# Patient Record
Sex: MALE | Race: WHITE | NOT HISPANIC OR LATINO
[De-identification: names, ages, dates, MRNs, and addresses within clinical notes are randomized per-mention and may not be internally consistent; named-entity substitution may affect disease eponyms.]

---

## 2018-01-09 ENCOUNTER — RECORD ABSTRACTING (OUTPATIENT)
Age: 83
End: 2018-01-09

## 2018-01-09 DIAGNOSIS — Z86.79 PERSONAL HISTORY OF OTHER DISEASES OF THE CIRCULATORY SYSTEM: ICD-10-CM

## 2018-01-09 DIAGNOSIS — E66.9 OBESITY, UNSPECIFIED: ICD-10-CM

## 2018-01-09 RX ORDER — METFORMIN HYDROCHLORIDE 1000 MG/1
1000 TABLET, COATED ORAL
Refills: 0 | Status: ACTIVE | COMMUNITY

## 2018-01-09 RX ORDER — ASPIRIN 81 MG
81 TABLET, DELAYED RELEASE (ENTERIC COATED) ORAL
Refills: 0 | Status: ACTIVE | COMMUNITY

## 2018-01-09 RX ORDER — VIT A/VIT C/VIT E/ZINC/COPPER 2148-113
TABLET ORAL
Refills: 0 | Status: ACTIVE | COMMUNITY

## 2018-01-29 ENCOUNTER — RX RENEWAL (OUTPATIENT)
Age: 83
End: 2018-01-29

## 2018-01-29 ENCOUNTER — MEDICATION RENEWAL (OUTPATIENT)
Age: 83
End: 2018-01-29

## 2018-02-05 ENCOUNTER — APPOINTMENT (OUTPATIENT)
Dept: CARDIOLOGY | Facility: CLINIC | Age: 83
End: 2018-02-05
Payer: MEDICARE

## 2018-02-05 VITALS
SYSTOLIC BLOOD PRESSURE: 130 MMHG | HEIGHT: 69 IN | BODY MASS INDEX: 31.84 KG/M2 | HEART RATE: 84 BPM | WEIGHT: 215 LBS | RESPIRATION RATE: 14 BRPM | DIASTOLIC BLOOD PRESSURE: 62 MMHG

## 2018-02-05 PROCEDURE — 93000 ELECTROCARDIOGRAM COMPLETE: CPT

## 2018-02-05 PROCEDURE — 99214 OFFICE O/P EST MOD 30 MIN: CPT

## 2018-02-05 RX ORDER — METOPROLOL SUCCINATE 50 MG/1
50 TABLET, EXTENDED RELEASE ORAL DAILY
Qty: 90 | Refills: 3 | Status: ACTIVE | COMMUNITY
Start: 1900-01-01 | End: 1900-01-01

## 2018-03-20 ENCOUNTER — MEDICATION RENEWAL (OUTPATIENT)
Age: 83
End: 2018-03-20

## 2018-03-20 RX ORDER — SIMVASTATIN 10 MG/1
10 TABLET, FILM COATED ORAL
Qty: 90 | Refills: 3 | Status: ACTIVE | COMMUNITY
Start: 1900-01-01 | End: 1900-01-01

## 2018-03-24 ENCOUNTER — APPOINTMENT (OUTPATIENT)
Dept: CARDIOLOGY | Facility: CLINIC | Age: 83
End: 2018-03-24
Payer: MEDICARE

## 2018-03-24 VITALS
RESPIRATION RATE: 14 BRPM | HEIGHT: 69 IN | HEART RATE: 78 BPM | BODY MASS INDEX: 31.84 KG/M2 | WEIGHT: 215 LBS | SYSTOLIC BLOOD PRESSURE: 148 MMHG | DIASTOLIC BLOOD PRESSURE: 70 MMHG

## 2018-03-24 PROCEDURE — 93000 ELECTROCARDIOGRAM COMPLETE: CPT

## 2018-03-24 PROCEDURE — 99214 OFFICE O/P EST MOD 30 MIN: CPT

## 2018-04-04 ENCOUNTER — RX RENEWAL (OUTPATIENT)
Age: 83
End: 2018-04-04

## 2018-04-04 RX ORDER — LISINOPRIL 10 MG/1
10 TABLET ORAL
Qty: 90 | Refills: 3 | Status: ACTIVE | COMMUNITY
Start: 2018-04-04 | End: 1900-01-01

## 2018-06-08 ENCOUNTER — APPOINTMENT (OUTPATIENT)
Dept: CARDIOLOGY | Facility: CLINIC | Age: 83
End: 2018-06-08
Payer: MEDICARE

## 2018-06-08 VITALS
DIASTOLIC BLOOD PRESSURE: 60 MMHG | RESPIRATION RATE: 14 BRPM | WEIGHT: 206 LBS | HEIGHT: 69 IN | SYSTOLIC BLOOD PRESSURE: 112 MMHG | HEART RATE: 81 BPM | BODY MASS INDEX: 30.51 KG/M2

## 2018-06-08 PROCEDURE — 99214 OFFICE O/P EST MOD 30 MIN: CPT

## 2018-06-08 PROCEDURE — 93000 ELECTROCARDIOGRAM COMPLETE: CPT

## 2018-06-08 RX ORDER — AZELASTINE HYDROCHLORIDE 137 UG/1
0.1 SPRAY, METERED NASAL
Qty: 30 | Refills: 0 | Status: DISCONTINUED | COMMUNITY
Start: 2018-04-20

## 2018-06-08 RX ORDER — AZITHROMYCIN 250 MG/1
250 TABLET, FILM COATED ORAL
Qty: 6 | Refills: 0 | Status: DISCONTINUED | COMMUNITY
Start: 2018-04-23

## 2018-06-08 RX ORDER — AMOXICILLIN 500 MG/1
500 CAPSULE ORAL
Qty: 4 | Refills: 0 | Status: DISCONTINUED | COMMUNITY
Start: 2018-01-16

## 2018-06-08 RX ORDER — AMOXICILLIN 875 MG/1
875 TABLET, FILM COATED ORAL
Qty: 20 | Refills: 0 | Status: DISCONTINUED | COMMUNITY
Start: 2018-01-19

## 2018-06-08 RX ORDER — LEVOFLOXACIN 500 MG/1
500 TABLET, FILM COATED ORAL
Qty: 5 | Refills: 0 | Status: DISCONTINUED | COMMUNITY
Start: 2018-04-20

## 2018-06-08 RX ORDER — BACITRACIN 500 [USP'U]/G
500 OINTMENT OPHTHALMIC
Qty: 4 | Refills: 0 | Status: DISCONTINUED | COMMUNITY
Start: 2018-04-16

## 2018-06-09 ENCOUNTER — APPOINTMENT (OUTPATIENT)
Dept: CARDIOLOGY | Facility: CLINIC | Age: 83
End: 2018-06-09
Payer: MEDICARE

## 2018-06-09 DIAGNOSIS — R94.31 ABNORMAL ELECTROCARDIOGRAM [ECG] [EKG]: ICD-10-CM

## 2018-06-09 PROCEDURE — 78452 HT MUSCLE IMAGE SPECT MULT: CPT

## 2018-06-09 PROCEDURE — 93015 CV STRESS TEST SUPVJ I&R: CPT

## 2018-06-09 PROCEDURE — A9500: CPT

## 2018-06-11 ENCOUNTER — APPOINTMENT (OUTPATIENT)
Dept: CARDIOLOGY | Facility: CLINIC | Age: 83
End: 2018-06-11
Payer: MEDICARE

## 2018-06-11 PROCEDURE — 93306 TTE W/DOPPLER COMPLETE: CPT

## 2018-06-13 PROBLEM — R94.31 ABNORMAL EKG: Status: ACTIVE | Noted: 2018-06-13

## 2018-06-13 RX ORDER — KIT FOR THE PREPARATION OF TECHNETIUM TC99M SESTAMIBI 1 MG/5ML
INJECTION, POWDER, LYOPHILIZED, FOR SOLUTION PARENTERAL
Refills: 0 | Status: COMPLETED | OUTPATIENT
Start: 2018-06-13

## 2018-06-13 RX ADMIN — KIT FOR THE PREPARATION OF TECHNETIUM TC99M SESTAMIBI 0: 1 INJECTION, POWDER, LYOPHILIZED, FOR SOLUTION PARENTERAL at 00:00

## 2018-06-27 ENCOUNTER — RX RENEWAL (OUTPATIENT)
Age: 83
End: 2018-06-27

## 2018-07-23 ENCOUNTER — APPOINTMENT (OUTPATIENT)
Dept: CARDIOLOGY | Facility: CLINIC | Age: 83
End: 2018-07-23

## 2018-07-26 ENCOUNTER — APPOINTMENT (OUTPATIENT)
Dept: CARDIOLOGY | Facility: CLINIC | Age: 83
End: 2018-07-26
Payer: MEDICARE

## 2018-07-26 VITALS
BODY MASS INDEX: 29.33 KG/M2 | DIASTOLIC BLOOD PRESSURE: 68 MMHG | HEIGHT: 69 IN | HEART RATE: 70 BPM | WEIGHT: 198 LBS | SYSTOLIC BLOOD PRESSURE: 124 MMHG | RESPIRATION RATE: 14 BRPM

## 2018-07-26 DIAGNOSIS — H02.9 UNSPECIFIED DISORDER OF EYELID: ICD-10-CM

## 2018-07-26 DIAGNOSIS — I38 ENDOCARDITIS, VALVE UNSPECIFIED: ICD-10-CM

## 2018-07-26 PROCEDURE — 93000 ELECTROCARDIOGRAM COMPLETE: CPT

## 2018-07-26 PROCEDURE — 99215 OFFICE O/P EST HI 40 MIN: CPT

## 2018-07-26 RX ORDER — CEPHALEXIN 500 MG/1
500 CAPSULE ORAL
Qty: 28 | Refills: 0 | Status: DISCONTINUED | COMMUNITY
Start: 2018-06-07 | End: 2018-07-26

## 2018-07-27 ENCOUNTER — APPOINTMENT (OUTPATIENT)
Dept: UROLOGY | Facility: CLINIC | Age: 83
End: 2018-07-27
Payer: MEDICARE

## 2018-07-27 VITALS
WEIGHT: 198 LBS | BODY MASS INDEX: 29.33 KG/M2 | TEMPERATURE: 97.9 F | SYSTOLIC BLOOD PRESSURE: 124 MMHG | HEART RATE: 67 BPM | DIASTOLIC BLOOD PRESSURE: 72 MMHG | RESPIRATION RATE: 14 BRPM | HEIGHT: 69 IN

## 2018-07-27 DIAGNOSIS — Z78.9 OTHER SPECIFIED HEALTH STATUS: ICD-10-CM

## 2018-07-27 DIAGNOSIS — Z85.46 PERSONAL HISTORY OF MALIGNANT NEOPLASM OF PROSTATE: ICD-10-CM

## 2018-07-27 DIAGNOSIS — E10.641 TYPE 1 DIABETES MELLITUS WITH HYPOGLYCEMIA WITH COMA: ICD-10-CM

## 2018-07-27 DIAGNOSIS — Z86.2 PERSONAL HISTORY OF DISEASES OF THE BLOOD AND BLOOD-FORMING ORGANS AND CERTAIN DISORDERS INVOLVING THE IMMUNE MECHANISM: ICD-10-CM

## 2018-07-27 PROBLEM — H02.9 EYELID LESION: Status: ACTIVE | Noted: 2018-07-27

## 2018-07-27 PROBLEM — I38 VALVULAR HEART DISEASE: Status: ACTIVE | Noted: 2018-01-09

## 2018-07-27 PROCEDURE — 99204 OFFICE O/P NEW MOD 45 MIN: CPT

## 2018-08-16 ENCOUNTER — CLINICAL ADVICE (OUTPATIENT)
Age: 83
End: 2018-08-16

## 2018-08-17 ENCOUNTER — OUTPATIENT (OUTPATIENT)
Dept: OUTPATIENT SERVICES | Facility: HOSPITAL | Age: 83
LOS: 1 days | End: 2018-08-17
Payer: MEDICARE

## 2018-08-17 ENCOUNTER — APPOINTMENT (OUTPATIENT)
Dept: UROLOGY | Facility: CLINIC | Age: 83
End: 2018-08-17
Payer: MEDICARE

## 2018-08-17 ENCOUNTER — INPATIENT (INPATIENT)
Facility: HOSPITAL | Age: 83
LOS: 6 days | Discharge: HOME | DRG: 728 | End: 2018-08-24
Attending: UROLOGY | Admitting: UROLOGY
Payer: MEDICARE

## 2018-08-17 VITALS
HEART RATE: 96 BPM | OXYGEN SATURATION: 98 % | SYSTOLIC BLOOD PRESSURE: 128 MMHG | DIASTOLIC BLOOD PRESSURE: 68 MMHG | TEMPERATURE: 99 F | WEIGHT: 197.98 LBS | RESPIRATION RATE: 18 BRPM

## 2018-08-17 VITALS
DIASTOLIC BLOOD PRESSURE: 73 MMHG | TEMPERATURE: 98.2 F | HEART RATE: 89 BPM | SYSTOLIC BLOOD PRESSURE: 145 MMHG | RESPIRATION RATE: 18 BRPM

## 2018-08-17 DIAGNOSIS — N41.2 ABSCESS OF PROSTATE: ICD-10-CM

## 2018-08-17 LAB
ANION GAP SERPL CALC-SCNC: 14 MMOL/L — SIGNIFICANT CHANGE UP (ref 5–17)
APPEARANCE UR: ABNORMAL
APTT BLD: 31.6 SEC — SIGNIFICANT CHANGE UP (ref 27.5–37.4)
BASOPHILS # BLD AUTO: 0 K/UL — SIGNIFICANT CHANGE UP (ref 0–0.2)
BASOPHILS NFR BLD AUTO: 0.1 % — SIGNIFICANT CHANGE UP (ref 0–2)
BILIRUB UR-MCNC: SIGNIFICANT CHANGE UP
BLD GP AB SCN SERPL QL: NEGATIVE — SIGNIFICANT CHANGE UP
BUN SERPL-MCNC: 24 MG/DL — HIGH (ref 7–23)
CALCIUM SERPL-MCNC: 8.7 MG/DL — SIGNIFICANT CHANGE UP (ref 8.4–10.5)
CHLORIDE SERPL-SCNC: 86 MMOL/L — LOW (ref 96–108)
CO2 SERPL-SCNC: 22 MMOL/L — SIGNIFICANT CHANGE UP (ref 22–31)
COLOR SPEC: SIGNIFICANT CHANGE UP
CREAT SERPL-MCNC: 1.18 MG/DL — SIGNIFICANT CHANGE UP (ref 0.5–1.3)
CRP SERPL-MCNC: 22.46 MG/DL — HIGH (ref 0–0.4)
DIFF PNL FLD: SIGNIFICANT CHANGE UP
EOSINOPHIL # BLD AUTO: 0 K/UL — SIGNIFICANT CHANGE UP (ref 0–0.5)
EOSINOPHIL NFR BLD AUTO: 0.2 % — SIGNIFICANT CHANGE UP (ref 0–6)
ERYTHROCYTE [SEDIMENTATION RATE] IN BLOOD: 96 MM/HR — HIGH (ref 0–20)
GLUCOSE BLDC GLUCOMTR-MCNC: 242 MG/DL — HIGH (ref 70–99)
GLUCOSE SERPL-MCNC: 267 MG/DL — HIGH (ref 70–99)
GLUCOSE UR QL: SIGNIFICANT CHANGE UP
HCT VFR BLD CALC: 32.8 % — LOW (ref 39–50)
HGB BLD-MCNC: 10.8 G/DL — LOW (ref 13–17)
INR BLD: 1.89 RATIO — HIGH (ref 0.88–1.16)
KETONES UR-MCNC: SIGNIFICANT CHANGE UP
LEUKOCYTE ESTERASE UR-ACNC: SIGNIFICANT CHANGE UP
LYMPHOCYTES # BLD AUTO: 0.8 K/UL — LOW (ref 1–3.3)
LYMPHOCYTES # BLD AUTO: 6.2 % — LOW (ref 13–44)
MAGNESIUM SERPL-MCNC: 1.8 MG/DL — SIGNIFICANT CHANGE UP (ref 1.6–2.6)
MCHC RBC-ENTMCNC: 27.1 PG — SIGNIFICANT CHANGE UP (ref 27–34)
MCHC RBC-ENTMCNC: 32.9 GM/DL — SIGNIFICANT CHANGE UP (ref 32–36)
MCV RBC AUTO: 82.4 FL — SIGNIFICANT CHANGE UP (ref 80–100)
MONOCYTES # BLD AUTO: 0.8 K/UL — SIGNIFICANT CHANGE UP (ref 0–0.9)
MONOCYTES NFR BLD AUTO: 5.8 % — SIGNIFICANT CHANGE UP (ref 2–14)
NEUTROPHILS # BLD AUTO: 11.5 K/UL — HIGH (ref 1.8–7.4)
NEUTROPHILS NFR BLD AUTO: 87.7 % — HIGH (ref 43–77)
NITRITE UR-MCNC: SIGNIFICANT CHANGE UP
PH UR: SIGNIFICANT CHANGE UP (ref 5–8)
PLATELET # BLD AUTO: 202 K/UL — SIGNIFICANT CHANGE UP (ref 150–400)
POTASSIUM SERPL-MCNC: 5.3 MMOL/L — SIGNIFICANT CHANGE UP (ref 3.5–5.3)
POTASSIUM SERPL-SCNC: 5.3 MMOL/L — SIGNIFICANT CHANGE UP (ref 3.5–5.3)
PROT UR-MCNC: SIGNIFICANT CHANGE UP
PROTHROM AB SERPL-ACNC: 20.7 SEC — HIGH (ref 9.8–12.7)
PSA FLD-MCNC: 0.66 NG/ML — SIGNIFICANT CHANGE UP (ref 0–4)
RBC # BLD: 3.99 M/UL — LOW (ref 4.2–5.8)
RBC # FLD: 13.6 % — SIGNIFICANT CHANGE UP (ref 10.3–14.5)
RH IG SCN BLD-IMP: POSITIVE — SIGNIFICANT CHANGE UP
SODIUM SERPL-SCNC: 122 MMOL/L — LOW (ref 135–145)
SP GR SPEC: 1.03 — HIGH (ref 1.01–1.02)
UROBILINOGEN FLD QL: SIGNIFICANT CHANGE UP
WBC # BLD: 13.2 K/UL — HIGH (ref 3.8–10.5)
WBC # FLD AUTO: 13.2 K/UL — HIGH (ref 3.8–10.5)

## 2018-08-17 PROCEDURE — 93010 ELECTROCARDIOGRAM REPORT: CPT

## 2018-08-17 PROCEDURE — 51702 INSERT TEMP BLADDER CATH: CPT

## 2018-08-17 PROCEDURE — 71045 X-RAY EXAM CHEST 1 VIEW: CPT | Mod: 26

## 2018-08-17 PROCEDURE — 99285 EMERGENCY DEPT VISIT HI MDM: CPT | Mod: 25

## 2018-08-17 PROCEDURE — 99222 1ST HOSP IP/OBS MODERATE 55: CPT | Mod: AI,25

## 2018-08-17 RX ORDER — DEXTROSE 50 % IN WATER 50 %
12.5 SYRINGE (ML) INTRAVENOUS ONCE
Qty: 0 | Refills: 0 | Status: DISCONTINUED | OUTPATIENT
Start: 2018-08-17 | End: 2018-08-24

## 2018-08-17 RX ORDER — MORPHINE SULFATE 50 MG/1
4 CAPSULE, EXTENDED RELEASE ORAL ONCE
Qty: 0 | Refills: 0 | Status: DISCONTINUED | OUTPATIENT
Start: 2018-08-17 | End: 2018-08-17

## 2018-08-17 RX ORDER — METOPROLOL TARTRATE 50 MG
50 TABLET ORAL DAILY
Qty: 0 | Refills: 0 | Status: DISCONTINUED | OUTPATIENT
Start: 2018-08-17 | End: 2018-08-24

## 2018-08-17 RX ORDER — SIMVASTATIN 20 MG/1
10 TABLET, FILM COATED ORAL AT BEDTIME
Qty: 0 | Refills: 0 | Status: DISCONTINUED | OUTPATIENT
Start: 2018-08-17 | End: 2018-08-24

## 2018-08-17 RX ORDER — DEXTROSE 50 % IN WATER 50 %
25 SYRINGE (ML) INTRAVENOUS ONCE
Qty: 0 | Refills: 0 | Status: DISCONTINUED | OUTPATIENT
Start: 2018-08-17 | End: 2018-08-24

## 2018-08-17 RX ORDER — PIPERACILLIN AND TAZOBACTAM 4; .5 G/20ML; G/20ML
3.38 INJECTION, POWDER, LYOPHILIZED, FOR SOLUTION INTRAVENOUS EVERY 8 HOURS
Qty: 0 | Refills: 0 | Status: DISCONTINUED | OUTPATIENT
Start: 2018-08-17 | End: 2018-08-23

## 2018-08-17 RX ORDER — INSULIN LISPRO 100/ML
VIAL (ML) SUBCUTANEOUS
Qty: 0 | Refills: 0 | Status: DISCONTINUED | OUTPATIENT
Start: 2018-08-17 | End: 2018-08-20

## 2018-08-17 RX ORDER — LISINOPRIL 2.5 MG/1
10 TABLET ORAL DAILY
Qty: 0 | Refills: 0 | Status: DISCONTINUED | OUTPATIENT
Start: 2018-08-17 | End: 2018-08-24

## 2018-08-17 RX ORDER — SODIUM CHLORIDE 9 MG/ML
1000 INJECTION INTRAMUSCULAR; INTRAVENOUS; SUBCUTANEOUS
Qty: 0 | Refills: 0 | Status: DISCONTINUED | OUTPATIENT
Start: 2018-08-17 | End: 2018-08-21

## 2018-08-17 RX ORDER — SODIUM CHLORIDE 9 MG/ML
1000 INJECTION, SOLUTION INTRAVENOUS
Qty: 0 | Refills: 0 | Status: DISCONTINUED | OUTPATIENT
Start: 2018-08-17 | End: 2018-08-24

## 2018-08-17 RX ORDER — GLUCAGON INJECTION, SOLUTION 0.5 MG/.1ML
1 INJECTION, SOLUTION SUBCUTANEOUS ONCE
Qty: 0 | Refills: 0 | Status: DISCONTINUED | OUTPATIENT
Start: 2018-08-17 | End: 2018-08-24

## 2018-08-17 RX ORDER — ACETAMINOPHEN 500 MG
650 TABLET ORAL EVERY 6 HOURS
Qty: 0 | Refills: 0 | Status: DISCONTINUED | OUTPATIENT
Start: 2018-08-17 | End: 2018-08-24

## 2018-08-17 RX ORDER — DEXTROSE 50 % IN WATER 50 %
15 SYRINGE (ML) INTRAVENOUS ONCE
Qty: 0 | Refills: 0 | Status: DISCONTINUED | OUTPATIENT
Start: 2018-08-17 | End: 2018-08-24

## 2018-08-17 RX ORDER — SODIUM CHLORIDE 9 MG/ML
1000 INJECTION INTRAMUSCULAR; INTRAVENOUS; SUBCUTANEOUS
Qty: 0 | Refills: 0 | Status: DISCONTINUED | OUTPATIENT
Start: 2018-08-17 | End: 2018-08-17

## 2018-08-17 RX ORDER — HEPARIN SODIUM 5000 [USP'U]/ML
5000 INJECTION INTRAVENOUS; SUBCUTANEOUS EVERY 8 HOURS
Qty: 0 | Refills: 0 | Status: DISCONTINUED | OUTPATIENT
Start: 2018-08-17 | End: 2018-08-19

## 2018-08-17 RX ORDER — TAMSULOSIN HYDROCHLORIDE 0.4 MG/1
0.4 CAPSULE ORAL AT BEDTIME
Qty: 0 | Refills: 0 | Status: DISCONTINUED | OUTPATIENT
Start: 2018-08-17 | End: 2018-08-24

## 2018-08-17 RX ORDER — VANCOMYCIN HCL 1 G
1000 VIAL (EA) INTRAVENOUS ONCE
Qty: 0 | Refills: 0 | Status: COMPLETED | OUTPATIENT
Start: 2018-08-17 | End: 2018-08-17

## 2018-08-17 RX ORDER — PIPERACILLIN AND TAZOBACTAM 4; .5 G/20ML; G/20ML
3.38 INJECTION, POWDER, LYOPHILIZED, FOR SOLUTION INTRAVENOUS ONCE
Qty: 0 | Refills: 0 | Status: COMPLETED | OUTPATIENT
Start: 2018-08-17 | End: 2018-08-17

## 2018-08-17 RX ORDER — SODIUM CHLORIDE 9 MG/ML
1000 INJECTION, SOLUTION INTRAVENOUS
Qty: 0 | Refills: 0 | Status: DISCONTINUED | OUTPATIENT
Start: 2018-08-17 | End: 2018-08-17

## 2018-08-17 RX ORDER — VANCOMYCIN HCL 1 G
1000 VIAL (EA) INTRAVENOUS EVERY 12 HOURS
Qty: 0 | Refills: 0 | Status: DISCONTINUED | OUTPATIENT
Start: 2018-08-18 | End: 2018-08-23

## 2018-08-17 RX ADMIN — TAMSULOSIN HYDROCHLORIDE 0.4 MILLIGRAM(S): 0.4 CAPSULE ORAL at 21:25

## 2018-08-17 RX ADMIN — MORPHINE SULFATE 4 MILLIGRAM(S): 50 CAPSULE, EXTENDED RELEASE ORAL at 16:05

## 2018-08-17 RX ADMIN — HEPARIN SODIUM 5000 UNIT(S): 5000 INJECTION INTRAVENOUS; SUBCUTANEOUS at 21:24

## 2018-08-17 RX ADMIN — SODIUM CHLORIDE 80 MILLILITER(S): 9 INJECTION, SOLUTION INTRAVENOUS at 16:05

## 2018-08-17 RX ADMIN — Medication 650 MILLIGRAM(S): at 16:30

## 2018-08-17 RX ADMIN — SIMVASTATIN 10 MILLIGRAM(S): 20 TABLET, FILM COATED ORAL at 21:25

## 2018-08-17 RX ADMIN — SODIUM CHLORIDE 75 MILLILITER(S): 9 INJECTION INTRAMUSCULAR; INTRAVENOUS; SUBCUTANEOUS at 19:43

## 2018-08-17 RX ADMIN — PIPERACILLIN AND TAZOBACTAM 25 GRAM(S): 4; .5 INJECTION, POWDER, LYOPHILIZED, FOR SOLUTION INTRAVENOUS at 20:16

## 2018-08-17 RX ADMIN — Medication 2: at 19:41

## 2018-08-17 RX ADMIN — Medication 250 MILLIGRAM(S): at 13:13

## 2018-08-17 RX ADMIN — PIPERACILLIN AND TAZOBACTAM 200 GRAM(S): 4; .5 INJECTION, POWDER, LYOPHILIZED, FOR SOLUTION INTRAVENOUS at 11:43

## 2018-08-17 NOTE — ED PROVIDER NOTE - PROGRESS NOTE DETAILS
Urology at bedside, probable admission. Slava Monaco: Urology PA recommended to add vancomycin, add on PSA, and admit to Dr. Irvin Burroughs [place]. Despite leukocytosis and mildly elevated HR do not think patient is septic given clinical appearance and lack of symptoms and likely chronic rather than acute infection.

## 2018-08-17 NOTE — ED PROVIDER NOTE - OBJECTIVE STATEMENT
82 yo m presents from urologist's office after Dooley cath replacement. Pt reports he had an MRI done and was found to have a mass over prostate. He had been having lower abdominal discomfort but denies being in any discomfort now. He denies fever, n/v/d. 82 yo m presents from urologist's office after Barbour cath replacement. Pt reports he had an MRI done and was found to have a mass over prostate concerning for abscess. He had been having lower abdominal discomfort chronically for months with mild worsening today when his urine wouldn't drain. A barbour was exchanged in the office at the [facility] imprvoing his pain and he denies being in any discomfort now. He denies fever, n/v/d. Report states 6.4cm rim enhancing lesion eroding seminal vessicles c/w intraprosthatic abscess. He denies fevers, chills, nausea, vomiting, diarrhea, night sweats.

## 2018-08-17 NOTE — ED PROVIDER NOTE - CARE PLAN
Principal Discharge DX:	Prostatic abscess  Secondary Diagnosis:	Chronic prostatitis  Secondary Diagnosis:	Urinary retention with incomplete bladder emptying

## 2018-08-17 NOTE — H&P ADULT - ASSESSMENT
84 y/o male s/p TURP 5/2018 complicated by persistent urinary retention and failed TOV with persistent abd, pelvic, penile pain sent in from Dr office with MRI results concerning for pelvic collection/abscess    Plan:  admit gu  upload outside images  iv abx  npo  IR consult for drainage

## 2018-08-17 NOTE — ED PROVIDER NOTE - ATTENDING CONTRIBUTION TO CARE
HPI, ROS, Exam and MDM by attending. ACP care supervised an any further documentation by ACP reviewed.

## 2018-08-17 NOTE — ED ADULT NURSE NOTE - NSIMPLEMENTINTERV_GEN_ALL_ED
Implemented All Fall with Harm Risk Interventions:  [place] to call system. Call bell, personal items and telephone within reach. Instruct patient to call for assistance. Room bathroom lighting operational. Non-slip footwear when patient is off stretcher. Physically safe environment: no spills, clutter or unnecessary equipment. Stretcher in lowest position, wheels locked, appropriate side rails in place. Provide visual cue, wrist band, yellow gown, etc. Monitor gait and stability. Monitor for mental status changes and reorient to person, place, and time. Review medications for side effects contributing to fall risk. Reinforce activity limits and safety measures with patient and family. Provide visual clues: red socks.

## 2018-08-17 NOTE — H&P ADULT - NSHPPHYSICALEXAM_GEN_ALL_CORE
pt in no acute distress  Back: no CVAT  Abd: soft NT ND  : 14F abrbour in place, urine pink         no tenderness to perineal

## 2018-08-17 NOTE — PROCEDURE NOTE - ADDITIONAL PROCEDURE DETAILS
Performed cystoscopy bedside for placement of barbour. High riding bladder neck noted. No dilation required, 20 Fr Salamatof placed without difficulty.

## 2018-08-17 NOTE — ED PROVIDER NOTE - NS ED ROS FT
CONST: no fevers, no chills, no trauma  EYES: no pain, no visual disturbances  ENT: no sore throat, no epistaxis, no rhinorrhea, no hearing changes  CV: no chest pain, no palpitations, no orthopnea, no extremity pain or swelling  RESP: no shortness of breath, no cough, no sputum, no pleurisy, no wheezing  ABD: no nausea, no vomiting, no diarrhea  : no dysuria, no hematuria, no frequency, no urgency  MSK: no back pain, no neck pain, no extremity pain  NEURO: no headache, no sensory disturbances, no focal weakness, no dizziness  HEME: no easy bleeding or bruising  SKIN: no diaphoresis, no rash CONST: no fevers, no chills, no trauma  EYES: no pain, no visual disturbances  ENT: no sore throat, no epistaxis, no rhinorrhea, no hearing changes  CV: no chest pain, no palpitations, no orthopnea, no extremity pain or swelling  RESP: no shortness of breath, no cough, no sputum, no pleurisy, no wheezing  ABD: see HPI, no nausea, no vomiting, no diarrhea  : see HPI  MSK: no back pain, no neck pain, no extremity pain  NEURO: no headache, no sensory disturbances, no focal weakness, no dizziness  HEME: no easy bleeding or bruising  SKIN: no diaphoresis, no rash

## 2018-08-17 NOTE — ED PROVIDER NOTE - PHYSICAL EXAMINATION
VITALS: reviewed  GEN: NAD, A & O x 4  HEAD/EYES: NCAT, PERRL, EOMI, anicteric sclerae, no conjunctival pallor  ENT: mucus membranes moist, oropharynx WNL, trachea midline, no JVD  RESP: lungs CTA with equal breath sounds bilaterally, chest wall nontender and atraumatic  CV: heart with reg rhythm S1, S2, no murmur; distal pulses intact and symmetric bilaterally  ABDOMEN: Suprapubic tenderness. normoactive bowel sounds, soft, nondistended  : Dooley in place, uncircumcised. Some blood at meatus. Bloody, clotty urine in Dooley bag.  MSK: extremities atraumatic and nontender, no edema, no asymmetry. the back is without midline or lateral tenderness, there is no spinal deformity or stepoff and the back is ranged painlessly. the neck has no midline tenderness, deformity, or stepoff, and is ranged painlessly.  SKIN: warm, dry, no rash, no bruising, no cyanosis. color appropriate for ethnicity  NEURO: alert, mentating appropriately, no facial asymmetry. gross sensation, motor, coordination are intact  PSYCH: Affect appropriate VITALS: reviewed and high normal heart rate, no other SIRS criteria.  GEN: NAD, A & O x 4  HEAD/EYES: NCAT, PERRL, EOMI, anicteric sclerae, no conjunctival pallor  ENT: mucus membranes moist, oropharynx WNL, trachea midline, no JVD  RESP: lungs CTA with equal breath sounds bilaterally, chest wall nontender and atraumatic  CV: heart with reg rhythm S1, S2, no murmur; distal pulses intact and symmetric bilaterally  ABDOMEN: Mild suprapubic tenderness. No rebound, no guarding, normoactive bowel sounds, soft, nondistended  : Dooley in place, uncircumcised. Trace blood at meatus. Bloody, cloudy, urine in Dooley bag. No CVA tenderness  MSK: extremities atraumatic and nontender, no edema, no asymmetry. the back is without midline or lateral tenderness, there is no spinal deformity or stepoff and the back is ranged painlessly. the neck has no midline tenderness, deformity, or stepoff, and is ranged painlessly.  SKIN: warm, dry, no rash, no bruising, no cyanosis. color appropriate for ethnicity  NEURO: alert, mentating appropriately, no facial asymmetry. gross sensation, motor, coordination are intact  PSYCH: Affect appropriate

## 2018-08-17 NOTE — ED PROVIDER NOTE - MEDICAL DECISION MAKING DETAILS
Well appearing pt with chronic Dooley presents for MRI read, suspicious for prostatic abscess. No systemic symptoms. Suspected secondary to chronic prostatitis. Pt is not septic. Will give antibiotics, get urology consult, probable admission for drainage and treatment. Pt to remain NPO until cleared by urology.

## 2018-08-17 NOTE — H&P ADULT - NSHPLABSRESULTS_GEN_ALL_CORE
PT/INR - ( 17 Aug 2018 11:38 )   PT: 20.7 sec;   INR: 1.89 ratio         PTT - ( 17 Aug 2018 11:38 )  PTT:31.6 sec

## 2018-08-17 NOTE — ED ADULT NURSE NOTE - OBJECTIVE STATEMENT
84 y/o male a+ox3, pmhx prostate CA, COW, TRP, afib on Elliquis, coming from urologists office for pelvic mass. Pt reports onset of urinary retention despite TRP procedure in June 2018; 2-3 days MRI found "mass" and "possible abscess" as per wife at bedside. Pt went to urologist office today for barbour placement; upon arrival to ED roughly 50cc laine colored urine noted; pt denies pain or discomfort to abdomen, penis or barbour site; states barbour has "really helped the pain." Pt currently denies chest pain or discomfort, headache, feeling lightheaded, dizziness, difficulty breathing, n/v/d, fever or chills. IV established, labs obtained. Pt left in position of comfort, guard rails up, bed low and locked. Will reassess

## 2018-08-17 NOTE — H&P ADULT - HISTORY OF PRESENT ILLNESS
84 y/o male s/p TURP 5/2018 with outside urologist Noé Lynn complicated by persistent urinary retention and failed TOV with persistent abd, pelvic, penile pain.  pt was seen in urology office earlier this week and started on cipro for possible infectious process and MRI ordered.  yesterday pt evaluated by GI to r/o gastro source of abd pain. negative  this am pt seen in Dr Moralez office, barbour changed, MRI reviewed and pt sent in for admission possible IR drainage

## 2018-08-18 ENCOUNTER — MOBILE ON CALL (OUTPATIENT)
Age: 83
End: 2018-08-18

## 2018-08-18 LAB
ANION GAP SERPL CALC-SCNC: 13 MMOL/L — SIGNIFICANT CHANGE UP (ref 5–17)
APPEARANCE UR: ABNORMAL
BACTERIA # UR AUTO: NEGATIVE — SIGNIFICANT CHANGE UP
BASOPHILS # BLD AUTO: 0 K/UL — SIGNIFICANT CHANGE UP (ref 0–0.2)
BASOPHILS NFR BLD AUTO: 0.1 % — SIGNIFICANT CHANGE UP (ref 0–2)
BILIRUB UR-MCNC: NEGATIVE — SIGNIFICANT CHANGE UP
BUN SERPL-MCNC: 25 MG/DL — HIGH (ref 7–23)
CALCIUM SERPL-MCNC: 8.6 MG/DL — SIGNIFICANT CHANGE UP (ref 8.4–10.5)
CHLORIDE SERPL-SCNC: 95 MMOL/L — LOW (ref 96–108)
CO2 SERPL-SCNC: 22 MMOL/L — SIGNIFICANT CHANGE UP (ref 22–31)
COLOR SPEC: YELLOW — SIGNIFICANT CHANGE UP
CREAT SERPL-MCNC: 1.34 MG/DL — HIGH (ref 0.5–1.3)
DIFF PNL FLD: ABNORMAL
EOSINOPHIL # BLD AUTO: 0 K/UL — SIGNIFICANT CHANGE UP (ref 0–0.5)
EOSINOPHIL NFR BLD AUTO: 0.5 % — SIGNIFICANT CHANGE UP (ref 0–6)
EPI CELLS # UR: 0 /HPF — SIGNIFICANT CHANGE UP (ref 0–5)
GLUCOSE BLDC GLUCOMTR-MCNC: 165 MG/DL — HIGH (ref 70–99)
GLUCOSE BLDC GLUCOMTR-MCNC: 204 MG/DL — HIGH (ref 70–99)
GLUCOSE BLDC GLUCOMTR-MCNC: 254 MG/DL — HIGH (ref 70–99)
GLUCOSE BLDC GLUCOMTR-MCNC: 302 MG/DL — HIGH (ref 70–99)
GLUCOSE SERPL-MCNC: 248 MG/DL — HIGH (ref 70–99)
GLUCOSE UR QL: NEGATIVE MG/DL — SIGNIFICANT CHANGE UP
HBA1C BLD-MCNC: 8 % — HIGH (ref 4–5.6)
HCT VFR BLD CALC: 29.8 % — LOW (ref 39–50)
HGB BLD-MCNC: 9.8 G/DL — LOW (ref 13–17)
HYALINE CASTS # UR AUTO: 0 /LPF — SIGNIFICANT CHANGE UP (ref 0–7)
KETONES UR-MCNC: NEGATIVE — SIGNIFICANT CHANGE UP
LEUKOCYTE ESTERASE UR-ACNC: ABNORMAL
LYMPHOCYTES # BLD AUTO: 0.9 K/UL — LOW (ref 1–3.3)
LYMPHOCYTES # BLD AUTO: 9.7 % — LOW (ref 13–44)
MCHC RBC-ENTMCNC: 27 PG — SIGNIFICANT CHANGE UP (ref 27–34)
MCHC RBC-ENTMCNC: 33 GM/DL — SIGNIFICANT CHANGE UP (ref 32–36)
MCV RBC AUTO: 81.9 FL — SIGNIFICANT CHANGE UP (ref 80–100)
MONOCYTES # BLD AUTO: 0.7 K/UL — SIGNIFICANT CHANGE UP (ref 0–0.9)
MONOCYTES NFR BLD AUTO: 7.8 % — SIGNIFICANT CHANGE UP (ref 2–14)
NEUTROPHILS # BLD AUTO: 7.5 K/UL — HIGH (ref 1.8–7.4)
NEUTROPHILS NFR BLD AUTO: 81.8 % — HIGH (ref 43–77)
NITRITE UR-MCNC: NEGATIVE — SIGNIFICANT CHANGE UP
PH UR: 6 — SIGNIFICANT CHANGE UP (ref 5–8)
PLATELET # BLD AUTO: 199 K/UL — SIGNIFICANT CHANGE UP (ref 150–400)
POTASSIUM SERPL-MCNC: 4.5 MMOL/L — SIGNIFICANT CHANGE UP (ref 3.5–5.3)
POTASSIUM SERPL-SCNC: 4.5 MMOL/L — SIGNIFICANT CHANGE UP (ref 3.5–5.3)
PROT UR-MCNC: 30 MG/DL
RBC # BLD: 3.64 M/UL — LOW (ref 4.2–5.8)
RBC # FLD: 12.9 % — SIGNIFICANT CHANGE UP (ref 10.3–14.5)
RBC CASTS # UR COMP ASSIST: 15 /HPF — HIGH (ref 0–4)
SODIUM SERPL-SCNC: 130 MMOL/L — LOW (ref 135–145)
SP GR SPEC: 1.01 — LOW (ref 1.01–1.02)
UROBILINOGEN FLD QL: NEGATIVE MG/DL — SIGNIFICANT CHANGE UP
WBC # BLD: 9.2 K/UL — SIGNIFICANT CHANGE UP (ref 3.8–10.5)
WBC # FLD AUTO: 9.2 K/UL — SIGNIFICANT CHANGE UP (ref 3.8–10.5)
WBC UR QL: 494 /HPF — HIGH (ref 0–5)

## 2018-08-18 RX ORDER — ACETAMINOPHEN 500 MG
650 TABLET ORAL EVERY 6 HOURS
Qty: 0 | Refills: 0 | Status: DISCONTINUED | OUTPATIENT
Start: 2018-08-18 | End: 2018-08-24

## 2018-08-18 RX ORDER — OXYCODONE HYDROCHLORIDE 5 MG/1
5 TABLET ORAL EVERY 6 HOURS
Qty: 0 | Refills: 0 | Status: DISCONTINUED | OUTPATIENT
Start: 2018-08-18 | End: 2018-08-24

## 2018-08-18 RX ORDER — DOCUSATE SODIUM 100 MG
100 CAPSULE ORAL
Qty: 0 | Refills: 0 | Status: DISCONTINUED | OUTPATIENT
Start: 2018-08-18 | End: 2018-08-24

## 2018-08-18 RX ORDER — OXYCODONE HYDROCHLORIDE 5 MG/1
10 TABLET ORAL EVERY 6 HOURS
Qty: 0 | Refills: 0 | Status: DISCONTINUED | OUTPATIENT
Start: 2018-08-18 | End: 2018-08-24

## 2018-08-18 RX ORDER — INSULIN LISPRO 100/ML
VIAL (ML) SUBCUTANEOUS AT BEDTIME
Qty: 0 | Refills: 0 | Status: DISCONTINUED | OUTPATIENT
Start: 2018-08-18 | End: 2018-08-20

## 2018-08-18 RX ORDER — SENNA PLUS 8.6 MG/1
2 TABLET ORAL AT BEDTIME
Qty: 0 | Refills: 0 | Status: DISCONTINUED | OUTPATIENT
Start: 2018-08-18 | End: 2018-08-24

## 2018-08-18 RX ORDER — ACETAMINOPHEN 500 MG
650 TABLET ORAL ONCE
Qty: 0 | Refills: 0 | Status: COMPLETED | OUTPATIENT
Start: 2018-08-18 | End: 2018-08-18

## 2018-08-18 RX ORDER — ACETAMINOPHEN 500 MG
650 TABLET ORAL EVERY 6 HOURS
Qty: 0 | Refills: 0 | Status: DISCONTINUED | OUTPATIENT
Start: 2018-08-18 | End: 2018-08-18

## 2018-08-18 RX ADMIN — Medication 650 MILLIGRAM(S): at 05:53

## 2018-08-18 RX ADMIN — PIPERACILLIN AND TAZOBACTAM 25 GRAM(S): 4; .5 INJECTION, POWDER, LYOPHILIZED, FOR SOLUTION INTRAVENOUS at 04:51

## 2018-08-18 RX ADMIN — Medication 650 MILLIGRAM(S): at 04:53

## 2018-08-18 RX ADMIN — LISINOPRIL 10 MILLIGRAM(S): 2.5 TABLET ORAL at 05:53

## 2018-08-18 RX ADMIN — OXYCODONE HYDROCHLORIDE 10 MILLIGRAM(S): 5 TABLET ORAL at 21:31

## 2018-08-18 RX ADMIN — Medication 3: at 08:44

## 2018-08-18 RX ADMIN — Medication 50 MILLIGRAM(S): at 05:53

## 2018-08-18 RX ADMIN — Medication 100 MILLIGRAM(S): at 11:31

## 2018-08-18 RX ADMIN — Medication 30 MILLILITER(S): at 15:23

## 2018-08-18 RX ADMIN — OXYCODONE HYDROCHLORIDE 10 MILLIGRAM(S): 5 TABLET ORAL at 22:31

## 2018-08-18 RX ADMIN — TAMSULOSIN HYDROCHLORIDE 0.4 MILLIGRAM(S): 0.4 CAPSULE ORAL at 21:28

## 2018-08-18 RX ADMIN — Medication 30 MILLILITER(S): at 19:28

## 2018-08-18 RX ADMIN — HEPARIN SODIUM 5000 UNIT(S): 5000 INJECTION INTRAVENOUS; SUBCUTANEOUS at 15:15

## 2018-08-18 RX ADMIN — Medication 650 MILLIGRAM(S): at 18:45

## 2018-08-18 RX ADMIN — Medication 250 MILLIGRAM(S): at 12:23

## 2018-08-18 RX ADMIN — SENNA PLUS 2 TABLET(S): 8.6 TABLET ORAL at 21:28

## 2018-08-18 RX ADMIN — SODIUM CHLORIDE 75 MILLILITER(S): 9 INJECTION INTRAMUSCULAR; INTRAVENOUS; SUBCUTANEOUS at 05:53

## 2018-08-18 RX ADMIN — OXYCODONE HYDROCHLORIDE 5 MILLIGRAM(S): 5 TABLET ORAL at 13:39

## 2018-08-18 RX ADMIN — HEPARIN SODIUM 5000 UNIT(S): 5000 INJECTION INTRAVENOUS; SUBCUTANEOUS at 21:27

## 2018-08-18 RX ADMIN — OXYCODONE HYDROCHLORIDE 5 MILLIGRAM(S): 5 TABLET ORAL at 14:09

## 2018-08-18 RX ADMIN — Medication 650 MILLIGRAM(S): at 10:26

## 2018-08-18 RX ADMIN — Medication 250 MILLIGRAM(S): at 01:19

## 2018-08-18 RX ADMIN — SIMVASTATIN 10 MILLIGRAM(S): 20 TABLET, FILM COATED ORAL at 21:28

## 2018-08-18 RX ADMIN — Medication 2: at 17:23

## 2018-08-18 RX ADMIN — PIPERACILLIN AND TAZOBACTAM 25 GRAM(S): 4; .5 INJECTION, POWDER, LYOPHILIZED, FOR SOLUTION INTRAVENOUS at 15:24

## 2018-08-18 RX ADMIN — HEPARIN SODIUM 5000 UNIT(S): 5000 INJECTION INTRAVENOUS; SUBCUTANEOUS at 05:53

## 2018-08-18 RX ADMIN — PIPERACILLIN AND TAZOBACTAM 25 GRAM(S): 4; .5 INJECTION, POWDER, LYOPHILIZED, FOR SOLUTION INTRAVENOUS at 22:31

## 2018-08-18 RX ADMIN — Medication 4: at 13:28

## 2018-08-18 RX ADMIN — Medication 650 MILLIGRAM(S): at 18:15

## 2018-08-19 LAB
ANION GAP SERPL CALC-SCNC: 12 MMOL/L — SIGNIFICANT CHANGE UP (ref 5–17)
APTT BLD: 27.4 SEC — LOW (ref 27.5–37.4)
BUN SERPL-MCNC: 20 MG/DL — SIGNIFICANT CHANGE UP (ref 7–23)
CALCIUM SERPL-MCNC: 9 MG/DL — SIGNIFICANT CHANGE UP (ref 8.4–10.5)
CHLORIDE SERPL-SCNC: 95 MMOL/L — LOW (ref 96–108)
CO2 SERPL-SCNC: 22 MMOL/L — SIGNIFICANT CHANGE UP (ref 22–31)
CREAT SERPL-MCNC: 1.36 MG/DL — HIGH (ref 0.5–1.3)
CULTURE RESULTS: NO GROWTH — SIGNIFICANT CHANGE UP
GLUCOSE BLDC GLUCOMTR-MCNC: 221 MG/DL — HIGH (ref 70–99)
GLUCOSE BLDC GLUCOMTR-MCNC: 248 MG/DL — HIGH (ref 70–99)
GLUCOSE BLDC GLUCOMTR-MCNC: 250 MG/DL — HIGH (ref 70–99)
GLUCOSE BLDC GLUCOMTR-MCNC: 355 MG/DL — HIGH (ref 70–99)
GLUCOSE SERPL-MCNC: 222 MG/DL — HIGH (ref 70–99)
HCT VFR BLD CALC: 29.7 % — LOW (ref 39–50)
HGB BLD-MCNC: 9.9 G/DL — LOW (ref 13–17)
INR BLD: 1.27 RATIO — HIGH (ref 0.88–1.16)
MCHC RBC-ENTMCNC: 27.6 PG — SIGNIFICANT CHANGE UP (ref 27–34)
MCHC RBC-ENTMCNC: 33.3 GM/DL — SIGNIFICANT CHANGE UP (ref 32–36)
MCV RBC AUTO: 82.9 FL — SIGNIFICANT CHANGE UP (ref 80–100)
PLATELET # BLD AUTO: 196 K/UL — SIGNIFICANT CHANGE UP (ref 150–400)
POTASSIUM SERPL-MCNC: 4.5 MMOL/L — SIGNIFICANT CHANGE UP (ref 3.5–5.3)
POTASSIUM SERPL-SCNC: 4.5 MMOL/L — SIGNIFICANT CHANGE UP (ref 3.5–5.3)
PROTHROM AB SERPL-ACNC: 13.9 SEC — HIGH (ref 9.8–12.7)
RBC # BLD: 3.58 M/UL — LOW (ref 4.2–5.8)
RBC # FLD: 13.4 % — SIGNIFICANT CHANGE UP (ref 10.3–14.5)
SODIUM SERPL-SCNC: 129 MMOL/L — LOW (ref 135–145)
SPECIMEN SOURCE: SIGNIFICANT CHANGE UP
VANCOMYCIN TROUGH SERPL-MCNC: 10.9 UG/ML — SIGNIFICANT CHANGE UP (ref 10–20)
WBC # BLD: 9.1 K/UL — SIGNIFICANT CHANGE UP (ref 3.8–10.5)
WBC # FLD AUTO: 9.1 K/UL — SIGNIFICANT CHANGE UP (ref 3.8–10.5)

## 2018-08-19 RX ORDER — SIMETHICONE 80 MG/1
80 TABLET, CHEWABLE ORAL EVERY 4 HOURS
Qty: 0 | Refills: 0 | Status: DISCONTINUED | OUTPATIENT
Start: 2018-08-19 | End: 2018-08-24

## 2018-08-19 RX ADMIN — Medication 650 MILLIGRAM(S): at 18:59

## 2018-08-19 RX ADMIN — PIPERACILLIN AND TAZOBACTAM 25 GRAM(S): 4; .5 INJECTION, POWDER, LYOPHILIZED, FOR SOLUTION INTRAVENOUS at 15:52

## 2018-08-19 RX ADMIN — Medication 250 MILLIGRAM(S): at 01:01

## 2018-08-19 RX ADMIN — Medication 250 MILLIGRAM(S): at 12:35

## 2018-08-19 RX ADMIN — Medication 650 MILLIGRAM(S): at 11:59

## 2018-08-19 RX ADMIN — Medication 2: at 08:52

## 2018-08-19 RX ADMIN — SIMVASTATIN 10 MILLIGRAM(S): 20 TABLET, FILM COATED ORAL at 21:51

## 2018-08-19 RX ADMIN — SIMETHICONE 80 MILLIGRAM(S): 80 TABLET, CHEWABLE ORAL at 12:34

## 2018-08-19 RX ADMIN — Medication 650 MILLIGRAM(S): at 11:29

## 2018-08-19 RX ADMIN — SIMETHICONE 80 MILLIGRAM(S): 80 TABLET, CHEWABLE ORAL at 16:18

## 2018-08-19 RX ADMIN — SENNA PLUS 2 TABLET(S): 8.6 TABLET ORAL at 21:51

## 2018-08-19 RX ADMIN — TAMSULOSIN HYDROCHLORIDE 0.4 MILLIGRAM(S): 0.4 CAPSULE ORAL at 21:51

## 2018-08-19 RX ADMIN — PIPERACILLIN AND TAZOBACTAM 25 GRAM(S): 4; .5 INJECTION, POWDER, LYOPHILIZED, FOR SOLUTION INTRAVENOUS at 22:21

## 2018-08-19 RX ADMIN — Medication 5: at 11:38

## 2018-08-19 RX ADMIN — HEPARIN SODIUM 5000 UNIT(S): 5000 INJECTION INTRAVENOUS; SUBCUTANEOUS at 21:51

## 2018-08-19 RX ADMIN — Medication 2: at 17:22

## 2018-08-19 RX ADMIN — HEPARIN SODIUM 5000 UNIT(S): 5000 INJECTION INTRAVENOUS; SUBCUTANEOUS at 15:52

## 2018-08-19 NOTE — PROGRESS NOTE ADULT - ASSESSMENT
83M with hx of prostate ca s/p brachy s/p TURP and TUR of bladder neck contracture with multiple failed TOV with barbour placed in ER under cystoscopic guidance, found to have prostatic abscess and ?masses in prostate, planning for IR drainage and bx    PLAN:    - Okay to continue holding elliquis per cardiology  - BMP, CBC, T & S, Vanc trough  - Continue Barbour  - Continue Vanc/Zosyn  - IR to place drain and bx masses Monday; will follow up drainage cultures. 83M with hx of prostate ca s/p brachy s/p TURP and TUR of bladder neck contracture with multiple failed TOV with barbour placed in ER under cystoscopic guidance, found to have prostatic abscess and ?masses in prostate, planning for IR drainage and bx    PLAN:    - Okay to continue holding elliquis per cardiology  - pre-op today, NPO after MN/ IVF  - BMP, CBC, T & S, Vanc trough  - Continue Barbour  - Continue Vanc/Zosyn  - IR to place drain and bx masses Monday; will follow up drainage cultures.  - continue OOB

## 2018-08-19 NOTE — PROGRESS NOTE ADULT - SUBJECTIVE AND OBJECTIVE BOX
Subjective:  No fevers o/n, patient feeling well. Vanc trough 10.9    Objectives:  T(C): 36.6 (18 @ 05:24), Max: 37.1 (18 @ 21:10)  HR: 99 (18 @ 05:24) (94 - 99)  BP: 149/72 (18 @ 05:24) (137/70 - 149/72)  RR: 18 (18 @ 05:24) (18 - 18)  SpO2: 97% (18 @ 05:24) (95% - 98%)  Wt(kg): --     @ 07:01  -   @ 07:00  --------------------------------------------------------  IN:    Oral Fluid: 240 mL    sodium chloride 0.9%.: 75 mL  Total IN: 315 mL    OUT:    Indwelling Catheter - Urethral: 1400 mL    Intermittent Catheterization - Urethral: 900 mL  Total OUT: 2300 mL    Total NET: -1985 mL       @ 07:01  -  08 @ 06:38  --------------------------------------------------------  IN:    Oral Fluid: 1200 mL    sodium chloride 0.9%.: 900 mL  Total IN: 2100 mL    OUT:    Indwelling Catheter - Urethral: 3800 mL  Total OUT: 3800 mL    Total NET: -1700 mL          Physcial Exam  GENERAL:   ABDOMEN:   GENITOURINARY:  WOUND:  DRAINS:  PSYCH: AAOx3    LABS:                        9.9    9.1   )-----------( 196      ( 19 Aug 2018 00:23 )             29.7     08    129<L>  |  95<L>  |  20  ----------------------------<  222<H>  4.5   |  22  |  1.36<H>    Ca    9.0      19 Aug 2018 00:23  Mg     1.8     08-      CAPILLARY BLOOD GLUCOSE      POCT Blood Glucose.: 165 mg/dL (18 Aug 2018 21:01)    PT/INR - ( 19 Aug 2018 00:23 )   PT: 13.9 sec;   INR: 1.27 ratio         PTT - ( 19 Aug 2018 00:23 )  PTT:27.4 sec  CAPILLARY BLOOD GLUCOSE      POCT Blood Glucose.: 165 mg/dL (18 Aug 2018 21:01)    Urinalysis Basic - ( 18 Aug 2018 14:49 )    Color: Yellow / Appearance: Slightly Turbid / S.009 / pH: x  Gluc: x / Ketone: Negative  / Bili: Negative / Urobili: Negative mg/dL   Blood: x / Protein: 30 mg/dL / Nitrite: Negative   Leuk Esterase: Large / RBC: 15 /HPF /  /HPF   Sq Epi: x / Non Sq Epi: 0 /HPF / Bacteria: Negative        ASSESSMENT:  83y Male with Abscess of prostate  Prostatic abscess       PLAN: Subjective:  No fevers o/n, patient feeling well. Vanc trough 10.9, +BM, c/o feel lot of gas in the abdomen cause discomfort. Ambulating well, tolerated regular diet    Objectives:  T(C): 36.6 (18 @ 05:24), Max: 37.1 (18 @ 21:10)  HR: 99 (18 @ 05:24) (94 - 99)  BP: 149/72 (18 @ 05:24) (137/70 - 149/72)  RR: 18 (18 @ 05:24) (18 - 18)  SpO2: 97% (18 @ 05:24) (95% - 98%)  Wt(kg): --     @ 07:01  -   @ 07:00  --------------------------------------------------------  IN:    Oral Fluid: 240 mL    sodium chloride 0.9%.: 75 mL  Total IN: 315 mL    OUT:    Indwelling Catheter - Urethral: 1400 mL    Intermittent Catheterization - Urethral: 900 mL  Total OUT: 2300 mL    Total NET: -1985 mL       @ 07:01  -   @ 06:38  --------------------------------------------------------  IN:    Oral Fluid: 1200 mL    sodium chloride 0.9%.: 900 mL  Total IN: 2100 mL    OUT:    Indwelling Catheter - Urethral: 3800 mL  Total OUT: 3800 mL    Total NET: -1700 mL          Physcial Exam  General: Not in acute distress   Skin: warm, no cyanosis   Abdo: Soft, Non-tender, mild distended,    Back: No CVAT B/L   Extremity: No calf tenderness, No edema   : , Dooley in place, No scrotal swelling, No testicular swelling. No erythema, No bleeding/discharge, No crepitation, no fluctuance, no paraphimosis   Neuro: A&O x3      LABS:                        9.9    9.1   )-----------( 196      ( 19 Aug 2018 00:23 )             29.7     08-19    129<L>  |  95<L>  |  20  ----------------------------<  222<H>  4.5   |  22  |  1.36<H>    Ca    9.0      19 Aug 2018 00:23  Mg     1.8     08-17      CAPILLARY BLOOD GLUCOSE      POCT Blood Glucose.: 165 mg/dL (18 Aug 2018 21:01)    PT/INR - ( 19 Aug 2018 00:23 )   PT: 13.9 sec;   INR: 1.27 ratio         PTT - ( 19 Aug 2018 00:23 )  PTT:27.4 sec  CAPILLARY BLOOD GLUCOSE      POCT Blood Glucose.: 165 mg/dL (18 Aug 2018 21:01)    Urinalysis Basic - ( 18 Aug 2018 14:49 )    Color: Yellow / Appearance: Slightly Turbid / S.009 / pH: x  Gluc: x / Ketone: Negative  / Bili: Negative / Urobili: Negative mg/dL   Blood: x / Protein: 30 mg/dL / Nitrite: Negative   Leuk Esterase: Large / RBC: 15 /HPF /  /HPF   Sq Epi: x / Non Sq Epi: 0 /HPF / Bacteria: Negative        ASSESSMENT:  83y Male with Abscess of prostate  Prostatic abscess       PLAN:

## 2018-08-20 ENCOUNTER — RESULT REVIEW (OUTPATIENT)
Age: 83
End: 2018-08-20

## 2018-08-20 DIAGNOSIS — R33.8 OTHER RETENTION OF URINE: ICD-10-CM

## 2018-08-20 DIAGNOSIS — N42.89 OTHER SPECIFIED DISORDERS OF PROSTATE: ICD-10-CM

## 2018-08-20 LAB
GLUCOSE BLDC GLUCOMTR-MCNC: 228 MG/DL — HIGH (ref 70–99)
GLUCOSE BLDC GLUCOMTR-MCNC: 260 MG/DL — HIGH (ref 70–99)
GLUCOSE BLDC GLUCOMTR-MCNC: 301 MG/DL — HIGH (ref 70–99)
GRAM STN FLD: SIGNIFICANT CHANGE UP
SPECIMEN SOURCE: SIGNIFICANT CHANGE UP
VANCOMYCIN TROUGH SERPL-MCNC: 12.6 UG/ML — SIGNIFICANT CHANGE UP (ref 10–20)

## 2018-08-20 PROCEDURE — 88112 CYTOPATH CELL ENHANCE TECH: CPT | Mod: 26

## 2018-08-20 PROCEDURE — 99232 SBSQ HOSP IP/OBS MODERATE 35: CPT

## 2018-08-20 PROCEDURE — 88305 TISSUE EXAM BY PATHOLOGIST: CPT | Mod: 26

## 2018-08-20 PROCEDURE — 88305 TISSUE EXAM BY PATHOLOGIST: CPT

## 2018-08-20 PROCEDURE — 88112 CYTOPATH CELL ENHANCE TECH: CPT

## 2018-08-20 PROCEDURE — 49406 IMAGE CATH FLUID PERI/RETRO: CPT

## 2018-08-20 PROCEDURE — 51702 INSERT TEMP BLADDER CATH: CPT

## 2018-08-20 RX ORDER — INSULIN LISPRO 100/ML
VIAL (ML) SUBCUTANEOUS AT BEDTIME
Qty: 0 | Refills: 0 | Status: DISCONTINUED | OUTPATIENT
Start: 2018-08-20 | End: 2018-08-24

## 2018-08-20 RX ORDER — INSULIN LISPRO 100/ML
VIAL (ML) SUBCUTANEOUS
Qty: 0 | Refills: 0 | Status: DISCONTINUED | OUTPATIENT
Start: 2018-08-20 | End: 2018-08-24

## 2018-08-20 RX ORDER — HEPARIN SODIUM 5000 [USP'U]/ML
5000 INJECTION INTRAVENOUS; SUBCUTANEOUS EVERY 8 HOURS
Qty: 0 | Refills: 0 | Status: DISCONTINUED | OUTPATIENT
Start: 2018-08-20 | End: 2018-08-24

## 2018-08-20 RX ORDER — ASPIRIN/CALCIUM CARB/MAGNESIUM 324 MG
81 TABLET ORAL DAILY
Qty: 0 | Refills: 0 | Status: DISCONTINUED | OUTPATIENT
Start: 2018-08-20 | End: 2018-08-24

## 2018-08-20 RX ORDER — INSULIN LISPRO 100/ML
VIAL (ML) SUBCUTANEOUS EVERY 6 HOURS
Qty: 0 | Refills: 0 | Status: DISCONTINUED | OUTPATIENT
Start: 2018-08-20 | End: 2018-08-20

## 2018-08-20 RX ORDER — ZALEPLON 10 MG
5 CAPSULE ORAL AT BEDTIME
Qty: 0 | Refills: 0 | Status: DISCONTINUED | OUTPATIENT
Start: 2018-08-20 | End: 2018-08-24

## 2018-08-20 RX ADMIN — Medication 650 MILLIGRAM(S): at 02:07

## 2018-08-20 RX ADMIN — PIPERACILLIN AND TAZOBACTAM 25 GRAM(S): 4; .5 INJECTION, POWDER, LYOPHILIZED, FOR SOLUTION INTRAVENOUS at 05:12

## 2018-08-20 RX ADMIN — SIMETHICONE 80 MILLIGRAM(S): 80 TABLET, CHEWABLE ORAL at 01:02

## 2018-08-20 RX ADMIN — Medication 100 MILLIGRAM(S): at 17:56

## 2018-08-20 RX ADMIN — SENNA PLUS 2 TABLET(S): 8.6 TABLET ORAL at 22:35

## 2018-08-20 RX ADMIN — Medication 2: at 17:56

## 2018-08-20 RX ADMIN — Medication 100 MILLIGRAM(S): at 05:10

## 2018-08-20 RX ADMIN — Medication 250 MILLIGRAM(S): at 02:03

## 2018-08-20 RX ADMIN — Medication 650 MILLIGRAM(S): at 08:40

## 2018-08-20 RX ADMIN — Medication 3: at 06:29

## 2018-08-20 RX ADMIN — Medication 5 MILLIGRAM(S): at 22:41

## 2018-08-20 RX ADMIN — Medication 5 MILLIGRAM(S): at 00:50

## 2018-08-20 RX ADMIN — PIPERACILLIN AND TAZOBACTAM 25 GRAM(S): 4; .5 INJECTION, POWDER, LYOPHILIZED, FOR SOLUTION INTRAVENOUS at 19:05

## 2018-08-20 RX ADMIN — Medication 2: at 22:36

## 2018-08-20 RX ADMIN — LISINOPRIL 10 MILLIGRAM(S): 2.5 TABLET ORAL at 05:10

## 2018-08-20 RX ADMIN — TAMSULOSIN HYDROCHLORIDE 0.4 MILLIGRAM(S): 0.4 CAPSULE ORAL at 22:35

## 2018-08-20 RX ADMIN — Medication 650 MILLIGRAM(S): at 02:40

## 2018-08-20 RX ADMIN — Medication 250 MILLIGRAM(S): at 17:56

## 2018-08-20 RX ADMIN — SIMVASTATIN 10 MILLIGRAM(S): 20 TABLET, FILM COATED ORAL at 22:35

## 2018-08-20 RX ADMIN — Medication 50 MILLIGRAM(S): at 05:10

## 2018-08-20 RX ADMIN — Medication 650 MILLIGRAM(S): at 08:11

## 2018-08-20 RX ADMIN — HEPARIN SODIUM 5000 UNIT(S): 5000 INJECTION INTRAVENOUS; SUBCUTANEOUS at 22:41

## 2018-08-20 NOTE — PROGRESS NOTE ADULT - SUBJECTIVE AND OBJECTIVE BOX
Post op Check    Pt seen and examined without complaints. Pain is controlled. Denies SOB/CP/N/V.     Vital Signs Last 24 Hrs  T(C): 36.6 (20 Aug 2018 17:14), Max: 37 (20 Aug 2018 05:09)  T(F): 97.9 (20 Aug 2018 17:14), Max: 98.6 (20 Aug 2018 05:09)  HR: 83 (20 Aug 2018 17:14) (76 - 88)  BP: 139/68 (20 Aug 2018 17:14) (125/65 - 177/81)  BP(mean): --  RR: 18 (20 Aug 2018 17:14) (16 - 18)  SpO2: 97% (20 Aug 2018 17:14) (94% - 97%)    I&O's Summary    19 Aug 2018 07:01  -  20 Aug 2018 07:00  --------------------------------------------------------  IN: 2760 mL / OUT: 3550 mL / NET: -790 mL        Physical Exam  Gen: NAD  Pulm: No respiratory distress, no subcostal retractions  CV: RRR, no JVD  Abd: Soft, NT, ND  Back: No CVAT, R buttocks drain in place, scant purulent drainage   : Dooley clear urine                          9.9    9.1   )-----------( 196      ( 19 Aug 2018 00:23 )             29.7       08-19    129<L>  |  95<L>  |  20  ----------------------------<  222<H>  4.5   |  22  |  1.36<H>    Ca    9.0      19 Aug 2018 00:23        A/P: 83y Male s/p IR drainage of prostatic abscess   F/u culture  Cont antibiotics  DVT prophylaxis/OOB  Incentive spirometry  Strict I&O's  Analgesia and antiemetics as needed  Diet- regular   AM labs

## 2018-08-20 NOTE — PROGRESS NOTE ADULT - ASSESSMENT
ASSESSMENT:  83y Male with Abscess of prostate  Prostatic abscess       PLAN:   Abscess drainage and biopsy by IR today.   Follow up urine and blood cultures  Continue antibiotics

## 2018-08-20 NOTE — PROGRESS NOTE ADULT - SUBJECTIVE AND OBJECTIVE BOX
Subjective:  Patient is feeling well. No events overnight. States feels his abdomen is better. He is NPO and is expecting to have IR procedure later today.     Objectives:  T(C): 37 (18 @ 05:09), Max: 37 (18 @ 05:09)  HR: 76 (18 @ 05:09) (76 - 88)  BP: 125/65 (18 @ 05:09) (125/65 - 177/81)  RR: 16 (18 @ 05:09) (16 - 18)  SpO2: 95% (18 @ 05:09) (95% - 97%)     @ 07:01  -   @ 07:00  --------------------------------------------------------  IN:    Oral Fluid: 960 mL    sodium chloride 0.9%.: 1800 mL  Total IN: 2760 mL    OUT:    Indwelling Catheter - Urethral: 3550 mL  Total OUT: 3550 mL    Total NET: -790 mL          Physcial Exam  GENERAL: NAD, well-developed  ABDOMEN: Soft, Nontender, Nondistended  GENITOURINARY: barbour in place, clear urine in the tubing and bag  PSYCH: AAOx3    LABS:                        9.9    9.1   )-----------( 196      ( 19 Aug 2018 00:23 )             29.7     08-19    129<L>  |  95<L>  |  20  ----------------------------<  222<H>  4.5   |  22  |  1.36<H>    Ca    9.0      19 Aug 2018 00:23      CAPILLARY BLOOD GLUCOSE      POCT Blood Glucose.: 260 mg/dL (20 Aug 2018 05:42)    PT/INR - ( 19 Aug 2018 00:23 )   PT: 13.9 sec;   INR: 1.27 ratio         PTT - ( 19 Aug 2018 00:23 )  PTT:27.4 sec  CAPILLARY BLOOD GLUCOSE      POCT Blood Glucose.: 260 mg/dL (20 Aug 2018 05:42)    Urinalysis Basic - ( 18 Aug 2018 14:49 )    Color: Yellow / Appearance: Slightly Turbid / S.009 / pH: x  Gluc: x / Ketone: Negative  / Bili: Negative / Urobili: Negative mg/dL   Blood: x / Protein: 30 mg/dL / Nitrite: Negative   Leuk Esterase: Large / RBC: 15 /HPF /  /HPF   Sq Epi: x / Non Sq Epi: 0 /HPF / Bacteria: Negative

## 2018-08-20 NOTE — PROGRESS NOTE ADULT - ATTENDING COMMENTS
Pt seen/examined.  Case discussed with housestaff/PA team.  Agree with above note history, physical and assessment/plan.  Giselle-prostatic abscess for IR drainage  Hyponatremia - cont IV NS/repletion  Cont IV abx  F/u cxs

## 2018-08-20 NOTE — PROGRESS NOTE ADULT - SUBJECTIVE AND OBJECTIVE BOX
Interventional Radiology Brief- Operative Note    Procedure: CT guided pelvic drainage    Operators: Matt    Anesthesia (type): MAC    Contrast: none    EBL: none    Findings/Follow up Plan of Care: CT guided drainage of pelvic collection performed. Appx 33cc cloudy brown fluid aspirated. Drain to SACHA. pt tolerated procedure without difficulty Full report to follow.    Specimens Removed: sample of fluid collected for microbiology and cytology    Implants: 8 Fr drain    Complications: none    Condition/Disposition: stable / pacu    Please call Interventional Radiology x [phone] with any questions, concerns, or issues.

## 2018-08-20 NOTE — PROGRESS NOTE ADULT - SUBJECTIVE AND OBJECTIVE BOX
Interventional Radiology Pre-Procedure Note    This is a 83y Male with PMH of afib on Eliquis (last dose on 8/17/18), prostate CA s/p radioactive seed implant (per pt), TURBT who was admitted after MRI (from outside facility) demonstrated findings of pelvic abscess in setting of abdominal/ pelvic pain. IR requested for drainage of collection. After review of imaging and discussion with urology (Dr. Burroughs) will plan for CT guided drainage of pelvic collection.     Pt accompanied by wife. Offers no complaints at this time. NPO since midnight.     PAST MEDICAL & SURGICAL HISTORY:  Afib  Prostate CA      Vital Signs Last 24 Hrs  T(C): 37 (20 Aug 2018 05:09), Max: 37 (20 Aug 2018 05:09)  T(F): 98.6 (20 Aug 2018 05:09), Max: 98.6 (20 Aug 2018 05:09)  HR: 76 (20 Aug 2018 05:09) (76 - 93)  BP: 125/65 (20 Aug 2018 05:09) (125/65 - 177/81)  BP(mean): --  RR: 16 (20 Aug 2018 05:09) (16 - 18)  SpO2: 95% (20 Aug 2018 05:09) (93% - 98%)    Allergies: No Known Allergies    Physical Exam: Gen: NAD, A&Ox3    Labs:                         9.9    9.1   )-----------( 196      ( 19 Aug 2018 00:23 )             29.7     08-19    129<L>  |  95<L>  |  20  ----------------------------<  222<H>  4.5   |  22  |  1.36<H>    Ca    9.0      19 Aug 2018 00:23      PT/INR - ( 19 Aug 2018 00:23 )   PT: 13.9 sec;   INR: 1.27 ratio         PTT - ( 19 Aug 2018 00:23 )  PTT:27.4 sec    Plan is for CT guided drainage of pelvic collection. The full procedure/ risks/ benefits/ alternatives were discussed with the pt and informed consent obtained. All questions and concerns have been addressed at this time.

## 2018-08-21 LAB
ANION GAP SERPL CALC-SCNC: 10 MMOL/L — SIGNIFICANT CHANGE UP (ref 5–17)
BUN SERPL-MCNC: 13 MG/DL — SIGNIFICANT CHANGE UP (ref 7–23)
CALCIUM SERPL-MCNC: 8.8 MG/DL — SIGNIFICANT CHANGE UP (ref 8.4–10.5)
CHLORIDE SERPL-SCNC: 97 MMOL/L — SIGNIFICANT CHANGE UP (ref 96–108)
CO2 SERPL-SCNC: 25 MMOL/L — SIGNIFICANT CHANGE UP (ref 22–31)
CREAT FLD-MCNC: 1.92 MG/DL — SIGNIFICANT CHANGE UP
CREAT SERPL-MCNC: 1.22 MG/DL — SIGNIFICANT CHANGE UP (ref 0.5–1.3)
GLUCOSE BLDC GLUCOMTR-MCNC: 239 MG/DL — HIGH (ref 70–99)
GLUCOSE BLDC GLUCOMTR-MCNC: 254 MG/DL — HIGH (ref 70–99)
GLUCOSE BLDC GLUCOMTR-MCNC: 300 MG/DL — HIGH (ref 70–99)
GLUCOSE BLDC GLUCOMTR-MCNC: 314 MG/DL — HIGH (ref 70–99)
GLUCOSE SERPL-MCNC: 245 MG/DL — HIGH (ref 70–99)
POTASSIUM SERPL-MCNC: 4.1 MMOL/L — SIGNIFICANT CHANGE UP (ref 3.5–5.3)
POTASSIUM SERPL-SCNC: 4.1 MMOL/L — SIGNIFICANT CHANGE UP (ref 3.5–5.3)
SODIUM SERPL-SCNC: 132 MMOL/L — LOW (ref 135–145)
SPECIMEN SOURCE FLD: SIGNIFICANT CHANGE UP

## 2018-08-21 PROCEDURE — 99232 SBSQ HOSP IP/OBS MODERATE 35: CPT

## 2018-08-21 RX ORDER — LIDOCAINE 4 G/100G
1 CREAM TOPICAL ONCE
Qty: 0 | Refills: 0 | Status: COMPLETED | OUTPATIENT
Start: 2018-08-21 | End: 2018-08-21

## 2018-08-21 RX ORDER — ACETAMINOPHEN 500 MG
1000 TABLET ORAL ONCE
Qty: 0 | Refills: 0 | Status: COMPLETED | OUTPATIENT
Start: 2018-08-21 | End: 2018-08-21

## 2018-08-21 RX ORDER — INSULIN GLARGINE 100 [IU]/ML
4 INJECTION, SOLUTION SUBCUTANEOUS AT BEDTIME
Qty: 0 | Refills: 0 | Status: DISCONTINUED | OUTPATIENT
Start: 2018-08-21 | End: 2018-08-24

## 2018-08-21 RX ORDER — INSULIN LISPRO 100/ML
2 VIAL (ML) SUBCUTANEOUS
Qty: 0 | Refills: 0 | Status: DISCONTINUED | OUTPATIENT
Start: 2018-08-21 | End: 2018-08-24

## 2018-08-21 RX ORDER — INSULIN LISPRO 100/ML
1 VIAL (ML) SUBCUTANEOUS
Qty: 0 | Refills: 0 | Status: DISCONTINUED | OUTPATIENT
Start: 2018-08-21 | End: 2018-08-24

## 2018-08-21 RX ADMIN — SENNA PLUS 2 TABLET(S): 8.6 TABLET ORAL at 21:31

## 2018-08-21 RX ADMIN — HEPARIN SODIUM 5000 UNIT(S): 5000 INJECTION INTRAVENOUS; SUBCUTANEOUS at 14:19

## 2018-08-21 RX ADMIN — HEPARIN SODIUM 5000 UNIT(S): 5000 INJECTION INTRAVENOUS; SUBCUTANEOUS at 21:31

## 2018-08-21 RX ADMIN — Medication 3: at 11:46

## 2018-08-21 RX ADMIN — Medication 400 MILLIGRAM(S): at 17:15

## 2018-08-21 RX ADMIN — Medication 100 MILLIGRAM(S): at 17:15

## 2018-08-21 RX ADMIN — LIDOCAINE 1 APPLICATION(S): 4 CREAM TOPICAL at 18:02

## 2018-08-21 RX ADMIN — Medication 81 MILLIGRAM(S): at 11:46

## 2018-08-21 RX ADMIN — TAMSULOSIN HYDROCHLORIDE 0.4 MILLIGRAM(S): 0.4 CAPSULE ORAL at 21:31

## 2018-08-21 RX ADMIN — SIMVASTATIN 10 MILLIGRAM(S): 20 TABLET, FILM COATED ORAL at 21:31

## 2018-08-21 RX ADMIN — PIPERACILLIN AND TAZOBACTAM 25 GRAM(S): 4; .5 INJECTION, POWDER, LYOPHILIZED, FOR SOLUTION INTRAVENOUS at 18:02

## 2018-08-21 RX ADMIN — LISINOPRIL 10 MILLIGRAM(S): 2.5 TABLET ORAL at 06:26

## 2018-08-21 RX ADMIN — Medication 250 MILLIGRAM(S): at 07:28

## 2018-08-21 RX ADMIN — Medication 2: at 08:46

## 2018-08-21 RX ADMIN — Medication 4: at 17:15

## 2018-08-21 RX ADMIN — Medication 1000 MILLIGRAM(S): at 17:45

## 2018-08-21 RX ADMIN — Medication 650 MILLIGRAM(S): at 02:00

## 2018-08-21 RX ADMIN — PIPERACILLIN AND TAZOBACTAM 25 GRAM(S): 4; .5 INJECTION, POWDER, LYOPHILIZED, FOR SOLUTION INTRAVENOUS at 11:45

## 2018-08-21 RX ADMIN — Medication 650 MILLIGRAM(S): at 11:27

## 2018-08-21 RX ADMIN — Medication 50 MILLIGRAM(S): at 06:26

## 2018-08-21 RX ADMIN — SODIUM CHLORIDE 75 MILLILITER(S): 9 INJECTION INTRAMUSCULAR; INTRAVENOUS; SUBCUTANEOUS at 18:02

## 2018-08-21 RX ADMIN — Medication 5 MILLIGRAM(S): at 21:32

## 2018-08-21 RX ADMIN — Medication 100 MILLIGRAM(S): at 06:26

## 2018-08-21 RX ADMIN — HEPARIN SODIUM 5000 UNIT(S): 5000 INJECTION INTRAVENOUS; SUBCUTANEOUS at 06:26

## 2018-08-21 RX ADMIN — Medication 650 MILLIGRAM(S): at 11:57

## 2018-08-21 RX ADMIN — PIPERACILLIN AND TAZOBACTAM 25 GRAM(S): 4; .5 INJECTION, POWDER, LYOPHILIZED, FOR SOLUTION INTRAVENOUS at 03:33

## 2018-08-21 RX ADMIN — Medication 650 MILLIGRAM(S): at 01:08

## 2018-08-21 RX ADMIN — Medication 1: at 21:32

## 2018-08-21 RX ADMIN — Medication 250 MILLIGRAM(S): at 17:16

## 2018-08-21 RX ADMIN — INSULIN GLARGINE 4 UNIT(S): 100 INJECTION, SOLUTION SUBCUTANEOUS at 21:30

## 2018-08-21 NOTE — PROGRESS NOTE ADULT - ASSESSMENT
84y/o M s/p IR drainage of prostatic abscess. Tolerated diet    Plan  - f/u AM lab  - cont. Abx, f/u Vanco level  - f/u UCx and BCx  - f/u patient cardiology for restart eliquis  - DVT ppx/ OOB  - incentive spirometry

## 2018-08-21 NOTE — PROGRESS NOTE ADULT - SUBJECTIVE AND OBJECTIVE BOX
UROLOGY DAILY PROGRESS NOTE:     Subjective: Patient seen and examined at bedside. No overnight events.   patient 82y/o M POD#1 s/p Ir drainage of prostatic abscess. Seen and examined at bed side. Patient feel well. No complaint . No SOB, N/V    Objective:  Vital signs  T(F): , Max: 98.9 (08-20-18 @ 18:12)  HR: 79 (08-21-18 @ 05:17)  BP: 143/69 (08-21-18 @ 05:17)  SpO2: 96% (08-21-18 @ 05:17)  Wt(kg): --    I&O's Summary    19 Aug 2018 07:01  -  20 Aug 2018 07:00  --------------------------------------------------------  IN: 2760 mL / OUT: 3550 mL / NET: -790 mL    20 Aug 2018 07:01  -  21 Aug 2018 06:57  --------------------------------------------------------  IN: 790 mL / OUT: 1625 mL / NET: -835 mL        Gen: NAD  Pulm: No respiratory distress, no subcostal retractions  CV: RRR, no JVD  Abd: Soft, NT, ND  Back: No CVAT. R buttocks drain in place. 25cc over night. Dress change at bed side.   : Dooley clear urine    Labs:                Urine Cx:

## 2018-08-21 NOTE — PROGRESS NOTE ADULT - SUBJECTIVE AND OBJECTIVE BOX
Interventional Radiology Follow- Up Note      This is a 83y Male with PMH of afib on Eliquis (last dose on 8/17/18), prostate CA s/p radioactive seed implant (per pt), TURBT who was admitted after MRI (from outside facility) demonstrated findings of pelvic abscess in setting of abdominal/ pelvic pain. He is currently s/p CT guided drainage of pelvic collection (right transgluteal approach) in IR with Dr. Gutierrez.     Pt seen and examined at bedside. Reports feeling much better since IR procedure. Ambulating well. Pt on abx therapy with vancomycin and zosyn.     PAST MEDICAL & SURGICAL HISTORY:      Allergies: No Known Allergies      LABS:    08-21    132<L>  |  97  |  13  ----------------------------<  245<H>  4.1   |  25  |  1.22    Ca    8.8      21 Aug 2018 07:15        Pelvic abscess Cultures: Pending, gram stain w/o organisms seen    Vitals: T(F): 97.7 (08-21-18 @ 09:07), Max: 98.9 (08-20-18 @ 18:12)  HR: 86 (08-21-18 @ 09:07) (79 - 88)  BP: 154/72 (08-21-18 @ 09:07) (136/84 - 159/76)  RR: 16 (08-21-18 @ 09:07) (16 - 18)  SpO2: 96% (08-21-18 @ 09:07) (94% - 97%)    PHYSICAL EXAM:  Gen: NAD, A&Ox3  Drain site (Right gluteal): Drain intact to SACHA with serosanguinous output in bulb, 25cc/ 24hr (per chart record). Dressing c/d/i.       A/P: 83y Male admitted with pelvic abscess s/p Drainage in IR with Dr. Gutierrez.   -Pt doing well after procedure, symptoms improving  -F/U culture results  -Continue abx therapy as per primary team  -continue to monitor output    -flush drain per doctor orders  -trend vitals, labs     Please call IR at extension [phone] with any questions, concerns, or issues regarding above.

## 2018-08-22 LAB
-  AMIKACIN: SIGNIFICANT CHANGE UP
-  AMOXICILLIN/CLAVULANIC ACID: SIGNIFICANT CHANGE UP
-  AMPICILLIN/SULBACTAM: SIGNIFICANT CHANGE UP
-  AMPICILLIN: SIGNIFICANT CHANGE UP
-  AMPICILLIN: SIGNIFICANT CHANGE UP
-  AZTREONAM: SIGNIFICANT CHANGE UP
-  CEFAZOLIN: SIGNIFICANT CHANGE UP
-  CEFEPIME: SIGNIFICANT CHANGE UP
-  CEFOXITIN: SIGNIFICANT CHANGE UP
-  CEFTRIAXONE: SIGNIFICANT CHANGE UP
-  CIPROFLOXACIN: SIGNIFICANT CHANGE UP
-  ERTAPENEM: SIGNIFICANT CHANGE UP
-  GENTAMICIN: SIGNIFICANT CHANGE UP
-  IMIPENEM: SIGNIFICANT CHANGE UP
-  LEVOFLOXACIN: SIGNIFICANT CHANGE UP
-  MEROPENEM: SIGNIFICANT CHANGE UP
-  PIPERACILLIN/TAZOBACTAM: SIGNIFICANT CHANGE UP
-  TETRACYCLINE: SIGNIFICANT CHANGE UP
-  TOBRAMYCIN: SIGNIFICANT CHANGE UP
-  TRIMETHOPRIM/SULFAMETHOXAZOLE: SIGNIFICANT CHANGE UP
-  VANCOMYCIN: SIGNIFICANT CHANGE UP
ANION GAP SERPL CALC-SCNC: 13 MMOL/L — SIGNIFICANT CHANGE UP (ref 5–17)
BUN SERPL-MCNC: 16 MG/DL — SIGNIFICANT CHANGE UP (ref 7–23)
CALCIUM SERPL-MCNC: 9.2 MG/DL — SIGNIFICANT CHANGE UP (ref 8.4–10.5)
CHLORIDE SERPL-SCNC: 97 MMOL/L — SIGNIFICANT CHANGE UP (ref 96–108)
CO2 SERPL-SCNC: 24 MMOL/L — SIGNIFICANT CHANGE UP (ref 22–31)
CREAT SERPL-MCNC: 1.26 MG/DL — SIGNIFICANT CHANGE UP (ref 0.5–1.3)
CULTURE RESULTS: SIGNIFICANT CHANGE UP
CULTURE RESULTS: SIGNIFICANT CHANGE UP
GLUCOSE BLDC GLUCOMTR-MCNC: 241 MG/DL — HIGH (ref 70–99)
GLUCOSE BLDC GLUCOMTR-MCNC: 242 MG/DL — HIGH (ref 70–99)
GLUCOSE BLDC GLUCOMTR-MCNC: 252 MG/DL — HIGH (ref 70–99)
GLUCOSE BLDC GLUCOMTR-MCNC: 297 MG/DL — HIGH (ref 70–99)
GLUCOSE SERPL-MCNC: 197 MG/DL — HIGH (ref 70–99)
METHOD TYPE: SIGNIFICANT CHANGE UP
METHOD TYPE: SIGNIFICANT CHANGE UP
NON-GYNECOLOGICAL CYTOLOGY STUDY: SIGNIFICANT CHANGE UP
POTASSIUM SERPL-MCNC: 4.3 MMOL/L — SIGNIFICANT CHANGE UP (ref 3.5–5.3)
POTASSIUM SERPL-SCNC: 4.3 MMOL/L — SIGNIFICANT CHANGE UP (ref 3.5–5.3)
SODIUM SERPL-SCNC: 134 MMOL/L — LOW (ref 135–145)
SPECIMEN SOURCE: SIGNIFICANT CHANGE UP
SPECIMEN SOURCE: SIGNIFICANT CHANGE UP
VANCOMYCIN TROUGH SERPL-MCNC: 16.1 UG/ML — SIGNIFICANT CHANGE UP (ref 10–20)

## 2018-08-22 PROCEDURE — 99232 SBSQ HOSP IP/OBS MODERATE 35: CPT

## 2018-08-22 RX ORDER — APIXABAN 2.5 MG/1
5 TABLET, FILM COATED ORAL EVERY 12 HOURS
Qty: 0 | Refills: 0 | Status: DISCONTINUED | OUTPATIENT
Start: 2018-08-22 | End: 2018-08-24

## 2018-08-22 RX ORDER — ACETAMINOPHEN 500 MG
1000 TABLET ORAL ONCE
Qty: 0 | Refills: 0 | Status: COMPLETED | OUTPATIENT
Start: 2018-08-22 | End: 2018-08-22

## 2018-08-22 RX ORDER — ACETAMINOPHEN 500 MG
1000 TABLET ORAL ONCE
Qty: 0 | Refills: 0 | Status: DISCONTINUED | OUTPATIENT
Start: 2018-08-22 | End: 2018-08-22

## 2018-08-22 RX ADMIN — HEPARIN SODIUM 5000 UNIT(S): 5000 INJECTION INTRAVENOUS; SUBCUTANEOUS at 21:25

## 2018-08-22 RX ADMIN — PIPERACILLIN AND TAZOBACTAM 25 GRAM(S): 4; .5 INJECTION, POWDER, LYOPHILIZED, FOR SOLUTION INTRAVENOUS at 03:11

## 2018-08-22 RX ADMIN — SIMETHICONE 80 MILLIGRAM(S): 80 TABLET, CHEWABLE ORAL at 06:03

## 2018-08-22 RX ADMIN — Medication 81 MILLIGRAM(S): at 11:52

## 2018-08-22 RX ADMIN — Medication 3: at 12:34

## 2018-08-22 RX ADMIN — Medication 3: at 18:36

## 2018-08-22 RX ADMIN — SIMVASTATIN 10 MILLIGRAM(S): 20 TABLET, FILM COATED ORAL at 21:24

## 2018-08-22 RX ADMIN — Medication 2 UNIT(S): at 12:34

## 2018-08-22 RX ADMIN — Medication 400 MILLIGRAM(S): at 05:25

## 2018-08-22 RX ADMIN — Medication 100 MILLIGRAM(S): at 06:03

## 2018-08-22 RX ADMIN — HEPARIN SODIUM 5000 UNIT(S): 5000 INJECTION INTRAVENOUS; SUBCUTANEOUS at 15:40

## 2018-08-22 RX ADMIN — Medication 2 UNIT(S): at 09:02

## 2018-08-22 RX ADMIN — PIPERACILLIN AND TAZOBACTAM 25 GRAM(S): 4; .5 INJECTION, POWDER, LYOPHILIZED, FOR SOLUTION INTRAVENOUS at 13:05

## 2018-08-22 RX ADMIN — TAMSULOSIN HYDROCHLORIDE 0.4 MILLIGRAM(S): 0.4 CAPSULE ORAL at 21:25

## 2018-08-22 RX ADMIN — Medication 5 MILLIGRAM(S): at 21:23

## 2018-08-22 RX ADMIN — HEPARIN SODIUM 5000 UNIT(S): 5000 INJECTION INTRAVENOUS; SUBCUTANEOUS at 06:03

## 2018-08-22 RX ADMIN — Medication 250 MILLIGRAM(S): at 19:55

## 2018-08-22 RX ADMIN — INSULIN GLARGINE 4 UNIT(S): 100 INJECTION, SOLUTION SUBCUTANEOUS at 21:23

## 2018-08-22 RX ADMIN — LISINOPRIL 10 MILLIGRAM(S): 2.5 TABLET ORAL at 06:03

## 2018-08-22 RX ADMIN — Medication 650 MILLIGRAM(S): at 15:40

## 2018-08-22 RX ADMIN — SENNA PLUS 2 TABLET(S): 8.6 TABLET ORAL at 21:24

## 2018-08-22 RX ADMIN — SIMETHICONE 80 MILLIGRAM(S): 80 TABLET, CHEWABLE ORAL at 15:41

## 2018-08-22 RX ADMIN — Medication 2: at 09:01

## 2018-08-22 RX ADMIN — PIPERACILLIN AND TAZOBACTAM 25 GRAM(S): 4; .5 INJECTION, POWDER, LYOPHILIZED, FOR SOLUTION INTRAVENOUS at 07:41

## 2018-08-22 RX ADMIN — Medication 1 UNIT(S): at 18:37

## 2018-08-22 RX ADMIN — Medication 250 MILLIGRAM(S): at 07:22

## 2018-08-22 RX ADMIN — Medication 50 MILLIGRAM(S): at 06:03

## 2018-08-22 RX ADMIN — Medication 100 MILLIGRAM(S): at 18:39

## 2018-08-22 RX ADMIN — Medication 1000 MILLIGRAM(S): at 05:55

## 2018-08-22 NOTE — PROGRESS NOTE ADULT - ASSESSMENT
82y/o M s/p IR drainage of prostatic abscess. Tolerated diet    Plan  - f/u AM lab  - cont. Abx, f/u Vanco level  - f/u UCx and BCx  - PT eval   - DVT ppx/ OOB  - incentive spirometry

## 2018-08-22 NOTE — PROGRESS NOTE ADULT - SUBJECTIVE AND OBJECTIVE BOX
UROLOGY DAILY PROGRESS NOTE:     Subjective: Patient seen and examined at bedside. No acute events overnight      Objective:  Vital signs  T(F): , Max: 98.4 (18 @ 21:42)  HR: 76 (18 @ 05:49)  BP: 151/71 (18 @ 05:49)  SpO2: 95% (18 @ 05:49)  Wt(kg): --    Output     I&O's Detail    20 Aug 2018 07:01  -  21 Aug 2018 07:00  --------------------------------------------------------  IN:    IV PiggyBack: 250 mL    Oral Fluid: 240 mL    sodium chloride 0.9%: 300 mL  Total IN: 790 mL    OUT:    Bulb: 25 mL    Indwelling Catheter - Urethral: 1600 mL  Total OUT: 1625 mL    Total NET: -835 mL      21 Aug 2018 07:  -  22 Aug 2018 06:45  --------------------------------------------------------  IN:    IV PiggyBack: 550 mL    Oral Fluid: 1680 mL    sodium chloride 0.9%: 825 mL    Solution: 100 mL  Total IN: 3155 mL    OUT:    Bulb: 25 mL    Indwelling Catheter - Urethral: 3700 mL  Total OUT: 3725 mL    Total NET: -570 mL          Physical Exam:  Gen: NAD  Abd: soft NTND SACHA serosanguinous   Back: no CVAT   : barbour clear yellow     Labs:    x     / x     /1.22           132<L>  |  97  |  13  ----------------------------<  245<H>  4.1   |  25  |  1.22    Ca    8.8      21 Aug 2018 07:15    LABS/RADIOLOGY RESULTS:        132<L>  |  97  |  13  ----------------------------<  245<H>  4.1   |  25  |  1.22    Ca    8.8      21 Aug 2018 07:15    Blood Cultures    Blood Culture--    @ 17:54    Results  Few Escherichia coli  Few Enterococcus faecalis    Organism--    Organism ID--    Urine Culture    @ 17:54    --       Results  Few Escherichia coli  Few Enterococcus faecalis    Organism--    Organism ID--  Urinalysis Basic - ( 18 Aug 2018 14:49 )    Color: Yellow / Appearance: Slightly Turbid / S.009 / pH:   Gluc:  / Ketone: Negative  / Bili: Negative / Urobili: Negative mg/dL   Blood:  / Protein: 30 mg/dL / Nitrite: Negative   Leuk Esterase: Large / RBC: 15 /HPF /  /HPF   Sq Epi:  / Non Sq Epi: 0 /HPF / Bacteria: Negative                Urine Cx:

## 2018-08-23 ENCOUNTER — APPOINTMENT (OUTPATIENT)
Age: 83
End: 2018-08-23

## 2018-08-23 ENCOUNTER — TRANSCRIPTION ENCOUNTER (OUTPATIENT)
Age: 83
End: 2018-08-23

## 2018-08-23 LAB
ANION GAP SERPL CALC-SCNC: 13 MMOL/L — SIGNIFICANT CHANGE UP (ref 5–17)
BUN SERPL-MCNC: 15 MG/DL — SIGNIFICANT CHANGE UP (ref 7–23)
CALCIUM SERPL-MCNC: 9.2 MG/DL — SIGNIFICANT CHANGE UP (ref 8.4–10.5)
CHLORIDE SERPL-SCNC: 97 MMOL/L — SIGNIFICANT CHANGE UP (ref 96–108)
CO2 SERPL-SCNC: 24 MMOL/L — SIGNIFICANT CHANGE UP (ref 22–31)
CREAT SERPL-MCNC: 1.27 MG/DL — SIGNIFICANT CHANGE UP (ref 0.5–1.3)
GLUCOSE BLDC GLUCOMTR-MCNC: 220 MG/DL — HIGH (ref 70–99)
GLUCOSE BLDC GLUCOMTR-MCNC: 225 MG/DL — HIGH (ref 70–99)
GLUCOSE BLDC GLUCOMTR-MCNC: 259 MG/DL — HIGH (ref 70–99)
GLUCOSE BLDC GLUCOMTR-MCNC: 291 MG/DL — HIGH (ref 70–99)
GLUCOSE SERPL-MCNC: 204 MG/DL — HIGH (ref 70–99)
POTASSIUM SERPL-MCNC: 4.1 MMOL/L — SIGNIFICANT CHANGE UP (ref 3.5–5.3)
POTASSIUM SERPL-SCNC: 4.1 MMOL/L — SIGNIFICANT CHANGE UP (ref 3.5–5.3)
SODIUM SERPL-SCNC: 134 MMOL/L — LOW (ref 135–145)

## 2018-08-23 PROCEDURE — 74176 CT ABD & PELVIS W/O CONTRAST: CPT | Mod: 26

## 2018-08-23 PROCEDURE — 99232 SBSQ HOSP IP/OBS MODERATE 35: CPT

## 2018-08-23 RX ORDER — BACITRACIN ZINC 500 UNIT/G
1 OINTMENT IN PACKET (EA) TOPICAL
Qty: 0 | Refills: 0 | Status: DISCONTINUED | OUTPATIENT
Start: 2018-08-23 | End: 2018-08-24

## 2018-08-23 RX ORDER — AMPICILLIN TRIHYDRATE 250 MG
500 CAPSULE ORAL EVERY 6 HOURS
Qty: 0 | Refills: 0 | Status: DISCONTINUED | OUTPATIENT
Start: 2018-08-24 | End: 2018-08-24

## 2018-08-23 RX ORDER — CEFDINIR 250 MG/5ML
300 POWDER, FOR SUSPENSION ORAL
Qty: 0 | Refills: 0 | Status: DISCONTINUED | OUTPATIENT
Start: 2018-08-23 | End: 2018-08-24

## 2018-08-23 RX ORDER — AMPICILLIN TRIHYDRATE 250 MG
500 CAPSULE ORAL ONCE
Qty: 0 | Refills: 0 | Status: COMPLETED | OUTPATIENT
Start: 2018-08-23 | End: 2018-08-23

## 2018-08-23 RX ORDER — AMPICILLIN TRIHYDRATE 250 MG
CAPSULE ORAL
Qty: 0 | Refills: 0 | Status: DISCONTINUED | OUTPATIENT
Start: 2018-08-23 | End: 2018-08-24

## 2018-08-23 RX ORDER — PIPERACILLIN AND TAZOBACTAM 4; .5 G/20ML; G/20ML
3.38 INJECTION, POWDER, LYOPHILIZED, FOR SOLUTION INTRAVENOUS EVERY 8 HOURS
Qty: 0 | Refills: 0 | Status: DISCONTINUED | OUTPATIENT
Start: 2018-08-23 | End: 2018-08-23

## 2018-08-23 RX ADMIN — INSULIN GLARGINE 4 UNIT(S): 100 INJECTION, SOLUTION SUBCUTANEOUS at 21:34

## 2018-08-23 RX ADMIN — LISINOPRIL 10 MILLIGRAM(S): 2.5 TABLET ORAL at 06:20

## 2018-08-23 RX ADMIN — APIXABAN 5 MILLIGRAM(S): 2.5 TABLET, FILM COATED ORAL at 18:41

## 2018-08-23 RX ADMIN — TAMSULOSIN HYDROCHLORIDE 0.4 MILLIGRAM(S): 0.4 CAPSULE ORAL at 21:19

## 2018-08-23 RX ADMIN — Medication 50 MILLIGRAM(S): at 06:20

## 2018-08-23 RX ADMIN — Medication 250 MILLIGRAM(S): at 06:20

## 2018-08-23 RX ADMIN — SIMVASTATIN 10 MILLIGRAM(S): 20 TABLET, FILM COATED ORAL at 21:34

## 2018-08-23 RX ADMIN — Medication 81 MILLIGRAM(S): at 11:19

## 2018-08-23 RX ADMIN — Medication 3: at 08:59

## 2018-08-23 RX ADMIN — Medication 2: at 12:05

## 2018-08-23 RX ADMIN — Medication 1 UNIT(S): at 16:46

## 2018-08-23 RX ADMIN — Medication 1 APPLICATION(S): at 11:19

## 2018-08-23 RX ADMIN — PIPERACILLIN AND TAZOBACTAM 25 GRAM(S): 4; .5 INJECTION, POWDER, LYOPHILIZED, FOR SOLUTION INTRAVENOUS at 09:01

## 2018-08-23 RX ADMIN — HEPARIN SODIUM 5000 UNIT(S): 5000 INJECTION INTRAVENOUS; SUBCUTANEOUS at 16:08

## 2018-08-23 RX ADMIN — APIXABAN 5 MILLIGRAM(S): 2.5 TABLET, FILM COATED ORAL at 06:20

## 2018-08-23 RX ADMIN — HEPARIN SODIUM 5000 UNIT(S): 5000 INJECTION INTRAVENOUS; SUBCUTANEOUS at 06:20

## 2018-08-23 RX ADMIN — CEFDINIR 300 MILLIGRAM(S): 250 POWDER, FOR SUSPENSION ORAL at 18:41

## 2018-08-23 RX ADMIN — Medication 104 MILLIGRAM(S): at 17:39

## 2018-08-23 RX ADMIN — Medication 3: at 16:46

## 2018-08-23 RX ADMIN — Medication 1 APPLICATION(S): at 21:35

## 2018-08-23 RX ADMIN — Medication 2 UNIT(S): at 12:05

## 2018-08-23 RX ADMIN — Medication 100 MILLIGRAM(S): at 18:41

## 2018-08-23 RX ADMIN — Medication 2 UNIT(S): at 08:58

## 2018-08-23 RX ADMIN — PIPERACILLIN AND TAZOBACTAM 25 GRAM(S): 4; .5 INJECTION, POWDER, LYOPHILIZED, FOR SOLUTION INTRAVENOUS at 00:18

## 2018-08-23 RX ADMIN — SENNA PLUS 2 TABLET(S): 8.6 TABLET ORAL at 21:19

## 2018-08-23 RX ADMIN — Medication 5 MILLIGRAM(S): at 21:18

## 2018-08-23 RX ADMIN — HEPARIN SODIUM 5000 UNIT(S): 5000 INJECTION INTRAVENOUS; SUBCUTANEOUS at 21:19

## 2018-08-23 RX ADMIN — Medication 1 APPLICATION(S): at 16:08

## 2018-08-23 NOTE — DISCHARGE NOTE ADULT - PLAN OF CARE
drain abscess to promote wound healing do not take a bath, continue to walk frequently, return to daily living activities slowly, no heavy lifting greater than 10lbs for 4-6 weeks, follow up Dr Burroughs 1 week for drain follow up  Call the office if you experience fever, chills, uncontrolled pain, the inability to tolerate liquids, or the urine does not drain rate control Continue home medication maintain stable glucose levels Maintain diabetic diet. Continue home medications to promote wound healing do not take a bath, continue to walk frequently, return to daily living activities slowly, no heavy lifting greater than 10lbs for 4-6 weeks, follow up Dr Burroughs 1 week for drain follow up  Call the office if you experience fever, chills, uncontrolled pain, the inability to tolerate liquids, or the urine does not drain  The drain should be flushed with 5 ml of normal saline every day to keep it patent to promote wound healing do not take a bath, continue to walk frequently, return to daily living activities slowly, no heavy lifting greater than 10lbs for 4-6 weeks, follow up Dr Burroughs 1 week for drain follow up  Call the office if you experience fever, chills, uncontrolled pain, the inability to tolerate liquids, or the urine does not drain  The drain should be flushed with 5 ml of normal saline every day to keep it patent  Please write down how much you empty out of it every day.  Please see Dr. Eubanks of infectious disease two weeks after discharge to help determine antibiotic length

## 2018-08-23 NOTE — DISCHARGE NOTE ADULT - PATIENT PORTAL LINK FT
You can access the [facility] Patient Portal, offered by [facility], by registering with the following website: [email or web address]

## 2018-08-23 NOTE — PHYSICAL THERAPY INITIAL EVALUATION ADULT - PERTINENT HX OF CURRENT PROBLEM, REHAB EVAL
82 y/o male s/p TURP 5/2018 complicated by persistent urinary retention and failed TOV with persistent abd, pelvic, penile pain sent in from Dr office with MRI results concerning for pelvic collection/absces. Pt s/p CT guided drainage of pelvic collection (right transgluteal approach) in IR (8/20).

## 2018-08-23 NOTE — DISCHARGE NOTE ADULT - CARE PROVIDER_API CALL
Irvin Burroughs), Urology  [address]  [place] [address]  Phone: [phone]  Fax: [phone]    Itz Eubanks), Infectious Disease; Internal Medicine  [address] [place] [address]  Phone: [phone]  Fax: [phone]

## 2018-08-23 NOTE — PHYSICAL THERAPY INITIAL EVALUATION ADULT - PLANNED THERAPY INTERVENTIONS, PT EVAL
Home. No skilled PT needs. No DME needs. Spouse available to assist as needed. Pt to be discharged from PT services at this time, pt demonstrates independence with all functional mobility, please reconsult as appropriate/if status changes. CM Yoana aware.

## 2018-08-23 NOTE — DISCHARGE NOTE ADULT - HOSPITAL COURSE
83 year old male with PMHx Valve replacement, Atrial fibrillation on eliquis, DM, HTN s/p Transurethral resection of prostate 6/2018 presented to ED on 8/17 with chief complaint of lower abdominal discomfort. Outpatient MRI revealing mass concerning for abscess. In ER, barbour catheter was exchange via cystoscopy. Eliquis was held and on 8/20 patient underwent successful CT guided percutaneous drainage of collection by Interventional Radiology. Culture from IR drainage positive EColi and Enterococcus faecalis. during hospital course patient treated with Vancomycin and Zosyn. Post IR procedure patient did well. His vitals were stable, she tolerated diet, her pain was controlled, she ambulated  and her labs were stable. Patient trial of void as out patient Patient was safely discharged home 83 year old male with PMHx Valve replacement, Atrial fibrillation on eliquis, DM, HTN s/p Transurethral resection of prostate 6/2018 presented to ED on 8/17 with chief complaint of lower abdominal discomfort. Outpatient MRI revealing mass concerning for abscess. In ER, barbour catheter was exchange via cystoscopy. Eliquis was held and on 8/20 patient underwent successful CT guided percutaneous drainage of collection by Interventional Radiology. Culture from IR drainage positive EColi and Enterococcus faecalis. during hospital course patient treated with Vancomycin and Zosyn. Post IR procedure patient did well. His vitals were stable, he tolerated diet, his pain was controlled, he ambulated  and his labs were stable. Patient trial of void as out patient Patient was safely discharged home. Antibiotic regimen was determined by Dr. Eubanks of ID

## 2018-08-23 NOTE — DISCHARGE NOTE ADULT - MEDICATION SUMMARY - MEDICATIONS TO TAKE
I will START or STAY ON the medications listed below when I get home from the hospital:    oxyCODONE 5 mg oral tablet  -- 1 tab(s) by mouth every 6 hours, As needed, Moderate Pain (4 - 6) MDD:4  -- Indication: For Pain medication     aspirin 81 mg oral delayed release tablet  -- 1  by mouth every other day (at bedtime)  -- Indication: For home medication     lisinopril 10 mg oral tablet  -- 1 tab(s) by mouth once a day  -- Indication: For home medication     Flomax  -- 0.4 milligram(s) by mouth once a day (at bedtime)  -- Indication: For home medication     Eliquis 5 mg oral tablet  -- 1 tab(s) by mouth 2 times a day  -- Indication: For home medication     metFORMIN  -- 1000 milligram(s) by mouth 2 times a day  -- Indication: For home medication     simvastatin  -- 10 milligram(s) by mouth once a day  -- Indication: For home medication     metoprolol succinate 50 mg oral tablet, extended release  -- 1 tab(s) by mouth once a day  -- Indication: For home medication     senna oral tablet  -- 2 tab(s) by mouth once a day (at bedtime)  -- Indication: For stool softener    docusate sodium 100 mg oral capsule  -- 1 cap(s) by mouth 2 times a day  -- Indication: For stool softener    amoxicillin-clavulanate 875 mg-125 mg oral tablet  -- 1 tab(s) by mouth 2 times a day  -- Indication: For Antibiotic     PreserVision  -- 2 tab(s) by mouth 2 times a day  -- Indication: For home medication

## 2018-08-23 NOTE — PROGRESS NOTE ADULT - SUBJECTIVE AND OBJECTIVE BOX
UROLOGY DAILY PROGRESS NOTE:     Subjective: Patient seen and examined at bedside.   No acute events  Abscess cx resulted     Objective:  Vital signs  T(F): , Max: 98.6 (18 @ 00:58)  HR: 83 (18 @ 05:47)  BP: 163/84 (18 @ 05:47)  SpO2: 95% (18 @ 05:47)  Wt(kg): --    Output     I&O's Detail    21 Aug 2018 07:  -  22 Aug 2018 07:00  --------------------------------------------------------  IN:    IV PiggyBack: 550 mL    Oral Fluid: 1680 mL    sodium chloride 0.9%: 825 mL    Solution: 100 mL  Total IN: 3155 mL    OUT:    Bulb: 25 mL    Indwelling Catheter - Urethral: 3700 mL  Total OUT: 3725 mL    Total NET: -570 mL      22 Aug 2018 07:01  -  23 Aug 2018 06:12  --------------------------------------------------------  IN:    Oral Fluid: 1000 mL    Solution: 100 mL  Total IN: 1100 mL    OUT:    Bulb: 15 mL    Indwelling Catheter - Urethral: 3900 mL  Total OUT: 3915 mL    Total NET: -2815 mL          Physical Exam:  Gen: NAD  Abd: soft NTND SACHA purulent   Back: soft NTND  : barbour yellow     Labs:    x     / x     /.  x     / x     /    134<L>  |  97  |  16  ----------------------------<  197<H>  4.3   |  24  |  1.26    Ca    9.2      22 Aug 2018 06:14    LABS/RADIOLOGY RESULTS:        134<L>  |  97  |  16  ----------------------------<  197<H>  4.3   |  24  |  1.26    Ca    9.2      22 Aug 2018 06:14    Blood Cultures    Blood Culture--    @ 17:54    Results  Few Escherichia coli  Few Enterococcus faecalis    OrganismEscherichia coli    Organism IDEscherichia coli  Enterococcus faecalis    Urine Culture    @ 17:54    --       Results  Few Escherichia coli  Few Enterococcus faecalis    OrganismEscherichia coli    Organism IDEscherichia coli  Enterococcus faecalis  Urinalysis Basic - ( 18 Aug 2018 14:49 )    Color: Yellow / Appearance: Slightly Turbid / S.009 / pH:   Gluc:  / Ketone: Negative  / Bili: Negative / Urobili: Negative mg/dL   Blood:  / Protein: 30 mg/dL / Nitrite: Negative   Leuk Esterase: Large / RBC: 15 /HPF /  /HPF   Sq Epi:  / Non Sq Epi: 0 /HPF / Bacteria: Negative                Urine Cx:

## 2018-08-23 NOTE — DISCHARGE NOTE ADULT - HOME CARE AGENCY
[facility] at home, [phone] to start visit the day after discharge, for RN eval, Reinforce Drain/barbour teaching [facility] at home, [phone] to start visit the day after discharge, for RN blayne, Reinforce Drain/barbour teaching. PLS MAKE SURE YOU ARE SENT HOME WITH FLUSHES & DRESSINGS TO USE AT HOME

## 2018-08-23 NOTE — PHYSICAL THERAPY INITIAL EVALUATION ADULT - ADDITIONAL COMMENTS
Pt states he lives in a private home with spouse with 3 steps to enter (+handrail) and a flight of steps to bedroom (+handrail). Pt states prior to admission being independent with all functional mobility and ADLs. 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

## 2018-08-23 NOTE — PROGRESS NOTE ADULT - ASSESSMENT
84y/o M s/p IR drainage of prostatic abscess. Tolerated diet    Plan  - f/u AM lab  - cont. Abx, f/u ID   - f/u UCx and BCx  - PT   - DVT ppx/ OOB  - incentive spirometry

## 2018-08-23 NOTE — PHYSICAL THERAPY INITIAL EVALUATION ADULT - GENERAL OBSERVATIONS, REHAB EVAL
Pt neli 35 min eval well. Pt agreed to session. Pt rec'd in bed with spouse at bedside, blood sugars 225, peripheral IV lock and barbour.

## 2018-08-23 NOTE — DISCHARGE NOTE ADULT - CARE PLAN
Principal Discharge DX:	Prostatic abscess  Goal:	drain abscess  Assessment and plan of treatment:	to promote wound healing do not take a bath, continue to walk frequently, return to daily living activities slowly, no heavy lifting greater than 10lbs for 4-6 weeks, follow up Dr Burroughs 1 week for drain follow up  Call the office if you experience fever, chills, uncontrolled pain, the inability to tolerate liquids, or the urine does not drain  Secondary Diagnosis:	Atrial fibrillation, chronic  Goal:	rate control  Assessment and plan of treatment:	Continue home medication  Secondary Diagnosis:	Diabetes  Goal:	maintain stable glucose levels  Assessment and plan of treatment:	Maintain diabetic diet. Continue home medications Principal Discharge DX:	Prostatic abscess  Goal:	drain abscess  Assessment and plan of treatment:	to promote wound healing do not take a bath, continue to walk frequently, return to daily living activities slowly, no heavy lifting greater than 10lbs for 4-6 weeks, follow up Dr Burroughs 1 week for drain follow up  Call the office if you experience fever, chills, uncontrolled pain, the inability to tolerate liquids, or the urine does not drain  The drain should be flushed with 5 ml of normal saline every day to keep it patent  Secondary Diagnosis:	Atrial fibrillation, chronic  Goal:	rate control  Assessment and plan of treatment:	Continue home medication  Secondary Diagnosis:	Diabetes  Goal:	maintain stable glucose levels  Assessment and plan of treatment:	Maintain diabetic diet. Continue home medications Principal Discharge DX:	Prostatic abscess  Goal:	drain abscess  Assessment and plan of treatment:	to promote wound healing do not take a bath, continue to walk frequently, return to daily living activities slowly, no heavy lifting greater than 10lbs for 4-6 weeks, follow up Dr Burroughs 1 week for drain follow up  Call the office if you experience fever, chills, uncontrolled pain, the inability to tolerate liquids, or the urine does not drain  The drain should be flushed with 5 ml of normal saline every day to keep it patent  Please write down how much you empty out of it every day.  Please see Dr. Eubanks of infectious disease two weeks after discharge to help determine antibiotic length  Secondary Diagnosis:	Atrial fibrillation, chronic  Goal:	rate control  Assessment and plan of treatment:	Continue home medication  Secondary Diagnosis:	Diabetes  Goal:	maintain stable glucose levels  Assessment and plan of treatment:	Maintain diabetic diet. Continue home medications

## 2018-08-23 NOTE — PROGRESS NOTE ADULT - ATTENDING COMMENTS
Pt seen/examined.  Case discussed with housestaff/PA team.  Agree with above note history, physical and assessment/plan.  Drain output still 20cc o/n - will d/w IR for study  Dispo pending

## 2018-08-24 VITALS
HEART RATE: 77 BPM | RESPIRATION RATE: 18 BRPM | OXYGEN SATURATION: 97 % | TEMPERATURE: 99 F | DIASTOLIC BLOOD PRESSURE: 68 MMHG | SYSTOLIC BLOOD PRESSURE: 141 MMHG

## 2018-08-24 LAB
CULTURE RESULTS: SIGNIFICANT CHANGE UP
GLUCOSE BLDC GLUCOMTR-MCNC: 192 MG/DL — HIGH (ref 70–99)
GLUCOSE BLDC GLUCOMTR-MCNC: 222 MG/DL — HIGH (ref 70–99)
GLUCOSE BLDC GLUCOMTR-MCNC: 225 MG/DL — HIGH (ref 70–99)
ORGANISM # SPEC MICROSCOPIC CNT: SIGNIFICANT CHANGE UP
SPECIMEN SOURCE: SIGNIFICANT CHANGE UP

## 2018-08-24 PROCEDURE — 86901 BLOOD TYPING SEROLOGIC RH(D): CPT

## 2018-08-24 PROCEDURE — 86900 BLOOD TYPING SEROLOGIC ABO: CPT

## 2018-08-24 PROCEDURE — 49423 EXCHANGE DRAINAGE CATHETER: CPT

## 2018-08-24 PROCEDURE — 87075 CULTR BACTERIA EXCEPT BLOOD: CPT

## 2018-08-24 PROCEDURE — 82570 ASSAY OF URINE CREATININE: CPT

## 2018-08-24 PROCEDURE — 87205 SMEAR GRAM STAIN: CPT

## 2018-08-24 PROCEDURE — 85027 COMPLETE CBC AUTOMATED: CPT

## 2018-08-24 PROCEDURE — C1887: CPT

## 2018-08-24 PROCEDURE — 82962 GLUCOSE BLOOD TEST: CPT

## 2018-08-24 PROCEDURE — 74176 CT ABD & PELVIS W/O CONTRAST: CPT

## 2018-08-24 PROCEDURE — 88305 TISSUE EXAM BY PATHOLOGIST: CPT

## 2018-08-24 PROCEDURE — C1769: CPT

## 2018-08-24 PROCEDURE — 99285 EMERGENCY DEPT VISIT HI MDM: CPT | Mod: 25

## 2018-08-24 PROCEDURE — 81001 URINALYSIS AUTO W/SCOPE: CPT

## 2018-08-24 PROCEDURE — 51702 INSERT TEMP BLADDER CATH: CPT

## 2018-08-24 PROCEDURE — 87186 SC STD MICRODIL/AGAR DIL: CPT

## 2018-08-24 PROCEDURE — 75984 XRAY CONTROL CATHETER CHANGE: CPT

## 2018-08-24 PROCEDURE — 87070 CULTURE OTHR SPECIMN AEROBIC: CPT

## 2018-08-24 PROCEDURE — 83735 ASSAY OF MAGNESIUM: CPT

## 2018-08-24 PROCEDURE — 88112 CYTOPATH CELL ENHANCE TECH: CPT

## 2018-08-24 PROCEDURE — G0103: CPT

## 2018-08-24 PROCEDURE — 80048 BASIC METABOLIC PNL TOTAL CA: CPT

## 2018-08-24 PROCEDURE — 49406 IMAGE CATH FLUID PERI/RETRO: CPT

## 2018-08-24 PROCEDURE — 87040 BLOOD CULTURE FOR BACTERIA: CPT

## 2018-08-24 PROCEDURE — 75984 XRAY CONTROL CATHETER CHANGE: CPT | Mod: 26

## 2018-08-24 PROCEDURE — 87086 URINE CULTURE/COLONY COUNT: CPT

## 2018-08-24 PROCEDURE — C1750: CPT

## 2018-08-24 PROCEDURE — 85730 THROMBOPLASTIN TIME PARTIAL: CPT

## 2018-08-24 PROCEDURE — 85652 RBC SED RATE AUTOMATED: CPT

## 2018-08-24 PROCEDURE — 80202 ASSAY OF VANCOMYCIN: CPT

## 2018-08-24 PROCEDURE — 71045 X-RAY EXAM CHEST 1 VIEW: CPT

## 2018-08-24 PROCEDURE — 97161 PT EVAL LOW COMPLEX 20 MIN: CPT

## 2018-08-24 PROCEDURE — C1729: CPT

## 2018-08-24 PROCEDURE — 96374 THER/PROPH/DIAG INJ IV PUSH: CPT

## 2018-08-24 PROCEDURE — 86850 RBC ANTIBODY SCREEN: CPT

## 2018-08-24 PROCEDURE — 83036 HEMOGLOBIN GLYCOSYLATED A1C: CPT

## 2018-08-24 PROCEDURE — 93005 ELECTROCARDIOGRAM TRACING: CPT

## 2018-08-24 PROCEDURE — 86140 C-REACTIVE PROTEIN: CPT

## 2018-08-24 PROCEDURE — 85610 PROTHROMBIN TIME: CPT

## 2018-08-24 RX ORDER — DOCUSATE SODIUM 100 MG
1 CAPSULE ORAL
Qty: 0 | Refills: 0 | COMMUNITY
Start: 2018-08-24

## 2018-08-24 RX ORDER — SENNA PLUS 8.6 MG/1
2 TABLET ORAL
Qty: 0 | Refills: 0 | COMMUNITY
Start: 2018-08-24

## 2018-08-24 RX ORDER — OXYCODONE HYDROCHLORIDE 5 MG/1
1 TABLET ORAL
Qty: 20 | Refills: 0 | OUTPATIENT
Start: 2018-08-24

## 2018-08-24 RX ADMIN — Medication 1 APPLICATION(S): at 08:26

## 2018-08-24 RX ADMIN — Medication 1 APPLICATION(S): at 05:35

## 2018-08-24 RX ADMIN — APIXABAN 5 MILLIGRAM(S): 2.5 TABLET, FILM COATED ORAL at 17:15

## 2018-08-24 RX ADMIN — HEPARIN SODIUM 5000 UNIT(S): 5000 INJECTION INTRAVENOUS; SUBCUTANEOUS at 05:35

## 2018-08-24 RX ADMIN — Medication 50 MILLIGRAM(S): at 05:35

## 2018-08-24 RX ADMIN — Medication 104 MILLIGRAM(S): at 05:36

## 2018-08-24 RX ADMIN — Medication 2: at 13:17

## 2018-08-24 RX ADMIN — Medication 1: at 08:08

## 2018-08-24 RX ADMIN — Medication 1 TABLET(S): at 17:15

## 2018-08-24 RX ADMIN — Medication 100 MILLIGRAM(S): at 17:15

## 2018-08-24 RX ADMIN — Medication 2 UNIT(S): at 08:08

## 2018-08-24 RX ADMIN — Medication 1 APPLICATION(S): at 13:18

## 2018-08-24 RX ADMIN — Medication 104 MILLIGRAM(S): at 01:08

## 2018-08-24 RX ADMIN — Medication 100 MILLIGRAM(S): at 05:35

## 2018-08-24 RX ADMIN — CEFDINIR 300 MILLIGRAM(S): 250 POWDER, FOR SUSPENSION ORAL at 05:35

## 2018-08-24 RX ADMIN — Medication 2 UNIT(S): at 13:17

## 2018-08-24 RX ADMIN — LISINOPRIL 10 MILLIGRAM(S): 2.5 TABLET ORAL at 05:35

## 2018-08-24 RX ADMIN — Medication 2: at 17:15

## 2018-08-24 RX ADMIN — Medication 1 UNIT(S): at 17:15

## 2018-08-24 RX ADMIN — HEPARIN SODIUM 5000 UNIT(S): 5000 INJECTION INTRAVENOUS; SUBCUTANEOUS at 13:18

## 2018-08-24 RX ADMIN — Medication 81 MILLIGRAM(S): at 13:18

## 2018-08-24 RX ADMIN — APIXABAN 5 MILLIGRAM(S): 2.5 TABLET, FILM COATED ORAL at 05:35

## 2018-08-24 RX ADMIN — Medication 1 APPLICATION(S): at 17:15

## 2018-08-24 NOTE — PROGRESS NOTE ADULT - SUBJECTIVE AND OBJECTIVE BOX
Subjective:  83 years old male with prostate abscess s/p IR drain on 8/20. Patient was seen at bedside. Offers no complaints. No events overnight.     Objectives:  T(C): 36.7 (08-24-18 @ 05:40), Max: 36.7 (08-23-18 @ 20:46)  HR: 75 (08-24-18 @ 05:40) (75 - 79)  BP: 153/75 (08-24-18 @ 05:40) (153/75 - 160/77)  RR: 16 (08-24-18 @ 05:40) (16 - 18)  SpO2: 96% (08-24-18 @ 05:40) (94% - 96%)  Wt(kg): --    08-23 @ 07:01  -  08-24 @ 07:00  --------------------------------------------------------  IN:    Oral Fluid: 1320 mL    Solution: 100 mL  Total IN: 1420 mL    OUT:    Bulb: 20 mL    Indwelling Catheter - Urethral: 3300 mL  Total OUT: 3320 mL    Total NET: -1900 mL          Physcial Exam  GENERAL: NAD, well-developed  ABDOMEN: Soft, Nontender, Nondistended; Bowel sounds present  GENITOURINARY: Urine is clear in the tubing.   DRAINS: 5 cc of blood-tinged serous fluid in the IR drain  PSYCH: AAOx3    LABS:    08-23    134<L>  |  97  |  15  ----------------------------<  204<H>  4.1   |  24  |  1.27    Ca    9.2      23 Aug 2018 07:23      CAPILLARY BLOOD GLUCOSE      POCT Blood Glucose.: 220 mg/dL (23 Aug 2018 20:54)      CAPILLARY BLOOD GLUCOSE      POCT Blood Glucose.: 220 mg/dL (23 Aug 2018 20:54)        ASSESSMENT:  83y Male with Abscess of prostate  Prostatic abscess       PLAN:

## 2018-08-24 NOTE — PROGRESS NOTE ADULT - SUBJECTIVE AND OBJECTIVE BOX
Interventional Radiology Brief- Operative Note    Procedure: 8 Fr    Operators: Chaparro Menendez MD    EBL: 1 mL    Findings: retracted position of the existing 8 Fr transgluteal prostrate abscess drain. Kink the abscess drain within the soft tissues. Smaller but persistent abscess cavity    Plan of Care: Successful exchange for a new 8 Fr transgluteal abscess drain, with the pigtail formed within the abscess    Post-procedure management of Abscess drain   1.	Daily site cleaning and dressing change daily  2.	Record output every 8 hours in the hospital and daily at home  3.	Flush the catheter with 5 – 10 mL of saline every 12-24 hours to prevent catheter occlusion  4.	If there is leakage around the catheter, leakage or pain with flushing, or a significant change in output or position, please consult IR for evaluation

## 2018-08-24 NOTE — DIETITIAN INITIAL EVALUATION ADULT. - NS AS NUTRI INTERV ED CONTENT
Encouraged PO intake. Discussed small, frequent meals high in protein and energy. Discussed lean sources of protein and protein/energy dense snacks. Brief DM nutrition therapy provided including: sources of CHO, pairing CHO with proteins, consistent eating patterns, and importance of self monitoring blood glucose levels. Pt verbalized understanding.

## 2018-08-24 NOTE — PROGRESS NOTE ADULT - ATTENDING COMMENTS
Pt seen/examined.  Case discussed with housestaff/PA team.  Agree with above note history, physical and assessment/plan.  IR for drain study - ?repositioning  Dispo planning thereafter

## 2018-08-24 NOTE — CONSULT NOTE ADULT - ASSESSMENT
82 yo male with history of DM,AVR,prostate cancer s/p RT seeds, now with retention since May 10th and a post TURP periprostatic abscess.  Antibiotics are adjunctive to effective drainage.  His blood cultures have been negative, he is clinically doing well.  I would switch to augmentin 875 po BID.  This should cover E coli and enterococcus.  I would extend antibiotics for 2-3 weeks post drain removal.  Cefdinir/po amox would be adequate but a little more complicated.

## 2018-08-24 NOTE — DIETITIAN INITIAL EVALUATION ADULT. - OTHER INFO
Pt seen for length of stay. Per chart, pt with prostate abscess s/p IR drain on 8/20. Pt reports very good appetite and PO intake for the past several days- able to consume >75% of meals. Pt denies any history of chewing/swallowing difficulty or GI distress at this time. Pt amenable to brief DM nutrition education review- provided and noted below.

## 2018-08-24 NOTE — PROGRESS NOTE ADULT - ASSESSMENT
84y/o M s/p IR drainage of prostatic abscess.     Plan  - IR for possible drain removal  - cont. Abx,   - f/u UCx and BCx  - PT   - DVT ppx/ OOB  - discharge planning 82y/o M s/p IR drainage of prostatic abscess.     Plan  - IR for drain study and possible repoitioning today   - cont. Abx, will ? with ID regarding duration. Currently on amp and omnicef   - f/u UCx and BCx  - DVT ppx/ OOB  - discharge planning

## 2018-08-24 NOTE — PROGRESS NOTE ADULT - SUBJECTIVE AND OBJECTIVE BOX
Interventional Radiology Pre-Procedure Note    This is a 83y Male with PMH of afib on Eliquis (last dose this am), prostate CA s/p radioactive seed implant (per pt), TURBT who was admitted after MRI (from outside facility) demonstrated findings of pelvic abscess in setting of abdominal/ pelvic pain. He is currently s/p CT guided drainage of pelvic collection (right transgluteal approach) in IR with Dr. Gutierrez. Pt with minimal output of 20cc/24hr, CT abd/ pelvis from 8/23 shows small decrease in collection size. He presents to IR today for abscess tube check and possible repositioning.       PAST MEDICAL & SURGICAL HISTORY:       Vital Signs Last 24 Hrs  T(C): 36.8 (24 Aug 2018 12:02), Max: 36.9 (23 Aug 2018 13:31)  T(F): 98.3 (24 Aug 2018 12:02), Max: 98.5 (23 Aug 2018 13:31)  HR: 77 (24 Aug 2018 12:02) (75 - 81)  BP: 145/72 (24 Aug 2018 12:02) (143/71 - 160/77)  RR: 17 (24 Aug 2018 12:02) (16 - 18)  SpO2: 98% (24 Aug 2018 12:02) (94% - 98%)    Allergies: No Known Allergies    Intolerances        Physical Exam: Gen: NAD, A&Ox3    Labs:     Complete Blood Count in AM (08.19.18 @ 00:23)    WBC Count: 9.1 K/uL    RBC Count: 3.58 M/uL    Hemoglobin: 9.9 g/dL    Hematocrit: 29.7 %    Mean Cell Volume: 82.9 fl    Mean Cell Hemoglobin: 27.6 pg    Mean Cell Hemoglobin Conc: 33.3 gm/dL    Red Cell Distrib Width: 13.4 %    Platelet Count - Automated: 196 K/uL      08-23    134<L>  |  97  |  15  ----------------------------<  204<H>  4.1   |  24  |  1.27    Ca    9.2      23 Aug 2018 07:23    Prothrombin Time and INR, Plasma in AM (08.19.18 @ 00:23)    Prothrombin Time, Plasma: 13.9 sec    INR: 1.27: RECOMMENDED RANGES FOR THERAPEUTIC INR:    2.0-3.0 for most medical and surgical thromboembolic states    2.0-3.0 for atrial fibrillation    2.0-3.0 for bileaflet mechanical valve in aortic position    2.5-3.5 for mechanical heart valves   Chest 2004;[id number]  The presence of direct thrombin inhibitors (argatroban, refludan)  may falsely increase results. ratio      Plan is for abscess tube check and possible repositioning. The full procedure/ risks/ benefits/ alternatives were discussed with the pt and Informed consent obtained. All questions and concerns have been addressed at this time.

## 2018-08-24 NOTE — DIETITIAN INITIAL EVALUATION ADULT. - ADHERENCE
fair/Pt with DM, reports not checking SMBG often. Pt noted to have HgbA1c 8.0% - acceptable given pt's advanced age.

## 2018-08-31 ENCOUNTER — OUTPATIENT (OUTPATIENT)
Dept: OUTPATIENT SERVICES | Facility: HOSPITAL | Age: 83
LOS: 1 days | End: 2018-08-31
Payer: MEDICARE

## 2018-08-31 ENCOUNTER — APPOINTMENT (OUTPATIENT)
Dept: UROLOGY | Facility: CLINIC | Age: 83
End: 2018-08-31
Payer: MEDICARE

## 2018-08-31 DIAGNOSIS — R35.0 FREQUENCY OF MICTURITION: ICD-10-CM

## 2018-08-31 PROCEDURE — 51784 ANAL/URINARY MUSCLE STUDY: CPT | Mod: 26

## 2018-08-31 PROCEDURE — 51797 INTRAABDOMINAL PRESSURE TEST: CPT | Mod: 26

## 2018-08-31 PROCEDURE — 51741 ELECTRO-UROFLOWMETRY FIRST: CPT

## 2018-08-31 PROCEDURE — 51600 INJECTION FOR BLADDER X-RAY: CPT

## 2018-08-31 PROCEDURE — 51784 ANAL/URINARY MUSCLE STUDY: CPT

## 2018-08-31 PROCEDURE — 76000 FLUOROSCOPY <1 HR PHYS/QHP: CPT | Mod: 59

## 2018-08-31 PROCEDURE — 74455 X-RAY URETHRA/BLADDER: CPT | Mod: 26

## 2018-08-31 PROCEDURE — 51741 ELECTRO-UROFLOWMETRY FIRST: CPT | Mod: 26

## 2018-08-31 PROCEDURE — 51728 CYSTOMETROGRAM W/VP: CPT | Mod: 26

## 2018-08-31 PROCEDURE — 51728 CYSTOMETROGRAM W/VP: CPT

## 2018-08-31 PROCEDURE — 51797 INTRAABDOMINAL PRESSURE TEST: CPT

## 2018-08-31 PROCEDURE — 52000 CYSTOURETHROSCOPY: CPT

## 2018-08-31 PROCEDURE — 74455 X-RAY URETHRA/BLADDER: CPT

## 2018-09-04 DIAGNOSIS — R33.8 OTHER RETENTION OF URINE: ICD-10-CM

## 2018-09-04 DIAGNOSIS — N42.89 OTHER SPECIFIED DISORDERS OF PROSTATE: ICD-10-CM

## 2018-09-05 ENCOUNTER — RECORD ABSTRACTING (OUTPATIENT)
Age: 83
End: 2018-09-05

## 2018-09-05 DIAGNOSIS — Z82.49 FAMILY HISTORY OF ISCHEMIC HEART DISEASE AND OTHER DISEASES OF THE CIRCULATORY SYSTEM: ICD-10-CM

## 2018-09-07 ENCOUNTER — APPOINTMENT (OUTPATIENT)
Dept: UROLOGY | Facility: CLINIC | Age: 83
End: 2018-09-07
Payer: MEDICARE

## 2018-09-07 VITALS
HEIGHT: 69 IN | TEMPERATURE: 98 F | DIASTOLIC BLOOD PRESSURE: 66 MMHG | WEIGHT: 198 LBS | HEART RATE: 77 BPM | RESPIRATION RATE: 17 BRPM | BODY MASS INDEX: 29.33 KG/M2 | SYSTOLIC BLOOD PRESSURE: 121 MMHG

## 2018-09-07 PROCEDURE — 99213 OFFICE O/P EST LOW 20 MIN: CPT

## 2018-09-11 ENCOUNTER — OUTPATIENT (OUTPATIENT)
Dept: OUTPATIENT SERVICES | Facility: HOSPITAL | Age: 83
LOS: 1 days | End: 2018-09-11
Payer: MEDICARE

## 2018-09-11 VITALS
HEIGHT: 69 IN | DIASTOLIC BLOOD PRESSURE: 75 MMHG | OXYGEN SATURATION: 100 % | SYSTOLIC BLOOD PRESSURE: 124 MMHG | HEART RATE: 97 BPM | TEMPERATURE: 98 F | WEIGHT: 190.92 LBS | RESPIRATION RATE: 18 BRPM

## 2018-09-11 DIAGNOSIS — I48.0 PAROXYSMAL ATRIAL FIBRILLATION: ICD-10-CM

## 2018-09-11 DIAGNOSIS — Z90.79 ACQUIRED ABSENCE OF OTHER GENITAL ORGAN(S): Chronic | ICD-10-CM

## 2018-09-11 DIAGNOSIS — R33.9 RETENTION OF URINE, UNSPECIFIED: ICD-10-CM

## 2018-09-11 DIAGNOSIS — Z95.2 PRESENCE OF PROSTHETIC HEART VALVE: Chronic | ICD-10-CM

## 2018-09-11 DIAGNOSIS — Z01.818 ENCOUNTER FOR OTHER PREPROCEDURAL EXAMINATION: ICD-10-CM

## 2018-09-11 DIAGNOSIS — R33.8 OTHER RETENTION OF URINE: ICD-10-CM

## 2018-09-11 LAB
ANION GAP SERPL CALC-SCNC: 14 MMOL/L — SIGNIFICANT CHANGE UP (ref 5–17)
BUN SERPL-MCNC: 25 MG/DL — HIGH (ref 7–23)
CALCIUM SERPL-MCNC: 9.6 MG/DL — SIGNIFICANT CHANGE UP (ref 8.4–10.5)
CHLORIDE SERPL-SCNC: 99 MMOL/L — SIGNIFICANT CHANGE UP (ref 96–108)
CO2 SERPL-SCNC: 23 MMOL/L — SIGNIFICANT CHANGE UP (ref 22–31)
CREAT SERPL-MCNC: 1.15 MG/DL — SIGNIFICANT CHANGE UP (ref 0.5–1.3)
GLUCOSE SERPL-MCNC: 149 MG/DL — HIGH (ref 70–99)
HBA1C BLD-MCNC: 8.2 % — HIGH (ref 4–5.6)
HCT VFR BLD CALC: 33.7 % — LOW (ref 39–50)
HGB BLD-MCNC: 11 G/DL — LOW (ref 13–17)
MCHC RBC-ENTMCNC: 26.6 PG — LOW (ref 27–34)
MCHC RBC-ENTMCNC: 32.6 GM/DL — SIGNIFICANT CHANGE UP (ref 32–36)
MCV RBC AUTO: 81.4 FL — SIGNIFICANT CHANGE UP (ref 80–100)
PLATELET # BLD AUTO: 181 K/UL — SIGNIFICANT CHANGE UP (ref 150–400)
POTASSIUM SERPL-MCNC: 4.8 MMOL/L — SIGNIFICANT CHANGE UP (ref 3.5–5.3)
POTASSIUM SERPL-SCNC: 4.8 MMOL/L — SIGNIFICANT CHANGE UP (ref 3.5–5.3)
RBC # BLD: 4.14 M/UL — LOW (ref 4.2–5.8)
RBC # FLD: 14.8 % — HIGH (ref 10.3–14.5)
SODIUM SERPL-SCNC: 136 MMOL/L — SIGNIFICANT CHANGE UP (ref 135–145)
WBC # BLD: 8.86 K/UL — SIGNIFICANT CHANGE UP (ref 3.8–10.5)
WBC # FLD AUTO: 8.86 K/UL — SIGNIFICANT CHANGE UP (ref 3.8–10.5)

## 2018-09-11 PROCEDURE — 87186 SC STD MICRODIL/AGAR DIL: CPT

## 2018-09-11 PROCEDURE — G0463: CPT

## 2018-09-11 PROCEDURE — 85027 COMPLETE CBC AUTOMATED: CPT

## 2018-09-11 PROCEDURE — 80048 BASIC METABOLIC PNL TOTAL CA: CPT

## 2018-09-11 PROCEDURE — 83036 HEMOGLOBIN GLYCOSYLATED A1C: CPT

## 2018-09-11 PROCEDURE — 87086 URINE CULTURE/COLONY COUNT: CPT

## 2018-09-11 RX ORDER — SODIUM CHLORIDE 9 MG/ML
3 INJECTION INTRAMUSCULAR; INTRAVENOUS; SUBCUTANEOUS EVERY 8 HOURS
Qty: 0 | Refills: 0 | Status: DISCONTINUED | OUTPATIENT
Start: 2018-09-19 | End: 2018-09-19

## 2018-09-11 RX ORDER — LIDOCAINE HCL 20 MG/ML
0.2 VIAL (ML) INJECTION ONCE
Qty: 0 | Refills: 0 | Status: DISCONTINUED | OUTPATIENT
Start: 2018-09-19 | End: 2018-09-19

## 2018-09-11 RX ORDER — CEFAZOLIN SODIUM 1 G
2000 VIAL (EA) INJECTION ONCE
Qty: 0 | Refills: 0 | Status: DISCONTINUED | OUTPATIENT
Start: 2018-09-19 | End: 2018-10-04

## 2018-09-11 NOTE — H&P PST ADULT - PROBLEM SELECTOR PLAN 2
CrCl: >50mL/min   -Eliquis Last Dose: 2 days before procedure 9/16/18  Restart: Resume within 24 hours after procedure  Continue Aspirin

## 2018-09-11 NOTE — H&P PST ADULT - PMH
HLD (hyperlipidemia)    Hypertension    Paroxysmal A-fib    Type 2 diabetes mellitus Benign prostatic hyperplasia with lower urinary tract symptoms, symptom details unspecified    HLD (hyperlipidemia)    Hypertension    Paroxysmal A-fib    Prostate cancer    Type 2 diabetes mellitus

## 2018-09-11 NOTE — H&P PST ADULT - HISTORY OF PRESENT ILLNESS
83 year old male with PMHx Valve replacement, Paroxysmal Atrial fibrillation on eliquis, DM, HTN s/p Transurethral resection of prostate 6/2018, Recent hospitalization from 8/17/18-8/24/18 with pelvic abscess. Outpatient MRI revealing mass concerning for abscess. In ER, barbour catheter was exchange via cystoscopy. Eliquis was held and on 8/20 patient underwent successful CT guided percutaneous drainage of collection by Interventional Radiology. Culture from IR drainage positive EColi and Enterococcus faecalis. During hospital course patient treated with Vancomycin and Zosyn. Patient was discharged with Barbour catheter. He is severely bothered by penile pain from catheter. No hematuria. No fever/chills.  Pt is scheduled for Cystoscopy and open insertion of Suprapubic Catheter on 9/19/18. 83 year old male with PMHx Prostate cancer with seeds in 2006, Valve replacement, Paroxysmal Atrial fibrillation on eliquis, DM, HTN s/p Transurethral resection of prostate 6/2018, Recent hospitalization from 8/17/18-8/24/18 with pelvic abscess. Outpatient MRI revealing mass concerning for abscess. In ER, barbour catheter was exchange via cystoscopy. Eliquis was held and on 8/20 patient underwent successful CT guided percutaneous drainage of collection by Interventional Radiology. Culture from IR drainage positive EColi and Enterococcus faecalis. During hospital course patient treated with Vancomycin and Zosyn. Patient was discharged with Barbour catheter and Augmentin. He is severely bothered by penile pain from catheter. No hematuria. No fever/chills.  Pt is scheduled for Cystoscopy and open insertion of Suprapubic Catheter on 9/19/18.

## 2018-09-13 ENCOUNTER — APPOINTMENT (OUTPATIENT)
Age: 83
End: 2018-09-13

## 2018-09-13 LAB
-  AMIKACIN: SIGNIFICANT CHANGE UP
-  AZTREONAM: SIGNIFICANT CHANGE UP
-  CEFEPIME: SIGNIFICANT CHANGE UP
-  CEFTAZIDIME: SIGNIFICANT CHANGE UP
-  CIPROFLOXACIN: SIGNIFICANT CHANGE UP
-  GENTAMICIN: SIGNIFICANT CHANGE UP
-  IMIPENEM: SIGNIFICANT CHANGE UP
-  LEVOFLOXACIN: SIGNIFICANT CHANGE UP
-  MEROPENEM: SIGNIFICANT CHANGE UP
-  PIPERACILLIN/TAZOBACTAM: SIGNIFICANT CHANGE UP
-  TOBRAMYCIN: SIGNIFICANT CHANGE UP
BACTERIA UR CULT: ABNORMAL
CULTURE RESULTS: SIGNIFICANT CHANGE UP
METHOD TYPE: SIGNIFICANT CHANGE UP
ORGANISM # SPEC MICROSCOPIC CNT: SIGNIFICANT CHANGE UP
ORGANISM # SPEC MICROSCOPIC CNT: SIGNIFICANT CHANGE UP
SPECIMEN SOURCE: SIGNIFICANT CHANGE UP

## 2018-09-17 ENCOUNTER — RX RENEWAL (OUTPATIENT)
Age: 83
End: 2018-09-17

## 2018-09-18 ENCOUNTER — TRANSCRIPTION ENCOUNTER (OUTPATIENT)
Age: 83
End: 2018-09-18

## 2018-09-19 ENCOUNTER — APPOINTMENT (OUTPATIENT)
Dept: UROLOGY | Facility: HOSPITAL | Age: 83
End: 2018-09-19

## 2018-09-19 ENCOUNTER — OUTPATIENT (OUTPATIENT)
Dept: OUTPATIENT SERVICES | Facility: HOSPITAL | Age: 83
LOS: 1 days | End: 2018-09-19
Payer: MEDICARE

## 2018-09-19 VITALS
WEIGHT: 190.92 LBS | HEART RATE: 92 BPM | TEMPERATURE: 98 F | DIASTOLIC BLOOD PRESSURE: 75 MMHG | HEIGHT: 69 IN | SYSTOLIC BLOOD PRESSURE: 122 MMHG | OXYGEN SATURATION: 100 % | RESPIRATION RATE: 18 BRPM

## 2018-09-19 VITALS
RESPIRATION RATE: 16 BRPM | HEART RATE: 80 BPM | DIASTOLIC BLOOD PRESSURE: 76 MMHG | OXYGEN SATURATION: 100 % | SYSTOLIC BLOOD PRESSURE: 130 MMHG | TEMPERATURE: 98 F

## 2018-09-19 DIAGNOSIS — Z95.2 PRESENCE OF PROSTHETIC HEART VALVE: Chronic | ICD-10-CM

## 2018-09-19 DIAGNOSIS — R33.8 OTHER RETENTION OF URINE: ICD-10-CM

## 2018-09-19 DIAGNOSIS — Z90.79 ACQUIRED ABSENCE OF OTHER GENITAL ORGAN(S): Chronic | ICD-10-CM

## 2018-09-19 PROBLEM — C61 MALIGNANT NEOPLASM OF PROSTATE: Chronic | Status: ACTIVE | Noted: 2018-09-11

## 2018-09-19 PROBLEM — E78.5 HYPERLIPIDEMIA, UNSPECIFIED: Chronic | Status: ACTIVE | Noted: 2018-09-11

## 2018-09-19 PROBLEM — E11.9 TYPE 2 DIABETES MELLITUS WITHOUT COMPLICATIONS: Chronic | Status: ACTIVE | Noted: 2018-09-11

## 2018-09-19 PROBLEM — I48.0 PAROXYSMAL ATRIAL FIBRILLATION: Chronic | Status: ACTIVE | Noted: 2018-09-11

## 2018-09-19 PROBLEM — N40.1 BENIGN PROSTATIC HYPERPLASIA WITH LOWER URINARY TRACT SYMPTOMS: Chronic | Status: ACTIVE | Noted: 2018-09-11

## 2018-09-19 PROBLEM — I10 ESSENTIAL (PRIMARY) HYPERTENSION: Chronic | Status: ACTIVE | Noted: 2018-09-11

## 2018-09-19 LAB — GLUCOSE BLDC GLUCOMTR-MCNC: 181 MG/DL — HIGH (ref 70–99)

## 2018-09-19 PROCEDURE — 52214 CYSTOSCOPY AND TREATMENT: CPT

## 2018-09-19 PROCEDURE — 82962 GLUCOSE BLOOD TEST: CPT

## 2018-09-19 PROCEDURE — 51040 INCISE & DRAIN BLADDER: CPT

## 2018-09-19 RX ORDER — SODIUM CHLORIDE 9 MG/ML
1000 INJECTION, SOLUTION INTRAVENOUS
Qty: 0 | Refills: 0 | Status: DISCONTINUED | OUTPATIENT
Start: 2018-09-19 | End: 2018-10-04

## 2018-09-19 RX ORDER — ACETAMINOPHEN WITH CODEINE 300MG-30MG
1 TABLET ORAL
Qty: 8 | Refills: 0 | OUTPATIENT
Start: 2018-09-19 | End: 2018-09-20

## 2018-09-19 NOTE — ASU DISCHARGE PLAN (ADULT/PEDIATRIC). - MEDICATION SUMMARY - MEDICATIONS TO TAKE
I will START or STAY ON the medications listed below when I get home from the hospital:    aspirin 81 mg oral delayed release tablet  -- 1  by mouth every other day (at bedtime)  -- Indication: For home med    Tylenol with Codeine #3 oral tablet  -- 1 tab(s) by mouth every 6 hours MDD:4 doses  -- Caution federal law prohibits the transfer of this drug to any person other  than the person for whom it was prescribed.  May cause drowsiness.  Alcohol may intensify this effect.  Use care when operating dangerous machinery.  This product contains acetaminophen.  Do not use  with any other product containing acetaminophen to prevent possible liver damage.  Using more of this medication than prescribed may cause serious breathing problems.    -- Indication: For severe pain    Tylenol 500 mg oral tablet  -- 2 tab(s) by mouth every 6 hours, As Needed  -- Indication: For mild to moderate pain    lisinopril 10 mg oral tablet  -- 1 tab(s) by mouth once a day  -- Indication: For home med    Flomax  -- 0.4 milligram(s) by mouth once a day (at bedtime)  -- Indication: For home med    Eliquis 5 mg oral tablet  -- 1 tab(s) by mouth 2 times a day  -- Indication: For HOLD FOR 3 DAYS.     metFORMIN  -- 1000 milligram(s) by mouth 2 times a day  -- Indication: For Home med    simvastatin  -- 10 milligram(s) by mouth once a day  -- Indication: For home med    metoprolol succinate 50 mg oral tablet, extended release  -- 1 tab(s) by mouth once a day  -- Indication: For home med    senna oral tablet  -- 2 tab(s) by mouth once a day (at bedtime), As Needed  -- Indication: For home med    docusate sodium 100 mg oral capsule  -- 1 cap(s) by mouth 2 times a day, As Needed  -- Indication: For home med    amoxicillin-clavulanate 875 mg-125 mg oral tablet  -- 1 tab(s) by mouth 2 times a day  -- Indication: For Continue until finished    PreserVision  -- 2 tab(s) by mouth 2 times a day  -- Indication: For home med

## 2018-09-19 NOTE — BRIEF OPERATIVE NOTE - PROCEDURE
<<-----Click on this checkbox to enter Procedure Cystoscopy with fulguration of bladder  09/19/2018    Active  CTABIB  Suprapubic tube placement  09/19/2018    Active  CTABIB

## 2018-09-19 NOTE — BRIEF OPERATIVE NOTE - POST-OP DX
Bladder polyp  09/19/2018    Active  Ulises Spaulding  Urinary retention  09/19/2018    Ulises Mcpherson

## 2018-09-21 ENCOUNTER — APPOINTMENT (OUTPATIENT)
Dept: UROLOGY | Facility: CLINIC | Age: 83
End: 2018-09-21

## 2018-09-26 ENCOUNTER — OUTPATIENT (OUTPATIENT)
Dept: OUTPATIENT SERVICES | Facility: HOSPITAL | Age: 83
LOS: 1 days | End: 2018-09-26
Payer: MEDICARE

## 2018-09-26 ENCOUNTER — APPOINTMENT (OUTPATIENT)
Dept: UROLOGY | Facility: CLINIC | Age: 83
End: 2018-09-26
Payer: MEDICARE

## 2018-09-26 DIAGNOSIS — Z90.79 ACQUIRED ABSENCE OF OTHER GENITAL ORGAN(S): Chronic | ICD-10-CM

## 2018-09-26 DIAGNOSIS — R33.9 RETENTION OF URINE, UNSPECIFIED: ICD-10-CM

## 2018-09-26 DIAGNOSIS — Z95.2 PRESENCE OF PROSTHETIC HEART VALVE: Chronic | ICD-10-CM

## 2018-09-26 DIAGNOSIS — R31.0 GROSS HEMATURIA: ICD-10-CM

## 2018-09-26 PROCEDURE — 99213 OFFICE O/P EST LOW 20 MIN: CPT | Mod: 25

## 2018-09-26 PROCEDURE — 51700 IRRIGATION OF BLADDER: CPT | Mod: 58

## 2018-09-26 PROCEDURE — 51700 IRRIGATION OF BLADDER: CPT

## 2018-09-28 ENCOUNTER — APPOINTMENT (OUTPATIENT)
Dept: UROLOGY | Facility: CLINIC | Age: 83
End: 2018-09-28
Payer: MEDICARE

## 2018-09-28 VITALS
SYSTOLIC BLOOD PRESSURE: 123 MMHG | HEART RATE: 76 BPM | WEIGHT: 198 LBS | HEIGHT: 69 IN | TEMPERATURE: 98 F | DIASTOLIC BLOOD PRESSURE: 66 MMHG | RESPIRATION RATE: 17 BRPM | BODY MASS INDEX: 29.33 KG/M2

## 2018-09-28 LAB
ALBUMIN SERPL ELPH-MCNC: 3.5 G/DL
ALP BLD-CCNC: 67 U/L
ALT SERPL-CCNC: 9 U/L
ANION GAP SERPL CALC-SCNC: 18 MMOL/L
AST SERPL-CCNC: 12 U/L
BASOPHILS # BLD AUTO: 0.01 K/UL
BASOPHILS NFR BLD AUTO: 0.1 %
BILIRUB SERPL-MCNC: 0.3 MG/DL
BUN SERPL-MCNC: 27 MG/DL
CALCIUM SERPL-MCNC: 9.3 MG/DL
CHLORIDE SERPL-SCNC: 98 MMOL/L
CO2 SERPL-SCNC: 20 MMOL/L
CREAT SERPL-MCNC: 1.32 MG/DL
EOSINOPHIL # BLD AUTO: 0.07 K/UL
EOSINOPHIL NFR BLD AUTO: 0.9 %
GLUCOSE SERPL-MCNC: 229 MG/DL
HCT VFR BLD CALC: 32.3 %
HGB BLD-MCNC: 10.4 G/DL
IMM GRANULOCYTES NFR BLD AUTO: 0.5 %
LYMPHOCYTES # BLD AUTO: 1.2 K/UL
LYMPHOCYTES NFR BLD AUTO: 14.8 %
MAN DIFF?: NORMAL
MCHC RBC-ENTMCNC: 26.2 PG
MCHC RBC-ENTMCNC: 32.2 GM/DL
MCV RBC AUTO: 81.4 FL
MONOCYTES # BLD AUTO: 0.58 K/UL
MONOCYTES NFR BLD AUTO: 7.2 %
NEUTROPHILS # BLD AUTO: 6.2 K/UL
NEUTROPHILS NFR BLD AUTO: 76.5 %
PLATELET # BLD AUTO: 192 K/UL
POTASSIUM SERPL-SCNC: 5 MMOL/L
PROT SERPL-MCNC: 7.1 G/DL
RBC # BLD: 3.97 M/UL
RBC # FLD: 14 %
SODIUM SERPL-SCNC: 136 MMOL/L
WBC # FLD AUTO: 8.1 K/UL

## 2018-09-28 PROCEDURE — 99024 POSTOP FOLLOW-UP VISIT: CPT

## 2018-09-29 DIAGNOSIS — R33.9 RETENTION OF URINE, UNSPECIFIED: ICD-10-CM

## 2018-09-29 DIAGNOSIS — N32.0 BLADDER-NECK OBSTRUCTION: ICD-10-CM

## 2018-09-29 DIAGNOSIS — R31.0 GROSS HEMATURIA: ICD-10-CM

## 2018-10-19 ENCOUNTER — APPOINTMENT (OUTPATIENT)
Dept: UROLOGY | Facility: CLINIC | Age: 83
End: 2018-10-19
Payer: MEDICARE

## 2018-10-19 ENCOUNTER — OUTPATIENT (OUTPATIENT)
Dept: OUTPATIENT SERVICES | Facility: HOSPITAL | Age: 83
LOS: 1 days | End: 2018-10-19
Payer: MEDICARE

## 2018-10-19 VITALS
RESPIRATION RATE: 16 BRPM | SYSTOLIC BLOOD PRESSURE: 138 MMHG | TEMPERATURE: 97.8 F | HEART RATE: 73 BPM | DIASTOLIC BLOOD PRESSURE: 80 MMHG

## 2018-10-19 DIAGNOSIS — R35.0 FREQUENCY OF MICTURITION: ICD-10-CM

## 2018-10-19 DIAGNOSIS — Z95.2 PRESENCE OF PROSTHETIC HEART VALVE: Chronic | ICD-10-CM

## 2018-10-19 DIAGNOSIS — Z85.46 PERSONAL HISTORY OF MALIGNANT NEOPLASM OF PROSTATE: ICD-10-CM

## 2018-10-19 DIAGNOSIS — R33.8 OTHER RETENTION OF URINE: ICD-10-CM

## 2018-10-19 DIAGNOSIS — Z90.79 ACQUIRED ABSENCE OF OTHER GENITAL ORGAN(S): Chronic | ICD-10-CM

## 2018-10-19 PROCEDURE — 51705 CHANGE OF BLADDER TUBE: CPT | Mod: 58

## 2018-10-19 PROCEDURE — 99214 OFFICE O/P EST MOD 30 MIN: CPT | Mod: 25

## 2018-10-19 PROCEDURE — 51705 CHANGE OF BLADDER TUBE: CPT

## 2018-10-19 PROCEDURE — C2627: CPT

## 2018-10-22 ENCOUNTER — FORM ENCOUNTER (OUTPATIENT)
Age: 83
End: 2018-10-22

## 2018-10-22 DIAGNOSIS — N28.89 OTHER SPECIFIED DISORDERS OF KIDNEY AND URETER: ICD-10-CM

## 2018-10-22 DIAGNOSIS — N42.89 OTHER SPECIFIED DISORDERS OF PROSTATE: ICD-10-CM

## 2018-10-22 DIAGNOSIS — Z85.46 PERSONAL HISTORY OF MALIGNANT NEOPLASM OF PROSTATE: ICD-10-CM

## 2018-10-22 DIAGNOSIS — R33.8 OTHER RETENTION OF URINE: ICD-10-CM

## 2018-10-23 ENCOUNTER — APPOINTMENT (OUTPATIENT)
Dept: MRI IMAGING | Facility: CLINIC | Age: 83
End: 2018-10-23
Payer: MEDICARE

## 2018-10-23 ENCOUNTER — OUTPATIENT (OUTPATIENT)
Dept: OUTPATIENT SERVICES | Facility: HOSPITAL | Age: 83
LOS: 1 days | End: 2018-10-23

## 2018-10-23 ENCOUNTER — MOBILE ON CALL (OUTPATIENT)
Age: 83
End: 2018-10-23

## 2018-10-23 DIAGNOSIS — Z00.8 ENCOUNTER FOR OTHER GENERAL EXAMINATION: ICD-10-CM

## 2018-10-23 DIAGNOSIS — Z95.2 PRESENCE OF PROSTHETIC HEART VALVE: Chronic | ICD-10-CM

## 2018-10-23 DIAGNOSIS — Z90.79 ACQUIRED ABSENCE OF OTHER GENITAL ORGAN(S): Chronic | ICD-10-CM

## 2018-10-23 PROCEDURE — 72197 MRI PELVIS W/O & W/DYE: CPT | Mod: 26

## 2018-10-24 ENCOUNTER — INPATIENT (INPATIENT)
Facility: HOSPITAL | Age: 83
LOS: 3 days | Discharge: HOME | DRG: 709 | End: 2018-10-28
Attending: UROLOGY | Admitting: UROLOGY
Payer: MEDICARE

## 2018-10-24 ENCOUNTER — TRANSCRIPTION ENCOUNTER (OUTPATIENT)
Age: 83
End: 2018-10-24

## 2018-10-24 VITALS
RESPIRATION RATE: 18 BRPM | HEART RATE: 99 BPM | WEIGHT: 190.04 LBS | TEMPERATURE: 98 F | SYSTOLIC BLOOD PRESSURE: 190 MMHG | DIASTOLIC BLOOD PRESSURE: 87 MMHG | OXYGEN SATURATION: 97 %

## 2018-10-24 DIAGNOSIS — Z29.9 ENCOUNTER FOR PROPHYLACTIC MEASURES, UNSPECIFIED: ICD-10-CM

## 2018-10-24 DIAGNOSIS — Z95.2 PRESENCE OF PROSTHETIC HEART VALVE: Chronic | ICD-10-CM

## 2018-10-24 DIAGNOSIS — R31.9 HEMATURIA, UNSPECIFIED: ICD-10-CM

## 2018-10-24 DIAGNOSIS — E11.9 TYPE 2 DIABETES MELLITUS WITHOUT COMPLICATIONS: ICD-10-CM

## 2018-10-24 DIAGNOSIS — T83.9XXA UNSPECIFIED COMPLICATION OF GENITOURINARY PROSTHETIC DEVICE, IMPLANT AND GRAFT, INITIAL ENCOUNTER: ICD-10-CM

## 2018-10-24 DIAGNOSIS — E87.1 HYPO-OSMOLALITY AND HYPONATREMIA: ICD-10-CM

## 2018-10-24 DIAGNOSIS — I48.91 UNSPECIFIED ATRIAL FIBRILLATION: ICD-10-CM

## 2018-10-24 DIAGNOSIS — Z90.79 ACQUIRED ABSENCE OF OTHER GENITAL ORGAN(S): Chronic | ICD-10-CM

## 2018-10-24 LAB
ALBUMIN SERPL ELPH-MCNC: 3.1 G/DL — LOW (ref 3.3–5)
ALP SERPL-CCNC: 56 U/L — SIGNIFICANT CHANGE UP (ref 40–120)
ALT FLD-CCNC: 8 U/L — LOW (ref 10–45)
ANION GAP SERPL CALC-SCNC: 13 MMOL/L — SIGNIFICANT CHANGE UP (ref 5–17)
ANION GAP SERPL CALC-SCNC: 16 MMOL/L — SIGNIFICANT CHANGE UP (ref 5–17)
APPEARANCE UR: ABNORMAL
APTT BLD: 26.5 SEC — LOW (ref 27.5–37.4)
AST SERPL-CCNC: 24 U/L — SIGNIFICANT CHANGE UP (ref 10–40)
BACTERIA # UR AUTO: NEGATIVE — SIGNIFICANT CHANGE UP
BASOPHILS # BLD AUTO: 0 K/UL — SIGNIFICANT CHANGE UP (ref 0–0.2)
BASOPHILS NFR BLD AUTO: 0 % — SIGNIFICANT CHANGE UP (ref 0–2)
BILIRUB SERPL-MCNC: 0.9 MG/DL — SIGNIFICANT CHANGE UP (ref 0.2–1.2)
BILIRUB UR-MCNC: NEGATIVE — SIGNIFICANT CHANGE UP
BUN SERPL-MCNC: 22 MG/DL — SIGNIFICANT CHANGE UP (ref 7–23)
BUN SERPL-MCNC: 27 MG/DL — HIGH (ref 7–23)
CALCIUM SERPL-MCNC: 8.5 MG/DL — SIGNIFICANT CHANGE UP (ref 8.4–10.5)
CALCIUM SERPL-MCNC: 8.6 MG/DL — SIGNIFICANT CHANGE UP (ref 8.4–10.5)
CHLORIDE SERPL-SCNC: 91 MMOL/L — LOW (ref 96–108)
CHLORIDE SERPL-SCNC: 98 MMOL/L — SIGNIFICANT CHANGE UP (ref 96–108)
CO2 SERPL-SCNC: 16 MMOL/L — LOW (ref 22–31)
CO2 SERPL-SCNC: 19 MMOL/L — LOW (ref 22–31)
COLOR SPEC: SIGNIFICANT CHANGE UP
CREAT SERPL-MCNC: 1.49 MG/DL — HIGH (ref 0.5–1.3)
CREAT SERPL-MCNC: 1.5 MG/DL — HIGH (ref 0.5–1.3)
DIFF PNL FLD: ABNORMAL
EOSINOPHIL # BLD AUTO: 0.1 K/UL — SIGNIFICANT CHANGE UP (ref 0–0.5)
EOSINOPHIL NFR BLD AUTO: 0.7 % — SIGNIFICANT CHANGE UP (ref 0–6)
EPI CELLS # UR: 3 /HPF — SIGNIFICANT CHANGE UP
GLUCOSE SERPL-MCNC: 170 MG/DL — HIGH (ref 70–99)
GLUCOSE SERPL-MCNC: 209 MG/DL — HIGH (ref 70–99)
GLUCOSE UR QL: NEGATIVE — SIGNIFICANT CHANGE UP
HCT VFR BLD CALC: 27.6 % — LOW (ref 39–50)
HGB BLD-MCNC: 9.4 G/DL — LOW (ref 13–17)
HYALINE CASTS # UR AUTO: 186 /LPF — HIGH (ref 0–2)
INR BLD: 1.42 RATIO — HIGH (ref 0.88–1.16)
KETONES UR-MCNC: NEGATIVE — SIGNIFICANT CHANGE UP
LEUKOCYTE ESTERASE UR-ACNC: ABNORMAL
LYMPHOCYTES # BLD AUTO: 1 K/UL — SIGNIFICANT CHANGE UP (ref 1–3.3)
LYMPHOCYTES # BLD AUTO: 11.6 % — LOW (ref 13–44)
MCHC RBC-ENTMCNC: 27.4 PG — SIGNIFICANT CHANGE UP (ref 27–34)
MCHC RBC-ENTMCNC: 34 GM/DL — SIGNIFICANT CHANGE UP (ref 32–36)
MCV RBC AUTO: 80.7 FL — SIGNIFICANT CHANGE UP (ref 80–100)
MONOCYTES # BLD AUTO: 0.7 K/UL — SIGNIFICANT CHANGE UP (ref 0–0.9)
MONOCYTES NFR BLD AUTO: 8.1 % — SIGNIFICANT CHANGE UP (ref 2–14)
NEUTROPHILS # BLD AUTO: 7.1 K/UL — SIGNIFICANT CHANGE UP (ref 1.8–7.4)
NEUTROPHILS NFR BLD AUTO: 79.6 % — HIGH (ref 43–77)
NITRITE UR-MCNC: NEGATIVE — SIGNIFICANT CHANGE UP
OSMOLALITY UR: 156 MOS/KG — LOW (ref 300–900)
PH UR: 7 — SIGNIFICANT CHANGE UP (ref 5–8)
PLATELET # BLD AUTO: 150 K/UL — SIGNIFICANT CHANGE UP (ref 150–400)
POTASSIUM SERPL-MCNC: 4.4 MMOL/L — SIGNIFICANT CHANGE UP (ref 3.5–5.3)
POTASSIUM SERPL-MCNC: 4.8 MMOL/L — SIGNIFICANT CHANGE UP (ref 3.5–5.3)
POTASSIUM SERPL-SCNC: 4.4 MMOL/L — SIGNIFICANT CHANGE UP (ref 3.5–5.3)
POTASSIUM SERPL-SCNC: 4.8 MMOL/L — SIGNIFICANT CHANGE UP (ref 3.5–5.3)
PROT SERPL-MCNC: 6.7 G/DL — SIGNIFICANT CHANGE UP (ref 6–8.3)
PROT UR-MCNC: ABNORMAL
PROTHROM AB SERPL-ACNC: 15.5 SEC — HIGH (ref 9.8–12.7)
PSA SERPL-MCNC: 0.81 NG/ML
RBC # BLD: 3.42 M/UL — LOW (ref 4.2–5.8)
RBC # FLD: 14.6 % — HIGH (ref 10.3–14.5)
RBC CASTS # UR COMP ASSIST: 9538 /HPF — HIGH (ref 0–4)
SODIUM SERPL-SCNC: 123 MMOL/L — LOW (ref 135–145)
SODIUM SERPL-SCNC: 130 MMOL/L — LOW (ref 135–145)
SP GR SPEC: 1.01 — SIGNIFICANT CHANGE UP (ref 1.01–1.02)
UROBILINOGEN FLD QL: NEGATIVE — SIGNIFICANT CHANGE UP
WBC # BLD: 8.9 K/UL — SIGNIFICANT CHANGE UP (ref 3.8–10.5)
WBC # FLD AUTO: 8.9 K/UL — SIGNIFICANT CHANGE UP (ref 3.8–10.5)
WBC UR QL: 31 /HPF — HIGH (ref 0–5)

## 2018-10-24 PROCEDURE — 99223 1ST HOSP IP/OBS HIGH 75: CPT | Mod: AI,24

## 2018-10-24 PROCEDURE — 99223 1ST HOSP IP/OBS HIGH 75: CPT | Mod: AI

## 2018-10-24 PROCEDURE — 72192 CT PELVIS W/O DYE: CPT | Mod: 26

## 2018-10-24 PROCEDURE — 99285 EMERGENCY DEPT VISIT HI MDM: CPT | Mod: GC

## 2018-10-24 RX ORDER — SODIUM CHLORIDE 9 MG/ML
1000 INJECTION, SOLUTION INTRAVENOUS
Qty: 0 | Refills: 0 | Status: DISCONTINUED | OUTPATIENT
Start: 2018-10-24 | End: 2018-10-28

## 2018-10-24 RX ORDER — ACETAMINOPHEN WITH CODEINE 300MG-30MG
1 TABLET ORAL EVERY 4 HOURS
Qty: 0 | Refills: 0 | Status: DISCONTINUED | OUTPATIENT
Start: 2018-10-24 | End: 2018-10-28

## 2018-10-24 RX ORDER — ATROPA BELLADONNA AND OPIUM 16.2; 6 MG/1; MG/1
1 SUPPOSITORY RECTAL EVERY 8 HOURS
Qty: 0 | Refills: 0 | Status: DISCONTINUED | OUTPATIENT
Start: 2018-10-24 | End: 2018-10-28

## 2018-10-24 RX ORDER — DEXTROSE 50 % IN WATER 50 %
25 SYRINGE (ML) INTRAVENOUS ONCE
Qty: 0 | Refills: 0 | Status: DISCONTINUED | OUTPATIENT
Start: 2018-10-24 | End: 2018-10-28

## 2018-10-24 RX ORDER — DEXTROSE 50 % IN WATER 50 %
12.5 SYRINGE (ML) INTRAVENOUS ONCE
Qty: 0 | Refills: 0 | Status: DISCONTINUED | OUTPATIENT
Start: 2018-10-24 | End: 2018-10-28

## 2018-10-24 RX ORDER — MORPHINE SULFATE 50 MG/1
4 CAPSULE, EXTENDED RELEASE ORAL ONCE
Qty: 0 | Refills: 0 | Status: DISCONTINUED | OUTPATIENT
Start: 2018-10-24 | End: 2018-10-24

## 2018-10-24 RX ORDER — SODIUM CHLORIDE 9 MG/ML
500 INJECTION INTRAMUSCULAR; INTRAVENOUS; SUBCUTANEOUS ONCE
Qty: 0 | Refills: 0 | Status: COMPLETED | OUTPATIENT
Start: 2018-10-24 | End: 2018-10-24

## 2018-10-24 RX ORDER — SIMVASTATIN 20 MG/1
10 TABLET, FILM COATED ORAL AT BEDTIME
Qty: 0 | Refills: 0 | Status: DISCONTINUED | OUTPATIENT
Start: 2018-10-24 | End: 2018-10-28

## 2018-10-24 RX ORDER — ASPIRIN/CALCIUM CARB/MAGNESIUM 324 MG
81 TABLET ORAL DAILY
Qty: 0 | Refills: 0 | Status: DISCONTINUED | OUTPATIENT
Start: 2018-10-24 | End: 2018-10-28

## 2018-10-24 RX ORDER — DOCUSATE SODIUM 100 MG
100 CAPSULE ORAL
Qty: 0 | Refills: 0 | Status: DISCONTINUED | OUTPATIENT
Start: 2018-10-24 | End: 2018-10-26

## 2018-10-24 RX ORDER — INSULIN LISPRO 100/ML
VIAL (ML) SUBCUTANEOUS
Qty: 0 | Refills: 0 | Status: DISCONTINUED | OUTPATIENT
Start: 2018-10-24 | End: 2018-10-26

## 2018-10-24 RX ORDER — ASPIRIN/CALCIUM CARB/MAGNESIUM 324 MG
1 TABLET ORAL
Qty: 0 | Refills: 0 | COMMUNITY

## 2018-10-24 RX ORDER — MULTIVIT-MIN/FERROUS GLUCONATE 9 MG/15 ML
1 LIQUID (ML) ORAL DAILY
Qty: 0 | Refills: 0 | Status: DISCONTINUED | OUTPATIENT
Start: 2018-10-24 | End: 2018-10-28

## 2018-10-24 RX ORDER — OXYCODONE HYDROCHLORIDE 5 MG/1
5 TABLET ORAL EVERY 4 HOURS
Qty: 0 | Refills: 0 | Status: DISCONTINUED | OUTPATIENT
Start: 2018-10-24 | End: 2018-10-24

## 2018-10-24 RX ORDER — SODIUM CHLORIDE 9 MG/ML
1000 INJECTION INTRAMUSCULAR; INTRAVENOUS; SUBCUTANEOUS
Qty: 0 | Refills: 0 | Status: DISCONTINUED | OUTPATIENT
Start: 2018-10-24 | End: 2018-10-26

## 2018-10-24 RX ORDER — INSULIN LISPRO 100/ML
VIAL (ML) SUBCUTANEOUS AT BEDTIME
Qty: 0 | Refills: 0 | Status: DISCONTINUED | OUTPATIENT
Start: 2018-10-24 | End: 2018-10-26

## 2018-10-24 RX ORDER — LISINOPRIL 2.5 MG/1
10 TABLET ORAL DAILY
Qty: 0 | Refills: 0 | Status: DISCONTINUED | OUTPATIENT
Start: 2018-10-24 | End: 2018-10-24

## 2018-10-24 RX ORDER — SODIUM CHLORIDE 9 MG/ML
1000 INJECTION INTRAMUSCULAR; INTRAVENOUS; SUBCUTANEOUS ONCE
Qty: 0 | Refills: 0 | Status: COMPLETED | OUTPATIENT
Start: 2018-10-24 | End: 2018-10-24

## 2018-10-24 RX ORDER — GLUCAGON INJECTION, SOLUTION 0.5 MG/.1ML
1 INJECTION, SOLUTION SUBCUTANEOUS ONCE
Qty: 0 | Refills: 0 | Status: DISCONTINUED | OUTPATIENT
Start: 2018-10-24 | End: 2018-10-28

## 2018-10-24 RX ORDER — TAMSULOSIN HYDROCHLORIDE 0.4 MG/1
0.4 CAPSULE ORAL AT BEDTIME
Qty: 0 | Refills: 0 | Status: DISCONTINUED | OUTPATIENT
Start: 2018-10-24 | End: 2018-10-28

## 2018-10-24 RX ORDER — HEPARIN SODIUM 5000 [USP'U]/ML
5000 INJECTION INTRAVENOUS; SUBCUTANEOUS EVERY 8 HOURS
Qty: 0 | Refills: 0 | Status: DISCONTINUED | OUTPATIENT
Start: 2018-10-24 | End: 2018-10-28

## 2018-10-24 RX ORDER — SENNA PLUS 8.6 MG/1
2 TABLET ORAL AT BEDTIME
Qty: 0 | Refills: 0 | Status: DISCONTINUED | OUTPATIENT
Start: 2018-10-24 | End: 2018-10-26

## 2018-10-24 RX ORDER — PIPERACILLIN AND TAZOBACTAM 4; .5 G/20ML; G/20ML
3.38 INJECTION, POWDER, LYOPHILIZED, FOR SOLUTION INTRAVENOUS EVERY 8 HOURS
Qty: 0 | Refills: 0 | Status: DISCONTINUED | OUTPATIENT
Start: 2018-10-24 | End: 2018-10-27

## 2018-10-24 RX ORDER — DEXTROSE 50 % IN WATER 50 %
15 SYRINGE (ML) INTRAVENOUS ONCE
Qty: 0 | Refills: 0 | Status: DISCONTINUED | OUTPATIENT
Start: 2018-10-24 | End: 2018-10-28

## 2018-10-24 RX ORDER — ACETAMINOPHEN 500 MG
650 TABLET ORAL EVERY 6 HOURS
Qty: 0 | Refills: 0 | Status: DISCONTINUED | OUTPATIENT
Start: 2018-10-24 | End: 2018-10-28

## 2018-10-24 RX ORDER — METOPROLOL TARTRATE 50 MG
50 TABLET ORAL DAILY
Qty: 0 | Refills: 0 | Status: DISCONTINUED | OUTPATIENT
Start: 2018-10-24 | End: 2018-10-28

## 2018-10-24 RX ORDER — MORPHINE SULFATE 50 MG/1
4 CAPSULE, EXTENDED RELEASE ORAL EVERY 4 HOURS
Qty: 0 | Refills: 0 | Status: DISCONTINUED | OUTPATIENT
Start: 2018-10-24 | End: 2018-10-27

## 2018-10-24 RX ADMIN — MORPHINE SULFATE 4 MILLIGRAM(S): 50 CAPSULE, EXTENDED RELEASE ORAL at 03:41

## 2018-10-24 RX ADMIN — MORPHINE SULFATE 4 MILLIGRAM(S): 50 CAPSULE, EXTENDED RELEASE ORAL at 20:43

## 2018-10-24 RX ADMIN — ATROPA BELLADONNA AND OPIUM 1 SUPPOSITORY(S): 16.2; 6 SUPPOSITORY RECTAL at 13:21

## 2018-10-24 RX ADMIN — HEPARIN SODIUM 5000 UNIT(S): 5000 INJECTION INTRAVENOUS; SUBCUTANEOUS at 21:11

## 2018-10-24 RX ADMIN — PIPERACILLIN AND TAZOBACTAM 25 GRAM(S): 4; .5 INJECTION, POWDER, LYOPHILIZED, FOR SOLUTION INTRAVENOUS at 10:16

## 2018-10-24 RX ADMIN — Medication 4: at 18:38

## 2018-10-24 RX ADMIN — TAMSULOSIN HYDROCHLORIDE 0.4 MILLIGRAM(S): 0.4 CAPSULE ORAL at 21:18

## 2018-10-24 RX ADMIN — SODIUM CHLORIDE 100 MILLILITER(S): 9 INJECTION INTRAMUSCULAR; INTRAVENOUS; SUBCUTANEOUS at 21:14

## 2018-10-24 RX ADMIN — PIPERACILLIN AND TAZOBACTAM 25 GRAM(S): 4; .5 INJECTION, POWDER, LYOPHILIZED, FOR SOLUTION INTRAVENOUS at 21:11

## 2018-10-24 RX ADMIN — Medication 1: at 15:44

## 2018-10-24 RX ADMIN — MORPHINE SULFATE 4 MILLIGRAM(S): 50 CAPSULE, EXTENDED RELEASE ORAL at 02:41

## 2018-10-24 RX ADMIN — MORPHINE SULFATE 4 MILLIGRAM(S): 50 CAPSULE, EXTENDED RELEASE ORAL at 14:37

## 2018-10-24 RX ADMIN — Medication 50 MILLIGRAM(S): at 15:44

## 2018-10-24 RX ADMIN — SODIUM CHLORIDE 500 MILLILITER(S): 9 INJECTION INTRAMUSCULAR; INTRAVENOUS; SUBCUTANEOUS at 05:17

## 2018-10-24 RX ADMIN — Medication 81 MILLIGRAM(S): at 15:44

## 2018-10-24 RX ADMIN — Medication 1 TABLET(S): at 15:45

## 2018-10-24 RX ADMIN — PIPERACILLIN AND TAZOBACTAM 25 GRAM(S): 4; .5 INJECTION, POWDER, LYOPHILIZED, FOR SOLUTION INTRAVENOUS at 15:43

## 2018-10-24 RX ADMIN — SODIUM CHLORIDE 500 MILLILITER(S): 9 INJECTION INTRAMUSCULAR; INTRAVENOUS; SUBCUTANEOUS at 03:16

## 2018-10-24 RX ADMIN — MORPHINE SULFATE 4 MILLIGRAM(S): 50 CAPSULE, EXTENDED RELEASE ORAL at 09:03

## 2018-10-24 RX ADMIN — MORPHINE SULFATE 4 MILLIGRAM(S): 50 CAPSULE, EXTENDED RELEASE ORAL at 20:13

## 2018-10-24 RX ADMIN — SODIUM CHLORIDE 1000 MILLILITER(S): 9 INJECTION INTRAMUSCULAR; INTRAVENOUS; SUBCUTANEOUS at 05:17

## 2018-10-24 RX ADMIN — SIMVASTATIN 10 MILLIGRAM(S): 20 TABLET, FILM COATED ORAL at 21:11

## 2018-10-24 NOTE — H&P ADULT - NSHPPHYSICALEXAM_GEN_ALL_CORE
Gen: NAD. AAOx3  HEENT: NC/AT  Pulm: nonlabored  CV: regular  Abd: obese, soft, ttp around suprapubic site. SPT in place draining cherry color urine.  : penis uncircumcised, ttp throughout shaft and firm to palpation. Testes descended b/l, nontender

## 2018-10-24 NOTE — ED PROVIDER NOTE - OBJECTIVE STATEMENT
84yo M with hx of BPH, prostate ca, pelvic abscess presents with penile pain and bleeding from suprapubic catheter site. Has been having chronic penile pain, worsening. Suprapubic catheter was placed and started bleeding after MRI 10/23. not draining well. Feels abd is distended. no fevers, chills. denies abd pain. on eliquis for afib

## 2018-10-24 NOTE — ED PROVIDER NOTE - PMH
Benign prostatic hyperplasia with lower urinary tract symptoms, symptom details unspecified    HLD (hyperlipidemia)    Hypertension    Paroxysmal A-fib    Prostate cancer    Type 2 diabetes mellitus

## 2018-10-24 NOTE — CONSULT NOTE ADULT - SUBJECTIVE AND OBJECTIVE BOX
HPI:  82y/o M with hx of BPH, prostate ca, pelvic abscess presents with penile pain and bleeding from suprapubic catheter site, The SPT placed on 9/19/2018, first time changed at Dr Burroughs office on 10/19/18. patient when to MRI for chronic penile pain. and start have GH after MRI, now the penile pain is getting  worsening. And SPT not draining well. Feels abd is distended. no fevers, chills. denies abd pain. on eliquis for afib.     PAST MEDICAL & SURGICAL HISTORY:  Benign prostatic hyperplasia with lower urinary tract symptoms, symptom details unspecified  Prostate cancer  Hypertension  HLD (hyperlipidemia)  Type 2 diabetes mellitus  Paroxysmal A-fib  S/P AVR (aortic valve replacement)  S/P TURP    FAMILY HISTORY:    SOCIAL HISTORY:   Tobacco hx:  MEDICATIONS  (STANDING):    MEDICATIONS  (PRN):    Allergies    No Known Allergies    Intolerances        REVIEW OF SYSTEMS: Pertinent positives and negatives as stated in HPI, otherwise negative    Vital signs  T(C): 36.6 (10-24-18 @ 01:58), Max: 36.6 (10-24-18 @ 01:58)  HR: 99 (10-24-18 @ 01:58)  BP: 190/87 (10-24-18 @ 01:58)  SpO2: 97% (10-24-18 @ 01:58)  Wt(kg): --    Output      Physical Exam  Gen: NAD  Pulm: No respiratory distress, no subcostal retractions  CV: RRR, no JVD  Abd: Soft, NT, ND, SPT in place, not draining, see dark red urine  in the bag  : Circumcised, Penile is hard and pain for palpation, a hard round lesions at base of penile, about 2x2cm and pain for palpation.  No discharge or blood at urethral meatus.  Testes descended bilaterally.  Testes and epididymis nontender bilaterally.  MSK: No edema present    LABS:        10-24 @ 03:08    WBC 8.9   / Hct 27.6  / SCr 1.50     10-24    123<L>  |  91<L>  |  27<H>  ----------------------------<  209<H>  4.8   |  16<L>  |  1.50<H>    Ca    8.6      24 Oct 2018 03:08    TPro  6.7  /  Alb  3.1<L>  /  TBili  0.9  /  DBili  x   /  AST  24  /  ALT  8<L>  /  AlkPhos  56  10-24    PT/INR - ( 24 Oct 2018 03:08 )   PT: 15.5 sec;   INR: 1.42 ratio         PTT - ( 24 Oct 2018 03:08 )  PTT:26.5 sec      Urine Cx:   Blood Cx:    Radiology: HPI:  84y/o M with hx of BPH, prostate ca, pelvic abscess presents with penile pain and bleeding from suprapubic catheter site, The SPT placed on 9/19/2018, first time changed at Dr Burroughs office on 10/19/18. patient when to MRI for chronic penile pain. and start have GH after MRI, now the penile pain is getting  worsening. And SPT not draining well. Feels abd is distended. no fevers, chills. denies abd pain. on eliquis for afib.     PAST MEDICAL & SURGICAL HISTORY:  Benign prostatic hyperplasia with lower urinary tract symptoms, symptom details unspecified  Prostate cancer  Hypertension  HLD (hyperlipidemia)  Type 2 diabetes mellitus  Paroxysmal A-fib  S/P AVR (aortic valve replacement)  S/P TURP    SOCIAL HISTORY:   Tobacco hx:    No Known Allergies    REVIEW OF SYSTEMS: Pertinent positives and negatives as stated in HPI, otherwise negative    Vital signs  T(C): 36.6 (10-24-18 @ 01:58), Max: 36.6 (10-24-18 @ 01:58)  HR: 99 (10-24-18 @ 01:58)  BP: 190/87 (10-24-18 @ 01:58)  SpO2: 97% (10-24-18 @ 01:58)    Physical Exam  Gen: NAD  Pulm: No respiratory distress, no subcostal retractions  CV: RRR, no JVD  Abd: Soft, NT, ND, SPT in place, not draining, see dark red urine  in the bag  : Circumcised, Penile is hard and pain for palpation, a hard round lesions at base of penile, about 2x2cm and pain for palpation.  No discharge or blood at urethral meatus.  Testes descended bilaterally.  Testes and epididymis nontender bilaterally.  MSK: No edema present    LABS:  10-24 @ 03:08  WBC 8.9   / Hct 27.6  / SCr 1.50     10-24  123<L>  |  91<L>  |  27<H>  ----------------------------<  209<H>  4.8   |  16<L>  |  1.50<H>    Ca    8.6      24 Oct 2018 03:08    TPro  6.7  /  Alb  3.1<L>  /  TBili  0.9  /  DBili  x   /  AST  24  /  ALT  8<L>  /  AlkPhos  56  10-24    PT/INR - ( 24 Oct 2018 03:08 )   PT: 15.5 sec;   INR: 1.42 ratio    PTT - ( 24 Oct 2018 03:08 )  PTT:26.5 sec    Radiology: HPI:  84y/o M with hx of BPH, prostate ca, pelvic abscess presents with penile pain and bleeding from suprapubic catheter site, The SPT placed on 9/19/2018, first time changed at Dr Burroughs office on 10/19/18. patient when to MRI for chronic penile pain. and start have GH after MRI, now the penile pain is getting  worsening. And SPT not draining well. Feels abd is distended. no fevers, chills. denies abd pain. on eliquis for afib.     PAST MEDICAL & SURGICAL HISTORY:  Benign prostatic hyperplasia with lower urinary tract symptoms, symptom details unspecified  Prostate cancer  Hypertension  HLD (hyperlipidemia)  Type 2 diabetes mellitus  Paroxysmal A-fib  S/P AVR (aortic valve replacement)  S/P TURP    No Known Allergies    REVIEW OF SYSTEMS: Pertinent positives and negatives as stated in HPI, otherwise negative    Vital signs  T(C): 36.6 (10-24-18 @ 01:58), Max: 36.6 (10-24-18 @ 01:58)  HR: 99 (10-24-18 @ 01:58)  BP: 190/87 (10-24-18 @ 01:58)  SpO2: 97% (10-24-18 @ 01:58)    Physical Exam  Gen: NAD  Pulm: No respiratory distress, no subcostal retractions  CV: RRR, no JVD  Abd: Soft, NT, ND, SPT in place, not draining, see dark red urine  in the bag  : Circumcised, Penile is hard and pain for palpation, a hard round lesions at base of penile, about 2x2cm and pain for palpation.  No discharge or blood at urethral meatus.  Testes descended bilaterally.  Testes and epididymis nontender bilaterally.  MSK: No edema present    LABS:  10-24 @ 03:08  WBC 8.9   / Hct 27.6  / SCr 1.50     10-24  123<L>  |  91<L>  |  27<H>  ----------------------------<  209<H>  4.8   |  16<L>  |  1.50<H>    Ca    8.6      24 Oct 2018 03:08    TPro  6.7  /  Alb  3.1<L>  /  TBili  0.9  /  DBili  x   /  AST  24  /  ALT  8<L>  /  AlkPhos  56  10-24    PT/INR - ( 24 Oct 2018 03:08 )   PT: 15.5 sec;   INR: 1.42 ratio    PTT - ( 24 Oct 2018 03:08 )  PTT:26.5 sec    Radiology:  IMPRESSION:   Persistent thick-walled collection in the region of the seminal vesicles   measuring 5.0 x 1.8 cm in size. This is contiguous with a diffusely   heterogeneous abnormal appearing post therapeutic prostate. There is marked   abnormality involving the penis with multiple areas of abnormal enhancement   some with ringlike characteristics. In addition there is marked thickening   of the posterior lower aspect of the urinary bladder. Given the rapid change   from prior MRI this likely represents infiltrating infection as opposed to   extension of prostate cancer.

## 2018-10-24 NOTE — ED PROVIDER NOTE - NS ED ROS FT
ROS: denies HA, weakness, dizziness, fevers/chills, nausea/vomiting, chest pain, SOB, diaphoresis, abdominal pain, diarrhea, joint pain, neuro deficits, dysuria/hematuria, rash    +penile pain, abd distension

## 2018-10-24 NOTE — CONSULT NOTE ADULT - PROBLEM SELECTOR RECOMMENDATION 9
Na of 123, BUN/Cr ratio of greater than 20 at admission suggestive of dehydration; pt was given 2 liters of IVF NS boluses; will recheck BMP to see if there was any improvement in Na level  -based on whether Na has increased or decreased after IVF NS, would recommend further IVF NS (if improved from previous hydration), at about 125 cc/hr, and monitor BMPs q8hrs  -do not over correct Na, should be approximately 8-10 meq per 24 hrs.  -consider renal consult for further management

## 2018-10-24 NOTE — ED ADULT TRIAGE NOTE - CHIEF COMPLAINT QUOTE
pt has suprapubic catheter in place. had MRI through penis today. tonight bleeding around suprapubic insertion site and into barbour bag. pain around penis  pt on Eliquis. looks pale

## 2018-10-24 NOTE — ED ADULT NURSE REASSESSMENT NOTE - NS ED NURSE REASSESS COMMENT FT1
Urinary collection bag connected to indwelling urinary barbour catheter. Bedside drainage to gravity. Stat lock in place.
suprapubic tube has been flushed and irrigated x 3.  pt also recvd belladonna CO, per md's orders along with multiple doses of morphine with no permanent relief.  Md Wood and MD Regalado from urology team made aware and have been at bedside periodically.  will continue to monitor.
urology at bedside and flushed catheter, clot was removed from catheter and is now patent and draining
Recvd pt awake, alert and responsive  to all stimuli.  no sob or respiratory distress noted.  suprapubic tube in place connected to BSD that is draining dark red colored urine.  pt states that his pain is 5/10 at this moment, and he is comfortable at this time.  pt resting in bed awaiting admitting surgical bed with family at bedside.  will continue to monitor.

## 2018-10-24 NOTE — CONSULT NOTE ADULT - ASSESSMENT
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

## 2018-10-24 NOTE — H&P ADULT - NSHPLABSRESULTS_GEN_ALL_CORE
9.4    8.9   )-----------( 150      ( 24 Oct 2018 03:08 )             27.6   10-24    123<L>  |  91<L>  |  27<H>  ----------------------------<  209<H>  4.8   |  16<L>  |  1.50<H>    Ca    8.6      24 Oct 2018 03:08    TPro  6.7  /  Alb  3.1<L>  /  TBili  0.9  /  DBili  x   /  AST  24  /  ALT  8<L>  /  AlkPhos  56  10-24    Urinalysis Basic - ( 24 Oct 2018 06:26 )    Color: DARK RED / Appearance: Turbid / S.015 / pH: x  Gluc: x / Ketone: Negative  / Bili: Negative / Urobili: Negative   Blood: x / Protein: 300 mg/dL / Nitrite: Negative   Leuk Esterase: Moderate / RBC: 9538 /hpf / WBC 31 /hpf   Sq Epi: x / Non Sq Epi: 3 /hpf / Bacteria: Negative    MRI 10/23: Marked heterogenous enhancement is seen in the region of the previously treated prostate. signal void from the descending markers and radiation seeds are identified. In the area the patient's previous collection there is a thick walled residual collection measuring 5x1.8cm in size. There appears be continuous abnormal enhancement extending into the base of the penis with multiple focal areas of ringlike abnormal enhancement involving both the corpus cavernosum and spongiosum. Whether this represents multiple small abscesses verus metastatic disease is in the differential. This was not identified on prior examination dated  and therefore is likely infectious in etiology. Bladder thick wall with suprapubic catheter in place. There is marked thick walled abnormality involving posterior bladder wall with diffuse enhancement

## 2018-10-24 NOTE — CONSULT NOTE ADULT - PROBLEM SELECTOR RECOMMENDATION 3
hold po metformin inpt, should be on ISS, and FS monitoring; would also recommend holding lisinopril for now, until Cr normalized to baseline  -HbA1c elevated to 8.2, DASH diet if ok for pt to eat, and no planned procedures, close outpt f/u

## 2018-10-24 NOTE — H&P ADULT - ASSESSMENT
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 Recent MRI 10/23 concerning for prostate abscess vs metastatic disease    - Admit to Dr. Burroughs  - Anuja  - Zosyn  - Irrigate SPT prn  - IR consult for prostate bx  - Restart home meds  - Hold Eliquis  - Continue aspirin  - DVT ppx: SQH/SCD

## 2018-10-24 NOTE — ED PROVIDER NOTE - PHYSICAL EXAMINATION
Gen: In distress, with blood in leg bag  Head: NCAT  HEENT: PERRL, MMM, normal conjunctiva, anicteric, neck supple  Lung: CTAB, no adventitious sounds  CV: RRR, no murmurs, rubs or gallops  Abd: soft, tenderness suprapubic, no rebound or guarding, no CVAT; suprapubic catheter in place with blood oozing from insertion site.   Neuro: No focal neurologic deficits. CN II-XII grossly intact. 5/5 strength and normal sensation in all extremities.  Skin: Warm and dry, no evidence of rash  Psych: normal mood and affect    : penis tender to palpation; shaft edematous without phimosis

## 2018-10-24 NOTE — ED ADULT NURSE NOTE - OBJECTIVE STATEMENT
83 year old male presented to ED with c/o of supra pubic catheter drainage/pain. insertion site intact, scant amount of blood at site. catheter draining 100cc of blood. Pt received procedure today. Pt denies CP, SOB, nausea/vomiting, numbness/tingling, fever, cough, chills, dizziness, headache, blurred vision. Pt a&ox3, lung sounds clear, heart rate regular, abdomen soft nontender distended to palp. Skin intact. IV in left forearm 20G and patent. Pt currently resting in bed with wife at bedside. side rails up for safety, bed in lowest position, call bell within reach. Will continue to monitor and assess while offering support and reassurance.

## 2018-10-24 NOTE — H&P ADULT - HISTORY OF PRESENT ILLNESS
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 SPT first time changed at Dr Burroughs office on 10/19/18. Patient c/o worsening penile pain. Recent MRI yesterday with findings concerning for prostatic abscess vs metastatic disease. Patient states SPT not draining well. Feels abd is distended. Denies fevers, chills. Denies abd pain. In the ED, patient found to be hyponatremic. SPT irrigated at bedside.      Cardiologist- Vernon Magaña

## 2018-10-24 NOTE — ED ADULT NURSE REASSESSMENT NOTE - GENERAL PATIENT STATE
comfortable appearance/family/SO at bedside/cooperative/resting/sleeping/smiling/interactive/improvement verbalized

## 2018-10-24 NOTE — ED PROVIDER NOTE - MEDICAL DECISION MAKING DETAILS
ddx urologic obstruction/bleed; urology c/s ddx urologic obstruction/bleed; urology c/s  Junior Gurrola DO: BPH, prost ca, pelvic collection, afib on Eliquis with recently placed suprapubic catheter c/o lower abd pain with no drainage. no fever, chills, radiation of abd pain, constipation diarrhea. no skin changes or rash. pain suspected 2/2 catheter obstruction. Urology flushed with large drainage of urine and clot. labs significant for Na 123. ua consistent with drained hematuria.  Pt's pain improved with drainage of urine. will admit with Urology for further management. ddx urologic obstruction/bleed; urology c/s  Junior Izabela DO: BPH, prost ca, pelvic collection, afib on Eliquis with recently placed suprapubic catheter c/o lower abd pain with no drainage. no fever, chills, radiation of abd pain, constipation diarrhea. no skin changes or rash. ttp suprapubic and penile shaft base, no teste/scrotal ttp. pain suspected 2/2 catheter obstruction. Urology flushed with large drainage of urine and clot. labs significant for Na 123. ua consistent with drained hematuria.  Pt's pain improved with drainage of urine. will admit with Urology for further management.

## 2018-10-24 NOTE — CONSULT NOTE ADULT - ASSESSMENT
84y/o M has new SPT. came in with hematuria and SPT not draining. Irrigated by 3L water, 150cc clot out. Urine become pink. SPT draining well.   Lab show low Na    plan  recom. medical team evaluation Hyponatremia 84y/o M has new SPT. came in with hematuria and SPT not draining. Irrigated by 3L water, 150cc clot out. Urine become pink. SPT draining well.   Lab show low Na    150cc clot irrigated, urine now pink  Patient feeling better at this time  Medicine eval for hyponatremia  Follow up plan for pelvic mass/abscess 82y/o M has new SPT. came in with hematuria and SPT not draining. Irrigated by 3L water, 150cc clot out. Urine become pink. SPT draining well.   Lab show low Na    150cc clot irrigated, urine now pink  Medicine eval for hyponatremia  Admit to inpatient  IR biopsy of penile mass/collection

## 2018-10-24 NOTE — ED ADULT NURSE NOTE - NURSING GU BLADDER
Patient is a 43 [facility] [address]     [address] [facility]  [phone]          distended/non-tender

## 2018-10-24 NOTE — CONSULT NOTE ADULT - PROBLEM SELECTOR RECOMMENDATION 2
case discussed with Dr. Magaña cardiologist's partner Dr. Martinez, given pt with active bleeding and hyponatremia, ok to hold eliquis in prep for procedure, and for active bleeding, and avoid hep gtt, as risks would outweigh benefits in actively bleeidng pt

## 2018-10-24 NOTE — CONSULT NOTE ADULT - SUBJECTIVE AND OBJECTIVE BOX
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 SPT first time changed at Dr Burroughs office on 10/19/18. Patient c/o worsening penile pain. Recent MRI yesterday with findings concerning for prostatic abscess vs metastatic disease. Patient states SPT not draining well. Feels abd is distended.     Pt also found to be hyponatremic to 123, no confusion, lightheadedness, weakness, ha, vision changes associated with the low Na.  Pt concerned about eliquis, and whether it is safe to hold in  preparation for biopsy given his cardiac history of atrial fibrillation.        Cardiologist- Vernon Magaña (24 Oct 2018 09:30)    REVIEW OF SYSTEMS:    CONSTITUTIONAL: No weakness, fevers or chills  EYES/ENT: No visual changes, no throat pain   RESPIRATORY: No cough, wheezing, hemoptysis; No shortness of breath  CARDIOVASCULAR: No chest pain or palpitations  GASTROINTESTINAL: No abdominal, nausea, vomiting, or hematemesis; No diarrhea or constipation. No melena or hematochezia.  GENITOURINARY: +  HEMATURIA,  + PENILE SWELLING AND PAIN, No dysuria, frequency   NEUROLOGICAL: No dizziness, numbness, or weakness  SKIN: No itching, burning, rashes, or lesions   All other review of systems is negative unless indicated above.      PMH: BPH, HLD, HTN, PAF, DM, Prostate Cancer, Bioprosthetic AVR    PSH: s/p AVR, s/p TURP    All: NKDA    FH: no significant history    SH: , lives with spouse, no known tob, illicit drug history        VITAL SIGNS:    Vital Signs Last 24 Hrs  T(C): 36.9 (24 Oct 2018 07:31), Max: 36.9 (24 Oct 2018 07:31)  T(F): 98.4 (24 Oct 2018 07:31), Max: 98.4 (24 Oct 2018 07:31)  HR: 87 (24 Oct 2018 07:31) (87 - 99)  BP: 131/68 (24 Oct 2018 07:31) (131/68 - 190/87)  BP(mean): --  RR: 17 (24 Oct 2018 07:31) (16 - 18)  SpO2: 97% (24 Oct 2018 07:31) (95% - 97%)    PHYSICAL EXAM:     GENERAL: no acute distress  HEENT: NC/AT, EOMI, neck supple, MMM  RESPIRATORY: LCTAB/L, no rhonchi, rales, or wheezing  CARDIOVASCULAR: irregular, no murmurs, gallops, rubs  ABDOMINAL: soft, non-tender, non-distended, positive bowel sounds   EXTREMITIES: no clubbing, cyanosis, or edema  NEUROLOGICAL: alert and oriented x 3, non-focal                            9.4    8.9   )-----------( 150      ( 24 Oct 2018 03:08 )             27.6     10-24    123<L>  |  91<L>  |  27<H>  ----------------------------<  209<H>  4.8   |  16<L>  |  1.50<H>    Ca    8.6      24 Oct 2018 03:08    TPro  6.7  /  Alb  3.1<L>  /  TBili  0.9  /  DBili  x   /  AST  24  /  ALT  8<L>  /  AlkPhos  56  10-24      CAPILLARY BLOOD GLUCOSE          MEDICATIONS  (STANDING):  aspirin enteric coated 81 milliGRAM(s) Oral daily  dextrose 5%. 1000 milliLiter(s) (50 mL/Hr) IV Continuous <Continuous>  dextrose 50% Injectable 12.5 Gram(s) IV Push once  dextrose 50% Injectable 25 Gram(s) IV Push once  dextrose 50% Injectable 25 Gram(s) IV Push once  heparin  Injectable 5000 Unit(s) SubCutaneous every 8 hours  insulin lispro (HumaLOG) corrective regimen sliding scale   SubCutaneous three times a day before meals  insulin lispro (HumaLOG) corrective regimen sliding scale   SubCutaneous at bedtime  lisinopril 10 milliGRAM(s) Oral daily  metoprolol succinate ER 50 milliGRAM(s) Oral daily  multivitamin/minerals 1 Tablet(s) Oral daily  piperacillin/tazobactam IVPB. 3.375 Gram(s) IV Intermittent every 8 hours  simvastatin 10 milliGRAM(s) Oral at bedtime  tamsulosin 0.4 milliGRAM(s) Oral at bedtime

## 2018-10-25 LAB
ANION GAP SERPL CALC-SCNC: 11 MMOL/L — SIGNIFICANT CHANGE UP (ref 5–17)
ANION GAP SERPL CALC-SCNC: 11 MMOL/L — SIGNIFICANT CHANGE UP (ref 5–17)
ANION GAP SERPL CALC-SCNC: 12 MMOL/L — SIGNIFICANT CHANGE UP (ref 5–17)
BLD GP AB SCN SERPL QL: NEGATIVE — SIGNIFICANT CHANGE UP
BUN SERPL-MCNC: 25 MG/DL — HIGH (ref 7–23)
BUN SERPL-MCNC: 25 MG/DL — HIGH (ref 7–23)
BUN SERPL-MCNC: 26 MG/DL — HIGH (ref 7–23)
CALCIUM SERPL-MCNC: 8.5 MG/DL — SIGNIFICANT CHANGE UP (ref 8.4–10.5)
CALCIUM SERPL-MCNC: 8.6 MG/DL — SIGNIFICANT CHANGE UP (ref 8.4–10.5)
CALCIUM SERPL-MCNC: 8.6 MG/DL — SIGNIFICANT CHANGE UP (ref 8.4–10.5)
CHLORIDE SERPL-SCNC: 100 MMOL/L — SIGNIFICANT CHANGE UP (ref 96–108)
CHLORIDE SERPL-SCNC: 100 MMOL/L — SIGNIFICANT CHANGE UP (ref 96–108)
CHLORIDE SERPL-SCNC: 102 MMOL/L — SIGNIFICANT CHANGE UP (ref 96–108)
CO2 SERPL-SCNC: 19 MMOL/L — LOW (ref 22–31)
CO2 SERPL-SCNC: 22 MMOL/L — SIGNIFICANT CHANGE UP (ref 22–31)
CO2 SERPL-SCNC: 22 MMOL/L — SIGNIFICANT CHANGE UP (ref 22–31)
CREAT SERPL-MCNC: 1.71 MG/DL — HIGH (ref 0.5–1.3)
CREAT SERPL-MCNC: 1.73 MG/DL — HIGH (ref 0.5–1.3)
CREAT SERPL-MCNC: 1.83 MG/DL — HIGH (ref 0.5–1.3)
GLUCOSE SERPL-MCNC: 199 MG/DL — HIGH (ref 70–99)
GLUCOSE SERPL-MCNC: 200 MG/DL — HIGH (ref 70–99)
GLUCOSE SERPL-MCNC: 268 MG/DL — HIGH (ref 70–99)
HCT VFR BLD CALC: 22.2 % — LOW (ref 39–50)
HCT VFR BLD CALC: 23.5 % — LOW (ref 39–50)
HGB BLD-MCNC: 7.5 G/DL — LOW (ref 13–17)
HGB BLD-MCNC: 7.9 G/DL — LOW (ref 13–17)
MCHC RBC-ENTMCNC: 27.1 PG — SIGNIFICANT CHANGE UP (ref 27–34)
MCHC RBC-ENTMCNC: 27.4 PG — SIGNIFICANT CHANGE UP (ref 27–34)
MCHC RBC-ENTMCNC: 33.5 GM/DL — SIGNIFICANT CHANGE UP (ref 32–36)
MCHC RBC-ENTMCNC: 33.7 GM/DL — SIGNIFICANT CHANGE UP (ref 32–36)
MCV RBC AUTO: 80.9 FL — SIGNIFICANT CHANGE UP (ref 80–100)
MCV RBC AUTO: 81.2 FL — SIGNIFICANT CHANGE UP (ref 80–100)
PLATELET # BLD AUTO: 130 K/UL — LOW (ref 150–400)
PLATELET # BLD AUTO: 139 K/UL — LOW (ref 150–400)
POTASSIUM SERPL-MCNC: 4.2 MMOL/L — SIGNIFICANT CHANGE UP (ref 3.5–5.3)
POTASSIUM SERPL-MCNC: 4.2 MMOL/L — SIGNIFICANT CHANGE UP (ref 3.5–5.3)
POTASSIUM SERPL-MCNC: 4.7 MMOL/L — SIGNIFICANT CHANGE UP (ref 3.5–5.3)
POTASSIUM SERPL-SCNC: 4.2 MMOL/L — SIGNIFICANT CHANGE UP (ref 3.5–5.3)
POTASSIUM SERPL-SCNC: 4.2 MMOL/L — SIGNIFICANT CHANGE UP (ref 3.5–5.3)
POTASSIUM SERPL-SCNC: 4.7 MMOL/L — SIGNIFICANT CHANGE UP (ref 3.5–5.3)
RBC # BLD: 2.74 M/UL — LOW (ref 4.2–5.8)
RBC # BLD: 2.9 M/UL — LOW (ref 4.2–5.8)
RBC # FLD: 15.1 % — HIGH (ref 10.3–14.5)
RBC # FLD: 15.2 % — HIGH (ref 10.3–14.5)
RH IG SCN BLD-IMP: POSITIVE — SIGNIFICANT CHANGE UP
SODIUM SERPL-SCNC: 133 MMOL/L — LOW (ref 135–145)
WBC # BLD: 4.3 K/UL — SIGNIFICANT CHANGE UP (ref 3.8–10.5)
WBC # BLD: 4.6 K/UL — SIGNIFICANT CHANGE UP (ref 3.8–10.5)
WBC # FLD AUTO: 4.3 K/UL — SIGNIFICANT CHANGE UP (ref 3.8–10.5)
WBC # FLD AUTO: 4.6 K/UL — SIGNIFICANT CHANGE UP (ref 3.8–10.5)

## 2018-10-25 PROCEDURE — 99233 SBSQ HOSP IP/OBS HIGH 50: CPT | Mod: 24

## 2018-10-25 RX ADMIN — PIPERACILLIN AND TAZOBACTAM 25 GRAM(S): 4; .5 INJECTION, POWDER, LYOPHILIZED, FOR SOLUTION INTRAVENOUS at 21:11

## 2018-10-25 RX ADMIN — MORPHINE SULFATE 4 MILLIGRAM(S): 50 CAPSULE, EXTENDED RELEASE ORAL at 18:10

## 2018-10-25 RX ADMIN — Medication 2: at 12:14

## 2018-10-25 RX ADMIN — HEPARIN SODIUM 5000 UNIT(S): 5000 INJECTION INTRAVENOUS; SUBCUTANEOUS at 05:02

## 2018-10-25 RX ADMIN — Medication 50 MILLIGRAM(S): at 05:04

## 2018-10-25 RX ADMIN — PIPERACILLIN AND TAZOBACTAM 25 GRAM(S): 4; .5 INJECTION, POWDER, LYOPHILIZED, FOR SOLUTION INTRAVENOUS at 05:02

## 2018-10-25 RX ADMIN — MORPHINE SULFATE 4 MILLIGRAM(S): 50 CAPSULE, EXTENDED RELEASE ORAL at 22:52

## 2018-10-25 RX ADMIN — MORPHINE SULFATE 4 MILLIGRAM(S): 50 CAPSULE, EXTENDED RELEASE ORAL at 04:58

## 2018-10-25 RX ADMIN — MORPHINE SULFATE 4 MILLIGRAM(S): 50 CAPSULE, EXTENDED RELEASE ORAL at 05:28

## 2018-10-25 RX ADMIN — SIMVASTATIN 10 MILLIGRAM(S): 20 TABLET, FILM COATED ORAL at 21:11

## 2018-10-25 RX ADMIN — TAMSULOSIN HYDROCHLORIDE 0.4 MILLIGRAM(S): 0.4 CAPSULE ORAL at 21:11

## 2018-10-25 RX ADMIN — HEPARIN SODIUM 5000 UNIT(S): 5000 INJECTION INTRAVENOUS; SUBCUTANEOUS at 21:11

## 2018-10-25 RX ADMIN — SODIUM CHLORIDE 75 MILLILITER(S): 9 INJECTION INTRAMUSCULAR; INTRAVENOUS; SUBCUTANEOUS at 21:11

## 2018-10-25 RX ADMIN — Medication 3: at 17:32

## 2018-10-25 RX ADMIN — Medication 1: at 08:05

## 2018-10-25 RX ADMIN — MORPHINE SULFATE 4 MILLIGRAM(S): 50 CAPSULE, EXTENDED RELEASE ORAL at 11:15

## 2018-10-25 RX ADMIN — MORPHINE SULFATE 4 MILLIGRAM(S): 50 CAPSULE, EXTENDED RELEASE ORAL at 22:22

## 2018-10-25 RX ADMIN — Medication 100 MILLIGRAM(S): at 11:15

## 2018-10-25 RX ADMIN — Medication 1 TABLET(S): at 11:15

## 2018-10-25 RX ADMIN — MORPHINE SULFATE 4 MILLIGRAM(S): 50 CAPSULE, EXTENDED RELEASE ORAL at 18:40

## 2018-10-25 RX ADMIN — MORPHINE SULFATE 4 MILLIGRAM(S): 50 CAPSULE, EXTENDED RELEASE ORAL at 11:45

## 2018-10-25 RX ADMIN — HEPARIN SODIUM 5000 UNIT(S): 5000 INJECTION INTRAVENOUS; SUBCUTANEOUS at 12:16

## 2018-10-25 RX ADMIN — Medication 81 MILLIGRAM(S): at 12:15

## 2018-10-25 RX ADMIN — Medication 2: at 21:30

## 2018-10-25 NOTE — PROGRESS NOTE ADULT - ASSESSMENT
84y/o M for  IR drainage of prostatic abscess and penile mass bx, trend color of urine     Plan  ir in am   prbc today for acute blood  loss anemia due to hematuria   silvia on hold

## 2018-10-25 NOTE — PROGRESS NOTE ADULT - SUBJECTIVE AND OBJECTIVE BOX
Subjective  complains fo penile pain, spt draining but complains of suprapubic tenderness and distension   Objective    Vital signs  T(F): , Max: 99.2 (10-25-18 @ 04:56)  HR: 98 (10-25-18 @ 09:19)  BP: 114/67 (10-25-18 @ 09:19)  SpO2: 94% (10-25-18 @ 09:19)  Wt(kg): --    Output     10-24 @ 07:01  -  10-25 @ 07:00  --------------------------------------------------------  IN: 1560 mL / OUT: 4050 mL / NET: -2490 mL        Gen awake alert nad axox3  Abd soft ntnd   spt in place and flushed with ns. clot evacuated    unicirc phallus + base of penis tender with palp mass     Labs      10-25 @ 05:47    WBC 4.6   / Hct 23.5  / SCr 1.71     10-24 @ 23:57    WBC --    / Hct --    / SCr 1.73       Imaging  ct pelvis official pending

## 2018-10-25 NOTE — PROGRESS NOTE ADULT - ATTENDING COMMENTS
Pt seen/examined.  Case discussed with housestaff/PA team.  Agree with above note history, physical and assessment/plan.  Persistent penile/SP pain - improved and controlled with pain medication.  No fever/chills.  Acute blood loss anemia due to gross hematuria with underlying existing coagulopathy from anticoagulation/eliquis.  Will transfuse, cont to hold eliquis.  Plan for IR tomorrow for pelvic drainage and biopsy

## 2018-10-26 ENCOUNTER — RESULT REVIEW (OUTPATIENT)
Age: 83
End: 2018-10-26

## 2018-10-26 LAB
-  AMIKACIN: SIGNIFICANT CHANGE UP
-  AMOXICILLIN/CLAVULANIC ACID: SIGNIFICANT CHANGE UP
-  AMPICILLIN/SULBACTAM: SIGNIFICANT CHANGE UP
-  AMPICILLIN: SIGNIFICANT CHANGE UP
-  AZTREONAM: SIGNIFICANT CHANGE UP
-  CEFAZOLIN: SIGNIFICANT CHANGE UP
-  CEFEPIME: SIGNIFICANT CHANGE UP
-  CEFOTAXIME: SIGNIFICANT CHANGE UP
-  CEFOXITIN: SIGNIFICANT CHANGE UP
-  CEFTAZIDIME: SIGNIFICANT CHANGE UP
-  CEFTRIAXONE: SIGNIFICANT CHANGE UP
-  CEFUROXIME: SIGNIFICANT CHANGE UP
-  CEPHALOTHIN: SIGNIFICANT CHANGE UP
-  CIPROFLOXACIN: SIGNIFICANT CHANGE UP
-  ERTAPENEM: SIGNIFICANT CHANGE UP
-  GENTAMICIN: SIGNIFICANT CHANGE UP
-  IMIPENEM: SIGNIFICANT CHANGE UP
-  LEVOFLOXACIN: SIGNIFICANT CHANGE UP
-  MEROPENEM: SIGNIFICANT CHANGE UP
-  NITROFURANTOIN: SIGNIFICANT CHANGE UP
-  PIPERACILLIN/TAZOBACTAM: SIGNIFICANT CHANGE UP
-  TETRACYCLINE: SIGNIFICANT CHANGE UP
-  TIGECYCLINE: SIGNIFICANT CHANGE UP
-  TOBRAMYCIN: SIGNIFICANT CHANGE UP
-  TRIMETHOPRIM/SULFAMETHOXAZOLE: SIGNIFICANT CHANGE UP
ANION GAP SERPL CALC-SCNC: 12 MMOL/L — SIGNIFICANT CHANGE UP (ref 5–17)
BUN SERPL-MCNC: 22 MG/DL — SIGNIFICANT CHANGE UP (ref 7–23)
CALCIUM SERPL-MCNC: 8.8 MG/DL — SIGNIFICANT CHANGE UP (ref 8.4–10.5)
CHLORIDE SERPL-SCNC: 104 MMOL/L — SIGNIFICANT CHANGE UP (ref 96–108)
CO2 SERPL-SCNC: 21 MMOL/L — LOW (ref 22–31)
CREAT SERPL-MCNC: 1.78 MG/DL — HIGH (ref 0.5–1.3)
CULTURE RESULTS: SIGNIFICANT CHANGE UP
GLUCOSE BLDC GLUCOMTR-MCNC: 161 MG/DL — HIGH (ref 70–99)
GLUCOSE BLDC GLUCOMTR-MCNC: 200 MG/DL — HIGH (ref 70–99)
GLUCOSE BLDC GLUCOMTR-MCNC: 269 MG/DL — HIGH (ref 70–99)
GLUCOSE SERPL-MCNC: 167 MG/DL — HIGH (ref 70–99)
HCT VFR BLD CALC: 26.9 % — LOW (ref 39–50)
HGB BLD-MCNC: 8.8 G/DL — LOW (ref 13–17)
MCHC RBC-ENTMCNC: 27 PG — SIGNIFICANT CHANGE UP (ref 27–34)
MCHC RBC-ENTMCNC: 32.7 GM/DL — SIGNIFICANT CHANGE UP (ref 32–36)
MCV RBC AUTO: 82.5 FL — SIGNIFICANT CHANGE UP (ref 80–100)
METHOD TYPE: SIGNIFICANT CHANGE UP
ORGANISM # SPEC MICROSCOPIC CNT: SIGNIFICANT CHANGE UP
ORGANISM # SPEC MICROSCOPIC CNT: SIGNIFICANT CHANGE UP
OSMOLALITY SERPL: 292 MOS/KG — SIGNIFICANT CHANGE UP (ref 275–300)
OSMOLALITY UR: 310 MOS/KG — SIGNIFICANT CHANGE UP (ref 300–900)
PLATELET # BLD AUTO: 141 K/UL — LOW (ref 150–400)
POTASSIUM SERPL-MCNC: 4.3 MMOL/L — SIGNIFICANT CHANGE UP (ref 3.5–5.3)
POTASSIUM SERPL-SCNC: 4.3 MMOL/L — SIGNIFICANT CHANGE UP (ref 3.5–5.3)
RBC # BLD: 3.25 M/UL — LOW (ref 4.2–5.8)
RBC # FLD: 14.4 % — SIGNIFICANT CHANGE UP (ref 10.3–14.5)
SODIUM SERPL-SCNC: 137 MMOL/L — SIGNIFICANT CHANGE UP (ref 135–145)
SODIUM UR-SCNC: 83 MMOL/L — SIGNIFICANT CHANGE UP
SPECIMEN SOURCE: SIGNIFICANT CHANGE UP
WBC # BLD: 4.8 K/UL — SIGNIFICANT CHANGE UP (ref 3.8–10.5)
WBC # FLD AUTO: 4.8 K/UL — SIGNIFICANT CHANGE UP (ref 3.8–10.5)

## 2018-10-26 PROCEDURE — 99233 SBSQ HOSP IP/OBS HIGH 50: CPT | Mod: 24

## 2018-10-26 PROCEDURE — 88341 IMHCHEM/IMCYTCHM EA ADD ANTB: CPT | Mod: 26

## 2018-10-26 PROCEDURE — 88305 TISSUE EXAM BY PATHOLOGIST: CPT | Mod: 26

## 2018-10-26 PROCEDURE — 77012 CT SCAN FOR NEEDLE BIOPSY: CPT | Mod: 26

## 2018-10-26 PROCEDURE — 93010 ELECTROCARDIOGRAM REPORT: CPT

## 2018-10-26 PROCEDURE — 71045 X-RAY EXAM CHEST 1 VIEW: CPT | Mod: 26

## 2018-10-26 PROCEDURE — 88342 IMHCHEM/IMCYTCHM 1ST ANTB: CPT | Mod: 26

## 2018-10-26 PROCEDURE — 88173 CYTOPATH EVAL FNA REPORT: CPT | Mod: 26

## 2018-10-26 PROCEDURE — 10022: CPT

## 2018-10-26 RX ORDER — INSULIN LISPRO 100/ML
VIAL (ML) SUBCUTANEOUS EVERY 6 HOURS
Qty: 0 | Refills: 0 | Status: DISCONTINUED | OUTPATIENT
Start: 2018-10-26 | End: 2018-10-26

## 2018-10-26 RX ORDER — MORPHINE SULFATE 50 MG/1
15 CAPSULE, EXTENDED RELEASE ORAL EVERY 6 HOURS
Qty: 0 | Refills: 0 | Status: DISCONTINUED | OUTPATIENT
Start: 2018-10-26 | End: 2018-10-27

## 2018-10-26 RX ORDER — SENNA PLUS 8.6 MG/1
2 TABLET ORAL AT BEDTIME
Qty: 0 | Refills: 0 | Status: DISCONTINUED | OUTPATIENT
Start: 2018-10-26 | End: 2018-10-28

## 2018-10-26 RX ORDER — MORPHINE SULFATE 50 MG/1
2 CAPSULE, EXTENDED RELEASE ORAL ONCE
Qty: 0 | Refills: 0 | Status: DISCONTINUED | OUTPATIENT
Start: 2018-10-26 | End: 2018-10-26

## 2018-10-26 RX ORDER — DOCUSATE SODIUM 100 MG
100 CAPSULE ORAL THREE TIMES A DAY
Qty: 0 | Refills: 0 | Status: DISCONTINUED | OUTPATIENT
Start: 2018-10-26 | End: 2018-10-28

## 2018-10-26 RX ORDER — INSULIN LISPRO 100/ML
VIAL (ML) SUBCUTANEOUS AT BEDTIME
Qty: 0 | Refills: 0 | Status: DISCONTINUED | OUTPATIENT
Start: 2018-10-26 | End: 2018-10-28

## 2018-10-26 RX ORDER — INSULIN LISPRO 100/ML
VIAL (ML) SUBCUTANEOUS
Qty: 0 | Refills: 0 | Status: DISCONTINUED | OUTPATIENT
Start: 2018-10-26 | End: 2018-10-28

## 2018-10-26 RX ADMIN — MORPHINE SULFATE 2 MILLIGRAM(S): 50 CAPSULE, EXTENDED RELEASE ORAL at 13:30

## 2018-10-26 RX ADMIN — PIPERACILLIN AND TAZOBACTAM 25 GRAM(S): 4; .5 INJECTION, POWDER, LYOPHILIZED, FOR SOLUTION INTRAVENOUS at 22:00

## 2018-10-26 RX ADMIN — Medication 50 MILLIGRAM(S): at 05:49

## 2018-10-26 RX ADMIN — MORPHINE SULFATE 4 MILLIGRAM(S): 50 CAPSULE, EXTENDED RELEASE ORAL at 17:43

## 2018-10-26 RX ADMIN — SENNA PLUS 2 TABLET(S): 8.6 TABLET ORAL at 22:02

## 2018-10-26 RX ADMIN — Medication 3: at 19:32

## 2018-10-26 RX ADMIN — Medication 1 TABLET(S): at 22:00

## 2018-10-26 RX ADMIN — MORPHINE SULFATE 2 MILLIGRAM(S): 50 CAPSULE, EXTENDED RELEASE ORAL at 13:15

## 2018-10-26 RX ADMIN — MORPHINE SULFATE 4 MILLIGRAM(S): 50 CAPSULE, EXTENDED RELEASE ORAL at 17:28

## 2018-10-26 RX ADMIN — Medication 1: at 15:21

## 2018-10-26 RX ADMIN — Medication 100 MILLIGRAM(S): at 22:02

## 2018-10-26 RX ADMIN — Medication 1: at 06:06

## 2018-10-26 RX ADMIN — PIPERACILLIN AND TAZOBACTAM 25 GRAM(S): 4; .5 INJECTION, POWDER, LYOPHILIZED, FOR SOLUTION INTRAVENOUS at 05:49

## 2018-10-26 RX ADMIN — Medication 1 TABLET(S): at 23:00

## 2018-10-26 RX ADMIN — HEPARIN SODIUM 5000 UNIT(S): 5000 INJECTION INTRAVENOUS; SUBCUTANEOUS at 15:18

## 2018-10-26 RX ADMIN — TAMSULOSIN HYDROCHLORIDE 0.4 MILLIGRAM(S): 0.4 CAPSULE ORAL at 22:03

## 2018-10-26 RX ADMIN — Medication 1 TABLET(S): at 15:15

## 2018-10-26 RX ADMIN — HEPARIN SODIUM 5000 UNIT(S): 5000 INJECTION INTRAVENOUS; SUBCUTANEOUS at 22:03

## 2018-10-26 RX ADMIN — SIMVASTATIN 10 MILLIGRAM(S): 20 TABLET, FILM COATED ORAL at 22:02

## 2018-10-26 RX ADMIN — PIPERACILLIN AND TAZOBACTAM 25 GRAM(S): 4; .5 INJECTION, POWDER, LYOPHILIZED, FOR SOLUTION INTRAVENOUS at 15:28

## 2018-10-26 RX ADMIN — Medication 1 ENEMA: at 17:41

## 2018-10-26 RX ADMIN — Medication 81 MILLIGRAM(S): at 15:14

## 2018-10-26 RX ADMIN — MORPHINE SULFATE 4 MILLIGRAM(S): 50 CAPSULE, EXTENDED RELEASE ORAL at 07:03

## 2018-10-26 NOTE — PROGRESS NOTE ADULT - SUBJECTIVE AND OBJECTIVE BOX
Interventional Radiology Pre-Procedure Note    This is a 83y Male with a penile mass and prostatic/seminal vesicle lesion.     Procedure: CT Guided Biopsy/Aspiration of a seminal vesicle lesion.     Diagnosis/Indication: Seminal Vesicle Collection seen on recent MRI.       PAST MEDICAL & SURGICAL HISTORY:  Benign prostatic hyperplasia with lower urinary tract symptoms, symptom details unspecified  Prostate cancer  Hypertension  HLD (hyperlipidemia)  Type 2 diabetes mellitus  Paroxysmal A-fib  S/P AVR (aortic valve replacement)  S/P TURP       Allergies: oxycodone (Other)      LABS:  CBC Full  -  ( 26 Oct 2018 07:09 )  WBC Count : 4.8 K/uL  Hemoglobin : 8.8 g/dL  Hematocrit : 26.9 %  Platelet Count - Automated : 141 K/uL  Mean Cell Volume : 82.5 fl  Mean Cell Hemoglobin : 27.0 pg  Mean Cell Hemoglobin Concentration : 32.7 gm/dL  Auto Neutrophil # : x  Auto Lymphocyte # : x  Auto Monocyte # : x  Auto Eosinophil # : x  Auto Basophil # : x  Auto Neutrophil % : x  Auto Lymphocyte % : x  Auto Monocyte % : x  Auto Eosinophil % : x  Auto Basophil % : x    10-26    137  |  104  |  22  ----------------------------<  167<H>  4.3   |  21<L>  |  1.78<H>    Ca    8.8      26 Oct 2018 07:09          Procedure/ risks/ benefits/ alternatives were explained, informed consent obtained from patient, verbalizes understanding.

## 2018-10-26 NOTE — PROGRESS NOTE ADULT - ATTENDING COMMENTS
Pt seen/examined.  Case discussed with housestaff/PA team.  Agree with above note history, physical and assessment/plan. Pt seen/examined.  Case discussed with housestaff/PA team.  Agree with above note history, physical and assessment/plan.  Hyponatremia has improved/ resolved.  Acute blood loss anemia 2/2 hematuria - improved with prbc transfusion, Hct responded appropriately  [place] with elevated Cr - stable at 1.7.  Pelvic CT with b/l hydro down to bladder - ?local invasion of prostate cancer to posterior bladder wall.  Plan for IR pelvic drainage and penile bx  Cont to observe urine color and irrigate prn

## 2018-10-26 NOTE — PROCEDURE NOTE - ADDITIONAL PROCEDURE DETAILS
Performed penile biospy of penile-scrotal nodules.   5 total biopsies taken: 3 of ventral lesion, 2 of dorsal lesion.  Minimal bleeding noted.

## 2018-10-26 NOTE — PROGRESS NOTE ADULT - ASSESSMENT
A/P: 83y Male s/p  penile and prostate biopsy  spt tp gravity and irrigate as needed  po pain medication - cannot take oxycodone   DVT prophylaxis/OOB  Incentive spirometry  Strict I&O's  Diet

## 2018-10-26 NOTE — PROGRESS NOTE ADULT - SUBJECTIVE AND OBJECTIVE BOX
Post op Check    Pt seen and examined without complaints. Pain is controlled. Denies SOB/CP/N/V.     Vital Signs Last 24 Hrs  T(C): 36.8 (26 Oct 2018 14:39), Max: 37.4 (25 Oct 2018 19:00)  T(F): 98.3 (26 Oct 2018 14:39), Max: 99.3 (25 Oct 2018 19:00)  HR: 87 (26 Oct 2018 14:39) (82 - 99)  BP: 145/73 (26 Oct 2018 14:39) (119/70 - 145/73)  BP(mean): --  RR: 18 (26 Oct 2018 14:39) (17 - 18)  SpO2: 96% (26 Oct 2018 14:39) (94% - 96%)    I&O's Summary    25 Oct 2018 07:01  -  26 Oct 2018 07:00  --------------------------------------------------------  IN: 1910 mL / OUT: 3600 mL / NET: -1690 mL    26 Oct 2018 07:01  -  26 Oct 2018 16:58  --------------------------------------------------------  IN: 185 mL / OUT: 550 mL / NET: -365 mL        Physical Exam  Gen: awake alert NAD AXOX3  Pulm: No respiratory distress, no subcostal retractions  CV: RRR, no JVD  Abd: Soft, NT, ND  spt urine rust no clot   : uncirc phallus + minimal ecchymosis periphallus from punctures. + palp mass mid to base of penis                           8.8    4.8   )-----------( 141      ( 26 Oct 2018 07:09 )             26.9       10-26    137  |  104  |  22  ----------------------------<  167<H>  4.3   |  21<L>  |  1.78<H>    Ca    8.8      26 Oct 2018 07:09

## 2018-10-26 NOTE — PROGRESS NOTE ADULT - ASSESSMENT
84y/o M for  IR drainage of prostatic abscess and penile mass bx, trend color of urine     Plan  - am cbc/bmp/osm  - urine na, osm  - ir bx this am   - monitor uo/color

## 2018-10-26 NOTE — PROGRESS NOTE ADULT - SUBJECTIVE AND OBJECTIVE BOX
Interventional Radiology Brief- Operative Note    Procedure: Computed Tomography Guided Seminal Vesicle Lesion Biopsy    Operators: Brittany REYES, Matt REYES    Anesthesia (type): MAC    Contrast: 0    EBL: Minimal    Findings/Follow up Plan of Care: Successful biopsy of a seminal vesical lesion via transgluteal approach.     Complications: None    Condition/Disposition: Stable. IRS x1 hour then floor.     Please call Interventional Radiology x [phone] with any questions, concerns, or issues. Interventional Radiology Brief- Operative Note    Procedure: Computed Tomography Guided Periprostatic Lesion Biopsy    Operators: Brittany REYES, Matt REYES    Anesthesia (type): MAC    Contrast: 0    EBL: Minimal    Findings/Follow up Plan of Care: Successful biopsy of a seminal vesical lesion via transgluteal approach.     Complications: None    Condition/Disposition: Stable. IRS x1 hour then floor.     Please call Interventional Radiology x [phone] with any questions, concerns, or issues.

## 2018-10-26 NOTE — PROGRESS NOTE ADULT - SUBJECTIVE AND OBJECTIVE BOX
UROLOGY PA PROGRESS NOTE:     Subjective:  no acute events overnight    Objective:  Vital signs  T(F): , Max: 99.3 (10-25-18 @ 19:00)  HR: 82 (10-26-18 @ 05:43)  BP: 132/74 (10-26-18 @ 05:43)  SpO2: 96% (10-26-18 @ 05:43)  Wt(kg): --    Output     SPT 2500cc    Physical Exam:  Gen: NAD  Abd: soft, NT/ND, +SPT draining hematuric urine      Labs:  10-25  x     / x     /1.83   10-25  4.3   / 22.2  /x                              7.5    4.3   )-----------( 130      ( 25 Oct 2018 10:38 )             22.2     10-25    133<L>  |  100  |  26<H>  ----------------------------<  268<H>  4.7   |  22  |  1.83<H>    Ca    8.6      25 Oct 2018 18:32            Urine Cx:    Culture - Urine (collected 24 Oct 2018 20:51)  Source: .Urine Suprapubic  Preliminary Report (25 Oct 2018 22:56):    Rare Gram Negative Rods

## 2018-10-27 LAB
ANION GAP SERPL CALC-SCNC: 11 MMOL/L — SIGNIFICANT CHANGE UP (ref 5–17)
BUN SERPL-MCNC: 23 MG/DL — SIGNIFICANT CHANGE UP (ref 7–23)
CALCIUM SERPL-MCNC: 8.7 MG/DL — SIGNIFICANT CHANGE UP (ref 8.4–10.5)
CHLORIDE SERPL-SCNC: 102 MMOL/L — SIGNIFICANT CHANGE UP (ref 96–108)
CO2 SERPL-SCNC: 21 MMOL/L — LOW (ref 22–31)
CREAT SERPL-MCNC: 1.96 MG/DL — HIGH (ref 0.5–1.3)
GLUCOSE BLDC GLUCOMTR-MCNC: 161 MG/DL — HIGH (ref 70–99)
GLUCOSE BLDC GLUCOMTR-MCNC: 179 MG/DL — HIGH (ref 70–99)
GLUCOSE BLDC GLUCOMTR-MCNC: 184 MG/DL — HIGH (ref 70–99)
GLUCOSE BLDC GLUCOMTR-MCNC: 267 MG/DL — HIGH (ref 70–99)
GLUCOSE SERPL-MCNC: 141 MG/DL — HIGH (ref 70–99)
HCT VFR BLD CALC: 23.9 % — LOW (ref 39–50)
HCT VFR BLD CALC: 25 % — LOW (ref 39–50)
HGB BLD-MCNC: 8.3 G/DL — LOW (ref 13–17)
HGB BLD-MCNC: 8.4 G/DL — LOW (ref 13–17)
MCHC RBC-ENTMCNC: 27.7 PG — SIGNIFICANT CHANGE UP (ref 27–34)
MCHC RBC-ENTMCNC: 28.6 PG — SIGNIFICANT CHANGE UP (ref 27–34)
MCHC RBC-ENTMCNC: 33.5 GM/DL — SIGNIFICANT CHANGE UP (ref 32–36)
MCHC RBC-ENTMCNC: 34.6 GM/DL — SIGNIFICANT CHANGE UP (ref 32–36)
MCV RBC AUTO: 82.6 FL — SIGNIFICANT CHANGE UP (ref 80–100)
MCV RBC AUTO: 82.7 FL — SIGNIFICANT CHANGE UP (ref 80–100)
PLATELET # BLD AUTO: 128 K/UL — LOW (ref 150–400)
PLATELET # BLD AUTO: 132 K/UL — LOW (ref 150–400)
POTASSIUM SERPL-MCNC: 4.4 MMOL/L — SIGNIFICANT CHANGE UP (ref 3.5–5.3)
POTASSIUM SERPL-SCNC: 4.4 MMOL/L — SIGNIFICANT CHANGE UP (ref 3.5–5.3)
RBC # BLD: 2.89 M/UL — LOW (ref 4.2–5.8)
RBC # BLD: 3.02 M/UL — LOW (ref 4.2–5.8)
RBC # FLD: 14.3 % — SIGNIFICANT CHANGE UP (ref 10.3–14.5)
RBC # FLD: 14.7 % — HIGH (ref 10.3–14.5)
SODIUM SERPL-SCNC: 134 MMOL/L — LOW (ref 135–145)
WBC # BLD: 4.9 K/UL — SIGNIFICANT CHANGE UP (ref 3.8–10.5)
WBC # BLD: 4.9 K/UL — SIGNIFICANT CHANGE UP (ref 3.8–10.5)
WBC # FLD AUTO: 4.9 K/UL — SIGNIFICANT CHANGE UP (ref 3.8–10.5)
WBC # FLD AUTO: 4.9 K/UL — SIGNIFICANT CHANGE UP (ref 3.8–10.5)

## 2018-10-27 PROCEDURE — 99233 SBSQ HOSP IP/OBS HIGH 50: CPT

## 2018-10-27 RX ADMIN — Medication 3: at 12:01

## 2018-10-27 RX ADMIN — Medication 100 MILLIGRAM(S): at 21:49

## 2018-10-27 RX ADMIN — Medication 1: at 18:41

## 2018-10-27 RX ADMIN — Medication 100 MILLIGRAM(S): at 14:02

## 2018-10-27 RX ADMIN — Medication 1 TABLET(S): at 08:01

## 2018-10-27 RX ADMIN — Medication 1: at 08:15

## 2018-10-27 RX ADMIN — SIMVASTATIN 10 MILLIGRAM(S): 20 TABLET, FILM COATED ORAL at 21:50

## 2018-10-27 RX ADMIN — Medication 1 TABLET(S): at 08:50

## 2018-10-27 RX ADMIN — Medication 1 TABLET(S): at 20:28

## 2018-10-27 RX ADMIN — Medication 1 TABLET(S): at 14:02

## 2018-10-27 RX ADMIN — TAMSULOSIN HYDROCHLORIDE 0.4 MILLIGRAM(S): 0.4 CAPSULE ORAL at 21:49

## 2018-10-27 RX ADMIN — Medication 1 TABLET(S): at 15:00

## 2018-10-27 RX ADMIN — HEPARIN SODIUM 5000 UNIT(S): 5000 INJECTION INTRAVENOUS; SUBCUTANEOUS at 05:31

## 2018-10-27 RX ADMIN — Medication 1 TABLET(S): at 14:08

## 2018-10-27 RX ADMIN — SENNA PLUS 2 TABLET(S): 8.6 TABLET ORAL at 21:50

## 2018-10-27 RX ADMIN — PIPERACILLIN AND TAZOBACTAM 25 GRAM(S): 4; .5 INJECTION, POWDER, LYOPHILIZED, FOR SOLUTION INTRAVENOUS at 05:31

## 2018-10-27 RX ADMIN — HEPARIN SODIUM 5000 UNIT(S): 5000 INJECTION INTRAVENOUS; SUBCUTANEOUS at 21:50

## 2018-10-27 RX ADMIN — Medication 1 TABLET(S): at 21:28

## 2018-10-27 RX ADMIN — Medication 81 MILLIGRAM(S): at 12:02

## 2018-10-27 RX ADMIN — Medication 100 MILLIGRAM(S): at 05:31

## 2018-10-27 RX ADMIN — Medication 50 MILLIGRAM(S): at 05:31

## 2018-10-27 RX ADMIN — HEPARIN SODIUM 5000 UNIT(S): 5000 INJECTION INTRAVENOUS; SUBCUTANEOUS at 14:04

## 2018-10-27 NOTE — PROGRESS NOTE ADULT - SUBJECTIVE AND OBJECTIVE BOX
Subjective  penile pain cont relieved with morphine s/p prostate and penile biopsy   Objective    Vital signs  T(F): , Max: 100.3 (10-26-18 @ 21:08)  HR: 74 (10-27-18 @ 05:30)  BP: 124/64 (10-27-18 @ 05:30)  SpO2: 93% (10-27-18 @ 05:30)  Wt(kg): --    Output     10-26 @ 07:01  -  10-27 @ 07:00  --------------------------------------------------------  IN: 545 mL / OUT: 2800 mL / NET: -2255 mL        Gen awake alert nad axox3  Abd soft ntnd spt in place urine clear rust      minimal ecchymosis rico penis     Labs      10-27 @ 06:10    WBC 4.9   / Hct 23.9  / SCr 1.96     10-26 @ 07:09    WBC 4.8   / Hct 26.9  / SCr 1.78 Subjective  penile pain cont relieved with morphine s/p prostate and penile biopsy   Objective    Vital signs  T(F): , Max: 100.3 (10-26-18 @ 21:08)  HR: 74 (10-27-18 @ 05:30)  BP: 124/64 (10-27-18 @ 05:30)  SpO2: 93% (10-27-18 @ 05:30)  Wt(kg): --    Output     10-26 @ 07:01  -  10-27 @ 07:00  --------------------------------------------------------  IN: 545 mL / OUT: 2800 mL / NET: -2255 mL        Gen awake alert nad axox3  Abd soft ntnd spt in place urine clear yellow      minimal ecchymosis rico penis     Labs      10-27 @ 06:10    WBC 4.9   / Hct 23.9  / SCr 1.96     10-26 @ 07:09    WBC 4.8   / Hct 26.9  / SCr 1.78

## 2018-10-27 NOTE — PROGRESS NOTE ADULT - ASSESSMENT
A/P: 83y Male s/p  penile and prostate biopsy  spt tp gravity and irrigate as needed  po pain medication trial   DVT prophylaxis/OOB  Incentive spirometry  Strict I&O's  Diet  off abx   ? restart elliquis

## 2018-10-27 NOTE — PROGRESS NOTE ADULT - PROBLEM SELECTOR PLAN 2
- paroxysmal atrial fibrillation  - ekg from 10/26 marcelo sinus rhythm  - It has been only 12 hours since hematuria and clot passage has stopped. Patient remarks he has an appointment with his cardiologist this Monday 10/29. I would recommend to continue to hold his eliquis for now given recent cessation of bleeding. If okay from urology standpoint, discharge for tomorrow and f/u cardiology on Monday. If not okay for discharge, would recommend cardiology consult in regards to timing of eliquis reinitiation/need.

## 2018-10-27 NOTE — PROGRESS NOTE ADULT - ASSESSMENT
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

## 2018-10-27 NOTE — PROGRESS NOTE ADULT - SUBJECTIVE AND OBJECTIVE BOX
Patient feels well and has on complaints. He did pass a lot of clots yesterday and had hematuria but urine is clear today.      Home Medications:  aspirin 81 mg oral delayed release tablet: 1  orally once a day for Bio-AVR (24 Oct 2018 09:34)  Cipro 500 mg oral tablet: 1 tab(s) orally every 12 hours. Continue until course finished  (19 Sep 2018 09:24)  Diflucan 200 mg oral tablet: 1 tab(s) orally once a day. Continue until course finished  (19 Sep 2018 09:24)  docusate sodium 100 mg oral capsule: 1 cap(s) orally 2 times a day, As Needed (19 Sep 2018 07:07)  Eliquis 5 mg oral tablet: 1 tab(s) orally 2 times a day (19 Sep 2018 07:07)  Flomax: 0.4 milligram(s) orally once a day (at bedtime) (19 Sep 2018 07:07)  lisinopril 10 mg oral tablet: 1 tab(s) orally once a day (19 Sep 2018 07:07)  metFORMIN: 1000 milligram(s) orally 2 times a day (19 Sep 2018 07:07)  metoprolol succinate 50 mg oral tablet, extended release: 1 tab(s) orally once a day (19 Sep 2018 07:07)  PreserVision: 2 tab(s) orally 2 times a day (19 Sep 2018 07:07)  senna oral tablet: 2 tab(s) orally once a day (at bedtime), As Needed (19 Sep 2018 07:07)  simvastatin: 10 milligram(s) orally once a day (19 Sep 2018 07:07)  Tylenol 500 mg oral tablet: 2 tab(s) orally every 6 hours, As Needed (19 Sep 2018 07:07)      PAST MEDICAL & SURGICAL HISTORY:  Benign prostatic hyperplasia with lower urinary tract symptoms, symptom details unspecified  Prostate cancer  Hypertension  HLD (hyperlipidemia)  Type 2 diabetes mellitus  Paroxysmal A-fib  S/P AVR (aortic valve replacement)  S/P TURP      Review of Systems:   CONSTITUTIONAL: No fever, weight loss, or fatigue  EYES: No eye pain, visual disturbances, or discharge  RESPIRATORY: No cough, wheezing, chills or hemoptysis; No shortness of breath  CARDIOVASCULAR: No chest pain, palpitations, dizziness, or leg swelling  GASTROINTESTINAL: No abdominal or epigastric pain. No nausea, vomiting, or hematemesis; No diarrhea or constipation.  GENITOURINARY: No dysuria, frequency, hematuria, or incontinence  SKIN: No itching, burning, rashes, or lesions   MUSCULOSKELETAL: No joint pain or swelling; No muscle, back, or extremity pain  PSYCHIATRIC: No depression, anxiety,    Allergies: oxycodone (Other)    MEDICATIONS  (STANDING):  aspirin enteric coated 81 milliGRAM(s) Oral daily  dextrose 5%. 1000 milliLiter(s) (50 mL/Hr) IV Continuous <Continuous>  dextrose 50% Injectable 12.5 Gram(s) IV Push once  dextrose 50% Injectable 25 Gram(s) IV Push once  dextrose 50% Injectable 25 Gram(s) IV Push once  docusate sodium 100 milliGRAM(s) Oral three times a day  heparin  Injectable 5000 Unit(s) SubCutaneous every 8 hours  insulin lispro (HumaLOG) corrective regimen sliding scale   SubCutaneous three times a day before meals  insulin lispro (HumaLOG) corrective regimen sliding scale   SubCutaneous at bedtime  metoprolol succinate ER 50 milliGRAM(s) Oral daily  multivitamin/minerals 1 Tablet(s) Oral daily  senna 2 Tablet(s) Oral at bedtime  simvastatin 10 milliGRAM(s) Oral at bedtime  tamsulosin 0.4 milliGRAM(s) Oral at bedtime    MEDICATIONS  (PRN):  acetaminophen   Tablet .. 650 milliGRAM(s) Oral every 6 hours PRN Mild Pain (1 - 3)  acetaminophen 300 mG/codeine 30 mG 1 Tablet(s) Oral every 4 hours PRN Moderate Pain (4 - 6)  belladonna 16.2 mG/opium 30 mg Suppository 1 Suppository(s) Rectal every 8 hours PRN bladder spasms  dextrose 40% Gel 15 Gram(s) Oral once PRN Blood Glucose LESS THAN 70 milliGRAM(s)/deciliter  glucagon  Injectable 1 milliGRAM(s) IntraMuscular once PRN Glucose LESS THAN 70 milligrams/deciliter      Vital Signs Last 24 Hrs  T(C): 36.6 (27 Oct 2018 09:26), Max: 37.9 (26 Oct 2018 21:08)  T(F): 97.8 (27 Oct 2018 09:26), Max: 100.3 (26 Oct 2018 21:08)  HR: 83 (27 Oct 2018 09:26) (74 - 101)  BP: 137/69 (27 Oct 2018 09:26) (112/64 - 170/77)  BP(mean): --  RR: 18 (27 Oct 2018 09:26) (17 - 18)  SpO2: 93% (27 Oct 2018 09:26) (93% - 97%)  CAPILLARY BLOOD GLUCOSE      POCT Blood Glucose.: 161 mg/dL (27 Oct 2018 08:04)  POCT Blood Glucose.: 200 mg/dL (26 Oct 2018 21:20)  POCT Blood Glucose.: 269 mg/dL (26 Oct 2018 19:21)  POCT Blood Glucose.: 161 mg/dL (26 Oct 2018 15:05)        PHYSICAL EXAM:  GENERAL: NAD, well-developed  HEAD:  Atraumatic, Normocephalic  EYES: EOMI, PERRLA, conjunctiva and sclera clear  NECK: Supple, No JVD  CHEST/LUNG: Clear to auscultation bilaterally; No wheeze  HEART: Regular rate and rhythm; No murmurs, rubs, or gallops  ABDOMEN: Soft, Nontender, Nondistended; Bowel sounds present  EXTREMITIES:  2+ Peripheral Pulses, No clubbing, cyanosis, or edema  PSYCH: AAOx3  NEUROLOGY: non-focal  SKIN: No rashes or lesions    LABS:                        8.4    4.9   )-----------( 132      ( 27 Oct 2018 08:45 )             25.0     10-27    134<L>  |  102  |  23  ----------------------------<  141<H>  4.4   |  21<L>  |  1.96<H>    Ca    8.7      27 Oct 2018 06:10                Culture - Urine (collected 24 Oct 2018 20:51)  Source: .Urine Suprapubic  Final Report (26 Oct 2018 23:21):    Rare Escherichia coli  Organism: Escherichia coli (26 Oct 2018 23:21)  Organism: Escherichia coli (26 Oct 2018 23:21)        RADIOLOGY & ADDITIONAL TESTS:    Imaging Personally Reviewed:    Consultant(s) Notes Reviewed:      Care Discussed with Consultants/Other Providers:

## 2018-10-28 ENCOUNTER — TRANSCRIPTION ENCOUNTER (OUTPATIENT)
Age: 83
End: 2018-10-28

## 2018-10-28 VITALS
DIASTOLIC BLOOD PRESSURE: 67 MMHG | RESPIRATION RATE: 18 BRPM | OXYGEN SATURATION: 95 % | HEART RATE: 77 BPM | TEMPERATURE: 98 F | SYSTOLIC BLOOD PRESSURE: 128 MMHG

## 2018-10-28 LAB
ANION GAP SERPL CALC-SCNC: 13 MMOL/L — SIGNIFICANT CHANGE UP (ref 5–17)
BUN SERPL-MCNC: 25 MG/DL — HIGH (ref 7–23)
CALCIUM SERPL-MCNC: 9 MG/DL — SIGNIFICANT CHANGE UP (ref 8.4–10.5)
CHLORIDE SERPL-SCNC: 99 MMOL/L — SIGNIFICANT CHANGE UP (ref 96–108)
CO2 SERPL-SCNC: 22 MMOL/L — SIGNIFICANT CHANGE UP (ref 22–31)
CREAT SERPL-MCNC: 2.07 MG/DL — HIGH (ref 0.5–1.3)
GLUCOSE BLDC GLUCOMTR-MCNC: 151 MG/DL — HIGH (ref 70–99)
GLUCOSE BLDC GLUCOMTR-MCNC: 239 MG/DL — HIGH (ref 70–99)
GLUCOSE SERPL-MCNC: 138 MG/DL — HIGH (ref 70–99)
POTASSIUM SERPL-MCNC: 4.5 MMOL/L — SIGNIFICANT CHANGE UP (ref 3.5–5.3)
POTASSIUM SERPL-SCNC: 4.5 MMOL/L — SIGNIFICANT CHANGE UP (ref 3.5–5.3)
SODIUM SERPL-SCNC: 134 MMOL/L — LOW (ref 135–145)

## 2018-10-28 PROCEDURE — 99238 HOSP IP/OBS DSCHRG MGMT 30/<: CPT

## 2018-10-28 PROCEDURE — 87205 SMEAR GRAM STAIN: CPT

## 2018-10-28 PROCEDURE — 72197 MRI PELVIS W/O & W/DYE: CPT

## 2018-10-28 PROCEDURE — 85610 PROTHROMBIN TIME: CPT

## 2018-10-28 PROCEDURE — 86923 COMPATIBILITY TEST ELECTRIC: CPT

## 2018-10-28 PROCEDURE — 84300 ASSAY OF URINE SODIUM: CPT

## 2018-10-28 PROCEDURE — 88342 IMHCHEM/IMCYTCHM 1ST ANTB: CPT

## 2018-10-28 PROCEDURE — 96361 HYDRATE IV INFUSION ADD-ON: CPT

## 2018-10-28 PROCEDURE — 82962 GLUCOSE BLOOD TEST: CPT

## 2018-10-28 PROCEDURE — A9585: CPT

## 2018-10-28 PROCEDURE — P9016: CPT

## 2018-10-28 PROCEDURE — 83930 ASSAY OF BLOOD OSMOLALITY: CPT

## 2018-10-28 PROCEDURE — 88341 IMHCHEM/IMCYTCHM EA ADD ANTB: CPT

## 2018-10-28 PROCEDURE — 71045 X-RAY EXAM CHEST 1 VIEW: CPT

## 2018-10-28 PROCEDURE — 88305 TISSUE EXAM BY PATHOLOGIST: CPT

## 2018-10-28 PROCEDURE — 80048 BASIC METABOLIC PNL TOTAL CA: CPT

## 2018-10-28 PROCEDURE — 87086 URINE CULTURE/COLONY COUNT: CPT

## 2018-10-28 PROCEDURE — 86900 BLOOD TYPING SEROLOGIC ABO: CPT

## 2018-10-28 PROCEDURE — 80053 COMPREHEN METABOLIC PANEL: CPT

## 2018-10-28 PROCEDURE — 86901 BLOOD TYPING SEROLOGIC RH(D): CPT

## 2018-10-28 PROCEDURE — 87070 CULTURE OTHR SPECIMN AEROBIC: CPT

## 2018-10-28 PROCEDURE — 86850 RBC ANTIBODY SCREEN: CPT

## 2018-10-28 PROCEDURE — 88172 CYTP DX EVAL FNA 1ST EA SITE: CPT

## 2018-10-28 PROCEDURE — 36430 TRANSFUSION BLD/BLD COMPNT: CPT

## 2018-10-28 PROCEDURE — 72192 CT PELVIS W/O DYE: CPT

## 2018-10-28 PROCEDURE — 96374 THER/PROPH/DIAG INJ IV PUSH: CPT

## 2018-10-28 PROCEDURE — 77012 CT SCAN FOR NEEDLE BIOPSY: CPT

## 2018-10-28 PROCEDURE — 93005 ELECTROCARDIOGRAM TRACING: CPT

## 2018-10-28 PROCEDURE — 10022: CPT

## 2018-10-28 PROCEDURE — 81001 URINALYSIS AUTO W/SCOPE: CPT

## 2018-10-28 PROCEDURE — 87186 SC STD MICRODIL/AGAR DIL: CPT

## 2018-10-28 PROCEDURE — 99285 EMERGENCY DEPT VISIT HI MDM: CPT | Mod: 25

## 2018-10-28 PROCEDURE — 85730 THROMBOPLASTIN TIME PARTIAL: CPT

## 2018-10-28 PROCEDURE — 85027 COMPLETE CBC AUTOMATED: CPT

## 2018-10-28 PROCEDURE — 88173 CYTOPATH EVAL FNA REPORT: CPT

## 2018-10-28 PROCEDURE — 83935 ASSAY OF URINE OSMOLALITY: CPT

## 2018-10-28 RX ORDER — MOXIFLOXACIN HYDROCHLORIDE TABLETS, 400 MG 400 MG/1
1 TABLET, FILM COATED ORAL
Qty: 0 | Refills: 0 | COMMUNITY

## 2018-10-28 RX ORDER — TAMSULOSIN HYDROCHLORIDE 0.4 MG/1
0.4 CAPSULE ORAL
Qty: 0 | Refills: 0 | COMMUNITY

## 2018-10-28 RX ORDER — ACETAMINOPHEN WITH CODEINE 300MG-30MG
1 TABLET ORAL
Qty: 20 | Refills: 0 | OUTPATIENT
Start: 2018-10-28 | End: 2018-11-01

## 2018-10-28 RX ORDER — ACETAMINOPHEN 500 MG
2 TABLET ORAL
Qty: 0 | Refills: 0 | COMMUNITY

## 2018-10-28 RX ORDER — FLUCONAZOLE 150 MG/1
1 TABLET ORAL
Qty: 0 | Refills: 0 | COMMUNITY

## 2018-10-28 RX ADMIN — Medication 1 TABLET(S): at 01:25

## 2018-10-28 RX ADMIN — ATROPA BELLADONNA AND OPIUM 1 SUPPOSITORY(S): 16.2; 6 SUPPOSITORY RECTAL at 09:30

## 2018-10-28 RX ADMIN — Medication 1 TABLET(S): at 12:55

## 2018-10-28 RX ADMIN — Medication 1 TABLET(S): at 09:00

## 2018-10-28 RX ADMIN — HEPARIN SODIUM 5000 UNIT(S): 5000 INJECTION INTRAVENOUS; SUBCUTANEOUS at 05:31

## 2018-10-28 RX ADMIN — Medication 100 MILLIGRAM(S): at 05:30

## 2018-10-28 RX ADMIN — Medication 81 MILLIGRAM(S): at 12:54

## 2018-10-28 RX ADMIN — Medication 1: at 08:07

## 2018-10-28 RX ADMIN — Medication 50 MILLIGRAM(S): at 05:31

## 2018-10-28 RX ADMIN — Medication 100 MILLIGRAM(S): at 13:00

## 2018-10-28 RX ADMIN — Medication 1 TABLET(S): at 15:01

## 2018-10-28 RX ADMIN — Medication 1 TABLET(S): at 08:12

## 2018-10-28 RX ADMIN — Medication 1 TABLET(S): at 02:25

## 2018-10-28 RX ADMIN — ATROPA BELLADONNA AND OPIUM 1 SUPPOSITORY(S): 16.2; 6 SUPPOSITORY RECTAL at 10:05

## 2018-10-28 RX ADMIN — Medication 1 TABLET(S): at 15:45

## 2018-10-28 RX ADMIN — Medication 2: at 13:00

## 2018-10-28 RX ADMIN — HEPARIN SODIUM 5000 UNIT(S): 5000 INJECTION INTRAVENOUS; SUBCUTANEOUS at 13:00

## 2018-10-28 NOTE — DISCHARGE NOTE ADULT - PATIENT PORTAL LINK FT
You can access the [facility] Patient Portal, offered by [facility], by registering with the following website: [email or web address]

## 2018-10-28 NOTE — DISCHARGE NOTE ADULT - SECONDARY DIAGNOSIS.
Hyponatremia Type 2 diabetes mellitus without complication, without long-term current use of insulin Chronic atrial fibrillation

## 2018-10-28 NOTE — PROGRESS NOTE ADULT - ASSESSMENT
A/P: 83y Male s/p  penile and prostate biopsy  spt tp gravity and irrigate as needed  po pain medication trial   dc planning today will fu with cardiologist on wed to see if urine remains clear will restart ellisraelis

## 2018-10-28 NOTE — PROGRESS NOTE ADULT - REASON FOR ADMISSION
hyponatremia , hematuria
hyponatremia , hematuria
hematuria and penile pain
hyponatremia , hematuria
hyponatremia, hematuria

## 2018-10-28 NOTE — DISCHARGE NOTE ADULT - CARE PLAN
Principal Discharge DX:	Gross hematuria  Goal:	clear urine  Assessment and plan of treatment:	Call the office if you experience fever, chills, uncontrolled pain, the inability to tolerate liquids, or the urine does not drain  See Dr. Burroughs in one to two weeks [phone]  If your urine is clear on Wednesday when you see the cardiologist you may restart Steve if he wishes  Secondary Diagnosis:	Hyponatremia  Goal:	your sodium level is back to normal  Secondary Diagnosis:	Type 2 diabetes mellitus without complication, without long-term current use of insulin  Goal:	maintain adequate blood sugar  Assessment and plan of treatment:	continue home medication  Secondary Diagnosis:	Chronic atrial fibrillation  Goal:	maintain a healthy heart  Assessment and plan of treatment:	If your urine is clear on Wednesday when you see the cardiologist you may restart Steve  if he wishes

## 2018-10-28 NOTE — DISCHARGE NOTE ADULT - MEDICATION SUMMARY - MEDICATIONS TO TAKE
I will START or STAY ON the medications listed below when I get home from the hospital:    aspirin 81 mg oral delayed release tablet  -- 1  by mouth once a day for Bio-AVR  -- Indication: For Home medication     Tylenol with Codeine #3 oral tablet  -- 1 tab(s) by mouth every 6 hours MDD:4 doses  -- Caution federal law prohibits the transfer of this drug to any person other  than the person for whom it was prescribed.  May cause drowsiness.  Alcohol may intensify this effect.  Use care when operating dangerous machinery.  This product contains acetaminophen.  Do not use  with any other product containing acetaminophen to prevent possible liver damage.  Using more of this medication than prescribed may cause serious breathing problems.    -- Indication: For pain medication     lisinopril 10 mg oral tablet  -- 1 tab(s) by mouth once a day  -- Indication: For Home medication     Eliquis 5 mg oral tablet  -- 1 tab(s) by mouth 2 times a day  -- Indication: For To be restarted Wednesday when you see the cardiologist     metFORMIN  -- 1000 milligram(s) by mouth 2 times a day  -- Indication: For Home medication     simvastatin  -- 10 milligram(s) by mouth once a day  -- Indication: For Home medication     metoprolol succinate 50 mg oral tablet, extended release  -- 1 tab(s) by mouth once a day  -- Indication: For Home medication     senna oral tablet  -- 2 tab(s) by mouth once a day (at bedtime), As Needed  -- Indication: For stool softener     docusate sodium 100 mg oral capsule  -- 1 cap(s) by mouth 2 times a day, As Needed  -- Indication: For stool softener    Augmentin 875 mg-125 mg oral tablet  -- 1 tab(s) by mouth every 12 hours   -- Finish all this medication unless otherwise directed by prescriber.  Take with food or milk.    -- Indication: For Antibiotic     PreserVision  -- 2 tab(s) by mouth 2 times a day  -- Indication: For Home medication

## 2018-10-28 NOTE — DISCHARGE NOTE ADULT - CARE PROVIDER_API CALL
Irvin Burroughs), Urology  [address] [place] [address]  Phone: [phone]  Fax: [phone]

## 2018-10-28 NOTE — PROGRESS NOTE ADULT - SUBJECTIVE AND OBJECTIVE BOX
Subjective  feeling well tylenol 3 working well   Objective    Vital signs  T(F): , Max: 99.5 (10-28-18 @ 01:06)  HR: 80 (10-28-18 @ 05:07)  BP: 133/79 (10-28-18 @ 05:07)  SpO2: 91% (10-28-18 @ 05:07)  Wt(kg): --    Output     10-27 @ 07:01  -  10-28 @ 07:00  --------------------------------------------------------  IN: 1260 mL / OUT: 3250 mL / NET: -1990 mL        Gen awake alert nad axox3  Abd flat soft ntnd   Back no cvat bl    spt in place urine yellow   palp mass base of penis ecchymosis mild periphallus     Labs      10-28 @ 06:27    WBC --    / Hct --    / SCr 2.07     10-27 @ 08:45    WBC 4.9   / Hct 25.0  / SCr --

## 2018-10-28 NOTE — DISCHARGE NOTE ADULT - INSTRUCTIONS
regular If you have persistent nausea, vomiting, pain, fever, unable to tolerate foods, liquids, unable to urinate , if you noticed any redness, tenderness,warmth, purulent drainage at the incision site call the doctor or go to the nearest hospital. keep the incision site clean, dry and intact. Pt. verbalized understanding of discharge instructions. Pt. alert and oriented x 4. Pt. verbalized understanding of medications prescribed and to follow up with MD in time ordered.

## 2018-10-28 NOTE — DISCHARGE NOTE ADULT - MEDICATION SUMMARY - MEDICATIONS TO STOP TAKING
I will STOP taking the medications listed below when I get home from the hospital:    Flomax  -- 0.4 milligram(s) by mouth once a day (at bedtime)

## 2018-10-28 NOTE — DISCHARGE NOTE ADULT - PLAN OF CARE
clear urine Call the office if you experience fever, chills, uncontrolled pain, the inability to tolerate liquids, or the urine does not drain  See Dr. Burroughs in one to two weeks [phone]  If your urine is clear on Wednesday when you see the cardiologist you may restart Elliquis if he wishes your sodium level is back to normal maintain adequate blood sugar continue home medication maintain a healthy heart If your urine is clear on Wednesday when you see the cardiologist you may restart Steve  if he wishes

## 2018-10-31 ENCOUNTER — APPOINTMENT (OUTPATIENT)
Dept: CARDIOLOGY | Facility: CLINIC | Age: 83
End: 2018-10-31
Payer: MEDICARE

## 2018-10-31 VITALS
HEART RATE: 81 BPM | WEIGHT: 188 LBS | DIASTOLIC BLOOD PRESSURE: 60 MMHG | SYSTOLIC BLOOD PRESSURE: 120 MMHG | RESPIRATION RATE: 14 BRPM | HEIGHT: 69 IN | BODY MASS INDEX: 27.85 KG/M2

## 2018-10-31 DIAGNOSIS — E78.2 MIXED HYPERLIPIDEMIA: ICD-10-CM

## 2018-10-31 DIAGNOSIS — N42.89 OTHER SPECIFIED DISORDERS OF PROSTATE: ICD-10-CM

## 2018-10-31 DIAGNOSIS — N32.0 BLADDER-NECK OBSTRUCTION: ICD-10-CM

## 2018-10-31 LAB
NON-GYNECOLOGICAL CYTOLOGY STUDY: SIGNIFICANT CHANGE UP
NON-GYNECOLOGICAL CYTOLOGY STUDY: SIGNIFICANT CHANGE UP

## 2018-10-31 PROCEDURE — 99214 OFFICE O/P EST MOD 30 MIN: CPT

## 2018-10-31 PROCEDURE — 93000 ELECTROCARDIOGRAM COMPLETE: CPT

## 2018-10-31 RX ORDER — CIPROFLOXACIN HYDROCHLORIDE 500 MG/1
500 TABLET, FILM COATED ORAL TWICE DAILY
Qty: 16 | Refills: 0 | Status: DISCONTINUED | COMMUNITY
Start: 2018-09-12 | End: 2018-10-31

## 2018-10-31 RX ORDER — FLUCONAZOLE 200 MG/1
200 TABLET ORAL DAILY
Qty: 5 | Refills: 0 | Status: DISCONTINUED | COMMUNITY
Start: 2018-09-14 | End: 2018-10-31

## 2018-10-31 RX ORDER — TAMSULOSIN HYDROCHLORIDE 0.4 MG/1
0.4 CAPSULE ORAL
Qty: 90 | Refills: 3 | Status: DISCONTINUED | COMMUNITY
Start: 2018-02-05 | End: 2018-10-31

## 2018-10-31 RX ORDER — APIXABAN 5 MG/1
5 TABLET, FILM COATED ORAL
Qty: 180 | Refills: 1 | Status: DISCONTINUED | COMMUNITY
Start: 2018-06-27 | End: 2018-10-31

## 2018-10-31 RX ORDER — TAMSULOSIN HYDROCHLORIDE 0.4 MG/1
0.4 CAPSULE ORAL
Refills: 0 | Status: DISCONTINUED | COMMUNITY
End: 2018-10-31

## 2018-10-31 RX ORDER — FLUCONAZOLE 200 MG/1
200 TABLET ORAL DAILY
Qty: 5 | Refills: 0 | Status: DISCONTINUED | COMMUNITY
Start: 2018-09-13 | End: 2018-10-31

## 2018-11-01 LAB
-  AMPICILLIN/SULBACTAM: SIGNIFICANT CHANGE UP
-  CEFAZOLIN: SIGNIFICANT CHANGE UP
-  CLINDAMYCIN: SIGNIFICANT CHANGE UP
-  ERYTHROMYCIN: SIGNIFICANT CHANGE UP
-  GENTAMICIN: SIGNIFICANT CHANGE UP
-  OXACILLIN: SIGNIFICANT CHANGE UP
-  PENICILLIN: SIGNIFICANT CHANGE UP
-  RIFAMPIN: SIGNIFICANT CHANGE UP
-  TETRACYCLINE: SIGNIFICANT CHANGE UP
-  TRIMETHOPRIM/SULFAMETHOXAZOLE: SIGNIFICANT CHANGE UP
-  VANCOMYCIN: SIGNIFICANT CHANGE UP
CULTURE RESULTS: SIGNIFICANT CHANGE UP
METHOD TYPE: SIGNIFICANT CHANGE UP
ORGANISM # SPEC MICROSCOPIC CNT: SIGNIFICANT CHANGE UP
ORGANISM # SPEC MICROSCOPIC CNT: SIGNIFICANT CHANGE UP
SPECIMEN SOURCE: SIGNIFICANT CHANGE UP

## 2018-11-05 ENCOUNTER — OUTPATIENT (OUTPATIENT)
Dept: OUTPATIENT SERVICES | Facility: HOSPITAL | Age: 83
LOS: 1 days | Discharge: HOME | End: 2018-11-05

## 2018-11-05 ENCOUNTER — APPOINTMENT (OUTPATIENT)
Age: 83
End: 2018-11-05

## 2018-11-05 ENCOUNTER — MESSAGE (OUTPATIENT)
Age: 83
End: 2018-11-05

## 2018-11-05 ENCOUNTER — INPATIENT (INPATIENT)
Facility: HOSPITAL | Age: 83
LOS: 6 days | Discharge: HOME | DRG: 657 | End: 2018-11-12
Attending: UROLOGY | Admitting: UROLOGY
Payer: MEDICARE

## 2018-11-05 VITALS
OXYGEN SATURATION: 97 % | DIASTOLIC BLOOD PRESSURE: 65 MMHG | HEART RATE: 105 BPM | TEMPERATURE: 98 F | RESPIRATION RATE: 18 BRPM | SYSTOLIC BLOOD PRESSURE: 180 MMHG

## 2018-11-05 DIAGNOSIS — Z95.2 PRESENCE OF PROSTHETIC HEART VALVE: Chronic | ICD-10-CM

## 2018-11-05 DIAGNOSIS — Z90.79 ACQUIRED ABSENCE OF OTHER GENITAL ORGAN(S): Chronic | ICD-10-CM

## 2018-11-05 DIAGNOSIS — C61 MALIGNANT NEOPLASM OF PROSTATE: ICD-10-CM

## 2018-11-05 DIAGNOSIS — R10.2 PELVIC AND PERINEAL PAIN: ICD-10-CM

## 2018-11-05 LAB
ALBUMIN SERPL ELPH-MCNC: 3.7 G/DL — SIGNIFICANT CHANGE UP (ref 3.3–5)
ALP SERPL-CCNC: 63 U/L — SIGNIFICANT CHANGE UP (ref 40–120)
ALT FLD-CCNC: 7 U/L — LOW (ref 10–45)
ANION GAP SERPL CALC-SCNC: 15 MMOL/L — SIGNIFICANT CHANGE UP (ref 5–17)
APTT BLD: 29.2 SEC — SIGNIFICANT CHANGE UP (ref 27.5–36.3)
AST SERPL-CCNC: 12 U/L — SIGNIFICANT CHANGE UP (ref 10–40)
BASOPHILS # BLD AUTO: 0 K/UL — SIGNIFICANT CHANGE UP (ref 0–0.2)
BASOPHILS NFR BLD AUTO: 0 % — SIGNIFICANT CHANGE UP (ref 0–2)
BILIRUB SERPL-MCNC: 0.4 MG/DL — SIGNIFICANT CHANGE UP (ref 0.2–1.2)
BLD GP AB SCN SERPL QL: NEGATIVE — SIGNIFICANT CHANGE UP
BUN SERPL-MCNC: 31 MG/DL — HIGH (ref 7–23)
CALCIUM SERPL-MCNC: 9.1 MG/DL — SIGNIFICANT CHANGE UP (ref 8.4–10.5)
CHLORIDE SERPL-SCNC: 91 MMOL/L — LOW (ref 96–108)
CO2 SERPL-SCNC: 20 MMOL/L — LOW (ref 22–31)
CREAT SERPL-MCNC: 2.6 MG/DL — HIGH (ref 0.5–1.3)
EOSINOPHIL # BLD AUTO: 0.1 K/UL — SIGNIFICANT CHANGE UP (ref 0–0.5)
EOSINOPHIL NFR BLD AUTO: 0.5 % — SIGNIFICANT CHANGE UP (ref 0–6)
GAS PNL BLDV: SIGNIFICANT CHANGE UP
GAS PNL BLDV: SIGNIFICANT CHANGE UP
GLUCOSE SERPL-MCNC: 206 MG/DL — HIGH (ref 70–99)
HCT VFR BLD CALC: 27.6 % — LOW (ref 39–50)
HGB BLD-MCNC: 9.3 G/DL — LOW (ref 13–17)
INR BLD: 1.13 RATIO — SIGNIFICANT CHANGE UP (ref 0.88–1.16)
LYMPHOCYTES # BLD AUTO: 1 K/UL — SIGNIFICANT CHANGE UP (ref 1–3.3)
LYMPHOCYTES # BLD AUTO: 8.7 % — LOW (ref 13–44)
MCHC RBC-ENTMCNC: 27.5 PG — SIGNIFICANT CHANGE UP (ref 27–34)
MCHC RBC-ENTMCNC: 33.7 GM/DL — SIGNIFICANT CHANGE UP (ref 32–36)
MCV RBC AUTO: 81.5 FL — SIGNIFICANT CHANGE UP (ref 80–100)
MONOCYTES # BLD AUTO: 0.9 K/UL — SIGNIFICANT CHANGE UP (ref 0–0.9)
MONOCYTES NFR BLD AUTO: 7.8 % — SIGNIFICANT CHANGE UP (ref 2–14)
NEUTROPHILS # BLD AUTO: 9.6 K/UL — HIGH (ref 1.8–7.4)
NEUTROPHILS NFR BLD AUTO: 83 % — HIGH (ref 43–77)
PLATELET # BLD AUTO: 242 K/UL — SIGNIFICANT CHANGE UP (ref 150–400)
POTASSIUM SERPL-MCNC: 5.7 MMOL/L — HIGH (ref 3.5–5.3)
POTASSIUM SERPL-SCNC: 5.7 MMOL/L — HIGH (ref 3.5–5.3)
PROT SERPL-MCNC: 8.1 G/DL — SIGNIFICANT CHANGE UP (ref 6–8.3)
PROTHROM AB SERPL-ACNC: 12.9 SEC — SIGNIFICANT CHANGE UP (ref 10–12.9)
RBC # BLD: 3.39 M/UL — LOW (ref 4.2–5.8)
RBC # FLD: 14 % — SIGNIFICANT CHANGE UP (ref 10.3–14.5)
RH IG SCN BLD-IMP: POSITIVE — SIGNIFICANT CHANGE UP
SODIUM SERPL-SCNC: 126 MMOL/L — LOW (ref 135–145)
WBC # BLD: 11.6 K/UL — HIGH (ref 3.8–10.5)
WBC # FLD AUTO: 11.6 K/UL — HIGH (ref 3.8–10.5)

## 2018-11-05 PROCEDURE — 99285 EMERGENCY DEPT VISIT HI MDM: CPT | Mod: GC

## 2018-11-05 PROCEDURE — 74176 CT ABD & PELVIS W/O CONTRAST: CPT | Mod: 26

## 2018-11-05 PROCEDURE — 99223 1ST HOSP IP/OBS HIGH 75: CPT | Mod: 24,AI

## 2018-11-05 RX ORDER — SENNA PLUS 8.6 MG/1
2 TABLET ORAL AT BEDTIME
Qty: 0 | Refills: 0 | Status: DISCONTINUED | OUTPATIENT
Start: 2018-11-05 | End: 2018-11-05

## 2018-11-05 RX ORDER — DOCUSATE SODIUM 100 MG
100 CAPSULE ORAL THREE TIMES A DAY
Qty: 0 | Refills: 0 | Status: DISCONTINUED | OUTPATIENT
Start: 2018-11-05 | End: 2018-11-07

## 2018-11-05 RX ORDER — METOPROLOL TARTRATE 50 MG
50 TABLET ORAL DAILY
Qty: 0 | Refills: 0 | Status: DISCONTINUED | OUTPATIENT
Start: 2018-11-05 | End: 2018-11-07

## 2018-11-05 RX ORDER — LISINOPRIL 2.5 MG/1
10 TABLET ORAL DAILY
Qty: 0 | Refills: 0 | Status: DISCONTINUED | OUTPATIENT
Start: 2018-11-05 | End: 2018-11-06

## 2018-11-05 RX ORDER — MORPHINE SULFATE 50 MG/1
4 CAPSULE, EXTENDED RELEASE ORAL ONCE
Qty: 0 | Refills: 0 | Status: DISCONTINUED | OUTPATIENT
Start: 2018-11-05 | End: 2018-11-05

## 2018-11-05 RX ORDER — POLYETHYLENE GLYCOL 3350 17 G/17G
17 POWDER, FOR SOLUTION ORAL DAILY
Qty: 0 | Refills: 0 | Status: DISCONTINUED | OUTPATIENT
Start: 2018-11-05 | End: 2018-11-07

## 2018-11-05 RX ORDER — ACETAMINOPHEN 500 MG
650 TABLET ORAL EVERY 6 HOURS
Qty: 0 | Refills: 0 | Status: DISCONTINUED | OUTPATIENT
Start: 2018-11-05 | End: 2018-11-07

## 2018-11-05 RX ORDER — DEXTROSE 50 % IN WATER 50 %
50 SYRINGE (ML) INTRAVENOUS ONCE
Qty: 0 | Refills: 0 | Status: COMPLETED | OUTPATIENT
Start: 2018-11-05 | End: 2018-11-05

## 2018-11-05 RX ORDER — SENNA PLUS 8.6 MG/1
2 TABLET ORAL AT BEDTIME
Qty: 0 | Refills: 0 | Status: DISCONTINUED | OUTPATIENT
Start: 2018-11-05 | End: 2018-11-07

## 2018-11-05 RX ORDER — APIXABAN 2.5 MG/1
1 TABLET, FILM COATED ORAL
Qty: 0 | Refills: 0 | COMMUNITY

## 2018-11-05 RX ORDER — SODIUM CHLORIDE 9 MG/ML
1000 INJECTION, SOLUTION INTRAVENOUS ONCE
Qty: 0 | Refills: 0 | Status: COMPLETED | OUTPATIENT
Start: 2018-11-05 | End: 2018-11-05

## 2018-11-05 RX ORDER — SODIUM CHLORIDE 9 MG/ML
1000 INJECTION INTRAMUSCULAR; INTRAVENOUS; SUBCUTANEOUS
Qty: 0 | Refills: 0 | Status: DISCONTINUED | OUTPATIENT
Start: 2018-11-05 | End: 2018-11-06

## 2018-11-05 RX ORDER — DOCUSATE SODIUM 100 MG
100 CAPSULE ORAL
Qty: 0 | Refills: 0 | Status: DISCONTINUED | OUTPATIENT
Start: 2018-11-05 | End: 2018-11-05

## 2018-11-05 RX ORDER — ACETAMINOPHEN WITH CODEINE 300MG-30MG
1 TABLET ORAL EVERY 4 HOURS
Qty: 0 | Refills: 0 | Status: DISCONTINUED | OUTPATIENT
Start: 2018-11-05 | End: 2018-11-07

## 2018-11-05 RX ORDER — ASPIRIN/CALCIUM CARB/MAGNESIUM 324 MG
81 TABLET ORAL EVERY OTHER DAY
Qty: 0 | Refills: 0 | Status: DISCONTINUED | OUTPATIENT
Start: 2018-11-05 | End: 2018-11-07

## 2018-11-05 RX ORDER — HEPARIN SODIUM 5000 [USP'U]/ML
5000 INJECTION INTRAVENOUS; SUBCUTANEOUS EVERY 8 HOURS
Qty: 0 | Refills: 0 | Status: DISCONTINUED | OUTPATIENT
Start: 2018-11-05 | End: 2018-11-07

## 2018-11-05 RX ORDER — INSULIN HUMAN 100 [IU]/ML
10 INJECTION, SOLUTION SUBCUTANEOUS ONCE
Qty: 0 | Refills: 0 | Status: COMPLETED | OUTPATIENT
Start: 2018-11-05 | End: 2018-11-05

## 2018-11-05 RX ORDER — SIMVASTATIN 20 MG/1
10 TABLET, FILM COATED ORAL AT BEDTIME
Qty: 0 | Refills: 0 | Status: DISCONTINUED | OUTPATIENT
Start: 2018-11-05 | End: 2018-11-07

## 2018-11-05 RX ORDER — ACETAMINOPHEN WITH CODEINE 300MG-30MG
2 TABLET ORAL EVERY 4 HOURS
Qty: 0 | Refills: 0 | Status: DISCONTINUED | OUTPATIENT
Start: 2018-11-05 | End: 2018-11-07

## 2018-11-05 RX ORDER — CEFAZOLIN SODIUM 1 G
VIAL (EA) INJECTION
Qty: 0 | Refills: 0 | Status: DISCONTINUED | OUTPATIENT
Start: 2018-11-05 | End: 2018-11-06

## 2018-11-05 RX ADMIN — MORPHINE SULFATE 4 MILLIGRAM(S): 50 CAPSULE, EXTENDED RELEASE ORAL at 21:45

## 2018-11-05 RX ADMIN — SODIUM CHLORIDE 1000 MILLILITER(S): 9 INJECTION, SOLUTION INTRAVENOUS at 21:45

## 2018-11-05 RX ADMIN — MORPHINE SULFATE 4 MILLIGRAM(S): 50 CAPSULE, EXTENDED RELEASE ORAL at 20:45

## 2018-11-05 RX ADMIN — MORPHINE SULFATE 4 MILLIGRAM(S): 50 CAPSULE, EXTENDED RELEASE ORAL at 22:02

## 2018-11-05 RX ADMIN — MORPHINE SULFATE 4 MILLIGRAM(S): 50 CAPSULE, EXTENDED RELEASE ORAL at 21:02

## 2018-11-05 NOTE — H&P ADULT - NSHPPHYSICALEXAM_GEN_ALL_CORE
Back: no CVAT b/l  Abd: soft no rebound  : 16f SPT in place, -irrigated with sterile water, few clots evacuated followed by ~200-300cc red urine.  barbour upsized to 18F 3way catheter. CBI started - urine intermittently red on high flow cbi

## 2018-11-05 NOTE — H&P ADULT - NSHPLABSRESULTS_GEN_ALL_CORE
LABS:    11-05 @ 20:52    WBC 11.6  / Hct 27.6  / SCr 2.60     11-05    126<L>  |  91<L>  |  31<H>  ----------------------------<  206<H>  5.7<H>   |  20<L>  |  2.60<H>    Ca    9.1      05 Nov 2018 20:52    TPro  8.1  /  Alb  3.7  /  TBili  0.4  /  DBili  x   /  AST  12  /  ALT  7<L>  /  AlkPhos  63  11-05    PT/INR - ( 05 Nov 2018 20:52 )   PT: 12.9 sec;   INR: 1.13 ratio         PTT - ( 05 Nov 2018 20:52 )  PTT:29.2 sec      RADIOLOGY:  EXAM:  CT PELVIS ONLY                          PROCEDURE DATE:  10/24/2018      INTERPRETATION:  CLINICAL INFORMATION: Evaluate for prostatic abscess.   History of prostate cancer with with periprostatic abscess status post   drainage in August 2018. Penile swelling for one month.    COMPARISON: MR pelvis 10/23/2018. CT abdomen/pelvis 8/23/2018    PROCEDURE:   CT of the Pelvis was performed without intravenous contrast.   Intravenous contrast: None.  Oral contrast: None.  Sagittal and coronal reformats were performed.    FINDINGS:  Evaluation of the solid organs and vasculature is limited without   intravenous contrast.    BLADDER: Contains suprapubic Dooley catheter and air. Posterior bladder   wall thickening. High density within the urinary bladder is consistent   with hemorrhage.  REPRODUCTIVE ORGANS: Brachytherapy seeds limits evaluation of the   prostate. An ill-defined low-attenuation collection at the base of the   penis (6, 73) extending posteriorly along the shaft of the penis is   better visualized on recent MR 10/23/2018.  LYMPH NODES: Less than 1 cm in short axis pelvic lymph nodes.    VISUALIZED PORTIONS:    ABDOMINAL ORGANS: Mild bilateral hydroureter to the level of the urinary   bladder.  BOWEL: Colonic diverticulosis.  PERITONEUM: Trace ascites.  VESSELS: Atherosclerotic calcifications.  ABDOMINAL WALL: Small fat-containing left inguinal hernia and small   fat-containing umbilical hernia.  BONES: Indeterminate sclerotic foci in the right ischial tuberosity and   right proximal femur are unchanged.    IMPRESSION: Limited study without intravenous contrast. The collection at   the base of the penis extending along the shaft posteriorly is better   visualized on the recent MR 10/23/2018.    Posterior urinary bladder wall thickening again noted with hemorrhage   within the urinary bladder. Suprapubic catheter also within urinary   bladder.    FREDY DILL M.D., RADIOLOGY RESIDENT  This document has been electronically signed.  RANDALL PATEL M.D., ATTENDING RADIOLOGIST  This document has been electronically signed. Oct 25 2018  1:40PM

## 2018-11-05 NOTE — H&P ADULT - HISTORY OF PRESENT ILLNESS
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   Pt recently admitted to urology service 10/24-10/28/2018 for gross hematuria which resolved spontaneously after eliquis was stopped. 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 (Pt scheduled to see medical oncology) Pt was ultimately discharged on Bactrim which he has completed.  Pt called emergency service line tonight c/o severe penile pain and gross hematuria.  Denies any fever, chills, nausea, vomiting, or flank pain

## 2018-11-05 NOTE — ED ADULT NURSE NOTE - OBJECTIVE STATEMENT
83 year old male presented to ED with c/o of testicular pain and lower abdominal pain. Pt has suprapubic catheter, draining bright red blood, 150cc. Urology contacted. Pt presents screaming in pain, 10/10, describes pain as sharp and constant starting 2 hours ago. Pt denies CP, SOB, nausea/vomiting, numbness/tingling, fever, cough, chills, dizziness, headache. Pt a&ox3, lung sounds clear, heart rate regular, abdomen soft nontender nondistended to palp. Skin around supra pubic catheter red dry and flaky. IV in right forearm 18G and patent. Pt currently resting in bed and received 4mg Morphine. On pulse ox and cardiac monitor. Will continue to monitor and assess while offering support and reassurance.

## 2018-11-05 NOTE — ED ADULT TRIAGE NOTE - CHIEF COMPLAINT QUOTE
pt has suprapubic catheter, blood in bag as well as around catheter. also feels like output is decreased. no fever/n/v. recently has infection.

## 2018-11-05 NOTE — ED PROVIDER NOTE - PHYSICAL EXAMINATION
Meredith Choi DO.:   GENERAL: Patient awake alert. Moderate pain/distress.  HEENT: NC/AT, Moist mucous membranes, PERRL, EOMI.  LUNGS: CTAB, no wheezes or crackles.   CARDIAC: RRR, no m/r/g.    ABDOMEN: Soft, +feels pressure over suprapubic area, ND, No rebound, guarding.  EXT: No edema. No calf tenderness.  MSK: No spinal tenderness, no pain with movement, no deformities.  NEURO: A&Ox3.  SKIN: Warm and dry. No rash.  PSYCH: Normal affect.

## 2018-11-05 NOTE — ED PROVIDER NOTE - MEDICAL DECISION MAKING DETAILS
83M p/w recurrent hematuria. Concern for recurrent clot worsening abscess. Urology consult. Likely admit. 83M p/w recurrent hematuria. Concern for recurrent clot worsening abscess. Urology consult. Likely admit.    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 metastatic disease who presents with freda hematuria, bleeding around SPT and penile pain. Pt was recently admitted for same symptoms and discharged 10/24, stopped eliquis due to bleeding. Now with blood in his SP catheter and severe groin pain. DDx: hematuria with clots, nonfunctioning barbour, urinary retention, uti, prostate abscess, other  complication. Plan: basic labs, type and screen, u/a, ucx, bladder scan, CTAP, emergent Urology consultation, pain control

## 2018-11-05 NOTE — ED PROVIDER NOTE - OBJECTIVE STATEMENT
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 metastatic disease presents with freda hematuria, bleeding around SPT and penile pain. Was recently admitted for same symptoms and discharged 10/24, d/c eliquis due to bleeding. Patient denies fevers, chills, decreased UO. Patient look uncomfrtable in severe pain. 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 metastatic disease presents with freda hematuria, bleeding around SPT and penile pain. Was recently admitted for same symptoms and discharged 10/24, d/c eliquis due to bleeding. Patient denies fevers, chills, decreased UO. Patient look uncomfortable in severe pain.

## 2018-11-05 NOTE — ED PROVIDER NOTE - NS ED ROS FT
CONST: no fevers, no chills  EYES: no pain, no vision changes  ENT: no sore throat, no ear pain, no change in hearing  CV: no chest pain, no leg swelling  RESP: no shortness of breath, no cough  ABD: +abdominal pain, no nausea, no vomiting, no diarrhea  : no dysuria, no flank pain, +hematuria, +testicular and penile pain  MSK: no back pain, no extremity pain  NEURO: no headache or additional neurologic complaints  HEME: no easy bleeding  SKIN:  no rash

## 2018-11-05 NOTE — H&P ADULT - ASSESSMENT
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

## 2018-11-06 ENCOUNTER — TRANSCRIPTION ENCOUNTER (OUTPATIENT)
Age: 83
End: 2018-11-06

## 2018-11-06 DIAGNOSIS — I10 ESSENTIAL (PRIMARY) HYPERTENSION: ICD-10-CM

## 2018-11-06 DIAGNOSIS — R31.0 GROSS HEMATURIA: ICD-10-CM

## 2018-11-06 DIAGNOSIS — N17.9 ACUTE KIDNEY FAILURE, UNSPECIFIED: ICD-10-CM

## 2018-11-06 DIAGNOSIS — Z95.2 PRESENCE OF PROSTHETIC HEART VALVE: ICD-10-CM

## 2018-11-06 DIAGNOSIS — C68.9 MALIGNANT NEOPLASM OF URINARY ORGAN, UNSPECIFIED: ICD-10-CM

## 2018-11-06 DIAGNOSIS — I48.0 PAROXYSMAL ATRIAL FIBRILLATION: ICD-10-CM

## 2018-11-06 DIAGNOSIS — E87.5 HYPERKALEMIA: ICD-10-CM

## 2018-11-06 DIAGNOSIS — E87.1 HYPO-OSMOLALITY AND HYPONATREMIA: ICD-10-CM

## 2018-11-06 LAB
ANION GAP SERPL CALC-SCNC: 13 MMOL/L — SIGNIFICANT CHANGE UP (ref 5–17)
APPEARANCE UR: ABNORMAL
BACTERIA # UR AUTO: NEGATIVE — SIGNIFICANT CHANGE UP
BILIRUB UR-MCNC: NEGATIVE — SIGNIFICANT CHANGE UP
BUN SERPL-MCNC: 31 MG/DL — HIGH (ref 7–23)
CALCIUM SERPL-MCNC: 8.3 MG/DL — LOW (ref 8.4–10.5)
CHLORIDE SERPL-SCNC: 95 MMOL/L — LOW (ref 96–108)
CO2 SERPL-SCNC: 20 MMOL/L — LOW (ref 22–31)
COLOR SPEC: SIGNIFICANT CHANGE UP
CREAT SERPL-MCNC: 2.52 MG/DL — HIGH (ref 0.5–1.3)
DIFF PNL FLD: ABNORMAL
EPI CELLS # UR: 0 /HPF — SIGNIFICANT CHANGE UP
GLUCOSE BLDC GLUCOMTR-MCNC: 172 MG/DL — HIGH (ref 70–99)
GLUCOSE BLDC GLUCOMTR-MCNC: 189 MG/DL — HIGH (ref 70–99)
GLUCOSE BLDC GLUCOMTR-MCNC: 206 MG/DL — HIGH (ref 70–99)
GLUCOSE BLDC GLUCOMTR-MCNC: 221 MG/DL — HIGH (ref 70–99)
GLUCOSE SERPL-MCNC: 169 MG/DL — HIGH (ref 70–99)
GLUCOSE UR QL: NEGATIVE — SIGNIFICANT CHANGE UP
HCT VFR BLD CALC: 21.2 % — LOW (ref 39–50)
HCT VFR BLD CALC: 23.4 % — LOW (ref 39–50)
HGB BLD-MCNC: 7.2 G/DL — LOW (ref 13–17)
HGB BLD-MCNC: 7.7 G/DL — LOW (ref 13–17)
HYALINE CASTS # UR AUTO: 0 /LPF — SIGNIFICANT CHANGE UP (ref 0–2)
KETONES UR-MCNC: NEGATIVE — SIGNIFICANT CHANGE UP
LEUKOCYTE ESTERASE UR-ACNC: ABNORMAL
MCHC RBC-ENTMCNC: 27.1 PG — SIGNIFICANT CHANGE UP (ref 27–34)
MCHC RBC-ENTMCNC: 27.7 PG — SIGNIFICANT CHANGE UP (ref 27–34)
MCHC RBC-ENTMCNC: 33.1 GM/DL — SIGNIFICANT CHANGE UP (ref 32–36)
MCHC RBC-ENTMCNC: 33.9 GM/DL — SIGNIFICANT CHANGE UP (ref 32–36)
MCV RBC AUTO: 81.7 FL — SIGNIFICANT CHANGE UP (ref 80–100)
MCV RBC AUTO: 81.9 FL — SIGNIFICANT CHANGE UP (ref 80–100)
NITRITE UR-MCNC: NEGATIVE — SIGNIFICANT CHANGE UP
PH UR: 6.5 — SIGNIFICANT CHANGE UP (ref 5–8)
PLATELET # BLD AUTO: 200 K/UL — SIGNIFICANT CHANGE UP (ref 150–400)
PLATELET # BLD AUTO: 203 K/UL — SIGNIFICANT CHANGE UP (ref 150–400)
POTASSIUM SERPL-MCNC: 5 MMOL/L — SIGNIFICANT CHANGE UP (ref 3.5–5.3)
POTASSIUM SERPL-SCNC: 5 MMOL/L — SIGNIFICANT CHANGE UP (ref 3.5–5.3)
PROT UR-MCNC: ABNORMAL
RBC # BLD: 2.58 M/UL — LOW (ref 4.2–5.8)
RBC # BLD: 2.86 M/UL — LOW (ref 4.2–5.8)
RBC # FLD: 13.9 % — SIGNIFICANT CHANGE UP (ref 10.3–14.5)
RBC # FLD: 13.9 % — SIGNIFICANT CHANGE UP (ref 10.3–14.5)
RBC CASTS # UR COMP ASSIST: 5759 /HPF — HIGH (ref 0–4)
SODIUM SERPL-SCNC: 128 MMOL/L — LOW (ref 135–145)
SP GR SPEC: 1.01 — LOW (ref 1.01–1.02)
UROBILINOGEN FLD QL: NEGATIVE — SIGNIFICANT CHANGE UP
WBC # BLD: 7.5 K/UL — SIGNIFICANT CHANGE UP (ref 3.8–10.5)
WBC # BLD: 8.3 K/UL — SIGNIFICANT CHANGE UP (ref 3.8–10.5)
WBC # FLD AUTO: 7.5 K/UL — SIGNIFICANT CHANGE UP (ref 3.8–10.5)
WBC # FLD AUTO: 8.3 K/UL — SIGNIFICANT CHANGE UP (ref 3.8–10.5)
WBC UR QL: 13 /HPF — HIGH (ref 0–5)

## 2018-11-06 PROCEDURE — 99233 SBSQ HOSP IP/OBS HIGH 50: CPT | Mod: GC

## 2018-11-06 PROCEDURE — 99223 1ST HOSP IP/OBS HIGH 75: CPT | Mod: GC

## 2018-11-06 PROCEDURE — 99233 SBSQ HOSP IP/OBS HIGH 50: CPT | Mod: 24

## 2018-11-06 RX ORDER — DEXTROSE 50 % IN WATER 50 %
25 SYRINGE (ML) INTRAVENOUS ONCE
Qty: 0 | Refills: 0 | Status: DISCONTINUED | OUTPATIENT
Start: 2018-11-06 | End: 2018-11-07

## 2018-11-06 RX ORDER — SODIUM CHLORIDE 9 MG/ML
1000 INJECTION, SOLUTION INTRAVENOUS
Qty: 0 | Refills: 0 | Status: DISCONTINUED | OUTPATIENT
Start: 2018-11-06 | End: 2018-11-07

## 2018-11-06 RX ORDER — MORPHINE SULFATE 50 MG/1
4 CAPSULE, EXTENDED RELEASE ORAL ONCE
Qty: 0 | Refills: 0 | Status: DISCONTINUED | OUTPATIENT
Start: 2018-11-06 | End: 2018-11-06

## 2018-11-06 RX ORDER — MORPHINE SULFATE 50 MG/1
2 CAPSULE, EXTENDED RELEASE ORAL EVERY 6 HOURS
Qty: 0 | Refills: 0 | Status: DISCONTINUED | OUTPATIENT
Start: 2018-11-06 | End: 2018-11-07

## 2018-11-06 RX ORDER — CEFAZOLIN SODIUM 1 G
1000 VIAL (EA) INJECTION ONCE
Qty: 0 | Refills: 0 | Status: COMPLETED | OUTPATIENT
Start: 2018-11-06 | End: 2018-11-06

## 2018-11-06 RX ORDER — INSULIN LISPRO 100/ML
VIAL (ML) SUBCUTANEOUS
Qty: 0 | Refills: 0 | Status: DISCONTINUED | OUTPATIENT
Start: 2018-11-06 | End: 2018-11-07

## 2018-11-06 RX ORDER — DEXTROSE 50 % IN WATER 50 %
12.5 SYRINGE (ML) INTRAVENOUS ONCE
Qty: 0 | Refills: 0 | Status: DISCONTINUED | OUTPATIENT
Start: 2018-11-06 | End: 2018-11-07

## 2018-11-06 RX ORDER — CEFAZOLIN SODIUM 1 G
1000 VIAL (EA) INJECTION EVERY 12 HOURS
Qty: 0 | Refills: 0 | Status: DISCONTINUED | OUTPATIENT
Start: 2018-11-06 | End: 2018-11-06

## 2018-11-06 RX ORDER — GLUCAGON INJECTION, SOLUTION 0.5 MG/.1ML
1 INJECTION, SOLUTION SUBCUTANEOUS ONCE
Qty: 0 | Refills: 0 | Status: DISCONTINUED | OUTPATIENT
Start: 2018-11-06 | End: 2018-11-07

## 2018-11-06 RX ORDER — DEXTROSE 50 % IN WATER 50 %
15 SYRINGE (ML) INTRAVENOUS ONCE
Qty: 0 | Refills: 0 | Status: DISCONTINUED | OUTPATIENT
Start: 2018-11-06 | End: 2018-11-07

## 2018-11-06 RX ORDER — CEFAZOLIN SODIUM 1 G
VIAL (EA) INJECTION
Qty: 0 | Refills: 0 | Status: DISCONTINUED | OUTPATIENT
Start: 2018-11-06 | End: 2018-11-06

## 2018-11-06 RX ADMIN — Medication 100 MILLIGRAM(S): at 21:32

## 2018-11-06 RX ADMIN — Medication 100 MILLIGRAM(S): at 13:37

## 2018-11-06 RX ADMIN — MORPHINE SULFATE 2 MILLIGRAM(S): 50 CAPSULE, EXTENDED RELEASE ORAL at 13:36

## 2018-11-06 RX ADMIN — POLYETHYLENE GLYCOL 3350 17 GRAM(S): 17 POWDER, FOR SOLUTION ORAL at 01:17

## 2018-11-06 RX ADMIN — HEPARIN SODIUM 5000 UNIT(S): 5000 INJECTION INTRAVENOUS; SUBCUTANEOUS at 17:59

## 2018-11-06 RX ADMIN — Medication 1 TABLET(S): at 22:40

## 2018-11-06 RX ADMIN — Medication 2: at 09:38

## 2018-11-06 RX ADMIN — MORPHINE SULFATE 2 MILLIGRAM(S): 50 CAPSULE, EXTENDED RELEASE ORAL at 07:43

## 2018-11-06 RX ADMIN — Medication 100 MILLIGRAM(S): at 17:59

## 2018-11-06 RX ADMIN — LISINOPRIL 10 MILLIGRAM(S): 2.5 TABLET ORAL at 01:15

## 2018-11-06 RX ADMIN — POLYETHYLENE GLYCOL 3350 17 GRAM(S): 17 POWDER, FOR SOLUTION ORAL at 12:28

## 2018-11-06 RX ADMIN — SENNA PLUS 2 TABLET(S): 8.6 TABLET ORAL at 01:16

## 2018-11-06 RX ADMIN — Medication 4: at 22:20

## 2018-11-06 RX ADMIN — MORPHINE SULFATE 2 MILLIGRAM(S): 50 CAPSULE, EXTENDED RELEASE ORAL at 14:05

## 2018-11-06 RX ADMIN — Medication 50 MILLILITER(S): at 01:22

## 2018-11-06 RX ADMIN — HEPARIN SODIUM 5000 UNIT(S): 5000 INJECTION INTRAVENOUS; SUBCUTANEOUS at 08:06

## 2018-11-06 RX ADMIN — Medication 1 TABLET(S): at 21:40

## 2018-11-06 RX ADMIN — Medication 50 MILLIGRAM(S): at 01:41

## 2018-11-06 RX ADMIN — MORPHINE SULFATE 2 MILLIGRAM(S): 50 CAPSULE, EXTENDED RELEASE ORAL at 08:15

## 2018-11-06 RX ADMIN — MORPHINE SULFATE 4 MILLIGRAM(S): 50 CAPSULE, EXTENDED RELEASE ORAL at 02:06

## 2018-11-06 RX ADMIN — HEPARIN SODIUM 5000 UNIT(S): 5000 INJECTION INTRAVENOUS; SUBCUTANEOUS at 01:19

## 2018-11-06 RX ADMIN — INSULIN HUMAN 10 UNIT(S): 100 INJECTION, SOLUTION SUBCUTANEOUS at 01:33

## 2018-11-06 RX ADMIN — Medication 2: at 15:10

## 2018-11-06 RX ADMIN — Medication 100 MILLIGRAM(S): at 01:22

## 2018-11-06 RX ADMIN — SIMVASTATIN 10 MILLIGRAM(S): 20 TABLET, FILM COATED ORAL at 01:49

## 2018-11-06 RX ADMIN — SIMVASTATIN 10 MILLIGRAM(S): 20 TABLET, FILM COATED ORAL at 21:32

## 2018-11-06 RX ADMIN — Medication 100 MILLIGRAM(S): at 01:15

## 2018-11-06 RX ADMIN — SENNA PLUS 2 TABLET(S): 8.6 TABLET ORAL at 21:32

## 2018-11-06 NOTE — PROGRESS NOTE ADULT - ASSESSMENT
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 Patient also with hyponatremia/hyperkalemia    - AM labs  - Replete electrolytes prn  - Diet  - Ancef  - med/onc consult  - CBI, titrate to clear  - Manually irrigate prn clots  - Repeat ultrasound in 2 days

## 2018-11-06 NOTE — CONSULT NOTE ADULT - ASSESSMENT
84yo M with a history of prostate cancer s/p radiation seed placement, TURP c/b prostatic abscess s/p drainage and penile stricture s/p suprapubic catheter, HTN, Type 2 DM, Afib not on AC, bioprosthetic AVR admitted with hematuria. Medicine service consulted for hyponatremia.

## 2018-11-06 NOTE — PROGRESS NOTE ADULT - SUBJECTIVE AND OBJECTIVE BOX
Urology PA Progress Note    Subjective:  Patient seen and examined at bedside. Overnight, patient manually irrigated overnight several times for clogged barbour. CBI currently running. Denies nausea or vomiting    Objective:  Vital signs  T(F): , Max: 98.4 (18 @ 01:51)  HR: 90 (18 @ 03:45)  BP: 102/55 (18 @ 03:45)  SpO2: 98% (18 @ 03:45)  Wt(kg): --    Output      @ 07:01  -   @ 06:51  --------------------------------------------------------  IN: 400 mL / OUT: 0 mL / NET: 400 mL        Physical Exam:  Gen: NAD. AAOx3  Pulm: nonlabored  Abd: soft, NT/ND. SPT in place, CBI running.   : Barbour in place with red clear output    Labs:  11  11.6  / 27.6  /2.60                           9.3    11.6  )-----------( 242      ( 2018 20:52 )             27.6     11    126<L>  |  91<L>  |  31<H>  ----------------------------<  206<H>  5.7<H>   |  20<L>  |  2.60<H>    Ca    9.1      2018 20:52    TPro  8.1  /  Alb  3.7  /  TBili  0.4  /  DBili  x   /  AST  12  /  ALT  7<L>  /  AlkPhos  63  11    PT/INR - ( 2018 20:52 )   PT: 12.9 sec;   INR: 1.13 ratio         PTT - ( 2018 20:52 )  PTT:29.2 sec  Urinalysis Basic - ( 2018 23:52 )    Color: DARK BROWN / Appearance: Turbid / S.006 / pH: x  Gluc: x / Ketone: Negative  / Bili: Negative / Urobili: Negative   Blood: x / Protein: 30 mg/dL / Nitrite: Negative   Leuk Esterase: Small / RBC: 5759 /hpf / WBC 13 /hpf   Sq Epi: x / Non Sq Epi: 0 /hpf / Bacteria: Negative Urology PA Progress Note    Subjective:  Patient seen and examined at bedside. Overnight, patient manually irrigated overnight several times for clogged barbour. CBI currently running. Denies nausea or vomiting    Objective:  Vital signs  T(F): , Max: 98.4 (18 @ 01:51)  HR: 90 (18 @ 03:45)  BP: 102/55 (18 @ 03:45)  SpO2: 98% (18 @ 03:45)  Wt(kg): --    Output      @ 07:01  -   @ 06:51  --------------------------------------------------------  IN: 400 mL / OUT: 0 mL / NET: 400 mL        Physical Exam:  Gen: NAD. AAOx3  Pulm: nonlabored  Abd: soft, NT/ND. SPT in place, 3-way CBI running.       Labs:  11  11.6  / 27.6  /2.60                           9.3    11.6  )-----------( 242      ( 2018 20:52 )             27.6         126<L>  |  91<L>  |  31<H>  ----------------------------<  206<H>  5.7<H>   |  20<L>  |  2.60<H>    Ca    9.1      2018 20:52    TPro  8.1  /  Alb  3.7  /  TBili  0.4  /  DBili  x   /  AST  12  /  ALT  7<L>  /  AlkPhos  63  11-    PT/INR - ( 2018 20:52 )   PT: 12.9 sec;   INR: 1.13 ratio         PTT - ( 2018 20:52 )  PTT:29.2 sec  Urinalysis Basic - ( 2018 23:52 )    Color: DARK BROWN / Appearance: Turbid / S.006 / pH: x  Gluc: x / Ketone: Negative  / Bili: Negative / Urobili: Negative   Blood: x / Protein: 30 mg/dL / Nitrite: Negative   Leuk Esterase: Small / RBC: 5759 /hpf / WBC 13 /hpf   Sq Epi: x / Non Sq Epi: 0 /hpf / Bacteria: Negative

## 2018-11-06 NOTE — CONSULT NOTE ADULT - PROBLEM SELECTOR RECOMMENDATION 6
CHADSVASC score 4  - Pt currently not on anticoagulation due to ongoing hematuria  - Now in NSR  - Continue to monitor

## 2018-11-06 NOTE — CONSULT NOTE ADULT - SUBJECTIVE AND OBJECTIVE BOX
83M with PMH of Afib (previously on Apixaban), bioprosthetic AVR, Anal fissures / ulcers, Prostate Cancer (s/p brachytherapy ~), followed by urology for prostate strictures and urinary retention (s/p BNC dilation and TURP/BNC 2018), recent pelvic abscess (s/p abx and pelvic drain), chronic barbour converted to suprapubic catheter 18 initially presented  with worsening penile pain, persistent hematuria. He was recently admitted 10/24-28 for gross hematuria that resolved after holding apixaban. During that admission he was complaining of penile pain and underwent biopsies of a lesion near the prostate and penile nodule. Both pathological specimens were most consistent with urothelial carcinoma. Disease was considered unresectable at time of evaluation and he was referred to see Dr Oquendo at [facility] for initial office visit on . Patient was admitted for pain control and management of gross hematuria. He is currently undergoing CBI. He complains of intermittent penile pain and continued blood present from his suprapubic catheter.      PAST MEDICAL & SURGICAL HISTORY:  Benign prostatic hyperplasia with lower urinary tract symptoms, symptom details unspecified  Prostate cancer  Hypertension  HLD (hyperlipidemia)  Type 2 diabetes mellitus  Paroxysmal A-fib  S/P AVR (aortic valve replacement)  S/P TURP      Review of Systems:  Constitutional: [ ] Fevers [ ] Weight changes  HEENT: [ ] Visual Disturbances [ ] Nasal congestion  CV: [ ] Chest pain [ ] Palpitations  Resp:  [ ] Cough [ ] Shortness of breath  GI:  [ ] Nausea [ ] Vomiting [ ] Diarrhea [ ] Constipation [ ] Abd pain  : [ ] Dysuria [x] Hematuria  Musculoskeletal: [x] Pain [ ] Weakness  Skin: [ ] Rash [ ] Itch  Neurological:  [ ] Headache [ ] Dizziness [ ] Numbness  Psychiatric: [ ] Anxiety [ ] Depression  Hematologic/Lymphatic: [x] Bleeding [ ] Enlarged Lymph Nodes  Allergic/Immunologic: [ ] Nasal discharge [ ] Hives  * Note all systems were reviewed as above; those not marked with an "x" are negative    MEDICATIONS  (STANDING):  aspirin enteric coated 81 milliGRAM(s) Oral every other day  dextrose 5%. 1000 milliLiter(s) (50 mL/Hr) IV Continuous <Continuous>  dextrose 50% Injectable 12.5 Gram(s) IV Push once  dextrose 50% Injectable 25 Gram(s) IV Push once  dextrose 50% Injectable 25 Gram(s) IV Push once  docusate sodium 100 milliGRAM(s) Oral three times a day  heparin  Injectable 5000 Unit(s) SubCutaneous every 8 hours  insulin lispro (HumaLOG) corrective regimen sliding scale   SubCutaneous three times a day before meals  metoprolol succinate ER 50 milliGRAM(s) Oral daily  polyethylene glycol 3350 17 Gram(s) Oral daily  senna 2 Tablet(s) Oral at bedtime  simvastatin 10 milliGRAM(s) Oral at bedtime    MEDICATIONS  (PRN):  acetaminophen   Tablet .. 650 milliGRAM(s) Oral every 6 hours PRN Mild Pain (1 - 3)  acetaminophen 300 mG/codeine 30 mG 1 Tablet(s) Oral every 4 hours PRN Moderate Pain (4 - 6)  acetaminophen 300 mG/codeine 30 mG 2 Tablet(s) Oral every 4 hours PRN Severe Pain (7 - 10)  dextrose 40% Gel 15 Gram(s) Oral once PRN Blood Glucose LESS THAN 70 milliGRAM(s)/deciliter  glucagon  Injectable 1 milliGRAM(s) IntraMuscular once PRN Glucose LESS THAN 70 milligrams/deciliter  morphine  - Injectable 2 milliGRAM(s) IV Push every 6 hours PRN Severe Pain (7 - 10)      Allergies  oxycodone (Other)    FAMILY HISTORY:  No pertinent family history in first degree relatives    Vital Signs Last 24 Hrs  T(C): 36.8 (2018 17:05), Max: 37.2 (2018 13:37)  T(F): 98.2 (2018 17:05), Max: 98.9 (2018 13:37)  HR: 79 (2018 17:05) (76 - 105)  BP: 107/49 (2018 17:05) (102/55 - 180/65)  BP(mean): --  RR: 16 (2018 17:05) (16 - 18)  SpO2: 97% (2018 17:05) (97% - 99%)    PHYSICAL EXAM:  Constitutional: well developed, in no acute distress  Eyes: Anicteric.   ENT: Oropharynx is unremarkable, no petechiae or masses  Neck: No anterior neck masses appreciated.   Pulmonary: Clear to auscultation bilaterally  Cardiac: RRR, no murmurs  Abdomen: Normoactive bowel sounds, soft and nontender, +suprapubic cath present with hematuria draining  Lymphatic: No cervical, supraclavicular or axillary lymphadenopathy appreciated  Skin: normal appearance, no significant bruising or petechiae  Neurology: Grossly intact, AAOx3    LABS:                        7.2    7.5   )-----------( 203      ( 2018 15:05 )             21.2     11-    128<L>  |  95<L>  |  31<H>  ----------------------------<  169<H>  5.0   |  20<L>  |  2.52<H>    Ca    8.3<L>      2018 06:49    TPro  8.1  /  Alb  3.7  /  TBili  0.4  /  DBili  x   /  AST  12  /  ALT  7<L>  /  AlkPhos  63  11-05    PT/INR - ( 2018 20:52 )   PT: 12.9 sec;   INR: 1.13 ratio         PTT - ( 2018 20:52 )  PTT:29.2 sec  Urinalysis Basic - ( 2018 23:52 )    Color: DARK BROWN / Appearance: Turbid / S.006 / pH: x  Gluc: x / Ketone: Negative  / Bili: Negative / Urobili: Negative   Blood: x / Protein: 30 mg/dL / Nitrite: Negative   Leuk Esterase: Small / RBC: 5759 /hpf / WBC 13 /hpf   Sq Epi: x / Non Sq Epi: 0 /hpf / Bacteria: Negative      RADIOLOGIC Studies:  Reviewed  CT A/P showed moderate bilateral hydroureteronephrosis, bilateral perinephric stranding and inflammatory changes surrounding the urinary bladder, heterogeneous hyperdensity within the urinary bladder lumen, few left upper and lower lobe pulmonary nodules measuring up to 5 mm.

## 2018-11-06 NOTE — CONSULT NOTE ADULT - ASSESSMENT
83M with PMH of Afib (previously on Apixaban), bioprosthetic AVR, Anal fissures / ulcers, Prostate Cancer (s/p brachytherapy ~2006), followed by urology for prostate strictures and urinary retention (s/p BNC dilation and TURP/BNC 6/26/2018), recent pelvic abscess (s/p abx and IR Tx), chronic barbour converted to suprapubic catheter 9/19/18 initially presented 11/5 with worsening penile pain, persistent hematuria. He was recently admitted 10/24-28 for gross hematuria that resolved after holding apixaban. During that admission he was complaining of penile pain and underwent biopsies of the prostate and a separate penile nodule that was most consistent with urothelial carcinoma vs squamous cell carcinoma. He is currently undergoing CBI.     Problem 1: Newly diagnosed Urothelial Cancer  - Gross hematuria likely related to a primary bladder mass in setting of biopsy proven urothelial carcinoma locally spread to the penis  - CT A/P: heterogeneous hyperdensity within the urinary bladder lumen. Few left upper and lower lobe pulmonary nodules measuring up to 5 mm, not commented on in prior CT scans of chest (8/2018).   - Pelvic MRI from August 2018 showed 2 rico-prostatic masses (retropubic mass 3.3 cm and midline posterior mass 3.2 cm)  - Pelvic MRI performed as an outpatient on 10/23 showed multiple areas of the penis with ring-like enhancement, marked thickening of the posterior aspect of the urinary bladder, 1 cm external iliac lymph node.  - Biopsies of a penile nodule and prostate were most consistent with urothelial cancer based on morphology, however squamous cell carcinoma was part of differential  - Following resolution of hematuria, further characterization of bladder luminal lesion may be a consideration, given b/l hydroureteronephrosis, will defer to urology.  - At this time a PET CT is not warranted and will not change current management  - Patient already scheduled for follow-up with Dr Misbah Oquendo at [facility] to discuss treatment options as an outpatient on 11/13.    Itz Braxton/Onc Fellow PGY4  [phone]

## 2018-11-06 NOTE — CONSULT NOTE ADULT - SUBJECTIVE AND OBJECTIVE BOX
Patient is a 83y old  Male who presents with a chief complaint of gross hematuria (2018 06:50)      HPI: Pt is an 84yo M with a history of prostate cancer s/p radiation seed placement, TURP c/b prostatic abscess s/p drainage and penile stricture s/p suprapubic catheter, HTN, Type 2 DM, Afib not on AC, bioprosthetic AVR admitted with hematuria. Pt was recently hospitalized from 10/24-10/28 for hematuria, at which time Eliquis was d/alla. Pt had a biopsy of a seminal vesicle lesion during that admission which was biopsied and returned positive for urothelial versus squamous cell carcinoma. Pt had an appt on [facility] on . Pt states on the day of admission, he developed severe penile pain and hematuria and was brought into the ER for further management.    Medicine service consulted for hyponatremia.    Pt states that he is feeling well, however he had an eventful night where his suprapubic catheter became clogged from blood clots and had to be changed several times. He denies any pain currently. Suprapubic catheter appears to be draining well.    PMH: Afib not on AC (previously on Eliquis), Type 2 DM, bioprosthetic AVR, prostate cancer, HTN, urothelial versus SCC    Family history: Not pertinent    Social history: Lives at home with his wife. Ambulates independently. Former smoker, quit 33 years ago and smoked for approximately 20 years prior to quitting. Occasional alcohol use. No recent travel.    MEDICATIONS (STANDING):  aspirin enteric coated 81 milliGRAM(s) Oral every other day  ceFAZolin   IVPB      ceFAZolin   IVPB 1000 milliGRAM(s) IV Intermittent every 12 hours  dextrose 5%. 1000 milliLiter(s) IV Continuous <Continuous>  dextrose 50% Injectable 12.5 Gram(s) IV Push once  dextrose 50% Injectable 25 Gram(s) IV Push once  dextrose 50% Injectable 25 Gram(s) IV Push once  docusate sodium 100 milliGRAM(s) Oral three times a day  heparin  Injectable 5000 Unit(s) SubCutaneous every 8 hours  insulin lispro (HumaLOG) corrective regimen sliding scale   SubCutaneous three times a day before meals  lisinopril 10 milliGRAM(s) Oral daily  metoprolol succinate ER 50 milliGRAM(s) Oral daily  polyethylene glycol 3350 17 Gram(s) Oral daily  senna 2 Tablet(s) Oral at bedtime  simvastatin 10 milliGRAM(s) Oral at bedtime  sodium chloride 0.9%. 1000 milliLiter(s) IV Continuous <Continuous>    MEDICATIONS  (PRN):  acetaminophen   Tablet .. 650 milliGRAM(s) Oral every 6 hours PRN  acetaminophen 300 mG/codeine 30 mG 1 Tablet(s) Oral every 4 hours PRN  acetaminophen 300 mG/codeine 30 mG 2 Tablet(s) Oral every 4 hours PRN  dextrose 40% Gel 15 Gram(s) Oral once PRN  glucagon  Injectable 1 milliGRAM(s) IntraMuscular once PRN  morphine  - Injectable 2 milliGRAM(s) IV Push every 6 hours PRN      REVIEW OF SYSTEMS:  CONSTITUTIONAL: No fever, weight loss, or fatigue  EYES: No eye pain, visual disturbances, or discharge  ENMT:  No difficulty hearing, tinnitus, vertigo; No sinus or throat pain  NECK: No pain or stiffness  RESPIRATORY: No cough, wheezing, chills or hemoptysis; No shortness of breath  CARDIOVASCULAR: No chest pain, palpitations, dizziness, or leg swelling  GASTROINTESTINAL: No abdominal or epigastric pain. No nausea, vomiting, or hematemesis; No diarrhea or constipation. No melena or hematochezia.  GENITOURINARY: Hematuria  NEUROLOGICAL: No headaches, memory loss, loss of strength, numbness, or tremors  SKIN: No itching, burning, rashes, or lesions   MUSCULOSKELETAL: No joint pain or swelling; No muscle, back, or extremity pain    T(F): 98.3 (18 @ 03:45), Max: 98.4 (18 @ 01:51)  HR: 90 (18 @ 03:45) (90 - 105)  BP: 102/55 (18 @ 03:45) (102/55 - 180/65)  RR: 17 (18 @ 03:45) (17 - 18)  SpO2: 98% (18 @ 03:45) (97% - 99%)  Wt(kg): --  CAPILLARY BLOOD GLUCOSE      POCT Blood Glucose.: 189 mg/dL (2018 09:30)  POCT Blood Glucose.: 221 mg/dL (2018 00:43)    I&O's Summary    2018 07:01  -  2018 07:00  --------------------------------------------------------  IN: 400 mL / OUT: 0 mL / NET: 400 mL        PHYSICAL EXAM:  GENERAL: NAD, well-groomed, well-developed  HEAD:  Atraumatic, Normocephalic  EYES: EOMI, PERRLA, conjunctiva and sclera clear  ENMT: No tonsillar erythema, exudates, or enlargement; Moist mucous membranes  NECK: Supple, No JVD  NERVOUS SYSTEM:  Alert & Oriented X3, Good concentration; Motor Strength 5/5 B/L upper and lower extremities  CHEST/LUNG: Clear to percussion bilaterally; No rales, rhonchi, wheezing, or rubs  HEART: Regular rate and rhythm; +systolic murmur, no rubs or gallops  ABDOMEN: Soft, Nontender, Nondistended; Bowel sounds present  : Suprapubic catheter in place, minimal bleeding at site, gross hematuria noted  EXTREMITIES:  2+ Peripheral Pulses, No clubbing, cyanosis, or edema  LYMPH: No lymphadenopathy noted  SKIN: No rashes or lesions    LABS:                        7.7    8.3   )-----------( 200      ( 2018 06:49 )             23.4     11-06    128<L>  |  95<L>  |  31<H>  ----------------------------<  169<H>  5.0   |  20<L>  |  2.52<H>    Ca    8.3<L>      2018 06:49    TPro  8.1  /  Alb  3.7  /  TBili  0.4  /  DBili  x   /  AST  12  /  ALT  7<L>  /  AlkPhos  63  11-05    PT/INR - ( 2018 20:52 )   PT: 12.9 sec;   INR: 1.13 ratio         PTT - ( 2018 20:52 )  PTT:29.2 sec  Urinalysis Basic - ( 2018 23:52 )    Color: DARK BROWN / Appearance: Turbid / S.006 / pH: x  Gluc: x / Ketone: Negative  / Bili: Negative / Urobili: Negative   Blood: x / Protein: 30 mg/dL / Nitrite: Negative   Leuk Esterase: Small / RBC: 5759 /hpf / WBC 13 /hpf   Sq Epi: x / Non Sq Epi: 0 /hpf / Bacteria: Negative          RADIOLOGY & ADDITIONAL TESTS:     < from: CT Abdomen and Pelvis No Cont (18 @ 22:49) >    EXAM:  CT ABDOMEN AND PELVIS                            PROCEDURE DATE:  2018            INTERPRETATION:  CLINICAL INFORMATION: Pelvic pain. History of prostate   cancer. Groin pain. Evaluate for abscess.    COMPARISON: CT abdomen pelvis 2018. CT pelvis 10/24/2018. MRI pelvis   10/23/2018.    PROCEDURE:   CT of the Abdomen and Pelvis was performed without intravenous contrast.   Intravenous contrast: None.  Oral contrast: None.  Sagittal and coronal reformats were performed.    FINDINGS:    LOWER CHEST: Status post median sternotomy and aortic valve replacement.    Mitral annular calcifications. Coronary arterial calcifications.   Calcified granulomas in the left lower lobe. Few scattered left upper and   lower lobe pulmonary nodules largest measuring 5 mm within the lingula   (series 2, image 3).    Evaluation of vascular structures and abdominal organs is limited without   the administration of intravenous contrast.    LIVER: Hepatic steatosis.  BILE DUCTS: Normal caliber.  GALLBLADDER: Within normal limits.  SPLEEN: Enlarged measuring up to 15.1 cm.  PANCREAS: Within normal limits.  ADRENALS: Within normal limits.  KIDNEYS/URETERS: Moderate bilateral hydroureteronephrosis to the level of   the urinary bladder and bilateral perinephric fat stranding. 1.6 cm right   interpole renal cyst. No nephrolithiasis.    BLADDER: Suprapubic catheter with balloon inflated within the lumen of   the bladder. Urinary bladder contains fluid as well as heterogeneously   hyperdense material and air.  REPRODUCTIVE ORGANS: Difficult to delineate fluid collection at the base   of the penile shaft measuring approximately 3.6 x 2.5 cm, without the   administration of intravenous contrast material. Prostatic radiation   seeds. The prostate is enlarged.    BOWEL: No bowel obstruction. Mild sigmoid diverticulosis. Appendix is   within normal limits.  PERITONEUM: No ascites.  VESSELS:  Atherosclerosis of the abdominal aorta and its branch vessels.  RETROPERITONEUM: No lymphadenopathy.  ABDOMINAL WALL: Fat-containing bilateral inguinal hernias, left greater   than right. Small fat-containing umbilical hernia.  BONES: Multilevel degenerative changes of the spine.    IMPRESSION:     Relatively stable appearing fluid collection in the base of the penile   shaft, which is not well evaluated on this noncontrast examination and   more clearly defined on recent MRI pelvis from 10/23/2018.     Moderate bilateral hydroureteronephrosis to the level of the urinary   bladder as well as bilateral perinephric stranding and inflammatory   change surrounding the urinary bladder. Correlate with urinalysis for   evidence of underlying infection.    Heterogeneous hyperdensity within the urinary bladder lumen, may   represent blood products, however underlying lesion cannot be excluded.    Few left upper and lower lobe pulmonary nodules measuring up to 5 mm.        LEIGH ANN ALFORD M.D., RADIOLOGY RESIDENT  This document has been electronically signed.  MARCOS BLUNT M.D., ATTENDING RADIOLOGIST  This document has been electronically signed. 2018 12:46AM        [facility]  Internal Medicine PGY-3  Spectra [id number]

## 2018-11-06 NOTE — CONSULT NOTE ADULT - PROBLEM SELECTOR RECOMMENDATION 3
Creatinine of 2.58 this morning; baseline from previous admission appears to be between 1.8-2.0  - BUN:creatinine ratio of 12; likely post-obstructive in the setting of recurrent clotting of suprapubic catheter, also with b/l hydroureteronephrosis seen on CT abdomen/pelvis  - Recommend holding lisinopril for now  - Monitor creatinine daily  - UA appears grossly negative for infection, however pt with b/l perinephric fat stranding seen on CTAP  - C/w Ancef  - C/w CBI, suprapubic catheter per urology team Creatinine of 2.58 this morning; baseline from previous admission appears to be between 1.8-2.0  - BUN:creatinine ratio of 12; likely post-obstructive in the setting of recurrent clotting of suprapubic catheter, also with b/l hydroureteronephrosis seen on CT abdomen/pelvis  - Minimal improvement s/p IVF, which points away from a pre-renal etiology for TRENTON  - Recommend holding lisinopril for now  - Monitor creatinine daily  - UA appears grossly negative for infection, however pt with b/l perinephric fat stranding seen on CTAP  - C/w Ancef  - C/w CBI, suprapubic catheter per urology team

## 2018-11-06 NOTE — CONSULT NOTE ADULT - PROBLEM SELECTOR RECOMMENDATION 9
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

## 2018-11-07 ENCOUNTER — RESULT REVIEW (OUTPATIENT)
Age: 83
End: 2018-11-07

## 2018-11-07 ENCOUNTER — APPOINTMENT (OUTPATIENT)
Dept: UROLOGY | Facility: CLINIC | Age: 83
End: 2018-11-07

## 2018-11-07 LAB
ANION GAP SERPL CALC-SCNC: 10 MMOL/L — SIGNIFICANT CHANGE UP (ref 5–17)
BUN SERPL-MCNC: 28 MG/DL — HIGH (ref 7–23)
CALCIUM SERPL-MCNC: 9.1 MG/DL — SIGNIFICANT CHANGE UP (ref 8.4–10.5)
CHLORIDE SERPL-SCNC: 101 MMOL/L — SIGNIFICANT CHANGE UP (ref 96–108)
CO2 SERPL-SCNC: 24 MMOL/L — SIGNIFICANT CHANGE UP (ref 22–31)
CREAT SERPL-MCNC: 2.43 MG/DL — HIGH (ref 0.5–1.3)
GLUCOSE BLDC GLUCOMTR-MCNC: 132 MG/DL — HIGH (ref 70–99)
GLUCOSE BLDC GLUCOMTR-MCNC: 154 MG/DL — HIGH (ref 70–99)
GLUCOSE BLDC GLUCOMTR-MCNC: 187 MG/DL — HIGH (ref 70–99)
GLUCOSE BLDC GLUCOMTR-MCNC: 298 MG/DL — HIGH (ref 70–99)
GLUCOSE SERPL-MCNC: 148 MG/DL — HIGH (ref 70–99)
HCT VFR BLD CALC: 25 % — LOW (ref 39–50)
HGB BLD-MCNC: 8.5 G/DL — LOW (ref 13–17)
MCHC RBC-ENTMCNC: 28.2 PG — SIGNIFICANT CHANGE UP (ref 27–34)
MCHC RBC-ENTMCNC: 34 GM/DL — SIGNIFICANT CHANGE UP (ref 32–36)
MCV RBC AUTO: 82.9 FL — SIGNIFICANT CHANGE UP (ref 80–100)
PLATELET # BLD AUTO: 193 K/UL — SIGNIFICANT CHANGE UP (ref 150–400)
POTASSIUM SERPL-MCNC: 5 MMOL/L — SIGNIFICANT CHANGE UP (ref 3.5–5.3)
POTASSIUM SERPL-SCNC: 5 MMOL/L — SIGNIFICANT CHANGE UP (ref 3.5–5.3)
RBC # BLD: 3.02 M/UL — LOW (ref 4.2–5.8)
RBC # FLD: 13.3 % — SIGNIFICANT CHANGE UP (ref 10.3–14.5)
SODIUM SERPL-SCNC: 135 MMOL/L — SIGNIFICANT CHANGE UP (ref 135–145)
WBC # BLD: 5.9 K/UL — SIGNIFICANT CHANGE UP (ref 3.8–10.5)
WBC # FLD AUTO: 5.9 K/UL — SIGNIFICANT CHANGE UP (ref 3.8–10.5)

## 2018-11-07 PROCEDURE — 51610 INJECTION FOR BLADDER X-RAY: CPT | Mod: 59,78

## 2018-11-07 PROCEDURE — 52001 CYSTO W/IRRG&EVAC MLT CLOTS: CPT | Mod: 59,78

## 2018-11-07 PROCEDURE — 88342 IMHCHEM/IMCYTCHM 1ST ANTB: CPT | Mod: 26

## 2018-11-07 PROCEDURE — 51700 IRRIGATION OF BLADDER: CPT

## 2018-11-07 PROCEDURE — 51705 CHANGE OF BLADDER TUBE: CPT | Mod: 78

## 2018-11-07 PROCEDURE — 88341 IMHCHEM/IMCYTCHM EA ADD ANTB: CPT | Mod: 26

## 2018-11-07 PROCEDURE — 88305 TISSUE EXAM BY PATHOLOGIST: CPT | Mod: 26

## 2018-11-07 PROCEDURE — 52204 CYSTOSCOPY W/BIOPSY(S): CPT | Mod: 78

## 2018-11-07 RX ORDER — GLUCAGON INJECTION, SOLUTION 0.5 MG/.1ML
1 INJECTION, SOLUTION SUBCUTANEOUS ONCE
Qty: 0 | Refills: 0 | Status: DISCONTINUED | OUTPATIENT
Start: 2018-11-07 | End: 2018-11-12

## 2018-11-07 RX ORDER — ACETAMINOPHEN 500 MG
1000 TABLET ORAL ONCE
Qty: 0 | Refills: 0 | Status: COMPLETED | OUTPATIENT
Start: 2018-11-07 | End: 2018-11-07

## 2018-11-07 RX ORDER — DEXTROSE 50 % IN WATER 50 %
12.5 SYRINGE (ML) INTRAVENOUS ONCE
Qty: 0 | Refills: 0 | Status: DISCONTINUED | OUTPATIENT
Start: 2018-11-07 | End: 2018-11-08

## 2018-11-07 RX ORDER — ACETAMINOPHEN 500 MG
650 TABLET ORAL EVERY 6 HOURS
Qty: 0 | Refills: 0 | Status: DISCONTINUED | OUTPATIENT
Start: 2018-11-07 | End: 2018-11-12

## 2018-11-07 RX ORDER — INSULIN LISPRO 100/ML
VIAL (ML) SUBCUTANEOUS
Qty: 0 | Refills: 0 | Status: DISCONTINUED | OUTPATIENT
Start: 2018-11-07 | End: 2018-11-07

## 2018-11-07 RX ORDER — SODIUM CHLORIDE 9 MG/ML
1000 INJECTION, SOLUTION INTRAVENOUS
Qty: 0 | Refills: 0 | Status: DISCONTINUED | OUTPATIENT
Start: 2018-11-07 | End: 2018-11-12

## 2018-11-07 RX ORDER — GLUCAGON INJECTION, SOLUTION 0.5 MG/.1ML
1 INJECTION, SOLUTION SUBCUTANEOUS ONCE
Qty: 0 | Refills: 0 | Status: DISCONTINUED | OUTPATIENT
Start: 2018-11-07 | End: 2018-11-08

## 2018-11-07 RX ORDER — INSULIN LISPRO 100/ML
VIAL (ML) SUBCUTANEOUS EVERY 6 HOURS
Qty: 0 | Refills: 0 | Status: DISCONTINUED | OUTPATIENT
Start: 2018-11-07 | End: 2018-11-08

## 2018-11-07 RX ORDER — ACETAMINOPHEN WITH CODEINE 300MG-30MG
1 TABLET ORAL EVERY 4 HOURS
Qty: 0 | Refills: 0 | Status: DISCONTINUED | OUTPATIENT
Start: 2018-11-07 | End: 2018-11-08

## 2018-11-07 RX ORDER — HYDROMORPHONE HYDROCHLORIDE 2 MG/ML
0.5 INJECTION INTRAMUSCULAR; INTRAVENOUS; SUBCUTANEOUS
Qty: 0 | Refills: 0 | Status: DISCONTINUED | OUTPATIENT
Start: 2018-11-07 | End: 2018-11-07

## 2018-11-07 RX ORDER — DEXTROSE 50 % IN WATER 50 %
15 SYRINGE (ML) INTRAVENOUS ONCE
Qty: 0 | Refills: 0 | Status: DISCONTINUED | OUTPATIENT
Start: 2018-11-07 | End: 2018-11-12

## 2018-11-07 RX ORDER — SIMVASTATIN 20 MG/1
10 TABLET, FILM COATED ORAL AT BEDTIME
Qty: 0 | Refills: 0 | Status: DISCONTINUED | OUTPATIENT
Start: 2018-11-07 | End: 2018-11-12

## 2018-11-07 RX ORDER — ONDANSETRON 8 MG/1
4 TABLET, FILM COATED ORAL ONCE
Qty: 0 | Refills: 0 | Status: DISCONTINUED | OUTPATIENT
Start: 2018-11-07 | End: 2018-11-07

## 2018-11-07 RX ORDER — SODIUM CHLORIDE 9 MG/ML
1000 INJECTION, SOLUTION INTRAVENOUS
Qty: 0 | Refills: 0 | Status: DISCONTINUED | OUTPATIENT
Start: 2018-11-07 | End: 2018-11-08

## 2018-11-07 RX ORDER — POLYETHYLENE GLYCOL 3350 17 G/17G
17 POWDER, FOR SOLUTION ORAL DAILY
Qty: 0 | Refills: 0 | Status: DISCONTINUED | OUTPATIENT
Start: 2018-11-07 | End: 2018-11-12

## 2018-11-07 RX ORDER — DEXTROSE 50 % IN WATER 50 %
25 SYRINGE (ML) INTRAVENOUS ONCE
Qty: 0 | Refills: 0 | Status: DISCONTINUED | OUTPATIENT
Start: 2018-11-07 | End: 2018-11-08

## 2018-11-07 RX ORDER — METOPROLOL TARTRATE 50 MG
50 TABLET ORAL DAILY
Qty: 0 | Refills: 0 | Status: DISCONTINUED | OUTPATIENT
Start: 2018-11-07 | End: 2018-11-08

## 2018-11-07 RX ORDER — ACETAMINOPHEN WITH CODEINE 300MG-30MG
2 TABLET ORAL EVERY 4 HOURS
Qty: 0 | Refills: 0 | Status: DISCONTINUED | OUTPATIENT
Start: 2018-11-07 | End: 2018-11-08

## 2018-11-07 RX ORDER — DEXTROSE 50 % IN WATER 50 %
25 SYRINGE (ML) INTRAVENOUS ONCE
Qty: 0 | Refills: 0 | Status: DISCONTINUED | OUTPATIENT
Start: 2018-11-07 | End: 2018-11-07

## 2018-11-07 RX ORDER — MORPHINE SULFATE 50 MG/1
2 CAPSULE, EXTENDED RELEASE ORAL EVERY 6 HOURS
Qty: 0 | Refills: 0 | Status: DISCONTINUED | OUTPATIENT
Start: 2018-11-07 | End: 2018-11-08

## 2018-11-07 RX ORDER — DEXTROSE 50 % IN WATER 50 %
25 SYRINGE (ML) INTRAVENOUS ONCE
Qty: 0 | Refills: 0 | Status: DISCONTINUED | OUTPATIENT
Start: 2018-11-07 | End: 2018-11-12

## 2018-11-07 RX ORDER — DOCUSATE SODIUM 100 MG
100 CAPSULE ORAL THREE TIMES A DAY
Qty: 0 | Refills: 0 | Status: DISCONTINUED | OUTPATIENT
Start: 2018-11-07 | End: 2018-11-12

## 2018-11-07 RX ORDER — SENNA PLUS 8.6 MG/1
2 TABLET ORAL AT BEDTIME
Qty: 0 | Refills: 0 | Status: DISCONTINUED | OUTPATIENT
Start: 2018-11-07 | End: 2018-11-12

## 2018-11-07 RX ORDER — DEXTROSE 50 % IN WATER 50 %
12.5 SYRINGE (ML) INTRAVENOUS ONCE
Qty: 0 | Refills: 0 | Status: DISCONTINUED | OUTPATIENT
Start: 2018-11-07 | End: 2018-11-12

## 2018-11-07 RX ORDER — HYDROMORPHONE HYDROCHLORIDE 2 MG/ML
0.25 INJECTION INTRAMUSCULAR; INTRAVENOUS; SUBCUTANEOUS
Qty: 0 | Refills: 0 | Status: DISCONTINUED | OUTPATIENT
Start: 2018-11-07 | End: 2018-11-07

## 2018-11-07 RX ADMIN — Medication 100 MILLIGRAM(S): at 13:40

## 2018-11-07 RX ADMIN — Medication 100 MILLIGRAM(S): at 21:06

## 2018-11-07 RX ADMIN — Medication 100 MILLIGRAM(S): at 05:17

## 2018-11-07 RX ADMIN — Medication 2 TABLET(S): at 11:29

## 2018-11-07 RX ADMIN — Medication 2 TABLET(S): at 20:55

## 2018-11-07 RX ADMIN — HEPARIN SODIUM 5000 UNIT(S): 5000 INJECTION INTRAVENOUS; SUBCUTANEOUS at 08:00

## 2018-11-07 RX ADMIN — Medication 50 MILLIGRAM(S): at 21:06

## 2018-11-07 RX ADMIN — Medication 400 MILLIGRAM(S): at 17:29

## 2018-11-07 RX ADMIN — Medication 2 TABLET(S): at 12:00

## 2018-11-07 RX ADMIN — HEPARIN SODIUM 5000 UNIT(S): 5000 INJECTION INTRAVENOUS; SUBCUTANEOUS at 00:23

## 2018-11-07 RX ADMIN — Medication 2: at 08:00

## 2018-11-07 RX ADMIN — HYDROMORPHONE HYDROCHLORIDE 0.25 MILLIGRAM(S): 2 INJECTION INTRAMUSCULAR; INTRAVENOUS; SUBCUTANEOUS at 17:21

## 2018-11-07 RX ADMIN — Medication 1 TABLET(S): at 08:00

## 2018-11-07 RX ADMIN — Medication 50 MILLIGRAM(S): at 05:17

## 2018-11-07 RX ADMIN — Medication 2 TABLET(S): at 20:17

## 2018-11-07 RX ADMIN — HYDROMORPHONE HYDROCHLORIDE 0.25 MILLIGRAM(S): 2 INJECTION INTRAMUSCULAR; INTRAVENOUS; SUBCUTANEOUS at 17:31

## 2018-11-07 RX ADMIN — Medication 2: at 13:39

## 2018-11-07 RX ADMIN — Medication 1 TABLET(S): at 08:30

## 2018-11-07 NOTE — PROGRESS NOTE ADULT - ASSESSMENT
A/P: 83y Male s/p cystoscopy w/clot evacuation. Tolerated PO in take.       DVT prophylaxis/OOB  Incentive spirometry  Strict I&O's  Analgesia and antiemetics as needed  regular Diet and NPO after MN

## 2018-11-07 NOTE — PROCEDURE NOTE - ADDITIONAL PROCEDURE DETAILS
Called to evaluate bladder/ CBI due to clot retention. Palpable bladder distention. SPT inserted to hub and clots evacuated. Bladder flushed with normal saline. CBI to continue.

## 2018-11-07 NOTE — PROGRESS NOTE ADULT - SUBJECTIVE AND OBJECTIVE BOX
Post op Check    Pt 82y/o M hx of hematuria s/p cystoscopy w/ clot evacuation, bladder biopsy. Seen and examined without complaints. Pain is controlled. Denies SOB/CP/N/V.     Vital Signs Last 24 Hrs  T(C): 36.9 (07 Nov 2018 19:59), Max: 37.4 (07 Nov 2018 18:15)  T(F): 98.4 (07 Nov 2018 19:59), Max: 99.3 (07 Nov 2018 18:15)  HR: 92 (07 Nov 2018 19:59) (79 - 94)  BP: 106/56 (07 Nov 2018 19:59) (105/58 - 160/71)  BP(mean): 101 (07 Nov 2018 17:30) (93 - 102)  RR: 16 (07 Nov 2018 19:59) (16 - 18)  SpO2: 96% (07 Nov 2018 19:59) (94% - 100%)    I&O's Summary    06 Nov 2018 07:01  -  07 Nov 2018 07:00  --------------------------------------------------------  IN: 1020 mL / OUT: 0 mL / NET: 1020 mL    07 Nov 2018 07:01  -  07 Nov 2018 20:14  --------------------------------------------------------  IN: 0 mL / OUT: 0 mL / NET: 0 mL        Physical Exam  Gen: NAD, A&Ox3  Pulm: No respiratory distress, no subcostal retractions  CV: RRR, no JVD  Abd: Soft, NT, ND. SP tube in place and CBI running. Draining clear.                             8.5    5.9   )-----------( 193      ( 07 Nov 2018 06:00 )             25.0       11-07    135  |  101  |  28<H>  ----------------------------<  148<H>  5.0   |  24  |  2.43<H>    Ca    9.1      07 Nov 2018 06:00    TPro  8.1  /  Alb  3.7  /  TBili  0.4  /  DBili  x   /  AST  12  /  ALT  7<L>  /  AlkPhos  63  11-05

## 2018-11-07 NOTE — PROGRESS NOTE ADULT - ASSESSMENT
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 Patient also with hyponatremia/hyperkalemia and hydronephrosis     - AM labs  - Replete electrolytes prn  - Ancef  -f/u medicine  - CBI, titrate to clear  - Manually irrigate prn clots  - Repeat ultrasound 11/8 reassess hydronephrosis A/P: 83 year old male with PMH prostate cancer s/p brachy radiation, TURP(6/26/2108) c/b prostatic abscess s/p IR drainage (drain now out), prostate stricture s/p SPT placement (9/19/18) a/w recurring hematuria, on CBI. Patient also with hyponatremia/hyperkalemia and hydronephrosis   acute blood loss anemia s/p 2 U PRBCS  - AM labs  - Replete electrolytes prn  - Ancef  -f/u medicine  - CBI, titrate to clear  - Manually irrigate prn clots  - Repeat ultrasound 11/8 reassess hydronephrosis A/P: 83 year old male with PMH prostate cancer s/p brachy radiation, TURP(6/26/2108) c/b prostatic abscess s/p IR drainage (drain now out), prostate stricture s/p SPT placement (9/19/18) a/w recurring hematuria, on CBI. Patient also with hyponatremia/hyperkalemia and hydronephrosis   acute blood loss anemia s/p 2 U PRBCS  - AM labs  - Replete electrolytes prn  -possible OR cysto fulguration biopsy   - Ancef  -f/u medicine  - CBI, titrate to clear  - Manually irrigate prn clots  - Repeat ultrasound 11/8 reassess hydronephrosis

## 2018-11-07 NOTE — PROGRESS NOTE ADULT - SUBJECTIVE AND OBJECTIVE BOX
UROLOGY DAILY PROGRESS NOTE:     Subjective: Patient seen and examined at bedside. TYransfused No acute events overnight  CBI running  2 U PRBCS  Oncology and Medicine input appreciated       Objective:  Vital signs  T(F): , Max: 98.9 (18 @ 13:37)  HR: 86 (18 @ 04:55)  BP: 126/68 (18 @ 04:55)  SpO2: 96% (18 @ 04:55)  Wt(kg): --    Output     I&O's Detail    2018 07:01  -  2018 07:00  --------------------------------------------------------  IN:    Oral Fluid: 720 mL    sodium chloride 0.9%: 300 mL  Total IN: 1020 mL    OUT:  Total OUT: 0 mL    Total NET: 1020 mL          Physical Exam:  Gen: NAD  Abd: soft NTND SPT in place with 3 way light red no clots   Back: no CVAT  : SPT    Labs:    x     / x     /2.43     7.5   / 21.2  /x                              7.2    7.5   )-----------(       ( 2018 15:05 )             21.2         135  |  101  |  28<H>  ----------------------------<  148<H>  5.0   |  24  |  2.43<H>    Ca    9.1      2018 06:00    TPro  8.1  /  Alb  3.7  /  TBili  0.4  /  DBili  x   /  AST  12  /  ALT  7<L>  /  AlkPhos  63    LABS/RADIOLOGY RESULTS:                          7.2    7.5   )-----------(       ( 2018 15:05 )             21.2       135  |  101  |  28<H>  ----------------------------<  148<H>  5.0   |  24  |  2.43<H>    Ca    9.1      2018 06:00    TPro  8.1  /  Alb  3.7  /  TBili  0.4  /  DBili  x   /  AST  12  /  ALT  7<L>  /  AlkPhos  63    PT/INR - ( 2018 20:52 )   PT: 12.9 sec;   INR: 1.13 ratio         PTT - ( 2018 20:52 )  PTT:29.2 secBlood Cultures    Blood Culture--    @ 23:11    Results  No growth to date.    Organism--    Organism ID--    Urine Culture    @ 23:11    --       Results  No growth to date.    Organism--    Organism ID--  Urinalysis Basic - ( 2018 23:52 )    Color: DARK BROWN / Appearance: Turbid / S.006 / pH:   Gluc:  / Ketone: Negative  / Bili: Negative / Urobili: Negative   Blood:  / Protein: 30 mg/dL / Nitrite: Negative   Leuk Esterase: Small / RBC: 5759 /hpf / WBC 13 /hpf   Sq Epi:  / Non Sq Epi: 0 /hpf / Bacteria: Negative      PT/INR - ( 2018 20:52 )   PT: 12.9 sec;   INR: 1.13 ratio         PTT - ( 2018 20:52 )  PTT:29.2 sec          Urine Cx: UROLOGY DAILY PROGRESS NOTE:     Subjective: Patient seen and examined at bedside. Transfused No acute events overnight  CBI running  2 U PRBCS  Oncology and Medicine input appreciated       Objective:  Vital signs  T(F): , Max: 98.9 (18 @ 13:37)  HR: 86 (18 @ 04:55)  BP: 126/68 (18 @ 04:55)  SpO2: 96% (18 @ 04:55)  Wt(kg): --    Output     I&O's Detail    2018 07:01  -  2018 07:00  --------------------------------------------------------  IN:    Oral Fluid: 720 mL    sodium chloride 0.9%: 300 mL  Total IN: 1020 mL    OUT:  Total OUT: 0 mL    Total NET: 1020 mL          Physical Exam:  Gen: NAD  Abd: soft NTND SPT in place with 3 way light red no clots   Back: no CVAT  : SPT    Labs:    x     / x     /2.43     7.5   / 21.2  /x                              7.2    7.5   )-----------(       ( 2018 15:05 )             21.2         135  |  101  |  28<H>  ----------------------------<  148<H>  5.0   |  24  |  2.43<H>    Ca    9.1      2018 06:00    TPro  8.1  /  Alb  3.7  /  TBili  0.4  /  DBili  x   /  AST  12  /  ALT  7<L>  /  AlkPhos  63    LABS/RADIOLOGY RESULTS:                          7.2    7.5   )-----------(       ( 2018 15:05 )             21.2       135  |  101  |  28<H>  ----------------------------<  148<H>  5.0   |  24  |  2.43<H>    Ca    9.1      2018 06:00    TPro  8.1  /  Alb  3.7  /  TBili  0.4  /  DBili  x   /  AST  12  /  ALT  7<L>  /  AlkPhos  63    PT/INR - ( 2018 20:52 )   PT: 12.9 sec;   INR: 1.13 ratio         PTT - ( 2018 20:52 )  PTT:29.2 secBlood Cultures    Blood Culture--    @ 23:11    Results  No growth to date.    Organism--    Organism ID--    Urine Culture    @ 23:11    --       Results  No growth to date.    Organism--    Organism ID--  Urinalysis Basic - ( 2018 23:52 )    Color: DARK BROWN / Appearance: Turbid / S.006 / pH:   Gluc:  / Ketone: Negative  / Bili: Negative / Urobili: Negative   Blood:  / Protein: 30 mg/dL / Nitrite: Negative   Leuk Esterase: Small / RBC: 5759 /hpf / WBC 13 /hpf   Sq Epi:  / Non Sq Epi: 0 /hpf / Bacteria: Negative      PT/INR - ( 2018 20:52 )   PT: 12.9 sec;   INR: 1.13 ratio         PTT - ( 2018 20:52 )  PTT:29.2 sec          Urine Cx:

## 2018-11-07 NOTE — BRIEF OPERATIVE NOTE - PROCEDURE
<<-----Click on this checkbox to enter Procedure Cystoscopy  11/07/2018  with clot evacuation, fulguration, urethral dilation, bladder biopsy, cystogram, Dooley catheter exchange  Active  [id number]

## 2018-11-08 LAB
ANION GAP SERPL CALC-SCNC: 12 MMOL/L — SIGNIFICANT CHANGE UP (ref 5–17)
APTT BLD: 28 SEC — SIGNIFICANT CHANGE UP (ref 27.5–36.3)
BLD GP AB SCN SERPL QL: NEGATIVE — SIGNIFICANT CHANGE UP
BUN SERPL-MCNC: 26 MG/DL — HIGH (ref 7–23)
CALCIUM SERPL-MCNC: 8.2 MG/DL — LOW (ref 8.4–10.5)
CHLORIDE SERPL-SCNC: 100 MMOL/L — SIGNIFICANT CHANGE UP (ref 96–108)
CO2 SERPL-SCNC: 21 MMOL/L — LOW (ref 22–31)
CREAT SERPL-MCNC: 2.73 MG/DL — HIGH (ref 0.5–1.3)
GLUCOSE BLDC GLUCOMTR-MCNC: 120 MG/DL — HIGH (ref 70–99)
GLUCOSE BLDC GLUCOMTR-MCNC: 137 MG/DL — HIGH (ref 70–99)
GLUCOSE BLDC GLUCOMTR-MCNC: 164 MG/DL — HIGH (ref 70–99)
GLUCOSE BLDC GLUCOMTR-MCNC: 166 MG/DL — HIGH (ref 70–99)
GLUCOSE BLDC GLUCOMTR-MCNC: 243 MG/DL — HIGH (ref 70–99)
GLUCOSE SERPL-MCNC: 177 MG/DL — HIGH (ref 70–99)
HCT VFR BLD CALC: 19.8 % — CRITICAL LOW (ref 39–50)
HCT VFR BLD CALC: 26.7 % — LOW (ref 39–50)
HGB BLD-MCNC: 6.9 G/DL — CRITICAL LOW (ref 13–17)
HGB BLD-MCNC: 9.2 G/DL — LOW (ref 13–17)
INR BLD: 1.27 RATIO — HIGH (ref 0.88–1.16)
MCHC RBC-ENTMCNC: 29 PG — SIGNIFICANT CHANGE UP (ref 27–34)
MCHC RBC-ENTMCNC: 34.7 GM/DL — SIGNIFICANT CHANGE UP (ref 32–36)
MCV RBC AUTO: 83.4 FL — SIGNIFICANT CHANGE UP (ref 80–100)
PLATELET # BLD AUTO: 194 K/UL — SIGNIFICANT CHANGE UP (ref 150–400)
POTASSIUM SERPL-MCNC: 5 MMOL/L — SIGNIFICANT CHANGE UP (ref 3.5–5.3)
POTASSIUM SERPL-SCNC: 5 MMOL/L — SIGNIFICANT CHANGE UP (ref 3.5–5.3)
PROTHROM AB SERPL-ACNC: 14.6 SEC — HIGH (ref 10–12.9)
RBC # BLD: 2.38 M/UL — LOW (ref 4.2–5.8)
RBC # FLD: 13.7 % — SIGNIFICANT CHANGE UP (ref 10.3–14.5)
RH IG SCN BLD-IMP: POSITIVE — SIGNIFICANT CHANGE UP
SODIUM SERPL-SCNC: 133 MMOL/L — LOW (ref 135–145)
WBC # BLD: 9.4 K/UL — SIGNIFICANT CHANGE UP (ref 3.8–10.5)
WBC # FLD AUTO: 9.4 K/UL — SIGNIFICANT CHANGE UP (ref 3.8–10.5)

## 2018-11-08 PROCEDURE — 99233 SBSQ HOSP IP/OBS HIGH 50: CPT

## 2018-11-08 PROCEDURE — 99233 SBSQ HOSP IP/OBS HIGH 50: CPT | Mod: 24

## 2018-11-08 PROCEDURE — 50432 PLMT NEPHROSTOMY CATHETER: CPT | Mod: 50

## 2018-11-08 PROCEDURE — 71045 X-RAY EXAM CHEST 1 VIEW: CPT | Mod: 26

## 2018-11-08 RX ORDER — INSULIN LISPRO 100/ML
VIAL (ML) SUBCUTANEOUS AT BEDTIME
Qty: 0 | Refills: 0 | Status: DISCONTINUED | OUTPATIENT
Start: 2018-11-08 | End: 2018-11-12

## 2018-11-08 RX ORDER — SODIUM CHLORIDE 9 MG/ML
1000 INJECTION INTRAMUSCULAR; INTRAVENOUS; SUBCUTANEOUS
Qty: 0 | Refills: 0 | Status: DISCONTINUED | OUTPATIENT
Start: 2018-11-08 | End: 2018-11-09

## 2018-11-08 RX ORDER — CEFTRIAXONE 500 MG/1
1 INJECTION, POWDER, FOR SOLUTION INTRAMUSCULAR; INTRAVENOUS EVERY 24 HOURS
Qty: 0 | Refills: 0 | Status: DISCONTINUED | OUTPATIENT
Start: 2018-11-08 | End: 2018-11-12

## 2018-11-08 RX ORDER — ACETAMINOPHEN 500 MG
1000 TABLET ORAL ONCE
Qty: 0 | Refills: 0 | Status: COMPLETED | OUTPATIENT
Start: 2018-11-08 | End: 2018-11-08

## 2018-11-08 RX ORDER — SODIUM CHLORIDE 9 MG/ML
1000 INJECTION, SOLUTION INTRAVENOUS
Qty: 0 | Refills: 0 | Status: DISCONTINUED | OUTPATIENT
Start: 2018-11-08 | End: 2018-11-08

## 2018-11-08 RX ORDER — HEPARIN SODIUM 5000 [USP'U]/ML
5000 INJECTION INTRAVENOUS; SUBCUTANEOUS EVERY 8 HOURS
Qty: 0 | Refills: 0 | Status: DISCONTINUED | OUTPATIENT
Start: 2018-11-08 | End: 2018-11-12

## 2018-11-08 RX ORDER — INSULIN LISPRO 100/ML
VIAL (ML) SUBCUTANEOUS
Qty: 0 | Refills: 0 | Status: DISCONTINUED | OUTPATIENT
Start: 2018-11-08 | End: 2018-11-12

## 2018-11-08 RX ORDER — METOPROLOL TARTRATE 50 MG
50 TABLET ORAL DAILY
Qty: 0 | Refills: 0 | Status: DISCONTINUED | OUTPATIENT
Start: 2018-11-08 | End: 2018-11-12

## 2018-11-08 RX ORDER — TRAMADOL HYDROCHLORIDE 50 MG/1
50 TABLET ORAL EVERY 6 HOURS
Qty: 0 | Refills: 0 | Status: DISCONTINUED | OUTPATIENT
Start: 2018-11-08 | End: 2018-11-09

## 2018-11-08 RX ADMIN — Medication 2 TABLET(S): at 07:45

## 2018-11-08 RX ADMIN — Medication 2 TABLET(S): at 08:20

## 2018-11-08 RX ADMIN — SIMVASTATIN 10 MILLIGRAM(S): 20 TABLET, FILM COATED ORAL at 21:54

## 2018-11-08 RX ADMIN — CEFTRIAXONE 100 GRAM(S): 500 INJECTION, POWDER, FOR SOLUTION INTRAMUSCULAR; INTRAVENOUS at 21:54

## 2018-11-08 RX ADMIN — SENNA PLUS 2 TABLET(S): 8.6 TABLET ORAL at 21:54

## 2018-11-08 RX ADMIN — SODIUM CHLORIDE 75 MILLILITER(S): 9 INJECTION INTRAMUSCULAR; INTRAVENOUS; SUBCUTANEOUS at 13:37

## 2018-11-08 RX ADMIN — TRAMADOL HYDROCHLORIDE 50 MILLIGRAM(S): 50 TABLET ORAL at 19:38

## 2018-11-08 RX ADMIN — Medication 2 TABLET(S): at 04:15

## 2018-11-08 RX ADMIN — Medication 400 MILLIGRAM(S): at 13:37

## 2018-11-08 RX ADMIN — Medication 4: at 02:02

## 2018-11-08 RX ADMIN — HEPARIN SODIUM 5000 UNIT(S): 5000 INJECTION INTRAVENOUS; SUBCUTANEOUS at 21:54

## 2018-11-08 RX ADMIN — Medication 650 MILLIGRAM(S): at 06:00

## 2018-11-08 RX ADMIN — Medication 2: at 05:22

## 2018-11-08 RX ADMIN — Medication 2 TABLET(S): at 03:46

## 2018-11-08 RX ADMIN — Medication 650 MILLIGRAM(S): at 05:32

## 2018-11-08 RX ADMIN — Medication 100 MILLIGRAM(S): at 21:54

## 2018-11-08 RX ADMIN — Medication 100 MILLIGRAM(S): at 05:21

## 2018-11-08 NOTE — PROVIDER CONTACT NOTE (OTHER) - ASSESSMENT
Pt A&Ox4, VSS except for temp of 102.9 30 mins post administration of PRBCs. Pt warm to touch but otherwise asymptomatic.

## 2018-11-08 NOTE — CHART NOTE - NSCHARTNOTEFT_GEN_A_CORE
Post op Check    Pt seen and examined without complaints. Pain is controlled. Denies SOB/CP/N/V.     Vital Signs Last 24 Hrs  T(C): 37.4 (08 Nov 2018 21:20), Max: 39.4 (08 Nov 2018 13:00)  T(F): 99.4 (08 Nov 2018 21:20), Max: 102.9 (08 Nov 2018 13:00)  HR: 87 (08 Nov 2018 21:20) (81 - 98)  BP: 106/47 (08 Nov 2018 21:20) (85/41 - 132/67)  BP(mean): --  RR: 18 (08 Nov 2018 21:20) (16 - 18)  SpO2: 94% (08 Nov 2018 21:20) (93% - 96%)    I&O's Summary    07 Nov 2018 07:01  -  08 Nov 2018 07:00  --------------------------------------------------------  IN: 320 mL / OUT: 0 mL / NET: 320 mL    08 Nov 2018 07:01  -  08 Nov 2018 22:00  --------------------------------------------------------  IN: 500 mL / OUT: 0 mL / NET: 500 mL        Physical Exam  Gen: Sitting up in bed eating, NAD, A&Ox3  Pulm: No respiratory distress, no subcostal retractions  CV: RRR, no JVD  Abd: Soft, NT, ND  Back: b/l NT in place, dressings clean, dry and intact. L NT with cherry drainage, R NT with serosanguinous drainage.   : Dooley in place draining clear punch urine to gravity.                           9.2    x     )-----------( x        ( 08 Nov 2018 20:18 )             26.7       11-08    133<L>  |  100  |  26<H>  ----------------------------<  177<H>  5.0   |  21<L>  |  2.73<H>    Ca    8.2<L>      08 Nov 2018 06:14        A/P: 83y Male s/p bilateral percutaneous nephrostomy tubes  s/p 4 units PRBC today, f/u 10pm CBC  Flush catheter with 5 mL of normal saline and aspirate if clots are persistent and obstructing flow  Continue ceftriaxone 1g QD, f/u BCx  DVT prophylaxis/OOB  Incentive spirometry  Strict I&O's  Analgesia and antiemetics as needed  Diet  AM labs

## 2018-11-08 NOTE — PROGRESS NOTE ADULT - ASSESSMENT
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 Patient also with hyponatremia/hyperkalemia and TR[place]/ hydronephrosis, acute blood loss anemia s/p 2 U PRBCS, s/p cysto, fulguration of bleeding, clot evacuation, bladder biopsy POD#1    -NPO for bilateral nephrostomy tubes today  -AM labs  -wean CBI as needed  -OOB/ambulate

## 2018-11-08 NOTE — PROGRESS NOTE ADULT - SUBJECTIVE AND OBJECTIVE BOX
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   s/p 1 unit of PRBC and 2 nd unit of PRBC to be given.   NPO status:     Anticoagulation: heparin SQ 5000 units 12/7 @8:00am     Antibiotics: Rocephin IV     Allergies: oxycodone (Other)      PAST MEDICAL & SURGICAL HISTORY:  Benign prostatic hyperplasia with lower urinary tract symptoms, symptom details unspecified  Prostate cancer  Hypertension  HLD (hyperlipidemia)  Type 2 diabetes mellitus  Paroxysmal A-fib  S/P AVR (aortic valve replacement)  S/P TURP        Pertinent labs:                      6.9    9.4   )-----------( 194      ( 08 Nov 2018 06:14 )             19.8   11-08    133<L>  |  100  |  26<H>  ----------------------------<  177<H>  5.0   |  21<L>  |  2.73<H>    Ca    8.2<L>      08 Nov 2018 06:14    PT/INR - ( 08 Nov 2018 06:14 )   PT: 14.6 sec;   INR: 1.27 ratio         PTT - ( 08 Nov 2018 06:14 )  PTT:28.0 sec    Consent: Procedure/risks/ Benefits explained. Informed consent obtained. Pt verbalizes understanding.

## 2018-11-08 NOTE — PROGRESS NOTE ADULT - SUBJECTIVE AND OBJECTIVE BOX
The patient was seen and examined at bedside.  Denies complaints of chest pain, shortness of breath, nausea, acute pain.    T(C): 38.3 (11-08-18 @ 05:27), Max: 38.3 (11-08-18 @ 05:27)  HR: 89 (11-08-18 @ 05:27) (79 - 94)  BP: 94/51 (11-08-18 @ 05:27) (94/51 - 160/71)  RR: 18 (11-08-18 @ 05:27) (16 - 18)  SpO2: 93% (11-08-18 @ 05:27) (92% - 100%)  Wt(kg): --    Physical Exam:    General: NAD, A+Ox3  Abdomen: soft, non-tender, non-distended  Back: dressing clean/dry/intact      11-06 @ 07:01  -  11-07 @ 07:00  --------------------------------------------------------  IN: 1020 mL / OUT: 0 mL / NET: 1020 mL    11-07 @ 07:01  -  11-08 @ 06:44  --------------------------------------------------------  IN: 320 mL / OUT: 0 mL / NET: 320 mL    SPT - light red on CBI

## 2018-11-08 NOTE — PROVIDER CONTACT NOTE (OTHER) - ACTION/TREATMENT ORDERED:
As per TANNER Calixto after bedside eval, patient should NOT be worked up for transfusion reaction as patient has history of prostate abcesses and has hx of being febrile after procedure. Orders to come

## 2018-11-08 NOTE — PROGRESS NOTE ADULT - SUBJECTIVE AND OBJECTIVE BOX
Event Note:    Called by RN to [facility] pt for fever of 102.9 after first unit of blood transfusion.  Had fever overnight postop of 101   Pt seen and examined. Feeling warm but otherwise well. Denies chills/ rigors, sob, palps, sob, oral swelling, body aches  Vitals   102.9 109/55 98 93 room air cbi  awake alert nad axox3 comfortable resting in bed   skin: cheek flushing no angioedema, body examined without erythema, wheals, rash  cta bl   s1s2  flat soft ntnd   urine clear on slow cbi     84 yo m with fever after blood transfusion. Does not appear to be a blood transfusion reaction.   iv apap given   will await fever to defervesce then proceed with second unit  will start abx   past cx reviewed   will send blood cx

## 2018-11-08 NOTE — PROGRESS NOTE ADULT - SUBJECTIVE AND OBJECTIVE BOX
Interventional Radiology Brief- Operative Note    Operators: Chaparro Menendez MD    Procedure: bilateral percutaneous nephrostomy tubes    Post-op Dx: bilateral hydronephrosis, bladder outlet obstruction, bladder mass    EBL: 10 cc     Medications: 1% lidocaine local and sedation by anesthesia    Contrast: 15cc of Omni 240    Complications: no immediate complications    Findings/Plan:  moderate b/l hydronephrosis     Percutaneous nephrostomy tube Post-procedure Management:  CBC to be done at 10pm  Check vital signs for the next two hours  Monitor fluid input and output  Continue antibiotics if infection is present  Treat pain and fever symptomatically as necessary  Flush catheter with 5 mL of normal saline and aspirate if clots are persistent and obstructing flow  Blood tinged urine may be seen for up to 72 hrs  If gross hematuria persists, catheter position should be checked  If Hct falls without gross hematuria, check for retroperitoneal hemorrhage

## 2018-11-09 ENCOUNTER — APPOINTMENT (OUTPATIENT)
Dept: UROLOGY | Facility: CLINIC | Age: 83
End: 2018-11-09

## 2018-11-09 LAB
-  AMIKACIN: SIGNIFICANT CHANGE UP
-  AMOXICILLIN/CLAVULANIC ACID: SIGNIFICANT CHANGE UP
-  AMPICILLIN/SULBACTAM: SIGNIFICANT CHANGE UP
-  AMPICILLIN: SIGNIFICANT CHANGE UP
-  AZTREONAM: SIGNIFICANT CHANGE UP
-  CEFAZOLIN: SIGNIFICANT CHANGE UP
-  CEFEPIME: SIGNIFICANT CHANGE UP
-  CEFOXITIN: SIGNIFICANT CHANGE UP
-  CEFTRIAXONE: SIGNIFICANT CHANGE UP
-  CIPROFLOXACIN: SIGNIFICANT CHANGE UP
-  ERTAPENEM: SIGNIFICANT CHANGE UP
-  GENTAMICIN: SIGNIFICANT CHANGE UP
-  IMIPENEM: SIGNIFICANT CHANGE UP
-  LEVOFLOXACIN: SIGNIFICANT CHANGE UP
-  MEROPENEM: SIGNIFICANT CHANGE UP
-  NITROFURANTOIN: SIGNIFICANT CHANGE UP
-  PIPERACILLIN/TAZOBACTAM: SIGNIFICANT CHANGE UP
-  TIGECYCLINE: SIGNIFICANT CHANGE UP
-  TOBRAMYCIN: SIGNIFICANT CHANGE UP
-  TRIMETHOPRIM/SULFAMETHOXAZOLE: SIGNIFICANT CHANGE UP
ANION GAP SERPL CALC-SCNC: 12 MMOL/L — SIGNIFICANT CHANGE UP (ref 5–17)
BUN SERPL-MCNC: 28 MG/DL — HIGH (ref 7–23)
CALCIUM SERPL-MCNC: 8.3 MG/DL — LOW (ref 8.4–10.5)
CHLORIDE SERPL-SCNC: 100 MMOL/L — SIGNIFICANT CHANGE UP (ref 96–108)
CO2 SERPL-SCNC: 21 MMOL/L — LOW (ref 22–31)
CREAT SERPL-MCNC: 2.56 MG/DL — HIGH (ref 0.5–1.3)
CULTURE RESULTS: SIGNIFICANT CHANGE UP
GLUCOSE BLDC GLUCOMTR-MCNC: 196 MG/DL — HIGH (ref 70–99)
GLUCOSE BLDC GLUCOMTR-MCNC: 211 MG/DL — HIGH (ref 70–99)
GLUCOSE BLDC GLUCOMTR-MCNC: 220 MG/DL — HIGH (ref 70–99)
GLUCOSE BLDC GLUCOMTR-MCNC: 232 MG/DL — HIGH (ref 70–99)
GLUCOSE SERPL-MCNC: 237 MG/DL — HIGH (ref 70–99)
HCT VFR BLD CALC: 23.7 % — LOW (ref 39–50)
HGB BLD-MCNC: 7.8 G/DL — LOW (ref 13–17)
MCHC RBC-ENTMCNC: 27.9 PG — SIGNIFICANT CHANGE UP (ref 27–34)
MCHC RBC-ENTMCNC: 32.9 GM/DL — SIGNIFICANT CHANGE UP (ref 32–36)
MCV RBC AUTO: 84.9 FL — SIGNIFICANT CHANGE UP (ref 80–100)
METHOD TYPE: SIGNIFICANT CHANGE UP
ORGANISM # SPEC MICROSCOPIC CNT: SIGNIFICANT CHANGE UP
ORGANISM # SPEC MICROSCOPIC CNT: SIGNIFICANT CHANGE UP
PLATELET # BLD AUTO: 180 K/UL — SIGNIFICANT CHANGE UP (ref 150–400)
POTASSIUM SERPL-MCNC: 4.8 MMOL/L — SIGNIFICANT CHANGE UP (ref 3.5–5.3)
POTASSIUM SERPL-SCNC: 4.8 MMOL/L — SIGNIFICANT CHANGE UP (ref 3.5–5.3)
RBC # BLD: 2.79 M/UL — LOW (ref 4.2–5.8)
RBC # FLD: 13.7 % — SIGNIFICANT CHANGE UP (ref 10.3–14.5)
SODIUM SERPL-SCNC: 133 MMOL/L — LOW (ref 135–145)
SPECIMEN SOURCE: SIGNIFICANT CHANGE UP
WBC # BLD: 10 K/UL — SIGNIFICANT CHANGE UP (ref 3.8–10.5)
WBC # FLD AUTO: 10 K/UL — SIGNIFICANT CHANGE UP (ref 3.8–10.5)

## 2018-11-09 PROCEDURE — 99231 SBSQ HOSP IP/OBS SF/LOW 25: CPT

## 2018-11-09 PROCEDURE — 99233 SBSQ HOSP IP/OBS HIGH 50: CPT | Mod: 24

## 2018-11-09 RX ORDER — ACETAMINOPHEN WITH CODEINE 300MG-30MG
1 TABLET ORAL EVERY 4 HOURS
Qty: 0 | Refills: 0 | Status: DISCONTINUED | OUTPATIENT
Start: 2018-11-09 | End: 2018-11-12

## 2018-11-09 RX ORDER — ATROPA BELLADONNA AND OPIUM 16.2; 6 MG/1; MG/1
1 SUPPOSITORY RECTAL EVERY 8 HOURS
Qty: 0 | Refills: 0 | Status: DISCONTINUED | OUTPATIENT
Start: 2018-11-09 | End: 2018-11-12

## 2018-11-09 RX ORDER — ATROPA BELLADONNA AND OPIUM 16.2; 6 MG/1; MG/1
1 SUPPOSITORY RECTAL EVERY 6 HOURS
Qty: 0 | Refills: 0 | Status: DISCONTINUED | OUTPATIENT
Start: 2018-11-09 | End: 2018-11-12

## 2018-11-09 RX ORDER — OXYBUTYNIN CHLORIDE 5 MG
5 TABLET ORAL EVERY 8 HOURS
Qty: 0 | Refills: 0 | Status: DISCONTINUED | OUTPATIENT
Start: 2018-11-09 | End: 2018-11-12

## 2018-11-09 RX ADMIN — Medication 100 MILLIGRAM(S): at 05:17

## 2018-11-09 RX ADMIN — Medication 1 TABLET(S): at 08:40

## 2018-11-09 RX ADMIN — HEPARIN SODIUM 5000 UNIT(S): 5000 INJECTION INTRAVENOUS; SUBCUTANEOUS at 05:17

## 2018-11-09 RX ADMIN — Medication 1 TABLET(S): at 17:13

## 2018-11-09 RX ADMIN — CEFTRIAXONE 100 GRAM(S): 500 INJECTION, POWDER, FOR SOLUTION INTRAMUSCULAR; INTRAVENOUS at 21:06

## 2018-11-09 RX ADMIN — Medication 100 MILLIGRAM(S): at 13:19

## 2018-11-09 RX ADMIN — Medication 1 TABLET(S): at 21:36

## 2018-11-09 RX ADMIN — Medication 2: at 17:52

## 2018-11-09 RX ADMIN — ATROPA BELLADONNA AND OPIUM 1 SUPPOSITORY(S): 16.2; 6 SUPPOSITORY RECTAL at 14:16

## 2018-11-09 RX ADMIN — TRAMADOL HYDROCHLORIDE 50 MILLIGRAM(S): 50 TABLET ORAL at 03:22

## 2018-11-09 RX ADMIN — Medication 1 TABLET(S): at 08:10

## 2018-11-09 RX ADMIN — Medication 1 TABLET(S): at 17:45

## 2018-11-09 RX ADMIN — ATROPA BELLADONNA AND OPIUM 1 SUPPOSITORY(S): 16.2; 6 SUPPOSITORY RECTAL at 14:57

## 2018-11-09 RX ADMIN — Medication 4: at 12:35

## 2018-11-09 RX ADMIN — Medication 1 TABLET(S): at 21:06

## 2018-11-09 RX ADMIN — Medication 100 MILLIGRAM(S): at 21:06

## 2018-11-09 RX ADMIN — Medication 1 TABLET(S): at 13:19

## 2018-11-09 RX ADMIN — Medication 4: at 08:09

## 2018-11-09 RX ADMIN — TRAMADOL HYDROCHLORIDE 50 MILLIGRAM(S): 50 TABLET ORAL at 03:52

## 2018-11-09 RX ADMIN — Medication 1 TABLET(S): at 13:50

## 2018-11-09 RX ADMIN — HEPARIN SODIUM 5000 UNIT(S): 5000 INJECTION INTRAVENOUS; SUBCUTANEOUS at 13:19

## 2018-11-09 RX ADMIN — HEPARIN SODIUM 5000 UNIT(S): 5000 INJECTION INTRAVENOUS; SUBCUTANEOUS at 21:06

## 2018-11-09 RX ADMIN — Medication 50 MILLIGRAM(S): at 05:17

## 2018-11-09 RX ADMIN — SENNA PLUS 2 TABLET(S): 8.6 TABLET ORAL at 21:06

## 2018-11-09 RX ADMIN — SIMVASTATIN 10 MILLIGRAM(S): 20 TABLET, FILM COATED ORAL at 21:06

## 2018-11-09 NOTE — PROCEDURE NOTE - ADDITIONAL PROCEDURE DETAILS
Called by RN. Pt in CBI via spt and clotting off. Balloon taken down and catheter advanced. Difficult to irrigate due to spasms and significant clot burden. Cbi on slow

## 2018-11-09 NOTE — PROGRESS NOTE ADULT - SUBJECTIVE AND OBJECTIVE BOX
Subjective    Feels well, pain controlled, no n/v    Objective    Vital signs  T(F): , Max: 102.9 (11-08-18 @ 13:00)  HR: 91 (11-09-18 @ 05:13)  BP: 139/68 (11-09-18 @ 05:13)  SpO2: 95% (11-09-18 @ 05:13)  Wt(kg): --    Output     OUT:    Nephrostomy Tube: 425 mL    Nephrostomy Tube: 1650 mL  Total OUT: 2075 mL    Total NET: -2075 mL          Physical Exam  Gen: NAD  Abd: soft, NT, ND  : right NT blood tinged, Left NT, CBI clear    Labs      11-08 @ 20:18    WBC --    / Hct 26.7  / SCr --       11-08 @ 06:14    WBC 9.4   / Hct 19.8  / SCr 2.73

## 2018-11-09 NOTE — PROGRESS NOTE ADULT - ASSESSMENT
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

## 2018-11-09 NOTE — PROGRESS NOTE ADULT - SUBJECTIVE AND OBJECTIVE BOX
Interventional Radiology    S/P Bilateral percutaneous nephrostomy tube placement, POD # 1.  Pt c/o problem with suprapubic cystostomy tube not draining that is being managed by Urology.      Vital Signs Last 24 Hrs  T(C): 37.7 (09 Nov 2018 05:13), Max: 39.4 (08 Nov 2018 13:00)  T(F): 99.9 (09 Nov 2018 05:13), Max: 102.9 (08 Nov 2018 13:00)  HR: 91 (09 Nov 2018 05:13) (81 - 98)  BP: 139/68 (09 Nov 2018 05:13) (85/41 - 139/68)  BP(mean): --  RR: 18 (09 Nov 2018 05:13) (16 - 18)  SpO2: 95% (09 Nov 2018 05:13) (93% - 96%)    General Appearance: NAD    Procedure Site (Bilateral Flanks): Left side catheter appears intact with bloody urine output.  catheter flushed without resistance.    Outputs:     Left Nephrostomy Tube: 11/9/18 7 AM- 425 mL      Right Nephrostomy Tube: 11/9/18 7 AM- 1650 mL    LABS:                       7.8    10.0  )-----------( 180      ( 09 Nov 2018 06:28 )             23.7     11-09    133<L>  |  100  |  28<H>  ----------------------------<  237<H>  4.8   |  21<L>  |  2.56<H>    Ca    8.3<L>      09 Nov 2018 06:28        83y Male with prostate cancer with obstructive uropathy now S/P     Continue care as per primary team  Pt was seen and examined with IR attending Dr. SHAKA Menendez present.    Continue to maintain dressings around bilateral nephrostomy tube sites and change as needed.   Monitor outputs from bilateral nephrostomy tubes.  The Nephrostomy tubes should be flushed if there is no output of the tubes appear clogged (forward flush as ordered with 5 ml Sterile NS).  The Left Nephrostomy output should become less bloody and clear over the next several days if the output remains bloody or becomes more bloody then pt should be evaluated by IR for tube check of Nephrostomy (nephrostogram).      IR contact information was given to patient's wife with instructions for the nephrostomy tubes to have routine exchange every 3 months.  IR outpatient scheduling office should be called to scheduled this at [phone].    KANDIS Smith  [facility] [address]  Ext [phone] Interventional Radiology    S/P Bilateral percutaneous nephrostomy tube placement, POD # 1.  Pt c/o problem with suprapubic cystostomy tube not draining that is being managed by Urology.      Vital Signs Last 24 Hrs  T(C): 37.7 (09 Nov 2018 05:13), Max: 39.4 (08 Nov 2018 13:00)  T(F): 99.9 (09 Nov 2018 05:13), Max: 102.9 (08 Nov 2018 13:00)  HR: 91 (09 Nov 2018 05:13) (81 - 98)  BP: 139/68 (09 Nov 2018 05:13) (85/41 - 139/68)  BP(mean): --  RR: 18 (09 Nov 2018 05:13) (16 - 18)  SpO2: 95% (09 Nov 2018 05:13) (93% - 96%)    General Appearance: NAD    Procedure Site (Bilateral Flanks): Left side catheter appears intact with bloody urine output.  catheter flushed without resistance.    Outputs:     Left Nephrostomy Tube: 11/9/18 7 AM- 425 mL      Right Nephrostomy Tube: 11/9/18 7 AM- 1650 mL    LABS:                       7.8    10.0  )-----------( 180      ( 09 Nov 2018 06:28 )             23.7     11-09    133<L>  |  100  |  28<H>  ----------------------------<  237<H>  4.8   |  21<L>  |  2.56<H>    Ca    8.3<L>      09 Nov 2018 06:28        83y Male with prostate cancer with obstructive uropathy now S/P     Continue care as per primary team  Pt was seen and examined with IR attending Dr. SHAKA Menendez present.      Continue to maintain dressings around bilateral nephrostomy tube sites and change as needed.   Monitor outputs from bilateral nephrostomy tubes.  The Nephrostomy tubes should be flushed if there is no output or if the tubes appear clogged (forward flush as ordered with 5 ml Sterile NS).  The Left Nephrostomy output should become less bloody and clear over the next several days if the output remains bloody or becomes more bloody then pt should be evaluated by IR for tube check of Nephrostomy (nephrostogram).      IR contact information was given to patient's wife with instructions for the nephrostomy tubes to have routine exchange every 3 months.  IR outpatient scheduling office should be called to scheduled this at [phone].    KANDIS Smith  [facility] [address]  Ext [phone]

## 2018-11-10 LAB
ANION GAP SERPL CALC-SCNC: 12 MMOL/L — SIGNIFICANT CHANGE UP (ref 5–17)
BUN SERPL-MCNC: 30 MG/DL — HIGH (ref 7–23)
CALCIUM SERPL-MCNC: 8.4 MG/DL — SIGNIFICANT CHANGE UP (ref 8.4–10.5)
CHLORIDE SERPL-SCNC: 97 MMOL/L — SIGNIFICANT CHANGE UP (ref 96–108)
CO2 SERPL-SCNC: 20 MMOL/L — LOW (ref 22–31)
CREAT SERPL-MCNC: 2.08 MG/DL — HIGH (ref 0.5–1.3)
GLUCOSE BLDC GLUCOMTR-MCNC: 199 MG/DL — HIGH (ref 70–99)
GLUCOSE BLDC GLUCOMTR-MCNC: 207 MG/DL — HIGH (ref 70–99)
GLUCOSE BLDC GLUCOMTR-MCNC: 239 MG/DL — HIGH (ref 70–99)
GLUCOSE BLDC GLUCOMTR-MCNC: 261 MG/DL — HIGH (ref 70–99)
GLUCOSE SERPL-MCNC: 168 MG/DL — HIGH (ref 70–99)
HCT VFR BLD CALC: 23.7 % — LOW (ref 39–50)
HGB BLD-MCNC: 7.5 G/DL — LOW (ref 13–17)
MCHC RBC-ENTMCNC: 27.2 PG — SIGNIFICANT CHANGE UP (ref 27–34)
MCHC RBC-ENTMCNC: 31.8 GM/DL — LOW (ref 32–36)
MCV RBC AUTO: 85.3 FL — SIGNIFICANT CHANGE UP (ref 80–100)
PLATELET # BLD AUTO: 173 K/UL — SIGNIFICANT CHANGE UP (ref 150–400)
POTASSIUM SERPL-MCNC: 4.5 MMOL/L — SIGNIFICANT CHANGE UP (ref 3.5–5.3)
POTASSIUM SERPL-SCNC: 4.5 MMOL/L — SIGNIFICANT CHANGE UP (ref 3.5–5.3)
RBC # BLD: 2.78 M/UL — LOW (ref 4.2–5.8)
RBC # FLD: 14.4 % — SIGNIFICANT CHANGE UP (ref 10.3–14.5)
SODIUM SERPL-SCNC: 129 MMOL/L — LOW (ref 135–145)
WBC # BLD: 9.9 K/UL — SIGNIFICANT CHANGE UP (ref 3.8–10.5)
WBC # FLD AUTO: 9.9 K/UL — SIGNIFICANT CHANGE UP (ref 3.8–10.5)

## 2018-11-10 RX ADMIN — SENNA PLUS 2 TABLET(S): 8.6 TABLET ORAL at 21:35

## 2018-11-10 RX ADMIN — CEFTRIAXONE 100 GRAM(S): 500 INJECTION, POWDER, FOR SOLUTION INTRAMUSCULAR; INTRAVENOUS at 21:34

## 2018-11-10 RX ADMIN — Medication 100 MILLIGRAM(S): at 14:11

## 2018-11-10 RX ADMIN — Medication 1 TABLET(S): at 18:55

## 2018-11-10 RX ADMIN — ATROPA BELLADONNA AND OPIUM 1 SUPPOSITORY(S): 16.2; 6 SUPPOSITORY RECTAL at 15:27

## 2018-11-10 RX ADMIN — HEPARIN SODIUM 5000 UNIT(S): 5000 INJECTION INTRAVENOUS; SUBCUTANEOUS at 05:26

## 2018-11-10 RX ADMIN — Medication 1 TABLET(S): at 05:55

## 2018-11-10 RX ADMIN — Medication 50 MILLIGRAM(S): at 05:26

## 2018-11-10 RX ADMIN — Medication 1 TABLET(S): at 14:45

## 2018-11-10 RX ADMIN — HEPARIN SODIUM 5000 UNIT(S): 5000 INJECTION INTRAVENOUS; SUBCUTANEOUS at 21:35

## 2018-11-10 RX ADMIN — Medication 6: at 12:32

## 2018-11-10 RX ADMIN — Medication 1 TABLET(S): at 09:45

## 2018-11-10 RX ADMIN — Medication 1 TABLET(S): at 09:15

## 2018-11-10 RX ADMIN — Medication 2: at 08:28

## 2018-11-10 RX ADMIN — ATROPA BELLADONNA AND OPIUM 1 SUPPOSITORY(S): 16.2; 6 SUPPOSITORY RECTAL at 22:34

## 2018-11-10 RX ADMIN — Medication 4: at 17:31

## 2018-11-10 RX ADMIN — ATROPA BELLADONNA AND OPIUM 1 SUPPOSITORY(S): 16.2; 6 SUPPOSITORY RECTAL at 21:34

## 2018-11-10 RX ADMIN — HEPARIN SODIUM 5000 UNIT(S): 5000 INJECTION INTRAVENOUS; SUBCUTANEOUS at 14:11

## 2018-11-10 RX ADMIN — Medication 100 MILLIGRAM(S): at 21:35

## 2018-11-10 RX ADMIN — Medication 1 TABLET(S): at 05:25

## 2018-11-10 RX ADMIN — Medication 1 TABLET(S): at 14:11

## 2018-11-10 RX ADMIN — ATROPA BELLADONNA AND OPIUM 1 SUPPOSITORY(S): 16.2; 6 SUPPOSITORY RECTAL at 16:00

## 2018-11-10 RX ADMIN — Medication 100 MILLIGRAM(S): at 05:26

## 2018-11-10 RX ADMIN — SIMVASTATIN 10 MILLIGRAM(S): 20 TABLET, FILM COATED ORAL at 21:35

## 2018-11-10 NOTE — PHYSICAL THERAPY INITIAL EVALUATION ADULT - PERTINENT HX OF CURRENT PROBLEM, REHAB EVAL
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

## 2018-11-10 NOTE — PROGRESS NOTE ADULT - SUBJECTIVE AND OBJECTIVE BOX
Subjective:  83 years old male with hematuria likely to advanced urethral cancer s/p cysto fulguration and b/l NT placement.   Patient was seen at bedside, offers no complaints, no events overnight. Tolerated diet, passing gas, ambulating to the chair.     Objectives:  T(C): 36.8 (11-10-18 @ 09:12), Max: 37.3 (11-10-18 @ 01:14)  HR: 89 (11-10-18 @ 09:12) (82 - 89)  BP: 104/52 (11-10-18 @ 09:12) (104/52 - 137/61)  RR: 18 (11-10-18 @ 09:12) (17 - 18)  SpO2: 99% (11-10-18 @ 09:12) (93% - 99%)  Wt(kg): --    11-09 @ 07:01  -  11-10 @ 07:00  --------------------------------------------------------  IN:    Oral Fluid: 1280 mL  Total IN: 1280 mL    OUT:    Indwelling Catheter - Suprapubic: 775 mL    Nephrostomy Tube: 1050 mL    Nephrostomy Tube: 1100 mL  Total OUT: 2925 mL    Total NET: -1645 mL      11-10 @ 07:01  -  11-10 @ 09:34  --------------------------------------------------------  IN:    Oral Fluid: 240 mL  Total IN: 240 mL    OUT:  Total OUT: 0 mL    Total NET: 240 mL          Physcial Exam  GENERAL: NAD, well-developed  ABDOMEN: Soft, Nontender, Nondistended;  DRAINS: Right NT- clear urine, Left NT- bloody urine  PSYCH: AAOx3    LABS:                        7.5    9.9   )-----------( 173      ( 10 Nov 2018 06:54 )             23.7     11-10    129<L>  |  97  |  30<H>  ----------------------------<  168<H>  4.5   |  20<L>  |  2.08<H>    Ca    8.4      10 Nov 2018 06:54      CAPILLARY BLOOD GLUCOSE      POCT Blood Glucose.: 199 mg/dL (10 Nov 2018 07:47)      CAPILLARY BLOOD GLUCOSE      POCT Blood Glucose.: 199 mg/dL (10 Nov 2018 07:47)

## 2018-11-10 NOTE — PROGRESS NOTE ADULT - ASSESSMENT
ASSESSMENT:  83 years old male with hematuria likely to advanced urethral cancer s/p cysto fulguration and b/l NT placement.   Doing well, H/H stable    PLAN:   D/c planning  CBC/BMP  Follow NT, urine and blood cultures (no growth up to date)

## 2018-11-10 NOTE — PHYSICAL THERAPY INITIAL EVALUATION ADULT - ADDITIONAL COMMENTS
as per pt, resides in a PH with spouse, 3 stairs to enter, 1 flight up to bedroom, PTA, pt (I) with amb, (I) with ADLs.

## 2018-11-11 LAB
ANION GAP SERPL CALC-SCNC: 11 MMOL/L — SIGNIFICANT CHANGE UP (ref 5–17)
BUN SERPL-MCNC: 26 MG/DL — HIGH (ref 7–23)
CALCIUM SERPL-MCNC: 8.4 MG/DL — SIGNIFICANT CHANGE UP (ref 8.4–10.5)
CHLORIDE SERPL-SCNC: 98 MMOL/L — SIGNIFICANT CHANGE UP (ref 96–108)
CO2 SERPL-SCNC: 22 MMOL/L — SIGNIFICANT CHANGE UP (ref 22–31)
CREAT SERPL-MCNC: 1.82 MG/DL — HIGH (ref 0.5–1.3)
CULTURE RESULTS: SIGNIFICANT CHANGE UP
CULTURE RESULTS: SIGNIFICANT CHANGE UP
GLUCOSE BLDC GLUCOMTR-MCNC: 203 MG/DL — HIGH (ref 70–99)
GLUCOSE BLDC GLUCOMTR-MCNC: 206 MG/DL — HIGH (ref 70–99)
GLUCOSE BLDC GLUCOMTR-MCNC: 269 MG/DL — HIGH (ref 70–99)
GLUCOSE BLDC GLUCOMTR-MCNC: 315 MG/DL — HIGH (ref 70–99)
GLUCOSE SERPL-MCNC: 182 MG/DL — HIGH (ref 70–99)
HCT VFR BLD CALC: 20.7 % — CRITICAL LOW (ref 39–50)
HCT VFR BLD CALC: 23 % — LOW (ref 39–50)
HGB BLD-MCNC: 7.1 G/DL — LOW (ref 13–17)
HGB BLD-MCNC: 8 G/DL — LOW (ref 13–17)
MCHC RBC-ENTMCNC: 29 PG — SIGNIFICANT CHANGE UP (ref 27–34)
MCHC RBC-ENTMCNC: 29.1 PG — SIGNIFICANT CHANGE UP (ref 27–34)
MCHC RBC-ENTMCNC: 34.3 GM/DL — SIGNIFICANT CHANGE UP (ref 32–36)
MCHC RBC-ENTMCNC: 34.6 GM/DL — SIGNIFICANT CHANGE UP (ref 32–36)
MCV RBC AUTO: 84.1 FL — SIGNIFICANT CHANGE UP (ref 80–100)
MCV RBC AUTO: 84.7 FL — SIGNIFICANT CHANGE UP (ref 80–100)
PLATELET # BLD AUTO: 170 K/UL — SIGNIFICANT CHANGE UP (ref 150–400)
PLATELET # BLD AUTO: 174 K/UL — SIGNIFICANT CHANGE UP (ref 150–400)
POTASSIUM SERPL-MCNC: 4.5 MMOL/L — SIGNIFICANT CHANGE UP (ref 3.5–5.3)
POTASSIUM SERPL-SCNC: 4.5 MMOL/L — SIGNIFICANT CHANGE UP (ref 3.5–5.3)
RBC # BLD: 2.44 M/UL — LOW (ref 4.2–5.8)
RBC # BLD: 2.74 M/UL — LOW (ref 4.2–5.8)
RBC # FLD: 13.3 % — SIGNIFICANT CHANGE UP (ref 10.3–14.5)
RBC # FLD: 13.7 % — SIGNIFICANT CHANGE UP (ref 10.3–14.5)
SODIUM SERPL-SCNC: 131 MMOL/L — LOW (ref 135–145)
SPECIMEN SOURCE: SIGNIFICANT CHANGE UP
SPECIMEN SOURCE: SIGNIFICANT CHANGE UP
WBC # BLD: 8.5 K/UL — SIGNIFICANT CHANGE UP (ref 3.8–10.5)
WBC # BLD: 9.1 K/UL — SIGNIFICANT CHANGE UP (ref 3.8–10.5)
WBC # FLD AUTO: 8.5 K/UL — SIGNIFICANT CHANGE UP (ref 3.8–10.5)
WBC # FLD AUTO: 9.1 K/UL — SIGNIFICANT CHANGE UP (ref 3.8–10.5)

## 2018-11-11 RX ADMIN — Medication 100 MILLIGRAM(S): at 21:24

## 2018-11-11 RX ADMIN — Medication 1 TABLET(S): at 11:06

## 2018-11-11 RX ADMIN — POLYETHYLENE GLYCOL 3350 17 GRAM(S): 17 POWDER, FOR SOLUTION ORAL at 13:54

## 2018-11-11 RX ADMIN — Medication 1 TABLET(S): at 08:08

## 2018-11-11 RX ADMIN — ATROPA BELLADONNA AND OPIUM 1 SUPPOSITORY(S): 16.2; 6 SUPPOSITORY RECTAL at 21:54

## 2018-11-11 RX ADMIN — Medication 50 MILLIGRAM(S): at 05:36

## 2018-11-11 RX ADMIN — SENNA PLUS 2 TABLET(S): 8.6 TABLET ORAL at 21:24

## 2018-11-11 RX ADMIN — Medication 10 MILLIGRAM(S): at 10:24

## 2018-11-11 RX ADMIN — HEPARIN SODIUM 5000 UNIT(S): 5000 INJECTION INTRAVENOUS; SUBCUTANEOUS at 05:36

## 2018-11-11 RX ADMIN — ATROPA BELLADONNA AND OPIUM 1 SUPPOSITORY(S): 16.2; 6 SUPPOSITORY RECTAL at 21:24

## 2018-11-11 RX ADMIN — Medication 4: at 08:06

## 2018-11-11 RX ADMIN — ATROPA BELLADONNA AND OPIUM 1 SUPPOSITORY(S): 16.2; 6 SUPPOSITORY RECTAL at 14:23

## 2018-11-11 RX ADMIN — Medication 4: at 18:30

## 2018-11-11 RX ADMIN — Medication 1 TABLET(S): at 10:36

## 2018-11-11 RX ADMIN — SIMVASTATIN 10 MILLIGRAM(S): 20 TABLET, FILM COATED ORAL at 21:24

## 2018-11-11 RX ADMIN — Medication 2: at 21:27

## 2018-11-11 RX ADMIN — HEPARIN SODIUM 5000 UNIT(S): 5000 INJECTION INTRAVENOUS; SUBCUTANEOUS at 13:54

## 2018-11-11 RX ADMIN — CEFTRIAXONE 100 GRAM(S): 500 INJECTION, POWDER, FOR SOLUTION INTRAMUSCULAR; INTRAVENOUS at 21:24

## 2018-11-11 RX ADMIN — Medication 1 TABLET(S): at 19:30

## 2018-11-11 RX ADMIN — Medication 8: at 15:03

## 2018-11-11 RX ADMIN — Medication 100 MILLIGRAM(S): at 05:36

## 2018-11-11 RX ADMIN — Medication 1 TABLET(S): at 01:17

## 2018-11-11 RX ADMIN — ATROPA BELLADONNA AND OPIUM 1 SUPPOSITORY(S): 16.2; 6 SUPPOSITORY RECTAL at 06:06

## 2018-11-11 RX ADMIN — HEPARIN SODIUM 5000 UNIT(S): 5000 INJECTION INTRAVENOUS; SUBCUTANEOUS at 21:28

## 2018-11-11 RX ADMIN — Medication 1 TABLET(S): at 17:40

## 2018-11-11 RX ADMIN — ATROPA BELLADONNA AND OPIUM 1 SUPPOSITORY(S): 16.2; 6 SUPPOSITORY RECTAL at 13:53

## 2018-11-11 RX ADMIN — Medication 100 MILLIGRAM(S): at 13:54

## 2018-11-11 RX ADMIN — Medication 1 TABLET(S): at 18:10

## 2018-11-11 RX ADMIN — Medication 1 TABLET(S): at 21:24

## 2018-11-11 RX ADMIN — Medication 1 TABLET(S): at 02:17

## 2018-11-11 RX ADMIN — Medication 1 TABLET(S): at 07:06

## 2018-11-11 RX ADMIN — ATROPA BELLADONNA AND OPIUM 1 SUPPOSITORY(S): 16.2; 6 SUPPOSITORY RECTAL at 05:36

## 2018-11-11 RX ADMIN — Medication 1 TABLET(S): at 21:54

## 2018-11-11 NOTE — PROGRESS NOTE ADULT - SUBJECTIVE AND OBJECTIVE BOX
UROLOGY PA PROGRESS NOTE:     Subjective:  no acute events overnight, pain controlled with tylenol #3. c/o constipation    Objective:  Vital signs  T(F): , Max: 99.8 (11-10-18 @ 21:37)  HR: 79 (11-11-18 @ 05:34)  BP: 112/65 (11-11-18 @ 05:34)  SpO2: 99% (11-11-18 @ 05:34)  Wt(kg): --    Output     [id number]  L NT- 700  R NT 510cc      Physical Exam:  Gen: NAD  Abd: soft, NT/ND, R NT clear, L NT translucent red  : +SPT dark red    Labs:  11-10  9.9   / 23.7  /2.08                           7.5    9.9   )-----------( 173      ( 10 Nov 2018 06:54 )             23.7     11-10    129<L>  |  97  |  30<H>  ----------------------------<  168<H>  4.5   |  20<L>  |  2.08<H>    Ca    8.4      10 Nov 2018 06:54            Urine Cx:    Culture - Urine (collected 09 Nov 2018 01:07)  Source: .Urine Nephrostomy - Right  Final Report (10 Nov 2018 16:22):    No growth at 48 hours    Culture - Urine (collected 09 Nov 2018 01:07)  Source: .Urine Nephrostomy - Left  Final Report (10 Nov 2018 16:21):    No growth at 48 hours    Culture - Blood (collected 08 Nov 2018 17:14)  Source: .Blood Blood-Venous  Preliminary Report (09 Nov 2018 18:01):    No growth to date.    Culture - Blood (collected 08 Nov 2018 17:14)  Source: .Blood Blood-Peripheral  Preliminary Report (09 Nov 2018 18:01):    No growth to date.    Culture - Blood (collected 08 Nov 2018 08:37)  Source: .Blood Blood-Peripheral  Preliminary Report (09 Nov 2018 09:01):    No growth to date.    Culture - Blood (collected 08 Nov 2018 08:37)  Source: .Blood Blood-Venous  Preliminary Report (09 Nov 2018 09:01):    No growth to date.

## 2018-11-11 NOTE — PROGRESS NOTE ADULT - ASSESSMENT
ASSESSMENT:  83 years old male with hematuria likely to advanced urethral cancer s/p cysto fulguration and b/l NT placement.   Doing well, H/H stable    PLAN:   D/c planning  CBC/BMP  Follow NT, urine and blood cultures (no growth up to date)  enema

## 2018-11-12 ENCOUNTER — TRANSCRIPTION ENCOUNTER (OUTPATIENT)
Age: 83
End: 2018-11-12

## 2018-11-12 VITALS
OXYGEN SATURATION: 98 % | TEMPERATURE: 98 F | RESPIRATION RATE: 19 BRPM | SYSTOLIC BLOOD PRESSURE: 131 MMHG | HEART RATE: 74 BPM | DIASTOLIC BLOOD PRESSURE: 61 MMHG

## 2018-11-12 LAB
ANION GAP SERPL CALC-SCNC: 12 MMOL/L — SIGNIFICANT CHANGE UP (ref 5–17)
BLD GP AB SCN SERPL QL: NEGATIVE — SIGNIFICANT CHANGE UP
BUN SERPL-MCNC: 25 MG/DL — HIGH (ref 7–23)
CALCIUM SERPL-MCNC: 8.2 MG/DL — LOW (ref 8.4–10.5)
CHLORIDE SERPL-SCNC: 97 MMOL/L — SIGNIFICANT CHANGE UP (ref 96–108)
CO2 SERPL-SCNC: 21 MMOL/L — LOW (ref 22–31)
CREAT SERPL-MCNC: 1.71 MG/DL — HIGH (ref 0.5–1.3)
GLUCOSE BLDC GLUCOMTR-MCNC: 243 MG/DL — HIGH (ref 70–99)
GLUCOSE BLDC GLUCOMTR-MCNC: 276 MG/DL — HIGH (ref 70–99)
GLUCOSE SERPL-MCNC: 195 MG/DL — HIGH (ref 70–99)
HCT VFR BLD CALC: 23.6 % — LOW (ref 39–50)
HCT VFR BLD CALC: 25.1 % — LOW (ref 39–50)
HGB BLD-MCNC: 8 G/DL — LOW (ref 13–17)
HGB BLD-MCNC: 8.5 G/DL — LOW (ref 13–17)
MCHC RBC-ENTMCNC: 28.7 PG — SIGNIFICANT CHANGE UP (ref 27–34)
MCHC RBC-ENTMCNC: 28.8 PG — SIGNIFICANT CHANGE UP (ref 27–34)
MCHC RBC-ENTMCNC: 33.7 GM/DL — SIGNIFICANT CHANGE UP (ref 32–36)
MCHC RBC-ENTMCNC: 33.8 GM/DL — SIGNIFICANT CHANGE UP (ref 32–36)
MCV RBC AUTO: 85 FL — SIGNIFICANT CHANGE UP (ref 80–100)
MCV RBC AUTO: 85.3 FL — SIGNIFICANT CHANGE UP (ref 80–100)
PLATELET # BLD AUTO: 174 K/UL — SIGNIFICANT CHANGE UP (ref 150–400)
PLATELET # BLD AUTO: 194 K/UL — SIGNIFICANT CHANGE UP (ref 150–400)
POTASSIUM SERPL-MCNC: 4.2 MMOL/L — SIGNIFICANT CHANGE UP (ref 3.5–5.3)
POTASSIUM SERPL-SCNC: 4.2 MMOL/L — SIGNIFICANT CHANGE UP (ref 3.5–5.3)
RBC # BLD: 2.77 M/UL — LOW (ref 4.2–5.8)
RBC # BLD: 2.95 M/UL — LOW (ref 4.2–5.8)
RBC # FLD: 13.3 % — SIGNIFICANT CHANGE UP (ref 10.3–14.5)
RBC # FLD: 13.7 % — SIGNIFICANT CHANGE UP (ref 10.3–14.5)
RH IG SCN BLD-IMP: POSITIVE — SIGNIFICANT CHANGE UP
SODIUM SERPL-SCNC: 130 MMOL/L — LOW (ref 135–145)
WBC # BLD: 7 K/UL — SIGNIFICANT CHANGE UP (ref 3.8–10.5)
WBC # BLD: 7.9 K/UL — SIGNIFICANT CHANGE UP (ref 3.8–10.5)
WBC # FLD AUTO: 7 K/UL — SIGNIFICANT CHANGE UP (ref 3.8–10.5)
WBC # FLD AUTO: 7.9 K/UL — SIGNIFICANT CHANGE UP (ref 3.8–10.5)

## 2018-11-12 PROCEDURE — 86900 BLOOD TYPING SEROLOGIC ABO: CPT

## 2018-11-12 PROCEDURE — C1726: CPT

## 2018-11-12 PROCEDURE — 80048 BASIC METABOLIC PNL TOTAL CA: CPT

## 2018-11-12 PROCEDURE — 83605 ASSAY OF LACTIC ACID: CPT

## 2018-11-12 PROCEDURE — 82803 BLOOD GASES ANY COMBINATION: CPT

## 2018-11-12 PROCEDURE — 82330 ASSAY OF CALCIUM: CPT

## 2018-11-12 PROCEDURE — C1758: CPT

## 2018-11-12 PROCEDURE — 99285 EMERGENCY DEPT VISIT HI MDM: CPT | Mod: 25

## 2018-11-12 PROCEDURE — 80053 COMPREHEN METABOLIC PANEL: CPT

## 2018-11-12 PROCEDURE — C1769: CPT

## 2018-11-12 PROCEDURE — 74176 CT ABD & PELVIS W/O CONTRAST: CPT

## 2018-11-12 PROCEDURE — 50432 PLMT NEPHROSTOMY CATHETER: CPT

## 2018-11-12 PROCEDURE — 88341 IMHCHEM/IMCYTCHM EA ADD ANTB: CPT

## 2018-11-12 PROCEDURE — 85027 COMPLETE CBC AUTOMATED: CPT

## 2018-11-12 PROCEDURE — 71045 X-RAY EXAM CHEST 1 VIEW: CPT

## 2018-11-12 PROCEDURE — 84295 ASSAY OF SERUM SODIUM: CPT

## 2018-11-12 PROCEDURE — 82947 ASSAY GLUCOSE BLOOD QUANT: CPT

## 2018-11-12 PROCEDURE — P9016: CPT

## 2018-11-12 PROCEDURE — 87186 SC STD MICRODIL/AGAR DIL: CPT

## 2018-11-12 PROCEDURE — 82435 ASSAY OF BLOOD CHLORIDE: CPT

## 2018-11-12 PROCEDURE — 96374 THER/PROPH/DIAG INJ IV PUSH: CPT

## 2018-11-12 PROCEDURE — 93005 ELECTROCARDIOGRAM TRACING: CPT

## 2018-11-12 PROCEDURE — 76000 FLUOROSCOPY <1 HR PHYS/QHP: CPT

## 2018-11-12 PROCEDURE — C1729: CPT

## 2018-11-12 PROCEDURE — 82962 GLUCOSE BLOOD TEST: CPT

## 2018-11-12 PROCEDURE — 99238 HOSP IP/OBS DSCHRG MGMT 30/<: CPT | Mod: 24

## 2018-11-12 PROCEDURE — 86923 COMPATIBILITY TEST ELECTRIC: CPT

## 2018-11-12 PROCEDURE — 87040 BLOOD CULTURE FOR BACTERIA: CPT

## 2018-11-12 PROCEDURE — 81001 URINALYSIS AUTO W/SCOPE: CPT

## 2018-11-12 PROCEDURE — 86901 BLOOD TYPING SEROLOGIC RH(D): CPT

## 2018-11-12 PROCEDURE — 85730 THROMBOPLASTIN TIME PARTIAL: CPT

## 2018-11-12 PROCEDURE — 88342 IMHCHEM/IMCYTCHM 1ST ANTB: CPT

## 2018-11-12 PROCEDURE — 88305 TISSUE EXAM BY PATHOLOGIST: CPT

## 2018-11-12 PROCEDURE — 97162 PT EVAL MOD COMPLEX 30 MIN: CPT

## 2018-11-12 PROCEDURE — 85014 HEMATOCRIT: CPT

## 2018-11-12 PROCEDURE — C1894: CPT

## 2018-11-12 PROCEDURE — 87086 URINE CULTURE/COLONY COUNT: CPT

## 2018-11-12 PROCEDURE — 36430 TRANSFUSION BLD/BLD COMPNT: CPT

## 2018-11-12 PROCEDURE — 85018 HEMOGLOBIN: CPT

## 2018-11-12 PROCEDURE — 84132 ASSAY OF SERUM POTASSIUM: CPT

## 2018-11-12 PROCEDURE — 86850 RBC ANTIBODY SCREEN: CPT

## 2018-11-12 PROCEDURE — 85610 PROTHROMBIN TIME: CPT

## 2018-11-12 PROCEDURE — 99231 SBSQ HOSP IP/OBS SF/LOW 25: CPT

## 2018-11-12 RX ORDER — ACETAMINOPHEN WITH CODEINE 300MG-30MG
1 TABLET ORAL
Qty: 30 | Refills: 0 | OUTPATIENT
Start: 2018-11-12 | End: 2018-11-16

## 2018-11-12 RX ORDER — ASPIRIN/CALCIUM CARB/MAGNESIUM 324 MG
1 TABLET ORAL
Qty: 0 | Refills: 0 | COMMUNITY
Start: 2018-11-12

## 2018-11-12 RX ORDER — ASPIRIN/CALCIUM CARB/MAGNESIUM 324 MG
1 TABLET ORAL
Qty: 0 | Refills: 0 | COMMUNITY

## 2018-11-12 RX ORDER — ACETAMINOPHEN 500 MG
2 TABLET ORAL
Qty: 0 | Refills: 0 | COMMUNITY
Start: 2018-11-12

## 2018-11-12 RX ADMIN — POLYETHYLENE GLYCOL 3350 17 GRAM(S): 17 POWDER, FOR SOLUTION ORAL at 12:06

## 2018-11-12 RX ADMIN — Medication 50 MILLIGRAM(S): at 05:08

## 2018-11-12 RX ADMIN — HEPARIN SODIUM 5000 UNIT(S): 5000 INJECTION INTRAVENOUS; SUBCUTANEOUS at 05:08

## 2018-11-12 RX ADMIN — HEPARIN SODIUM 5000 UNIT(S): 5000 INJECTION INTRAVENOUS; SUBCUTANEOUS at 14:32

## 2018-11-12 RX ADMIN — Medication 100 MILLIGRAM(S): at 05:08

## 2018-11-12 RX ADMIN — Medication 1 TABLET(S): at 10:40

## 2018-11-12 RX ADMIN — Medication 6: at 08:11

## 2018-11-12 RX ADMIN — Medication 1 TABLET(S): at 03:42

## 2018-11-12 RX ADMIN — Medication 4: at 12:02

## 2018-11-12 RX ADMIN — ATROPA BELLADONNA AND OPIUM 1 SUPPOSITORY(S): 16.2; 6 SUPPOSITORY RECTAL at 05:09

## 2018-11-12 RX ADMIN — Medication 100 MILLIGRAM(S): at 14:32

## 2018-11-12 RX ADMIN — Medication 1 TABLET(S): at 10:07

## 2018-11-12 RX ADMIN — Medication 1 TABLET(S): at 04:12

## 2018-11-12 RX ADMIN — Medication 1 TABLET(S): at 14:32

## 2018-11-12 NOTE — PROGRESS NOTE ADULT - SUBJECTIVE AND OBJECTIVE BOX
UROLOGY PA PROGRESS NOTE:     Subjective:  no acute events overnight, complaining of occasional pain. s/p 2u prbc yesterday     Objective:  Vital signs  T(F): , Max: 99.3 (11-11-18 @ 15:30)  HR: 84 (11-12-18 @ 05:05)  BP: 132/71 (11-12-18 @ 05:05)  SpO2: 97% (11-12-18 @ 05:05)  Wt(kg): --    Output     SPT 150cc  R NT- 700cc  L NT- 950cc    Physical Exam:  Gen: NAD  Abd: soft, NT/ND, +SPT draining dark red  : b/l NT draining clear urine    Labs:  11-11  8.5   / 23.0  /x      11-11  9.1   / 20.7  /1.82                           8.0    8.5   )-----------( 174      ( 11 Nov 2018 21:38 )             23.0     11-11    131<L>  |  98  |  26<H>  ----------------------------<  182<H>  4.5   |  22  |  1.82<H>    Ca    8.4      11 Nov 2018 09:04      U: NGTD  B: NGTD

## 2018-11-12 NOTE — DISCHARGE NOTE ADULT - CARE PROVIDERS DIRECT ADDRESSES
[email or web address] [email or web address]

## 2018-11-12 NOTE — PROGRESS NOTE ADULT - ASSESSMENT
ASSESSMENT:  83 years old male with hematuria likely to advanced urethral cancer s/p cysto fulguration and b/l NT placement.   s/p 2u PRBC yesterday     PLAN:   CBC/BMP/ T&S  transfuse as needed  Follow NT, urine and blood cultures (no growth up to date)  enema as needed  pain control

## 2018-11-12 NOTE — DISCHARGE NOTE ADULT - HOSPITAL COURSE
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 started on CBI via SPT which was upsized. Underwent 11/7 OR cysto, clot evacuation and fulguration. Unable to stop bleeding, therefore underwent b/l NT placement to divert urine. SPT now with decreasing bloody output and NTx2 draining clear.   Received 2u prbc on 11/6, 11/8, and 11/11. Now AVSS and hemodynamically stable. Follow up with Dr. Oquendo (onc) tomorrow 11/13. 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 started on CBI via SPT which was upsized. Underwent 11/7 OR cysto, clot evacuation and fulguration. Unable to stop bleeding, therefore underwent b/l NT placement to divert urine. SPT now with decreasing bloody output and NTx2 draining clear.   Received 2u prbc on 11/6, 11/8, and 11/11. Now AVSS and hemodynamically stable. Follow up with Dr. Oquendo (onc) tomorrow 11/13.   b/l NT to drainage and SPT to drainage.

## 2018-11-12 NOTE — DISCHARGE NOTE ADULT - PLAN OF CARE
pain control Call the office if you have fever greater than 101, urine not draining, pain not relieved with pain medication, nausea/vomiting. recommend colace/senna while taking narcotic pain medication to avoid constipation. follow up with Dr. Oquendo as directed, 11/13. continue all home medication STOP eliquis for now until instructed otherwise by Dr. Burroughs Call the office if you have fever greater than 101, urine not draining, pain not relieved with pain medication, nausea/vomiting, lightheadedness, dizziness or feeling faint.   recommend colace/senna while taking narcotic pain medication to avoid constipation. follow up with Dr. Oquendo as directed, 11/13. STOP eliquis for now until instructed otherwise by Dr. Burroughs  continue baby aspirin 81mg daily

## 2018-11-12 NOTE — DISCHARGE NOTE ADULT - PATIENT PORTAL LINK FT
You can access the [facility] Patient [facility], offered by [facility], by registering with the following website: [email or web address]

## 2018-11-12 NOTE — DISCHARGE NOTE ADULT - MEDICATION SUMMARY - MEDICATIONS TO STOP TAKING
I will STOP taking the medications listed below when I get home from the hospital:    Augmentin 875 mg-125 mg oral tablet  -- 1 tab(s) by mouth every 12 hours   -- Finish all this medication unless otherwise directed by prescriber.  Take with food or milk.

## 2018-11-12 NOTE — DISCHARGE NOTE ADULT - CONDITIONS AT DISCHARGE
Pt. verbalized understanding of discharge instructions. Pt. alert and oriented x 4, ambulatory, voiding, tolerating diet, vss. Pt. verbalized understanding of medications prescribed and to follow up with MD in time ordered. IV lock removed and safety maintained. Pt home w/b/l neph tubes and suprapubic cath.

## 2018-11-12 NOTE — DISCHARGE NOTE ADULT - MEDICATION SUMMARY - MEDICATIONS TO TAKE
I will START or STAY ON the medications listed below when I get home from the hospital:    Tylenol with Codeine #3 oral tablet  -- 1 tab(s) by mouth every 6 hours MDD:4 doses  -- Caution federal law prohibits the transfer of this drug to any person other  than the person for whom it was prescribed.  May cause drowsiness.  Alcohol may intensify this effect.  Use care when operating dangerous machinery.  This product contains acetaminophen.  Do not use  with any other product containing acetaminophen to prevent possible liver damage.  Using more of this medication than prescribed may cause serious breathing problems.    -- Indication: For Pain    acetaminophen 325 mg oral tablet  -- 2 tab(s) by mouth every 6 hours, As needed, Mild Pain (1 - 3)  -- Indication: For Pain    lisinopril 10 mg oral tablet  -- 1 tab(s) by mouth once a day  -- Indication: For Htn    metFORMIN  -- 1000 milligram(s) by mouth 2 times a day  -- Indication: For dm    simvastatin  -- 10 milligram(s) by mouth once a day  -- Indication: For cholesterol    metoprolol succinate 50 mg oral tablet, extended release  -- 1 tab(s) by mouth once a day  -- Indication: For Afib    senna oral tablet  -- 2 tab(s) by mouth once a day (at bedtime), As Needed  -- Indication: For constipation    docusate sodium 100 mg oral capsule  -- 1 cap(s) by mouth 2 times a day, As Needed  -- Indication: For constipation    PreserVision  -- 2 tab(s) by mouth 2 times a day  -- Indication: For supplement I will START or STAY ON the medications listed below when I get home from the hospital:    acetaminophen 325 mg oral tablet  -- 2 tab(s) by mouth every 6 hours, As needed, Mild Pain (1 - 3)  -- Indication: For Pain    Tylenol with Codeine #3 oral tablet  -- 1 tab(s) by mouth every 4 hours, As Needed  -for moderate pain MDD:6 doses  -- Caution federal law prohibits the transfer of this drug to any person other  than the person for whom it was prescribed.  May cause drowsiness.  Alcohol may intensify this effect.  Use care when operating dangerous machinery.  This product contains acetaminophen.  Do not use  with any other product containing acetaminophen to prevent possible liver damage.  Using more of this medication than prescribed may cause serious breathing problems.    -- Indication: For Pain    aspirin 81 mg oral delayed release tablet  -- 1 tab(s) by mouth once a day  -- Indication: For Heart health    lisinopril 10 mg oral tablet  -- 1 tab(s) by mouth once a day  -- Indication: For Htn    metFORMIN  -- 1000 milligram(s) by mouth 2 times a day  -- Indication: For dm    simvastatin  -- 10 milligram(s) by mouth once a day  -- Indication: For cholesterol    metoprolol succinate 50 mg oral tablet, extended release  -- 1 tab(s) by mouth once a day  -- Indication: For Afib    senna oral tablet  -- 2 tab(s) by mouth once a day (at bedtime), As Needed  -- Indication: For constipation    docusate sodium 100 mg oral capsule  -- 1 cap(s) by mouth 2 times a day, As Needed  -- Indication: For constipation    PreserVision  -- 2 tab(s) by mouth 2 times a day  -- Indication: For supplement

## 2018-11-12 NOTE — DISCHARGE NOTE ADULT - NS AS ACTIVITY OBS
Showering allowed/No Heavy lifting/straining/Walking-Indoors allowed/Walking-Outdoors allowed/Stairs allowed

## 2018-11-12 NOTE — DISCHARGE NOTE ADULT - CARE PROVIDER_API CALL
Irvin Burroughs), Urology  [address] [place] [address]  Phone: [phone]  Fax: [phone]    Seth Oquendo), Hematology; Internal Medicine; Medical Oncology  [address] [place] [address]  Phone: [phone]  Fax: [phone]

## 2018-11-12 NOTE — DISCHARGE NOTE ADULT - CARE PLAN
Principal Discharge DX:	Gross hematuria  Goal:	pain control  Assessment and plan of treatment:	Call the office if you have fever greater than 101, urine not draining, pain not relieved with pain medication, nausea/vomiting. recommend colace/senna while taking narcotic pain medication to avoid constipation. follow up with Dr. Oquendo as directed, 11/13.  Secondary Diagnosis:	Essential hypertension  Assessment and plan of treatment:	continue all home medication  Secondary Diagnosis:	Paroxysmal A-fib  Assessment and plan of treatment:	STOP eliquis for now until instructed otherwise by Dr. Burroughs  Secondary Diagnosis:	Type 2 diabetes mellitus  Assessment and plan of treatment:	continue all home medication Principal Discharge DX:	Gross hematuria  Goal:	pain control  Assessment and plan of treatment:	Call the office if you have fever greater than 101, urine not draining, pain not relieved with pain medication, nausea/vomiting, lightheadedness, dizziness or feeling faint.   recommend colace/senna while taking narcotic pain medication to avoid constipation. follow up with Dr. Oquendo as directed, 11/13.  Secondary Diagnosis:	Essential hypertension  Assessment and plan of treatment:	continue all home medication  Secondary Diagnosis:	Paroxysmal A-fib  Assessment and plan of treatment:	STOP eliquis for now until instructed otherwise by Dr. Burroughs  continue baby aspirin 81mg daily  Secondary Diagnosis:	Type 2 diabetes mellitus  Assessment and plan of treatment:	continue all home medication

## 2018-11-12 NOTE — DISCHARGE NOTE ADULT - HOME CARE AGENCY
[place] [phone] for RN visit to assess for skilled nursing needs for start of care the day after discharge

## 2018-11-12 NOTE — PROGRESS NOTE ADULT - REASON FOR ADMISSION
gross hematuria

## 2018-11-12 NOTE — DISCHARGE NOTE ADULT - ADDITIONAL INSTRUCTIONS
follow up with Dr. Burroughs as instructed, call for appt.   Follow up with Dr. Oquendo 11/13 as scheduled

## 2018-11-12 NOTE — PROGRESS NOTE ADULT - ATTENDING COMMENTS
Pt seen/examined.  Case discussed with housestaff/PA team.  Agree with above note history, physical and assessment/plan.
Pt seen/examined.  Case discussed with housestaff/PA team.  Agree with above note history, physical and assessment/plan.
Pt seen/examined.  Case discussed with housestaff/PA team.  Agree with above note history, physical and assessment/plan.  Family meeting yesterday - hold off on palliative consult per pt/family request
Pt seen/examined.  Case discussed with housestaff/PA team.  Agree with above note history, physical and assessment/plan.  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  Cystoscopic findings reveal tumor appears to invading bladder neck and floor/trigone/UOs.  Area biopsied and fulgurated to control bleeding  Given TRENTON and hydro, suspect involvement/extension into UOs - will proceed with b/l NTs.  Family meeting/discussion this morning - case/plan discussed
Pt seen/examined.  Case discussed with housestaff/PA team.  Agree with above note history, physical and assessment/plan.  Biopsy ~1 wk ago showed malignancy - urothelial vs squamous.  Suspect local invasion/advanced disease from prostate into bladder/penis/pelvis.  Appr Med Onc recs.  Will consider staging w/u while inpatient.  Cont to wean CBI as possible, trend labs.  Repeat renal sono

## 2018-11-12 NOTE — PROGRESS NOTE ADULT - SUBJECTIVE AND OBJECTIVE BOX
Interventional Radiology    S/P bilateral nephrostomy tube POD # 4.     Vital Signs Last 24 Hrs  T(C): 36.9 (12 Nov 2018 13:31), Max: 37.4 (11 Nov 2018 15:30)  T(F): 98.5 (12 Nov 2018 13:31), Max: 99.3 (11 Nov 2018 15:30)  HR: 74 (12 Nov 2018 13:31) (74 - 88)  BP: 131/61 (12 Nov 2018 13:31) (116/57 - 136/64)  BP(mean): --  RR: 19 (12 Nov 2018 13:31) (17 - 19)  SpO2: 98% (12 Nov 2018 13:31) (94% - 98%)    General Appearance: NAD    Procedure Site (B/L flanks): clear urine output from bilateral nephrostomy tubes. bilateral catheters are intact and draining.  flushed bilateral PCN catheters without difficulty.    OUTPUTS:    Right Nephrostomy Tube: 11/12/18 7AM- 1175 mL, 11/11/17 1110 mL    Left Nephrostomy Tube: 11/12/18 7AM- 1475 mL, 11/11/17 1400 mL       LABS:                        8.0    7.0   )-----------( 174      ( 12 Nov 2018 07:09 )             23.6     11-12    130<L>  |  97  |  25<H>  ----------------------------<  195<H>  4.2   |  21<L>  |  1.71<H>    Ca    8.2<L>      12 Nov 2018 07:09        A/P  83y Male with prostate cancer with obstructive uropathy now S/P B/L PCN     Continue care as per primary team.    Continue to maintain dressings around bilateral nephrostomy tube sites and change as needed.   Monitor outputs from bilateral nephrostomy tubes.  The Nephrostomy tubes should be flushed if there is no output or if the tubes appear clogged (forward flush as ordered with 5 ml Sterile NS).    Pt will need VNS for catheter care when discharged.  I d/w covering RN to educate pt on flushing catheter and dressing changes and do b/l PCN dressing changes today.      IR contact information was given to patient's wife with instructions for the nephrostomy tubes to have routine exchange every 3 months.  IR outpatient scheduling office should be called to scheduled this at [phone].    KANDIS Smith  [facility] [address]  Ext [phone]

## 2018-11-13 ENCOUNTER — RESULT REVIEW (OUTPATIENT)
Age: 83
End: 2018-11-13

## 2018-11-13 ENCOUNTER — INBOUND DOCUMENT (OUTPATIENT)
Age: 83
End: 2018-11-13

## 2018-11-13 ENCOUNTER — APPOINTMENT (OUTPATIENT)
Dept: HEMATOLOGY ONCOLOGY | Facility: CLINIC | Age: 83
End: 2018-11-13
Payer: MEDICARE

## 2018-11-13 VITALS
SYSTOLIC BLOOD PRESSURE: 139 MMHG | HEIGHT: 69 IN | BODY MASS INDEX: 27.76 KG/M2 | OXYGEN SATURATION: 95 % | WEIGHT: 187.39 LBS | HEART RATE: 97 BPM | DIASTOLIC BLOOD PRESSURE: 61 MMHG | TEMPERATURE: 97.9 F | RESPIRATION RATE: 16 BRPM

## 2018-11-13 DIAGNOSIS — Z92.89 PERSONAL HISTORY OF OTHER MEDICAL TREATMENT: ICD-10-CM

## 2018-11-13 DIAGNOSIS — I48.0 PAROXYSMAL ATRIAL FIBRILLATION: ICD-10-CM

## 2018-11-13 DIAGNOSIS — Z81.8 FAMILY HISTORY OF OTHER MENTAL AND BEHAVIORAL DISORDERS: ICD-10-CM

## 2018-11-13 DIAGNOSIS — Z87.891 PERSONAL HISTORY OF NICOTINE DEPENDENCE: ICD-10-CM

## 2018-11-13 DIAGNOSIS — I35.0 NONRHEUMATIC AORTIC (VALVE) STENOSIS: ICD-10-CM

## 2018-11-13 DIAGNOSIS — I10 ESSENTIAL (PRIMARY) HYPERTENSION: ICD-10-CM

## 2018-11-13 DIAGNOSIS — H35.30 UNSPECIFIED MACULAR DEGENERATION: ICD-10-CM

## 2018-11-13 DIAGNOSIS — E11.9 TYPE 2 DIABETES MELLITUS W/OUT COMPLICATIONS: ICD-10-CM

## 2018-11-13 DIAGNOSIS — N18.3 CHRONIC KIDNEY DISEASE, STAGE 3 (MODERATE): ICD-10-CM

## 2018-11-13 DIAGNOSIS — Z80.42 FAMILY HISTORY OF MALIGNANT NEOPLASM OF PROSTATE: ICD-10-CM

## 2018-11-13 LAB
BASOPHILS # BLD AUTO: 0 K/UL — SIGNIFICANT CHANGE UP (ref 0–0.2)
BASOPHILS NFR BLD AUTO: 0.1 % — SIGNIFICANT CHANGE UP (ref 0–2)
CULTURE RESULTS: SIGNIFICANT CHANGE UP
EOSINOPHIL # BLD AUTO: 0.1 K/UL — SIGNIFICANT CHANGE UP (ref 0–0.5)
EOSINOPHIL NFR BLD AUTO: 0.4 % — SIGNIFICANT CHANGE UP (ref 0–6)
HCT VFR BLD CALC: 25.7 % — LOW (ref 39–50)
HGB BLD-MCNC: 8.5 G/DL — LOW (ref 13–17)
LYMPHOCYTES # BLD AUTO: 0.6 K/UL — LOW (ref 1–3.3)
LYMPHOCYTES # BLD AUTO: 5.2 % — LOW (ref 13–44)
MCHC RBC-ENTMCNC: 28.2 PG — SIGNIFICANT CHANGE UP (ref 27–34)
MCHC RBC-ENTMCNC: 33.3 G/DL — SIGNIFICANT CHANGE UP (ref 32–36)
MCV RBC AUTO: 84.8 FL — SIGNIFICANT CHANGE UP (ref 80–100)
MONOCYTES # BLD AUTO: 0.9 K/UL — SIGNIFICANT CHANGE UP (ref 0–0.9)
MONOCYTES NFR BLD AUTO: 7.3 % — SIGNIFICANT CHANGE UP (ref 2–14)
NEUTROPHILS # BLD AUTO: 10.2 K/UL — HIGH (ref 1.8–7.4)
NEUTROPHILS NFR BLD AUTO: 87 % — HIGH (ref 43–77)
PLATELET # BLD AUTO: 206 K/UL — SIGNIFICANT CHANGE UP (ref 150–400)
RBC # BLD: 3.03 M/UL — LOW (ref 4.2–5.8)
RBC # FLD: 13.2 % — SIGNIFICANT CHANGE UP (ref 10.3–14.5)
RETICS #: 94.6 K/UL — SIGNIFICANT CHANGE UP (ref 25–125)
RETICS/RBC NFR: 3.1 % — HIGH (ref 0.5–2.5)
SPECIMEN SOURCE: SIGNIFICANT CHANGE UP
WBC # BLD: 11.8 K/UL — HIGH (ref 3.8–10.5)
WBC # FLD AUTO: 11.8 K/UL — HIGH (ref 3.8–10.5)

## 2018-11-13 PROCEDURE — 99215 OFFICE O/P EST HI 40 MIN: CPT

## 2018-11-13 NOTE — REASON FOR VISIT
[Initial Consultation] : an initial consultation [Spouse] : spouse [Family Member] : family member [FreeTextEntry2] : prostate cancer/urothelial cancer

## 2018-11-13 NOTE — REVIEW OF SYSTEMS
[Fatigue] : fatigue [Vision Problems] : vision problems [Lower Ext Edema] : lower extremity edema [Constipation] : constipation [Difficulty Walking] : difficulty walking [Easy Bruising] : a tendency for easy bruising [Fever] : no fever [Chills] : no chills [Night Sweats] : no night sweats [Dysphagia] : no dysphagia [Loss of Hearing] : no loss of hearing [Nosebleeds] : no nosebleeds [Hoarseness] : no hoarseness [Odynophagia] : no odynophagia [Mucosal Pain] : no mucosal pain [Chest Pain] : no chest pain [Palpitations] : no palpitations [Cough] : no cough [SOB on Exertion] : no shortness of breath during exertion [Abdominal Pain] : no abdominal pain [Vomiting] : no vomiting [Diarrhea] : no diarrhea [Dysuria] : no dysuria [Joint Pain] : no joint pain [Joint Stiffness] : no joint stiffness [Muscle Pain] : no muscle pain [Skin Rash] : no skin rash [Dizziness] : no dizziness [Fainting] : no fainting [Anxiety] : no anxiety [Depression] : no depression [Muscle Weakness] : no muscle weakness [Easy Bleeding] : no tendency for easy bleeding [FreeTextEntry2] : occ low grade temps, appetite is good, but lost weight, about 35 pounds since May [FreeTextEntry4] : dry mouth [FreeTextEntry5] : c [FreeTextEntry6] : chronic mild SOB, with exertion [FreeTextEntry8] : hematuria, suprapubic catheter [de-identified] : no headaches, no parethesias

## 2018-11-13 NOTE — RESULTS/DATA
[FreeTextEntry1] : EXAM: CT ABDOMEN AND PELVIS \par PROCEDURE DATE: 11/05/2018 \par INTERPRETATION: CLINICAL INFORMATION: Pelvic pain. History of prostate cancer. Groin pain. Evaluate for abscess. \par COMPARISON: CT abdomen pelvis 8/23/2018. CT pelvis 10/24/2018. MRI pelvis 10/23/2018. \par PROCEDURE: \par CT of the Abdomen and Pelvis was performed without intravenous contrast. Intravenous contrast: None. Oral contrast: None. \par Sagittal and coronal reformats were performed. \par FINDINGS: \par LOWER CHEST: Status post median sternotomy and aortic valve replacement. Mitral annular calcifications. Coronary arterial calcifications. Calcified \par granulomas in the left lower lobe. Few scattered left upper and lower lobe pulmonary nodules largest measuring 5 mm within the lingula (series 2, image 3). \par Evaluation of vascular structures and abdominal organs is limited without the administration of intravenous contrast. \par LIVER: Hepatic steatosis. \par BILE DUCTS: Normal caliber. \par GALLBLADDER: Within normal limits. \par SPLEEN: Enlarged measuring up to 15.1 cm. \par PANCREAS: Within normal limits. \par ADRENALS: Within normal limits. \par KIDNEYS/URETERS: Moderate bilateral hydroureteronephrosis to the level of the urinary bladder and bilateral perinephric fat stranding. 1.6 cm right \par interpole renal cyst. No nephrolithiasis. \par BLADDER: Suprapubic catheter with balloon inflated within the lumen of the bladder. Urinary bladder contains fluid as well as heterogeneously hyperdense material and air. \par REPRODUCTIVE ORGANS: Difficult to delineate fluid collection at the base of the penile shaft measuring approximately 3.6 x 2.5 cm, without the administration of intravenous contrast material. Prostatic radiation seeds. \par The prostate is enlarged. \par BOWEL: No bowel obstruction. Mild sigmoid diverticulosis. Appendix is within normal limits. \par PERITONEUM: No ascites. \par VESSELS: Atherosclerosis of the abdominal aorta and its branch vessels. \par RETROPERITONEUM: No lymphadenopathy. \par ABDOMINAL WALL: Fat-containing bilateral inguinal hernias, left greater than right. Small fat-containing umbilical hernia. \par BONES: Multilevel degenerative changes of the spine. \par IMPRESSION: \par Relatively stable appearing fluid collection in the base of the penile shaft, which is not well evaluated on this noncontrast examination and more \par clearly defined on recent MRI pelvis from 10/23/2018. \par Moderate bilateral hydroureteronephrosis to the level of the urinary bladder as well as bilateral perinephric stranding and inflammatory change \par surrounding the urinary bladder. Correlate with urinalysis for evidence of underlying infection. \par Heterogeneous hyperdensity within the urinary bladder lumen, may represent blood products, however underlying lesion cannot be excluded. \par Few left upper and lower lobe pulmonary nodules measuring up to 5 mm. \par LEIGH ANN ALFORD M.D., RADIOLOGY RESIDENT \par This document has been electronically signed. \par MARCOS BLUNT M.D., ATTENDING RADIOLOGIST \par ***********************************************************\par EXAM: MR PELVIS WAW IC \par PROCEDURE DATE: 10/23/2018 \par INTERPRETATION: CLINICAL INFORMATION: History of prostate cancer with previously drained abscess involving the seminal vesicles. Persistent pain and penile pain \par COMPARISON: CT dated 8/18, outside MRI dated 8/18 \par PROCEDURE: \par MRI of the pelvis was performed with and without intravenous contrast. 9 cc of Gadavist 1 cc discarded \par FINDINGS: \par REPRODUCTIVE ORGANS: Marked heterogeneous enhancement is seen in the region of the previously treated prostate. Signal void from the descending markers \par and radiation seeds are identified. In the area the patient's previous collection there is a thick walled residual collection best seen series 16 image 40 measuring 5.0 x 1.8 cm in size. There appears be continuous abnormal enhancement extending into the base of the penis with multiple focal areas of ringlike abnormal heterogeneous enhancement involving both the corpus cavernosum and spongiosum. Whether this represents multiple small abscesses versus metastatic disease is in the differential. This was not \par identified on the prior examination dated 8/18 and therefore is likely infectious in etiology. \par BLADDER: Thick wall with a suprapubic catheter in place. There is marked thick-walled abnormality involving the posterior bladder wall with diffuse enhancement \par LYMPH NODES: 1 cm left external iliac lymph node. \par VISUALIZED PORTIONS: \par ABDOMINAL ORGANS: Within normal limits. \par BOWEL: Within normal limits. \par PERITONEUM: No ascites. \par VESSELS: Within normal limits. \par ABDOMINAL WALL: Abnormal enhancement extending into the left obturator externus musculature \par BONES: Within normal limits. \par IMPRESSION: \par Persistent thick-walled collection in the region of the seminal vesicles measuring 5.0 x 1.8 cm in size. This is contiguous with a diffusely \par heterogeneous abnormal appearing post therapeutic prostate. There is marked abnormality involving the penis with multiple areas of abnormal enhancement \par some with ringlike characteristics. In addition there is marked thickening of the posterior lower aspect of the urinary bladder. Given the rapid change \par from prior MRI this likely represents infiltrating infection as opposed to extension of prostate cancer. \par KOBE STEINER M.D., ATTENDING RADIOLOGIST \par This document has been electronically signed. Oct 23 2018 1:48PM \par **************************************************************************\par Final Diagnosis\par PENILE NODULE, CT GUIDED, CORE BIOPSY\par POSITIVE FOR MALIGNANT CELLS.  \par  Urothelial vs. Squamous  cell carcinoma favor the former\par Touch prep and core biopsy display hypercellular specimen composed of flat sheets, glands and single lying malignant polygonal cells with enlarged hyperchromatic nuclei infiltrating fibroconnective, muscular and vascular tissue. The tissue is poorly preserved precluding accurate classification. The pattern of staining may be seen in tumors of squamous and urothelial origin. Based on morphology urothelial is favored over squamous cell carcinoma. Please correlate clinically to confirm the origin.\par Immunohistochemistry: The neoplastic cells are positive for YURIY-3, CK7, P40 and P63 and negative for PSA and PSAP. This immunostaining pattern supports the above findings.\par Please also refer to specimen number  [id number]-\par Case reported to Tumor Registry.\par This case was reviewed for  with Dr. Gamble who concur(s) with the diagnosis on 10/31/18 \par Report faxed to Dr. Irvin Burroughs's office on 10/31/18\par Screened by: Idalia WORTHINGTON (ASCP)\par Verified by: ABISAI Landaverde\par (Electronic Signature)\par Reported on: 10/31/18 13:39 EDT, [address] [place] [address]\par Cytology technical processing performed at [address] [place] [id number]\par ***********************************************\par Final Diagnosis\par PROSTATE, CT GUIDED, FNA\par POSITIVE FOR MALIGNANT CELLS.  \par Urothelial Carcinoma vs. Squamous carcinoma\par Cytology slides and cell block display a cellular specimen composed of crowded 3-dimensional groups and single lying malignant cells with enlarged nuclei containing prominent nucleoli and vacuolated cytoplasm in a background of acute inflammation and macrophages. Papillary structures are present. This tumor is mophologically similar to the tumor from the penile nodule (10-FN-). The differential diagnosis includes urothelial vs. squamous cell carcinoma the latter is favored.\par Immunohistochemistry: The cells with vacuolated cytoplasm are positve for AE 1/3 and negative for  supporting epithelial nature of the cells.\par Please also refer to specimen number [id number]-\par Case reported to Tumor Registry.\par This case was reviewed for  with staff pathologist who concur(s) with the diagnosis on 10/31/18 \par Report faxed to Dr. Irvin Burroughs's office on 10/31/18\par Screened by: Idalia WORTHINGTON (ASCP)\par Verified by: ABISAI Landaverde\par (Electronic Signature)\par Reported on: 10/31/18 14:17 EDT, [address] [place] [address]\par Cytology technical processing performed at [address] [place] [id number]

## 2018-11-13 NOTE — PHYSICAL EXAM
[Restricted in physically strenuous activity but ambulatory and able to carry out work of a light or sedentary nature] : Status 1- Restricted in physically strenuous activity but ambulatory and able to carry out work of a light or sedentary nature, e.g., light house work, office work [Normal] : affect appropriate [de-identified] : RRR, S2 increased with brief FUNMILAYO, II/VI [de-identified] : no gynecomastia [de-identified] : suprapubic tube, bilateral PCNs, no H/S -megaly or masses, mild suprapubic tenderness [FreeTextEntry1] : deferred [de-identified] : uncircumcised, difficult to retract foreskin due to pain, penic induated throughout from the glans to the penile base in the perneum, with tendeness to paplpation, no skin changes, testes tender, but normal

## 2018-11-13 NOTE — ASSESSMENT
[Palliative Care Plan] : not applicable at this time [FreeTextEntry1] : Mr. Nation was seen in the office today in consultation, accompanied by his wife and his son. He was diagnosed with prostate cancer in 2006 and had seed implantation performed. He has had no prostatic tissue since that time. In May of 2018, he developed urinary retention. He went to the emergency room at the [facility] and a Dooley catheter was placed. He saw his primary urologist and a CT scan was performed on May 21. Along with a bone scan on May 24. An MRI of the prostate was subsequently performed.  He had a transurethral resection of the prostate performed at [facility] and the prostate was described as "cement" according to his wife. He was then referred to Dr. Burroughs for evaluation. He was sent from the office to the hospital due to the apparent abscess. He then required placement of a suprapubic tube as he was unable to pass urine. A sigmoidoscopy was performed at some point showing some small rectal ulcerations, which was attributed to the prior radiation therapy and they have been the reason for the abscess formation. He has had pain which has persisted over time which started before the transurethral resection of the prostate in May. The pain continues to worsen. He had difficulty tolerating oxycodone and had delirium. He seems to be okay on Tylenol with codeine. He has pain in the penis and testicles as well as the perineum. He has difficulty sitting in a chair for too long a period of time especially of the chair his heart. The pain is described as both stabbing and burning. It does awaken him at times at night.\par \par A biopsy of the prostate revealed metastatic urothelial carcinoma, and a biopsy of his injury to penis revealed the same.\par \par On physical examination, there was some mild suprapubic tenderness. A suprapubic tube is present. There was no enlargement of liver or spleen, and no masses were appreciated. A rectal examination was deferred. The penis is uncircumcised with difficult to retract the foreskin. He has significant pain. The penis is indurated throughout from the glans of the penile base and the perineum with some tenderness to palpation and movement.\par \par Recent bloodwork reveals a creatinine of 1.71 which is trending downwards. This was from his hospital admission yesterday. He would not be a candidate for the use of cisplatin based therapy at this time. We discussed the management of metastatic urothelial carcinoma. His disease is actually locally advanced at this point. He has pain in infiltration of the penis and perineum. He may benefit from chemotherapy in terms of pain relief. He was told that his disease is not curable, and the goal will be to control his disease. He also needs better control of his pain, and have given him a low-dose patch of fentanyl with instructions as to how to use it. He may continue to use the Tylenol with codeine as well. We discussed management of his constipation, which she seems to have under control at this time.\par \par We discussed the role of carboplatin and gemcitabine in the management of metastatic urothelial carcinoma. The preferred agent will be cisplatin, but he cannot receive this due to his renal function impairment.\par \par I described the logistics of chemotherapy administration as well as the toxicities that may occur, both expected as well as an expected. Written informed consent was obtained. All questions were answered to the best of my ability and to his apparent satisfaction. We will begin his chemotherapy as soon as possible.

## 2018-11-13 NOTE — HISTORY OF PRESENT ILLNESS
[Disease: _____________________] : Disease: [unfilled] [T: ___] : T[unfilled] [de-identified] : This 83 year old man is seen in consultation on November 13, 2018. He was diagnosed with prostate cancer in 2006 and had seed implantation. He has had no issues since that time. In May, he developed urinary retention. 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 He went to his urologist, . He had a CT on 5/21/18, a bone scan on 5/24/18, MRI of prostate on 8/15/18, MRI prostate 10/23/18. He had a TURP at [facility] and the prostate was described as cement, according to the wife. This is not available. He was referred to Irvin Burroughs. He was sent to [facility] from the office as there was an apparent abscess. He had the abscess drained. Discharged home. He then had a suprapubic tune after urodynamic studies in September 2018. He had a sigmoidoscopy showing rectal ulcerations, attributed to prior RT. The pain persisted over time, started before the TURP in May 2018. The pain continues to worsen. The pain is in the penis and testicles as well as the perineum. He has trouble sitting in a hard chair. It is described as stabbing and burning. It does awaken him at night. He was receiving morphine in the hospital with brief relief. On Tylenol #3 at this time, his wife says it makes him "loopy". He prefers the Tylenol #3 as it provides a slightly longer benefit, about 3 hours. Pain currently at 9, best in past 324 hours at 5-6, worst at 9. The pain has been much worse over the past 2 months. Just recently hospitalized at [facility], discharged last night. He was having a lot of blood in the bag as well as around the suprapubic. he apparently had clot retention. PCNs were placed on November 8, 2018. Cysto was done on 11/7/18. He had a biopsy of the penis, revealing met urothelial cancer. A prostate biopsy was done, showing the same.  He received 6 units of blood while in the hospital. Appetite has been good, but has lost about 35 pounds over the course of this illness. \par \par He had delirium from oxycodone during on hospitalization.

## 2018-11-14 ENCOUNTER — APPOINTMENT (OUTPATIENT)
Age: 83
End: 2018-11-14

## 2018-11-14 LAB
ALBUMIN SERPL ELPH-MCNC: 3.1 G/DL
ALP BLD-CCNC: 77 U/L
ALT SERPL-CCNC: 6 U/L
ANION GAP SERPL CALC-SCNC: 14 MMOL/L
AST SERPL-CCNC: 10 U/L
BILIRUB SERPL-MCNC: 0.3 MG/DL
BUN SERPL-MCNC: 20 MG/DL
CALCIUM SERPL-MCNC: 8.7 MG/DL
CHLORIDE SERPL-SCNC: 94 MMOL/L
CO2 SERPL-SCNC: 20 MMOL/L
CREAT SERPL-MCNC: 1.72 MG/DL
GLUCOSE SERPL-MCNC: 320 MG/DL
HBV CORE IGG+IGM SER QL: NONREACTIVE
HBV SURFACE AB SER QL: NONREACTIVE
HBV SURFACE AG SER QL: NONREACTIVE
HCV AB SER QL: NONREACTIVE
HCV S/CO RATIO: 0.32 S/CO
POTASSIUM SERPL-SCNC: 4.6 MMOL/L
PROT SERPL-MCNC: 6.2 G/DL
SODIUM SERPL-SCNC: 129 MMOL/L
SURGICAL PATHOLOGY STUDY: SIGNIFICANT CHANGE UP

## 2018-11-16 ENCOUNTER — APPOINTMENT (OUTPATIENT)
Dept: UROLOGY | Facility: CLINIC | Age: 83
End: 2018-11-16

## 2018-11-17 ENCOUNTER — MESSAGE (OUTPATIENT)
Age: 83
End: 2018-11-17

## 2018-11-17 ENCOUNTER — EMERGENCY (EMERGENCY)
Facility: HOSPITAL | Age: 83
LOS: 1 days | Discharge: HOME | End: 2018-11-17
Attending: EMERGENCY MEDICINE
Payer: MEDICARE

## 2018-11-17 VITALS
SYSTOLIC BLOOD PRESSURE: 91 MMHG | HEIGHT: 69 IN | HEART RATE: 92 BPM | DIASTOLIC BLOOD PRESSURE: 52 MMHG | WEIGHT: 188.94 LBS | RESPIRATION RATE: 18 BRPM | TEMPERATURE: 98 F

## 2018-11-17 VITALS
SYSTOLIC BLOOD PRESSURE: 119 MMHG | OXYGEN SATURATION: 100 % | DIASTOLIC BLOOD PRESSURE: 62 MMHG | HEART RATE: 78 BPM | RESPIRATION RATE: 16 BRPM

## 2018-11-17 DIAGNOSIS — Z95.2 PRESENCE OF PROSTHETIC HEART VALVE: Chronic | ICD-10-CM

## 2018-11-17 DIAGNOSIS — Z90.79 ACQUIRED ABSENCE OF OTHER GENITAL ORGAN(S): Chronic | ICD-10-CM

## 2018-11-17 LAB
ALBUMIN SERPL ELPH-MCNC: 2.8 G/DL — LOW (ref 3.3–5)
ALP SERPL-CCNC: 73 U/L — SIGNIFICANT CHANGE UP (ref 40–120)
ALT FLD-CCNC: 6 U/L — LOW (ref 10–45)
ANION GAP SERPL CALC-SCNC: 15 MMOL/L — SIGNIFICANT CHANGE UP (ref 5–17)
AST SERPL-CCNC: 9 U/L — LOW (ref 10–40)
BASOPHILS # BLD AUTO: 0 K/UL — SIGNIFICANT CHANGE UP (ref 0–0.2)
BASOPHILS NFR BLD AUTO: 0 % — SIGNIFICANT CHANGE UP (ref 0–2)
BILIRUB SERPL-MCNC: 0.3 MG/DL — SIGNIFICANT CHANGE UP (ref 0.2–1.2)
BUN SERPL-MCNC: 21 MG/DL — SIGNIFICANT CHANGE UP (ref 7–23)
CALCIUM SERPL-MCNC: 8.9 MG/DL — SIGNIFICANT CHANGE UP (ref 8.4–10.5)
CHLORIDE SERPL-SCNC: 97 MMOL/L — SIGNIFICANT CHANGE UP (ref 96–108)
CO2 SERPL-SCNC: 21 MMOL/L — LOW (ref 22–31)
CREAT SERPL-MCNC: 1.35 MG/DL — HIGH (ref 0.5–1.3)
EOSINOPHIL # BLD AUTO: 0.1 K/UL — SIGNIFICANT CHANGE UP (ref 0–0.5)
EOSINOPHIL NFR BLD AUTO: 0.8 % — SIGNIFICANT CHANGE UP (ref 0–6)
GLUCOSE SERPL-MCNC: 205 MG/DL — HIGH (ref 70–99)
HCT VFR BLD CALC: 25 % — LOW (ref 39–50)
HGB BLD-MCNC: 8.6 G/DL — LOW (ref 13–17)
INR BLD: 1.25 RATIO — HIGH (ref 0.88–1.16)
LYMPHOCYTES # BLD AUTO: 0.8 K/UL — LOW (ref 1–3.3)
LYMPHOCYTES # BLD AUTO: 9.4 % — LOW (ref 13–44)
MCHC RBC-ENTMCNC: 28.8 PG — SIGNIFICANT CHANGE UP (ref 27–34)
MCHC RBC-ENTMCNC: 34.3 GM/DL — SIGNIFICANT CHANGE UP (ref 32–36)
MCV RBC AUTO: 84.1 FL — SIGNIFICANT CHANGE UP (ref 80–100)
MONOCYTES # BLD AUTO: 0.8 K/UL — SIGNIFICANT CHANGE UP (ref 0–0.9)
MONOCYTES NFR BLD AUTO: 9.2 % — SIGNIFICANT CHANGE UP (ref 2–14)
NEUTROPHILS # BLD AUTO: 6.9 K/UL — SIGNIFICANT CHANGE UP (ref 1.8–7.4)
NEUTROPHILS NFR BLD AUTO: 80.6 % — HIGH (ref 43–77)
PLATELET # BLD AUTO: 211 K/UL — SIGNIFICANT CHANGE UP (ref 150–400)
POTASSIUM SERPL-MCNC: 4.7 MMOL/L — SIGNIFICANT CHANGE UP (ref 3.5–5.3)
POTASSIUM SERPL-SCNC: 4.7 MMOL/L — SIGNIFICANT CHANGE UP (ref 3.5–5.3)
PROT SERPL-MCNC: 6.5 G/DL — SIGNIFICANT CHANGE UP (ref 6–8.3)
PROTHROM AB SERPL-ACNC: 14.3 SEC — HIGH (ref 10–12.9)
RBC # BLD: 2.98 M/UL — LOW (ref 4.2–5.8)
RBC # FLD: 13 % — SIGNIFICANT CHANGE UP (ref 10.3–14.5)
SODIUM SERPL-SCNC: 133 MMOL/L — LOW (ref 135–145)
WBC # BLD: 8.5 K/UL — SIGNIFICANT CHANGE UP (ref 3.8–10.5)
WBC # FLD AUTO: 8.5 K/UL — SIGNIFICANT CHANGE UP (ref 3.8–10.5)

## 2018-11-17 PROCEDURE — 99284 EMERGENCY DEPT VISIT MOD MDM: CPT | Mod: 25

## 2018-11-17 PROCEDURE — 85610 PROTHROMBIN TIME: CPT

## 2018-11-17 PROCEDURE — 51705 CHANGE OF BLADDER TUBE: CPT

## 2018-11-17 PROCEDURE — 82962 GLUCOSE BLOOD TEST: CPT

## 2018-11-17 PROCEDURE — 99284 EMERGENCY DEPT VISIT MOD MDM: CPT | Mod: GC

## 2018-11-17 PROCEDURE — 85027 COMPLETE CBC AUTOMATED: CPT

## 2018-11-17 PROCEDURE — 80053 COMPREHEN METABOLIC PANEL: CPT

## 2018-11-17 RX ORDER — ACETAMINOPHEN WITH CODEINE 300MG-30MG
5 TABLET ORAL ONCE
Qty: 0 | Refills: 0 | Status: DISCONTINUED | OUTPATIENT
Start: 2018-11-17 | End: 2018-11-17

## 2018-11-17 RX ORDER — ACETAMINOPHEN WITH CODEINE 300MG-30MG
1 TABLET ORAL EVERY 4 HOURS
Qty: 0 | Refills: 0 | Status: DISCONTINUED | OUTPATIENT
Start: 2018-11-17 | End: 2018-11-17

## 2018-11-17 RX ADMIN — Medication 1 TABLET(S): at 12:39

## 2018-11-17 RX ADMIN — Medication 1 TABLET(S): at 12:09

## 2018-11-17 NOTE — ED ADULT NURSE NOTE - NSIMPLEMENTINTERV_GEN_ALL_ED
Implemented All Fall with Harm Risk Interventions:  Platinum to call system. Call bell, personal items and telephone within reach. Instruct patient to call for assistance. Room bathroom lighting operational. Non-slip footwear when patient is off stretcher. Physically safe environment: no spills, clutter or unnecessary equipment. Stretcher in lowest position, wheels locked, appropriate side rails in place. Provide visual cue, wrist band, yellow gown, etc. Monitor gait and stability. Monitor for mental status changes and reorient to person, place, and time. Review medications for side effects contributing to fall risk. Reinforce activity limits and safety measures with patient and family. Provide visual clues: red socks.

## 2018-11-17 NOTE — PROCEDURE NOTE - NSURITECHNIQUE_GU_A_CORE
Proper hand hygiene was performed/A sterile drape was used to cover all adjacent areas/The catheter was secured with a securement device (e.g. StatLock)/The urinary drainage system is closed at the end of the procedure/The collection bag is below the level of the patient and urinary bladder/The catheter was appropriately lubricated/Sterile gloves were worn for the duration of the procedure

## 2018-11-17 NOTE — ED PROVIDER NOTE - MEDICAL DECISION MAKING DETAILS
83y male w/ bladder CA presenting to ED for suprapubic catheter reinsertion. Urology consulted. 83y male w/ bladder CA presenting to ED for suprapubic catheter reinsertion. Urology consulted.  Attending Sheila Conte: 84 y/o male with multiple medical issues presenting with concern for new suprapubic catheter. no fevers or chills. d/w urology who will replace tube. likely d/c

## 2018-11-17 NOTE — ED PROVIDER NOTE - OBJECTIVE STATEMENT
83y male w/ bladder CA s/p b/l nephrostomy tube & suprapubic catheter placement 11/8/18 p/w urinary catheter complications. Pt reports that suprapubic catheter fell out after shower this AM. His wife attempted to reinsert it but it fell out again. Pt recently diagnosed w/ bladder CA & is to start chemo & radiation on Monday. Recently hospitalized for complications w/ suprapubic catheter 2/2 clots, now w/ better drainage after b/l nephrostomy placement. Pt now on 12mcg fentanyl patch and Tylenol #3 w/ codeine 300-30mg for pain.

## 2018-11-17 NOTE — ED ADULT NURSE NOTE - OBJECTIVE STATEMENT
83 yrs old male with h/o bladder cancer present to the ER because his suprapubic catheter became dislodged. As per pt this happened a couple of times in the past. Pt also have 2 nephrostomy tube insitu. Pt reports pain to the abd area with pain level of 6/10 on pain scale and aching in quality. Urology team made aware that pt is in the ER .

## 2018-11-17 NOTE — ED PROVIDER NOTE - ATTENDING CONTRIBUTION TO CARE
Attending MD Sheila Conte:  I personally have seen and examined this patient.  Resident note reviewed and agree on plan of care and except where noted.  See HPI, PE, and MDM for details.

## 2018-11-17 NOTE — ED ADULT TRIAGE NOTE - CHIEF COMPLAINT QUOTE
suprapubic catheter fell out this AM after shower. bilateral nephrostomy tubes placed 11/8/18. patient recently diagnosed with bladder cancer, no chemo or radiation just yet. about to start on monday

## 2018-11-17 NOTE — ED PROVIDER NOTE - PHYSICAL EXAMINATION
gen: well appearing  Mentation: AAO x 3  psych: mood appropriate  ENT: airway patent  Eyes: conjunctivae clear bilaterally  Cardio: Normal rate, regular rhythm, no m/r/g  Resp: CTA b/l  GI: s/nt/nd   : b/l nephrostomy tubes in place draining clear yellow urine. Suprapubic catheter bag attached to left leg w/ concentrated dark urine. Suprapubic insertion site clean, dry, intact, no erythema, or induration  Neuro: AAO x 3, sensation and motor function intact, CN 2-12 intact  Skin: No evidence of rash. Fentanyl patch on upper Right chest  MSK: normal movement of all extremities gen: well appearing  Mentation: AAO x 3  psych: mood appropriate  ENT: airway patent  Eyes: conjunctivae clear bilaterally  Cardio: Normal rate, regular rhythm, no m/r/g  Resp: CTA b/l  GI: s/nt/nd   : b/l nephrostomy tubes in place draining clear yellow urine. Suprapubic catheter bag attached to left leg w/ concentrated dark urine. Suprapubic insertion site clean, dry, intact, no erythema, or induration  Neuro: AAO x 3, sensation and motor function intact, CN 2-12 intact  Skin: No evidence of rash. Fentanyl patch on upper Right chest  MSK: normal movement of all extremities  Attending Sheila Conte: Gen: NAD, heent: atrauamtic, eomi, perrla, mmm, op pink, uvula midline, neck; nttp, no nuchal rigidity, chest: nttp, no crepitus, cv: rrr, no murmurs, lungs: ctab, abd: soft, nontender, nondistended, no peritoneal signs, +BS, no guarding, ext: wwp, b/l nephrostomy tubes in placed. , skin: no rash, neuro: awake and alert, following commands, speech clear, sensation and strength intact, no focal deficits

## 2018-11-17 NOTE — ED PROVIDER NOTE - NS ED ROS FT
Constitutional: no fever  Eyes: no conjunctivitis  Ears: no ear pain   Nose: no nasal congestion, Mouth/Throat: no throat pain, Neck: no stiffness  Cardiovascular: no chest pain  Chest: no cough  Gastrointestinal: no abdominal pain, no vomiting and diarrhea  MSK: no joint pain  : endorses pain in genital area 2/2 bladder CA  Skin: no rash  Neuro: no LOC

## 2018-11-17 NOTE — PROCEDURE NOTE - ADDITIONAL PROCEDURE DETAILS
Patient s/p SPT 9/2018 presents to ED for dislodged PEG tube, new 24 [place] catheter placed under aseptic technique with out incident. Catheter irrigated with NS and light pink urine evacuated.

## 2018-11-19 ENCOUNTER — APPOINTMENT (OUTPATIENT)
Dept: INFUSION THERAPY | Facility: HOSPITAL | Age: 83
End: 2018-11-19

## 2018-11-19 ENCOUNTER — RESULT REVIEW (OUTPATIENT)
Age: 83
End: 2018-11-19

## 2018-11-19 LAB
HCT VFR BLD CALC: 26.8 % — LOW (ref 39–50)
HGB BLD-MCNC: 8.5 G/DL — LOW (ref 13–17)
MCHC RBC-ENTMCNC: 26.7 PG — LOW (ref 27–34)
MCHC RBC-ENTMCNC: 31.7 G/DL — LOW (ref 32–36)
MCV RBC AUTO: 84.1 FL — SIGNIFICANT CHANGE UP (ref 80–100)
PLATELET # BLD AUTO: 288 K/UL — SIGNIFICANT CHANGE UP (ref 150–400)
RBC # BLD: 3.19 M/UL — LOW (ref 4.2–5.8)
RBC # FLD: 13.2 % — SIGNIFICANT CHANGE UP (ref 10.3–14.5)
WBC # BLD: 12.1 K/UL — HIGH (ref 3.8–10.5)
WBC # FLD AUTO: 12.1 K/UL — HIGH (ref 3.8–10.5)

## 2018-11-20 ENCOUNTER — FORM ENCOUNTER (OUTPATIENT)
Age: 83
End: 2018-11-20

## 2018-11-20 DIAGNOSIS — Z51.11 ENCOUNTER FOR ANTINEOPLASTIC CHEMOTHERAPY: ICD-10-CM

## 2018-11-20 DIAGNOSIS — R11.2 NAUSEA WITH VOMITING, UNSPECIFIED: ICD-10-CM

## 2018-11-21 ENCOUNTER — APPOINTMENT (OUTPATIENT)
Dept: CT IMAGING | Facility: IMAGING CENTER | Age: 83
End: 2018-11-21
Payer: MEDICARE

## 2018-11-21 ENCOUNTER — OUTPATIENT (OUTPATIENT)
Dept: OUTPATIENT SERVICES | Facility: HOSPITAL | Age: 83
LOS: 1 days | End: 2018-11-21
Payer: MEDICARE

## 2018-11-21 ENCOUNTER — APPOINTMENT (OUTPATIENT)
Dept: NUCLEAR MEDICINE | Facility: IMAGING CENTER | Age: 83
End: 2018-11-21
Payer: MEDICARE

## 2018-11-21 DIAGNOSIS — Z90.79 ACQUIRED ABSENCE OF OTHER GENITAL ORGAN(S): Chronic | ICD-10-CM

## 2018-11-21 DIAGNOSIS — C68.9 MALIGNANT NEOPLASM OF URINARY ORGAN, UNSPECIFIED: ICD-10-CM

## 2018-11-21 DIAGNOSIS — Z95.2 PRESENCE OF PROSTHETIC HEART VALVE: Chronic | ICD-10-CM

## 2018-11-21 PROCEDURE — 78320: CPT | Mod: 26

## 2018-11-21 PROCEDURE — 78306 BONE IMAGING WHOLE BODY: CPT | Mod: 26

## 2018-11-21 PROCEDURE — 78999 UNLISTED MISC PX DX NUC MED: CPT

## 2018-11-21 PROCEDURE — A9561: CPT

## 2018-11-21 PROCEDURE — 71250 CT THORAX DX C-: CPT | Mod: 26

## 2018-11-21 PROCEDURE — 78306 BONE IMAGING WHOLE BODY: CPT

## 2018-11-21 PROCEDURE — 71250 CT THORAX DX C-: CPT

## 2018-11-26 ENCOUNTER — APPOINTMENT (OUTPATIENT)
Dept: INFUSION THERAPY | Facility: HOSPITAL | Age: 83
End: 2018-11-26

## 2018-11-26 ENCOUNTER — RESULT REVIEW (OUTPATIENT)
Age: 83
End: 2018-11-26

## 2018-11-26 LAB
HCT VFR BLD CALC: 26.6 % — LOW (ref 39–50)
HGB BLD-MCNC: 8.9 G/DL — LOW (ref 13–17)
MCHC RBC-ENTMCNC: 27.1 PG — SIGNIFICANT CHANGE UP (ref 27–34)
MCHC RBC-ENTMCNC: 33.3 G/DL — SIGNIFICANT CHANGE UP (ref 32–36)
MCV RBC AUTO: 81.3 FL — SIGNIFICANT CHANGE UP (ref 80–100)
PLATELET # BLD AUTO: 173 K/UL — SIGNIFICANT CHANGE UP (ref 150–400)
RBC # BLD: 3.27 M/UL — LOW (ref 4.2–5.8)
RBC # FLD: 13.1 % — SIGNIFICANT CHANGE UP (ref 10.3–14.5)
WBC # BLD: 8.5 K/UL — SIGNIFICANT CHANGE UP (ref 3.8–10.5)
WBC # FLD AUTO: 8.5 K/UL — SIGNIFICANT CHANGE UP (ref 3.8–10.5)

## 2018-11-27 DIAGNOSIS — E86.0 DEHYDRATION: ICD-10-CM

## 2018-12-03 ENCOUNTER — RESULT REVIEW (OUTPATIENT)
Age: 83
End: 2018-12-03

## 2018-12-03 ENCOUNTER — APPOINTMENT (OUTPATIENT)
Dept: HEMATOLOGY ONCOLOGY | Facility: CLINIC | Age: 83
End: 2018-12-03
Payer: MEDICARE

## 2018-12-03 VITALS
OXYGEN SATURATION: 100 % | SYSTOLIC BLOOD PRESSURE: 123 MMHG | DIASTOLIC BLOOD PRESSURE: 65 MMHG | HEART RATE: 78 BPM | TEMPERATURE: 98.2 F | RESPIRATION RATE: 16 BRPM

## 2018-12-03 DIAGNOSIS — G89.3 NEOPLASM RELATED PAIN (ACUTE) (CHRONIC): ICD-10-CM

## 2018-12-03 LAB
HCT VFR BLD CALC: 26.2 % — LOW (ref 39–50)
HGB BLD-MCNC: 8.8 G/DL — LOW (ref 13–17)
MCHC RBC-ENTMCNC: 27.2 PG — SIGNIFICANT CHANGE UP (ref 27–34)
MCHC RBC-ENTMCNC: 33.6 G/DL — SIGNIFICANT CHANGE UP (ref 32–36)
MCV RBC AUTO: 80.8 FL — SIGNIFICANT CHANGE UP (ref 80–100)
PLATELET # BLD AUTO: 87.9 K/UL — SIGNIFICANT CHANGE UP (ref 150–400)
RBC # BLD: 3.24 M/UL — LOW (ref 4.2–5.8)
RBC # FLD: 13.9 % — SIGNIFICANT CHANGE UP (ref 10.3–14.5)
WBC # BLD: 6.2 K/UL — SIGNIFICANT CHANGE UP (ref 3.8–10.5)
WBC # FLD AUTO: 6.2 K/UL — SIGNIFICANT CHANGE UP (ref 3.8–10.5)

## 2018-12-03 PROCEDURE — 99215 OFFICE O/P EST HI 40 MIN: CPT | Mod: PD

## 2018-12-03 RX ORDER — MORPHINE SULFATE 15 MG/1
15 TABLET ORAL
Qty: 60 | Refills: 0 | Status: ACTIVE | COMMUNITY
Start: 2018-12-03

## 2018-12-03 RX ORDER — ACETAMINOPHEN AND CODEINE 300; 30 MG/1; MG/1
300-30 TABLET ORAL
Qty: 240 | Refills: 0 | Status: DISCONTINUED | COMMUNITY
Start: 2018-11-13 | End: 2018-12-03

## 2018-12-03 NOTE — RESULTS/DATA
[FreeTextEntry1] : EXAM: CT ABDOMEN AND PELVIS \par PROCEDURE DATE: 11/05/2018 \par INTERPRETATION: CLINICAL INFORMATION: Pelvic pain. History of prostate cancer. Groin pain. Evaluate for abscess. \par COMPARISON: CT abdomen pelvis 8/23/2018. CT pelvis 10/24/2018. MRI pelvis 10/23/2018. \par PROCEDURE: \par CT of the Abdomen and Pelvis was performed without intravenous contrast. Intravenous contrast: None. Oral contrast: None. \par Sagittal and coronal reformats were performed. \par FINDINGS: \par LOWER CHEST: Status post median sternotomy and aortic valve replacement. Mitral annular calcifications. Coronary arterial calcifications. Calcified \par granulomas in the left lower lobe. Few scattered left upper and lower lobe pulmonary nodules largest measuring 5 mm within the lingula (series 2, image 3). \par Evaluation of vascular structures and abdominal organs is limited without the administration of intravenous contrast. \par LIVER: Hepatic steatosis. \par BILE DUCTS: Normal caliber. \par GALLBLADDER: Within normal limits. \par SPLEEN: Enlarged measuring up to 15.1 cm. \par PANCREAS: Within normal limits. \par ADRENALS: Within normal limits. \par KIDNEYS/URETERS: Moderate bilateral hydroureteronephrosis to the level of the urinary bladder and bilateral perinephric fat stranding. 1.6 cm right \par interpole renal cyst. No nephrolithiasis. \par BLADDER: Suprapubic catheter with balloon inflated within the lumen of the bladder. Urinary bladder contains fluid as well as heterogeneously hyperdense material and air. \par REPRODUCTIVE ORGANS: Difficult to delineate fluid collection at the base of the penile shaft measuring approximately 3.6 x 2.5 cm, without the administration of intravenous contrast material. Prostatic radiation seeds. \par The prostate is enlarged. \par BOWEL: No bowel obstruction. Mild sigmoid diverticulosis. Appendix is within normal limits. \par PERITONEUM: No ascites. \par VESSELS: Atherosclerosis of the abdominal aorta and its branch vessels. \par RETROPERITONEUM: No lymphadenopathy. \par ABDOMINAL WALL: Fat-containing bilateral inguinal hernias, left greater than right. Small fat-containing umbilical hernia. \par BONES: Multilevel degenerative changes of the spine. \par IMPRESSION: \par Relatively stable appearing fluid collection in the base of the penile shaft, which is not well evaluated on this noncontrast examination and more \par clearly defined on recent MRI pelvis from 10/23/2018. \par Moderate bilateral hydroureteronephrosis to the level of the urinary bladder as well as bilateral perinephric stranding and inflammatory change \par surrounding the urinary bladder. Correlate with urinalysis for evidence of underlying infection. \par Heterogeneous hyperdensity within the urinary bladder lumen, may represent blood products, however underlying lesion cannot be excluded. \par Few left upper and lower lobe pulmonary nodules measuring up to 5 mm. \par LEIGH ANN ALFORD M.D., RADIOLOGY RESIDENT \par This document has been electronically signed. \par MARCOS BLUNT M.D., ATTENDING RADIOLOGIST \par ***********************************************************\par EXAM: MR PELVIS WAW IC \par PROCEDURE DATE: 10/23/2018 \par INTERPRETATION: CLINICAL INFORMATION: History of prostate cancer with previously drained abscess involving the seminal vesicles. Persistent pain and penile pain \par COMPARISON: CT dated 8/18, outside MRI dated 8/18 \par PROCEDURE: \par MRI of the pelvis was performed with and without intravenous contrast. 9 cc of Gadavist 1 cc discarded \par FINDINGS: \par REPRODUCTIVE ORGANS: Marked heterogeneous enhancement is seen in the region of the previously treated prostate. Signal void from the descending markers \par and radiation seeds are identified. In the area the patient's previous collection there is a thick walled residual collection best seen series 16 image 40 measuring 5.0 x 1.8 cm in size. There appears be continuous abnormal enhancement extending into the base of the penis with multiple focal areas of ringlike abnormal heterogeneous enhancement involving both the corpus cavernosum and spongiosum. Whether this represents multiple small abscesses versus metastatic disease is in the differential. This was not \par identified on the prior examination dated 8/18 and therefore is likely infectious in etiology. \par BLADDER: Thick wall with a suprapubic catheter in place. There is marked thick-walled abnormality involving the posterior bladder wall with diffuse enhancement \par LYMPH NODES: 1 cm left external iliac lymph node. \par VISUALIZED PORTIONS: \par ABDOMINAL ORGANS: Within normal limits. \par BOWEL: Within normal limits. \par PERITONEUM: No ascites. \par VESSELS: Within normal limits. \par ABDOMINAL WALL: Abnormal enhancement extending into the left obturator externus musculature \par BONES: Within normal limits. \par IMPRESSION: \par Persistent thick-walled collection in the region of the seminal vesicles measuring 5.0 x 1.8 cm in size. This is contiguous with a diffusely \par heterogeneous abnormal appearing post therapeutic prostate. There is marked abnormality involving the penis with multiple areas of abnormal enhancement \par some with ringlike characteristics. In addition there is marked thickening of the posterior lower aspect of the urinary bladder. Given the rapid change \par from prior MRI this likely represents infiltrating infection as opposed to extension of prostate cancer. \par KOBE STEINER M.D., ATTENDING RADIOLOGIST \par This document has been electronically signed. Oct 23 2018 1:48PM \par **************************************************************************\par Final Diagnosis\par PENILE NODULE, CT GUIDED, CORE BIOPSY\par POSITIVE FOR MALIGNANT CELLS.  \par  Urothelial vs. Squamous  cell carcinoma favor the former\par Touch prep and core biopsy display hypercellular specimen composed of flat sheets, glands and single lying malignant polygonal cells with enlarged hyperchromatic nuclei infiltrating fibroconnective, muscular and vascular tissue. The tissue is poorly preserved precluding accurate classification. The pattern of staining may be seen in tumors of squamous and urothelial origin. Based on morphology urothelial is favored over squamous cell carcinoma. Please correlate clinically to confirm the origin.\par Immunohistochemistry: The neoplastic cells are positive for YURIY-3, CK7, P40 and P63 and negative for PSA and PSAP. This immunostaining pattern supports the above findings.\par Please also refer to specimen number  [id number]-\par Case reported to Tumor Registry.\par This case was reviewed for  with Dr. Gamble who concur(s) with the diagnosis on 10/31/18 \par Report faxed to Dr. Irvin Burroughs's office on 10/31/18\par Screened by: Idalia WORTHINGTON (ASCP)\par Verified by: ABISAI Landaverde\par (Electronic Signature)\par Reported on: 10/31/18 13:39 EDT, [address] [place] [address]\par Cytology technical processing performed at [address] [place] [id number]\par ***********************************************\par Final Diagnosis\par PROSTATE, CT GUIDED, FNA\par POSITIVE FOR MALIGNANT CELLS.  \par Urothelial Carcinoma vs. Squamous carcinoma\par Cytology slides and cell block display a cellular specimen composed of crowded 3-dimensional groups and single lying malignant cells with enlarged nuclei containing prominent nucleoli and vacuolated cytoplasm in a background of acute inflammation and macrophages. Papillary structures are present. This tumor is mophologically similar to the tumor from the penile nodule (10-FN-). The differential diagnosis includes urothelial vs. squamous cell carcinoma the latter is favored.\par Immunohistochemistry: The cells with vacuolated cytoplasm are positve for AE 1/3 and negative for  supporting epithelial nature of the cells.\par Please also refer to specimen number [id number]-\par Case reported to Tumor Registry.\par This case was reviewed for  with staff pathologist who concur(s) with the diagnosis on 10/31/18 \par Report faxed to Dr. Irvin Burroughs's office on 10/31/18\par Screened by: Idalia WORTHINGTON (ASCP)\par Verified by: ABISAI Landaverde\par (Electronic Signature)\par Reported on: 10/31/18 14:17 EDT, [address] [place] [address]\par Cytology technical processing performed at [address] [place] [id number]

## 2018-12-03 NOTE — HISTORY OF PRESENT ILLNESS
[Disease: _____________________] : Disease: [unfilled] [T: ___] : T[unfilled] [Therapy: ___] : Therapy: [unfilled] [8 - Distress Level] : Distress Level: 8 [Referred Patient  to social work for follow-up] : Patient was referred to social work for follow-up [de-identified] : This 83 year old man is seen in consultation on November 13, 2018. He was diagnosed with prostate cancer in 2006 and had seed implantation. He has had no issues since that time. In May, he developed urinary retention. 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 He went to his urologist, . He had a CT on 5/21/18, a bone scan on 5/24/18, MRI of prostate on 8/15/18, MRI prostate 10/23/18. He had a TURP at [facility] and the prostate was described as cement, according to the wife. This is not available. He was referred to Irvin Burroughs. He was sent to [facility] from the office as there was an apparent abscess. He had the abscess drained. Discharged home. He then had a suprapubic tune after urodynamic studies in September 2018. He had a sigmoidoscopy showing rectal ulcerations, attributed to prior RT. The pain persisted over time, started before the TURP in May 2018. The pain continues to worsen. The pain is in the penis and testicles as well as the perineum. He has trouble sitting in a hard chair. It is described as stabbing and burning. It does awaken him at night. He was receiving morphine in the hospital with brief relief. On Tylenol #3 at this time, his wife says it makes him "loopy". He prefers the Tylenol #3 as it provides a slightly longer benefit, about 3 hours. Pain currently at 9, best in past 324 hours at 5-6, worst at 9. The pain has been much worse over the past 2 months. Just recently hospitalized at [facility], discharged last night. He was having a lot of blood in the bag as well as around the suprapubic. he apparently had clot retention. PCNs were placed on November 8, 2018. Cysto was done on 11/7/18. He had a biopsy of the penis, revealing met urothelial cancer. A prostate biopsy was done, showing the same.  He received 6 units of blood while in the hospital. Appetite has been good, but has lost about 35 pounds over the course of this illness. \par \par He had delirium from oxycodone during on hospitalization.  [de-identified] : 12/3/18 : Cycle 1 day 15 : carbo + [place] regimen. Has been feeling fatigued and with suprapubic pain which is worse. There is clear urine out put from bilateral nephrostomy tubes and slight tinge of pink blood from suprapubic catheter. Pain meds : he is on fentanyl 12 mcg.hr.72 Hr, not effective and he is afraid of constipation which he has been struggling with. He is on Movantik daily and has taking Mag Citrate x 2. He has cyclical diarrhea and constipation.  [FreeTextEntry7] : Pain

## 2018-12-03 NOTE — ASSESSMENT
[Palliative Care Plan] : not applicable at this time [FreeTextEntry1] : Mr. Nation was seen in the office today in consultation, accompanied by his wife and his son. He was diagnosed with prostate cancer in 2006 and had seed implantation performed. He has had no prostatic tissue since that time. In May of 2018, he developed urinary retention. He went to the emergency room at the [facility] and a Dooley catheter was placed. He saw his primary urologist and a CT scan was performed on May 21. Along with a bone scan on May 24. An MRI of the prostate was subsequently performed.  He had a transurethral resection of the prostate performed at [facility] and the prostate was described as "cement" according to his wife. He was then referred to Dr. Burroughs for evaluation. He was sent from the office to the hospital due to the apparent abscess. He then required placement of a suprapubic tube as he was unable to pass urine. A sigmoidoscopy was performed at some point showing some small rectal ulcerations, which was attributed to the prior radiation therapy and they have been the reason for the abscess formation. He has had pain which has persisted over time which started before the transurethral resection of the prostate in May. The pain continues to worsen. He had difficulty tolerating oxycodone and had delirium. He seems to be okay on Tylenol with codeine. He has pain in the penis and testicles as well as the perineum. He has difficulty sitting in a chair for too long a period of time especially of the chair his heart. The pain is described as both stabbing and burning. It does awaken him at times at night.\par A biopsy of the prostate revealed metastatic urothelial carcinoma, and a biopsy of his injury to penis revealed the same.\par \par On physical examination, there was some mild suprapubic tenderness. A suprapubic tube is present. There was no enlargement of liver or spleen, and no masses were appreciated. The penis is indurated throughout from the glans of the penile base and the perineum with some tenderness to palpation and movement, this is not new. \par \par With increased Cr, we opted to start him on Carboplatin + gemcitabine. \par Has had increase suprapubic pain and fatigue. We ill increase fentanyl patch to 25 mcg. Also ordered morphine 15 mg for breakthrough as needed. \par \par Constipation was addressed during this visit. Will take miralax daily, increase to twice daily. Sporadic use of mag citrate and other aids will create cyclical issue without regular pattern. \par \par No fevers or chills or changes in his nephrostomy tubes however slight pink tinged urine in suprapubic catheter. \par I encouraged him to touch base with Tyson Burroughs in Urology. He has tube exchange scheduled for the 14th however will see him sooner. \par \par \par We will see him back on Cycle 2 Day 15. \par \par Jewels Marshall, ANP-BC

## 2018-12-03 NOTE — REVIEW OF SYSTEMS
[Fatigue] : fatigue [Vision Problems] : vision problems [Lower Ext Edema] : lower extremity edema [Constipation] : constipation [Difficulty Walking] : difficulty walking [Easy Bruising] : a tendency for easy bruising [Fever] : no fever [Chills] : no chills [Night Sweats] : no night sweats [Dysphagia] : no dysphagia [Loss of Hearing] : no loss of hearing [Nosebleeds] : no nosebleeds [Hoarseness] : no hoarseness [Odynophagia] : no odynophagia [Mucosal Pain] : no mucosal pain [Chest Pain] : no chest pain [Palpitations] : no palpitations [Cough] : no cough [SOB on Exertion] : no shortness of breath during exertion [Abdominal Pain] : no abdominal pain [Vomiting] : no vomiting [Diarrhea] : no diarrhea [Dysuria] : no dysuria [Joint Pain] : no joint pain [Joint Stiffness] : no joint stiffness [Muscle Pain] : no muscle pain [Skin Rash] : no skin rash [Dizziness] : no dizziness [Fainting] : no fainting [Anxiety] : no anxiety [Depression] : no depression [Muscle Weakness] : no muscle weakness [Easy Bleeding] : no tendency for easy bleeding [FreeTextEntry2] : occ low grade temps, appetite is good, but lost weight, about 35 pounds since May [FreeTextEntry4] : dry mouth [FreeTextEntry6] : chronic mild SOB, with exertion [FreeTextEntry8] : hematuria, suprapubic catheter [de-identified] : no headaches, no parethesias

## 2018-12-03 NOTE — PHYSICAL EXAM
[Restricted in physically strenuous activity but ambulatory and able to carry out work of a light or sedentary nature] : Status 1- Restricted in physically strenuous activity but ambulatory and able to carry out work of a light or sedentary nature, e.g., light house work, office work [Normal] : affect appropriate [de-identified] : RRR, S2 increased with brief FUNMILAYO, II/VI [de-identified] : no gynecomastia [de-identified] : suprapubic tube, bilateral PCNs, no H/S -megaly or masses, mild suprapubic tenderness [FreeTextEntry1] : deferred [de-identified] : uncircumcised, difficult to retract foreskin due to pain, penic indurated throughout from the glans to the penile base in the perineum, with tenderness to palpation, no skin changes, testes tender, but normal

## 2018-12-03 NOTE — REASON FOR VISIT
[Follow-Up Visit] : a follow-up [Spouse] : spouse [FreeTextEntry2] : prostate cancer/urothelial cancer

## 2018-12-04 ENCOUNTER — OUTPATIENT (OUTPATIENT)
Dept: OUTPATIENT SERVICES | Facility: HOSPITAL | Age: 83
LOS: 1 days | Discharge: HOME | End: 2018-12-04

## 2018-12-04 DIAGNOSIS — Z95.2 PRESENCE OF PROSTHETIC HEART VALVE: Chronic | ICD-10-CM

## 2018-12-04 DIAGNOSIS — Z90.79 ACQUIRED ABSENCE OF OTHER GENITAL ORGAN(S): Chronic | ICD-10-CM

## 2018-12-04 DIAGNOSIS — C61 MALIGNANT NEOPLASM OF PROSTATE: ICD-10-CM

## 2018-12-04 LAB
ALBUMIN SERPL ELPH-MCNC: 3.1 G/DL
ALP BLD-CCNC: 62 U/L
ALT SERPL-CCNC: 9 U/L
ANION GAP SERPL CALC-SCNC: 12 MMOL/L
AST SERPL-CCNC: 9 U/L
BILIRUB SERPL-MCNC: 0.5 MG/DL
BUN SERPL-MCNC: 28 MG/DL
CALCIUM SERPL-MCNC: 8.6 MG/DL
CHLORIDE SERPL-SCNC: 95 MMOL/L
CO2 SERPL-SCNC: 23 MMOL/L
CREAT SERPL-MCNC: 1.36 MG/DL
GLUCOSE SERPL-MCNC: 203 MG/DL
MAGNESIUM SERPL-MCNC: 1.8 MG/DL
PHOSPHATE SERPL-MCNC: 2.6 MG/DL
POTASSIUM SERPL-SCNC: 5 MMOL/L
PROT SERPL-MCNC: 6.2 G/DL
SODIUM SERPL-SCNC: 130 MMOL/L

## 2018-12-05 ENCOUNTER — INPATIENT (INPATIENT)
Facility: HOSPITAL | Age: 83
LOS: 8 days | Discharge: HOME HEALTH | DRG: 872 | End: 2018-12-14
Attending: STUDENT IN AN ORGANIZED HEALTH CARE EDUCATION/TRAINING PROGRAM | Admitting: HOSPITALIST
Payer: MEDICARE

## 2018-12-05 VITALS
HEART RATE: 104 BPM | TEMPERATURE: 98 F | DIASTOLIC BLOOD PRESSURE: 65 MMHG | WEIGHT: 179.9 LBS | HEIGHT: 69 IN | RESPIRATION RATE: 16 BRPM | SYSTOLIC BLOOD PRESSURE: 172 MMHG | OXYGEN SATURATION: 95 %

## 2018-12-05 DIAGNOSIS — L02.91 CUTANEOUS ABSCESS, UNSPECIFIED: ICD-10-CM

## 2018-12-05 DIAGNOSIS — Z95.2 PRESENCE OF PROSTHETIC HEART VALVE: Chronic | ICD-10-CM

## 2018-12-05 DIAGNOSIS — Z90.79 ACQUIRED ABSENCE OF OTHER GENITAL ORGAN(S): Chronic | ICD-10-CM

## 2018-12-05 LAB
ALBUMIN SERPL ELPH-MCNC: 2.9 G/DL — LOW (ref 3.3–5)
ALP SERPL-CCNC: 59 U/L — SIGNIFICANT CHANGE UP (ref 40–120)
ALT FLD-CCNC: 6 U/L — LOW (ref 10–45)
ANION GAP SERPL CALC-SCNC: 14 MMOL/L — SIGNIFICANT CHANGE UP (ref 5–17)
APPEARANCE UR: ABNORMAL
AST SERPL-CCNC: 9 U/L — LOW (ref 10–40)
BACTERIA # UR AUTO: ABNORMAL
BASE EXCESS BLDV CALC-SCNC: 3.2 MMOL/L — HIGH (ref -2–2)
BASOPHILS # BLD AUTO: 0 K/UL — SIGNIFICANT CHANGE UP (ref 0–0.2)
BASOPHILS NFR BLD AUTO: 0.6 % — SIGNIFICANT CHANGE UP (ref 0–2)
BILIRUB SERPL-MCNC: 0.6 MG/DL — SIGNIFICANT CHANGE UP (ref 0.2–1.2)
BILIRUB UR-MCNC: NEGATIVE — SIGNIFICANT CHANGE UP
BUN SERPL-MCNC: 22 MG/DL — SIGNIFICANT CHANGE UP (ref 7–23)
CA-I SERPL-SCNC: 1.09 MMOL/L — LOW (ref 1.12–1.3)
CALCIUM SERPL-MCNC: 8.9 MG/DL — SIGNIFICANT CHANGE UP (ref 8.4–10.5)
CHLORIDE BLDV-SCNC: 98 MMOL/L — SIGNIFICANT CHANGE UP (ref 96–108)
CHLORIDE SERPL-SCNC: 93 MMOL/L — LOW (ref 96–108)
CO2 BLDV-SCNC: 27 MMOL/L — SIGNIFICANT CHANGE UP (ref 22–30)
CO2 SERPL-SCNC: 22 MMOL/L — SIGNIFICANT CHANGE UP (ref 22–31)
COLOR SPEC: YELLOW — SIGNIFICANT CHANGE UP
COMMENT - URINE: SIGNIFICANT CHANGE UP
CREAT SERPL-MCNC: 1.28 MG/DL — SIGNIFICANT CHANGE UP (ref 0.5–1.3)
DIFF PNL FLD: ABNORMAL
EOSINOPHIL # BLD AUTO: 0 K/UL — SIGNIFICANT CHANGE UP (ref 0–0.5)
EOSINOPHIL NFR BLD AUTO: 0.8 % — SIGNIFICANT CHANGE UP (ref 0–6)
EPI CELLS # UR: 1 /HPF — SIGNIFICANT CHANGE UP
EPI CELLS # UR: 2 /HPF — SIGNIFICANT CHANGE UP
EPI CELLS # UR: 2 — SIGNIFICANT CHANGE UP
GAS PNL BLDV: 122 MMOL/L — LOW (ref 136–145)
GAS PNL BLDV: SIGNIFICANT CHANGE UP
GAS PNL BLDV: SIGNIFICANT CHANGE UP
GLUCOSE BLDV-MCNC: 158 MG/DL — HIGH (ref 70–99)
GLUCOSE SERPL-MCNC: 166 MG/DL — HIGH (ref 70–99)
GLUCOSE UR QL: NEGATIVE — SIGNIFICANT CHANGE UP
HCO3 BLDV-SCNC: 26 MMOL/L — SIGNIFICANT CHANGE UP (ref 21–29)
HCT VFR BLD CALC: 24.4 % — LOW (ref 39–50)
HCT VFR BLDA CALC: 24 % — LOW (ref 39–50)
HGB BLD CALC-MCNC: 7.5 G/DL — LOW (ref 13–17)
HGB BLD-MCNC: 8.2 G/DL — LOW (ref 13–17)
HYALINE CASTS # UR AUTO: 2 /LPF — SIGNIFICANT CHANGE UP (ref 0–7)
HYALINE CASTS # UR AUTO: 6 /LPF — HIGH (ref 0–2)
HYALINE CASTS # UR AUTO: 8 /LPF — HIGH (ref 0–2)
KETONES UR-MCNC: NEGATIVE — SIGNIFICANT CHANGE UP
LACTATE BLDV-MCNC: 2.9 MMOL/L — HIGH (ref 0.7–2)
LEUKOCYTE ESTERASE UR-ACNC: ABNORMAL
LYMPHOCYTES # BLD AUTO: 0.7 K/UL — LOW (ref 1–3.3)
LYMPHOCYTES # BLD AUTO: 13.3 % — SIGNIFICANT CHANGE UP (ref 13–44)
MCHC RBC-ENTMCNC: 27.4 PG — SIGNIFICANT CHANGE UP (ref 27–34)
MCHC RBC-ENTMCNC: 33.8 GM/DL — SIGNIFICANT CHANGE UP (ref 32–36)
MCV RBC AUTO: 81 FL — SIGNIFICANT CHANGE UP (ref 80–100)
MONOCYTES # BLD AUTO: 0.6 K/UL — SIGNIFICANT CHANGE UP (ref 0–0.9)
MONOCYTES NFR BLD AUTO: 12.4 % — SIGNIFICANT CHANGE UP (ref 2–14)
NEUTROPHILS # BLD AUTO: 3.6 K/UL — SIGNIFICANT CHANGE UP (ref 1.8–7.4)
NEUTROPHILS NFR BLD AUTO: 73 % — SIGNIFICANT CHANGE UP (ref 43–77)
NITRITE UR-MCNC: POSITIVE
PCO2 BLDV: 33 MMHG — LOW (ref 35–50)
PH BLDV: 7.51 — HIGH (ref 7.35–7.45)
PH UR: 7.5 — SIGNIFICANT CHANGE UP (ref 5–8)
PH UR: 7.5 — SIGNIFICANT CHANGE UP (ref 5–8)
PH UR: 8 — SIGNIFICANT CHANGE UP (ref 5–8)
PLAT MORPH BLD: NORMAL — SIGNIFICANT CHANGE UP
PLATELET # BLD AUTO: 80 K/UL — LOW (ref 150–400)
PO2 BLDV: 26 MMHG — SIGNIFICANT CHANGE UP (ref 25–45)
POTASSIUM BLDV-SCNC: 5 MMOL/L — SIGNIFICANT CHANGE UP (ref 3.5–5.3)
POTASSIUM SERPL-MCNC: 5.3 MMOL/L — SIGNIFICANT CHANGE UP (ref 3.5–5.3)
POTASSIUM SERPL-SCNC: 5.3 MMOL/L — SIGNIFICANT CHANGE UP (ref 3.5–5.3)
PROT SERPL-MCNC: 6.5 G/DL — SIGNIFICANT CHANGE UP (ref 6–8.3)
PROT UR-MCNC: ABNORMAL
RAPID RVP RESULT: SIGNIFICANT CHANGE UP
RBC # BLD: 3.01 M/UL — LOW (ref 4.2–5.8)
RBC # FLD: 15.2 % — HIGH (ref 10.3–14.5)
RBC BLD AUTO: SIGNIFICANT CHANGE UP
RBC CASTS # UR COMP ASSIST: 10 /HPF — HIGH (ref 0–4)
RBC CASTS # UR COMP ASSIST: 28 /HPF — HIGH (ref 0–4)
RBC CASTS # UR COMP ASSIST: 36 /HPF — HIGH (ref 0–4)
SAO2 % BLDV: 43 % — LOW (ref 67–88)
SODIUM SERPL-SCNC: 129 MMOL/L — LOW (ref 135–145)
SP GR SPEC: 1.01 — SIGNIFICANT CHANGE UP (ref 1.01–1.02)
SP GR SPEC: 1.02 — SIGNIFICANT CHANGE UP (ref 1.01–1.02)
SP GR SPEC: 1.02 — SIGNIFICANT CHANGE UP (ref 1.01–1.02)
UROBILINOGEN FLD QL: NEGATIVE — SIGNIFICANT CHANGE UP
WBC # BLD: 5 K/UL — SIGNIFICANT CHANGE UP (ref 3.8–10.5)
WBC # FLD AUTO: 5 K/UL — SIGNIFICANT CHANGE UP (ref 3.8–10.5)
WBC UR QL: 112 /HPF — HIGH (ref 0–5)
WBC UR QL: 1148 /HPF — HIGH (ref 0–5)
WBC UR QL: 343 /HPF — HIGH (ref 0–5)

## 2018-12-05 PROCEDURE — 93010 ELECTROCARDIOGRAM REPORT: CPT

## 2018-12-05 PROCEDURE — 71250 CT THORAX DX C-: CPT | Mod: 26

## 2018-12-05 PROCEDURE — 74176 CT ABD & PELVIS W/O CONTRAST: CPT | Mod: 26

## 2018-12-05 PROCEDURE — 99285 EMERGENCY DEPT VISIT HI MDM: CPT | Mod: GC,25

## 2018-12-05 RX ORDER — SODIUM CHLORIDE 9 MG/ML
3 INJECTION INTRAMUSCULAR; INTRAVENOUS; SUBCUTANEOUS ONCE
Qty: 0 | Refills: 0 | Status: COMPLETED | OUTPATIENT
Start: 2018-12-05 | End: 2018-12-05

## 2018-12-05 RX ORDER — MORPHINE SULFATE 50 MG/1
4 CAPSULE, EXTENDED RELEASE ORAL ONCE
Qty: 0 | Refills: 0 | Status: DISCONTINUED | OUTPATIENT
Start: 2018-12-05 | End: 2018-12-05

## 2018-12-05 RX ORDER — SODIUM CHLORIDE 9 MG/ML
1000 INJECTION INTRAMUSCULAR; INTRAVENOUS; SUBCUTANEOUS ONCE
Qty: 0 | Refills: 0 | Status: COMPLETED | OUTPATIENT
Start: 2018-12-05 | End: 2018-12-05

## 2018-12-05 RX ORDER — CEFTRIAXONE 500 MG/1
1 INJECTION, POWDER, FOR SOLUTION INTRAMUSCULAR; INTRAVENOUS ONCE
Qty: 0 | Refills: 0 | Status: COMPLETED | OUTPATIENT
Start: 2018-12-05 | End: 2018-12-05

## 2018-12-05 RX ORDER — PIPERACILLIN AND TAZOBACTAM 4; .5 G/20ML; G/20ML
3.38 INJECTION, POWDER, LYOPHILIZED, FOR SOLUTION INTRAVENOUS ONCE
Qty: 0 | Refills: 0 | Status: COMPLETED | OUTPATIENT
Start: 2018-12-05 | End: 2018-12-05

## 2018-12-05 RX ORDER — SODIUM CHLORIDE 9 MG/ML
1000 INJECTION INTRAMUSCULAR; INTRAVENOUS; SUBCUTANEOUS
Qty: 0 | Refills: 0 | Status: DISCONTINUED | OUTPATIENT
Start: 2018-12-05 | End: 2018-12-06

## 2018-12-05 RX ORDER — VANCOMYCIN HCL 1 G
1000 VIAL (EA) INTRAVENOUS ONCE
Qty: 0 | Refills: 0 | Status: COMPLETED | OUTPATIENT
Start: 2018-12-05 | End: 2018-12-05

## 2018-12-05 RX ADMIN — MORPHINE SULFATE 4 MILLIGRAM(S): 50 CAPSULE, EXTENDED RELEASE ORAL at 19:24

## 2018-12-05 RX ADMIN — SODIUM CHLORIDE 3 MILLILITER(S): 9 INJECTION INTRAMUSCULAR; INTRAVENOUS; SUBCUTANEOUS at 20:10

## 2018-12-05 RX ADMIN — PIPERACILLIN AND TAZOBACTAM 200 GRAM(S): 4; .5 INJECTION, POWDER, LYOPHILIZED, FOR SOLUTION INTRAVENOUS at 22:34

## 2018-12-05 RX ADMIN — MORPHINE SULFATE 4 MILLIGRAM(S): 50 CAPSULE, EXTENDED RELEASE ORAL at 19:54

## 2018-12-05 RX ADMIN — CEFTRIAXONE 100 GRAM(S): 500 INJECTION, POWDER, FOR SOLUTION INTRAMUSCULAR; INTRAVENOUS at 20:12

## 2018-12-05 RX ADMIN — SODIUM CHLORIDE 1000 MILLILITER(S): 9 INJECTION INTRAMUSCULAR; INTRAVENOUS; SUBCUTANEOUS at 19:24

## 2018-12-05 RX ADMIN — SODIUM CHLORIDE 100 MILLILITER(S): 9 INJECTION INTRAMUSCULAR; INTRAVENOUS; SUBCUTANEOUS at 20:10

## 2018-12-05 RX ADMIN — Medication 250 MILLIGRAM(S): at 23:39

## 2018-12-05 NOTE — ED PROVIDER NOTE - CONSTITUTIONAL MANNER
Quality 431: Preventive Care And Screening: Unhealthy Alcohol Use - Screening: Patient screened for unhealthy alcohol use using a single question and scores less than 2 times per year Quality 110: Preventive Care And Screening: Influenza Immunization: Influenza Immunization not Administered for Documented Reasons. Detail Level: Detailed appropriate for situation

## 2018-12-05 NOTE — CONSULT NOTE ADULT - ASSESSMENT
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), BPH s/p TURP 6/26/18 c/b prostatic abscess requiring IR drain, prostate cancer s/p brachy radiation, 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 (last changed 11/17), and b/l hydronephrosis s/p perc nephrostomy tubes 11/8 recently diagnosed with infiltrating high grade urothelial ca started chemo 11/19 now with a UTI and abscess posterior to bladder  -Recommend IR consult for drainage of abscess  -NPO after midnight for possible IR drain   -Recommend colorectal surgery consult to evaluate for possible rectal fistula   -Vanco/zosyn based on previous cultures  -Management of hyponatremia   -Analgesia prn  -Discussed risks, benefits, alternatives, pt voiced understanding 83year old male with PMHx of HTN, DM, A.Fib (no longer taking eliquis), 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), BPH s/p TURP 6/26/18 c/b prostatic abscess requiring IR drain, prostate cancer s/p brachy radiation, 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

## 2018-12-05 NOTE — ED ADULT NURSE NOTE - OBJECTIVE STATEMENT
Pt is an 82 yo M who was brought to the Ed by his wife c/o fever today. Pt hx  bladder ca with mets to prostate, urethra and penis. Last chemo 11/26. Pt has suprapubic catheter/leg bag placed 9/19 and bilateral nephrostomy tubes placed sometime during hospitalization of 11/5/12/18. A/O to person, place c/o pain in penis and abdomen but not at suprapubic site.

## 2018-12-05 NOTE — CONSULT NOTE ADULT - ASSESSMENT
83 year old male with pelvic collection, concerning for possible rectal fistula; hemodynamically stable and afebrile.    - agree with management per primary team and urology  - drainage and cultures of abscess to target antibiotics therapy  - may benefit from imaging with rectal contrast to assess for fistula  - will follow with Colorectal surgery service, [id number]    --TANYA Blum, PGY-4 83 year old male with pelvic collection, concerning for possible rectal fistula; hemodynamically stable and afebrile.    - agree with management per primary team and urology  - drainage and cultures of abscess to target antibiotic therapy  - may benefit from imaging with rectal contrast to assess for fistula  - recommend bowel regimen with colace TID and senna (two tabs at bedtime)  - will follow with Colorectal surgery service, [phone]    --TANYA Blum, PGY-4

## 2018-12-05 NOTE — CONSULT NOTE ADULT - ATTENDING COMMENTS
Pt seen/examined.  Case discussed with housestaff/PA team.  Agree with above note history, physical and assessment/plan.  Pt is nontoxic appearing - infection may be decompressing via penis/urethra as he has worsened purulent drainage  CT imaging to rule out fistula -- defer to Gen surg as to imaging for colorectal fistula  IR consult - reports no good window for perc drainage.  Will discuss with IR if transrectal approach feasible, altho concern for creation of fistula  Not a candidate for surgical drainage at this time  IV abx, appr ID consult  Will follow

## 2018-12-05 NOTE — ED PROVIDER NOTE - PROGRESS NOTE DETAILS
Onc paged  Óscar Graves MD, Facep Discussed with Onc Fellow Ayde ascencioit jason Graves MD, Facep Discussed with Urology req med admit/ivabx and ir consult  Óscar Graves MD, Facep

## 2018-12-05 NOTE — ED ADULT NURSE NOTE - NSIMPLEMENTINTERV_GEN_ALL_ED
Implemented All Fall with Harm Risk Interventions:  [place] to call system. Call bell, personal items and telephone within reach. Instruct patient to call for assistance. Room bathroom lighting operational. Non-slip footwear when patient is off stretcher. Physically safe environment: no spills, clutter or unnecessary equipment. Stretcher in lowest position, wheels locked, appropriate side rails in place. Provide visual cue, wrist band, yellow gown, etc. Monitor gait and stability. Monitor for mental status changes and reorient to person, place, and time. Review medications for side effects contributing to fall risk. Reinforce activity limits and safety measures with patient and family. Provide visual clues: red socks.

## 2018-12-05 NOTE — CONSULT NOTE ADULT - SUBJECTIVE AND OBJECTIVE BOX
Incomplete note, full consult to follow.    HPI: 83 year old male presenting with fever, found to have intraabdominal abscess. Patient's recent history significant for urothelial cancer on chemotherapy and prostate abscess requiring IR drainage and stricture requiring suprapubic cath and bilateral nephrostomy tubes, previously had radiation for prostate cancer; now with collection concerning for vesical vs rectal fistula.    PMHx/PSHx: HTN, HLD, DM II, paroxysmal afib (off anticoagulation), AVR (bioprosthetic, on ASA), BPH s/p TURP, prostate abscess, urothelial cancer    Medications (inpatient): sodium chloride 0.9%. 1000 milliLiter(s) IV Continuous <Continuous>  vancomycin  IVPB 1000 milliGRAM(s) IV Intermittent once    Allergies: oxycodone (Other)  (Intolerances: None)  Social Hx:   Family Hx: No pertinent family history in first degree relatives    Physical Exam  T(C): 37.7  HR: 78 (64 - 104)  BP: 118/60 (118/60 - 172/65)  RR: 18 (16 - 18)  SpO2: 100% (95% - 100%)  Tmax: T(C): , Max: 37.7 (18 @ 21:31)      Labs:                        8.2    5.0   )-----------( 80       ( 05 Dec 2018 19:49 )             24.4       12-    129<L>  |  93<L>  |  22  ----------------------------<  166<H>  5.3   |  22  |  1.28    Ca    8.9      05 Dec 2018 19:49    TPro  6.5  /  Alb  2.9<L>  /  TBili  0.6  /  DBili  x   /  AST  9<L>  /  ALT  6<L>  /  AlkPhos  59  12-    Urinalysis Basic - ( 05 Dec 2018 20:17 )    Color: Yellow / Appearance: Turbid / S.016 / pH: x  Gluc: x / Ketone: Negative  / Bili: Negative / Urobili: Negative   Blood: x / Protein: 300 mg/dL / Nitrite: Positive   Leuk Esterase: Large / RBC: 10 /hpf /  /hpf   Sq Epi: x / Non Sq Epi: 1 /hpf / Bacteria: Many      Imaging and other studies:  < from: CT Abdomen and Pelvis No Cont (12.05.18 @ 20:19) >  IMPRESSION: Limited evaluation due to lack of IV contrast    Abscess collection posterior to the bladder as described above with air tracking into the prostate and more inferiorly, new since 2018.   Although not demonstrated on this examination, the possibility of fistulization with the bladder or rectum is not excluded. A cystogram can be obtained for further evaluation.    < end of copied text > Incomplete note, full consult to follow.    HPI: 83 year old male presenting with fever, found to have intraabdominal abscess. Patient's recent history significant for urothelial cancer on chemotherapy and prostate abscess requiring IR drainage and stricture requiring suprapubic cath and bilateral nephrostomy tubes, previously had radiation for prostate cancer; now with collection concerning for vesical vs rectal fistula. He has had multiple admissions related to these diagnoses, and lost 30lbs in the last 6 months. He has been passing flatus but requires an enema to have a bowel movement. Of note, he had a sigmoidoscopy in October that demonstrated ulceration consistent with radiation proctitis. He has also had routine screening colonoscopies with occasional polyps but no other pathology. Patient reports rectal pressure, and penile pain.    PMHx/PSHx: HTN, HLD, DM II, paroxysmal afib (off anticoagulation), AVR (bioprosthetic, on ASA), BPH s/p TURP, prostate abscess, urothelial cancer    Medications (inpatient): sodium chloride 0.9%. 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[facility], now unable to continue secondary to his illness  Family Hx: No pertinent family history in first degree relatives    Physical Exam  T(C): 37.7  HR: 78 (64 - 104)  BP: 118/60 (118/60 - 172/65)  RR: 18 (16 - 18)  SpO2: 100% (95% - 100%)  Tmax: T(C): , Max: 37.7 (18 @ 21:31)    General: alert and oriented, NAD  Resp: airway patent, respirations unlabored  CVS: regular rate and rhythm  Abdomen: soft, nontender, nondistended  Extremities: no edema  Skin: warm, dry, appropriate color    Labs:                        8.2    5.0   )-----------( 80       ( 05 Dec 2018 19:49 )             24.4       -    129<L>  |  93<L>  |  22  ----------------------------<  166<H>  5.3   |  22  |  1.28    Ca    8.9      05 Dec 2018 19:49    TPro  6.5  /  Alb  2.9<L>  /  TBili  0.6  /  DBili  x   /  AST  9<L>  /  ALT  6<L>  /  AlkPhos  59  12-    Urinalysis Basic - ( 05 Dec 2018 20:17 )    Color: Yellow / Appearance: Turbid / S.016 / pH: x  Gluc: x / Ketone: Negative  / Bili: Negative / Urobili: Negative   Blood: x / Protein: 300 mg/dL / Nitrite: Positive   Leuk Esterase: Large / RBC: 10 /hpf /  /hpf   Sq Epi: x / Non Sq Epi: 1 /hpf / Bacteria: Many      Imaging and other studies:  < from: CT Abdomen and Pelvis No Cont (18 @ 20:19) >  IMPRESSION: Limited evaluation due to lack of IV contrast    Abscess collection posterior to the bladder as described above with air tracking into the prostate and more inferiorly, new since 2018.   Although not demonstrated on this examination, the possibility of fistulization with the bladder or rectum is not excluded. A cystogram can be obtained for further evaluation.    < end of copied text >

## 2018-12-05 NOTE — ED PROVIDER NOTE - OBJECTIVE STATEMENT
Private Physician [facility], Seth Oquendo [place]  83y male pmh BPH, Bladder Ca on chemo last 11/26,HTN,HLD,P Afib,DMT2. Pt comes to ed complains of fever tmax 101. with chills, no nvdc, cp,cough,sputum, Has suprapubic and itzel nephrostomy tubes. Has had foul smelling from urethra past week. Seen by vns and referred to ed.

## 2018-12-05 NOTE — CONSULT NOTE ADULT - SUBJECTIVE AND OBJECTIVE BOX
HPI:  83 year old male with PMHx of HTN, DM, A.Fib (no longer taking eliquis), Bio-AVR on ASA (), BPH s/p TURP 18 c/b prostatic abscess requiring IR drain, prostate cancer s/p brachy radiation, prostatic urethral stricture s/p SPT placement 18 (last changed ), and b/l hydronephrosis s/p perc nephrostomy tubes  recently diagnosed with infiltrating high grade urothelial ca started chemo  presents to ED with fever at home of 101 and non radiating lower quadrant abdominal pain since this morning.    Denies any chills, nausea, vomiting, flank pain or gross hematuria     PAST MEDICAL & SURGICAL HISTORY:  Benign prostatic hyperplasia with lower urinary tract symptoms, symptom details unspecified  Prostate cancer  Hypertension  HLD (hyperlipidemia)  Type 2 diabetes mellitus  Paroxysmal A-fib  S/P AVR (aortic valve replacement)  S/P TURP    FAMILY HISTORY:  No pertinent family history in first degree relatives    SOCIAL HISTORY:   Tobacco hx:  MEDICATIONS  (STANDING):  sodium chloride 0.9%. 1000 milliLiter(s) (100 mL/Hr) IV Continuous <Continuous>    MEDICATIONS  (PRN):    Allergies  oxycodone (Other)    REVIEW OF SYSTEMS: Pertinent positives and negatives as stated in HPI, otherwise negative    Vital signs  T(C): 37.7 (18 @ 21:31), Max: 37.7 (18 @ 21:31)  HR: 78 (18 @ 20:12)  BP: 118/60 (18 @ 20:12)  SpO2: 100% (18 @ 20:12)    Physical Exam  Gen: NAD  Pulm: No respiratory distress, no subcostal retractions  CV: RRR, no JVD  Abd: Soft, ND, right and left lower quadrant tenderness, no rebound tenderness or guarding, SPT draining yellow urine   : Uncircumcised. base of penis tender and indurated,  small amount of yellow discharge from urethra, no blood.  Testes descended bilaterally.  Testes and epididymis nontender bilaterally.    Back: b/l NT draining yellow urine     LABS:     @ 19:49    WBC 5.0   / Hct 24.4  / SCr 1.28         129<L>  |  93<L>  |  22  ----------------------------<  166<H>  5.3   |  22  |  1.28    Ca    8.9      05 Dec 2018 19:49    TPro  6.5  /  Alb  2.9<L>  /  TBili  0.6  /  DBili  x   /  AST  9<L>  /  ALT  6<L>  /  AlkPhos  59  12-    Urinalysis Basic - ( 05 Dec 2018 20:17 )    Color: Yellow / Appearance: Turbid / S.016 / pH: x  Gluc: x / Ketone: Negative  / Bili: Negative / Urobili: Negative   Blood: x / Protein: 300 mg/dL / Nitrite: Positive   Leuk Esterase: Large / RBC: 10 /hpf /  /hpf   Sq Epi: x / Non Sq Epi: 1 /hpf / Bacteria: Many    Urine Cx: pending   Blood Cx: pending     Radiology:  < from: CT Abdomen and Pelvis No Cont (18 @ 20:19) >  IMPRESSION: Limited evaluation due to lack of IV contrast    Abscess collection posterior to the bladder as described above with air   tracking into the prostate and more inferiorly, new since 2018.   Although not demonstrated on this examination, the possibility of   fistulization with the bladder or rectum is not excluded. A cystogram can   be obtained for further evaluation.    These findings were discussed with Dr. Benson at 2018 8:30 PM by   Dr. Angelita Costa with read back confirmation.    < end of copied text >

## 2018-12-05 NOTE — ED PROVIDER NOTE - MEDICAL DECISION MAKING DETAILS
Fever chills with metastatic bladder ca neprhostomy tubes and suprapubic,concerns for sepsis/uti check ua,labs,cultures.ivf,abx,  Óscar Graves MD, Facep

## 2018-12-06 DIAGNOSIS — D64.9 ANEMIA, UNSPECIFIED: ICD-10-CM

## 2018-12-06 DIAGNOSIS — A41.9 SEPSIS, UNSPECIFIED ORGANISM: ICD-10-CM

## 2018-12-06 DIAGNOSIS — I48.0 PAROXYSMAL ATRIAL FIBRILLATION: ICD-10-CM

## 2018-12-06 DIAGNOSIS — N18.3 CHRONIC KIDNEY DISEASE, STAGE 3 (MODERATE): ICD-10-CM

## 2018-12-06 DIAGNOSIS — E11.9 TYPE 2 DIABETES MELLITUS WITHOUT COMPLICATIONS: ICD-10-CM

## 2018-12-06 DIAGNOSIS — L02.91 CUTANEOUS ABSCESS, UNSPECIFIED: ICD-10-CM

## 2018-12-06 DIAGNOSIS — C68.9 MALIGNANT NEOPLASM OF URINARY ORGAN, UNSPECIFIED: ICD-10-CM

## 2018-12-06 DIAGNOSIS — I10 ESSENTIAL (PRIMARY) HYPERTENSION: ICD-10-CM

## 2018-12-06 DIAGNOSIS — D69.6 THROMBOCYTOPENIA, UNSPECIFIED: ICD-10-CM

## 2018-12-06 DIAGNOSIS — Z29.9 ENCOUNTER FOR PROPHYLACTIC MEASURES, UNSPECIFIED: ICD-10-CM

## 2018-12-06 DIAGNOSIS — Z95.2 PRESENCE OF PROSTHETIC HEART VALVE: ICD-10-CM

## 2018-12-06 LAB
ANION GAP SERPL CALC-SCNC: 11 MMOL/L — SIGNIFICANT CHANGE UP (ref 5–17)
BUN SERPL-MCNC: 21 MG/DL — SIGNIFICANT CHANGE UP (ref 7–23)
CALCIUM SERPL-MCNC: 8.4 MG/DL — SIGNIFICANT CHANGE UP (ref 8.4–10.5)
CHLORIDE SERPL-SCNC: 95 MMOL/L — LOW (ref 96–108)
CO2 SERPL-SCNC: 23 MMOL/L — SIGNIFICANT CHANGE UP (ref 22–31)
CREAT SERPL-MCNC: 1.46 MG/DL — HIGH (ref 0.5–1.3)
CULTURE RESULTS: SIGNIFICANT CHANGE UP
CULTURE RESULTS: SIGNIFICANT CHANGE UP
GLUCOSE BLDC GLUCOMTR-MCNC: 122 MG/DL — HIGH (ref 70–99)
GLUCOSE BLDC GLUCOMTR-MCNC: 184 MG/DL — HIGH (ref 70–99)
GLUCOSE BLDC GLUCOMTR-MCNC: 189 MG/DL — HIGH (ref 70–99)
GLUCOSE BLDC GLUCOMTR-MCNC: 225 MG/DL — HIGH (ref 70–99)
GLUCOSE BLDC GLUCOMTR-MCNC: 266 MG/DL — HIGH (ref 70–99)
GLUCOSE SERPL-MCNC: 171 MG/DL — HIGH (ref 70–99)
MAGNESIUM SERPL-MCNC: 1.7 MG/DL — SIGNIFICANT CHANGE UP (ref 1.6–2.6)
OSMOLALITY UR: 243 MOS/KG — LOW (ref 300–900)
PHOSPHATE SERPL-MCNC: 2.7 MG/DL — SIGNIFICANT CHANGE UP (ref 2.5–4.5)
POTASSIUM SERPL-MCNC: 4.6 MMOL/L — SIGNIFICANT CHANGE UP (ref 3.5–5.3)
POTASSIUM SERPL-SCNC: 4.6 MMOL/L — SIGNIFICANT CHANGE UP (ref 3.5–5.3)
SODIUM SERPL-SCNC: 129 MMOL/L — LOW (ref 135–145)
SODIUM UR-SCNC: 38 MMOL/L — SIGNIFICANT CHANGE UP
SPECIMEN SOURCE: SIGNIFICANT CHANGE UP
SPECIMEN SOURCE: SIGNIFICANT CHANGE UP

## 2018-12-06 PROCEDURE — 99223 1ST HOSP IP/OBS HIGH 75: CPT | Mod: GC

## 2018-12-06 PROCEDURE — 99223 1ST HOSP IP/OBS HIGH 75: CPT | Mod: AI

## 2018-12-06 PROCEDURE — 99222 1ST HOSP IP/OBS MODERATE 55: CPT | Mod: 24

## 2018-12-06 PROCEDURE — 72192 CT PELVIS W/O DYE: CPT | Mod: 26

## 2018-12-06 PROCEDURE — 99223 1ST HOSP IP/OBS HIGH 75: CPT

## 2018-12-06 PROCEDURE — 99222 1ST HOSP IP/OBS MODERATE 55: CPT

## 2018-12-06 PROCEDURE — 12345: CPT | Mod: NC,GC

## 2018-12-06 RX ORDER — PIPERACILLIN AND TAZOBACTAM 4; .5 G/20ML; G/20ML
3.38 INJECTION, POWDER, LYOPHILIZED, FOR SOLUTION INTRAVENOUS EVERY 8 HOURS
Qty: 0 | Refills: 0 | Status: DISCONTINUED | OUTPATIENT
Start: 2018-12-06 | End: 2018-12-06

## 2018-12-06 RX ORDER — INSULIN LISPRO 100/ML
VIAL (ML) SUBCUTANEOUS AT BEDTIME
Qty: 0 | Refills: 0 | Status: DISCONTINUED | OUTPATIENT
Start: 2018-12-06 | End: 2018-12-14

## 2018-12-06 RX ORDER — DEXTROSE 50 % IN WATER 50 %
15 SYRINGE (ML) INTRAVENOUS ONCE
Qty: 0 | Refills: 0 | Status: DISCONTINUED | OUTPATIENT
Start: 2018-12-06 | End: 2018-12-14

## 2018-12-06 RX ORDER — GLUCAGON INJECTION, SOLUTION 0.5 MG/.1ML
1 INJECTION, SOLUTION SUBCUTANEOUS ONCE
Qty: 0 | Refills: 0 | Status: DISCONTINUED | OUTPATIENT
Start: 2018-12-06 | End: 2018-12-14

## 2018-12-06 RX ORDER — INSULIN LISPRO 100/ML
VIAL (ML) SUBCUTANEOUS
Qty: 0 | Refills: 0 | Status: DISCONTINUED | OUTPATIENT
Start: 2018-12-06 | End: 2018-12-14

## 2018-12-06 RX ORDER — MEROPENEM 1 G/30ML
1000 INJECTION INTRAVENOUS EVERY 8 HOURS
Qty: 0 | Refills: 0 | Status: DISCONTINUED | OUTPATIENT
Start: 2018-12-06 | End: 2018-12-10

## 2018-12-06 RX ORDER — MORPHINE SULFATE 50 MG/1
4 CAPSULE, EXTENDED RELEASE ORAL EVERY 4 HOURS
Qty: 0 | Refills: 0 | Status: DISCONTINUED | OUTPATIENT
Start: 2018-12-06 | End: 2018-12-11

## 2018-12-06 RX ORDER — DOCUSATE SODIUM 100 MG
100 CAPSULE ORAL THREE TIMES A DAY
Qty: 0 | Refills: 0 | Status: DISCONTINUED | OUTPATIENT
Start: 2018-12-06 | End: 2018-12-14

## 2018-12-06 RX ORDER — DOCUSATE SODIUM 100 MG
100 CAPSULE ORAL
Qty: 0 | Refills: 0 | Status: DISCONTINUED | OUTPATIENT
Start: 2018-12-06 | End: 2018-12-06

## 2018-12-06 RX ORDER — SODIUM CHLORIDE 9 MG/ML
1000 INJECTION INTRAMUSCULAR; INTRAVENOUS; SUBCUTANEOUS
Qty: 0 | Refills: 0 | Status: DISCONTINUED | OUTPATIENT
Start: 2018-12-06 | End: 2018-12-08

## 2018-12-06 RX ORDER — VANCOMYCIN HCL 1 G
1000 VIAL (EA) INTRAVENOUS EVERY 12 HOURS
Qty: 0 | Refills: 0 | Status: DISCONTINUED | OUTPATIENT
Start: 2018-12-06 | End: 2018-12-06

## 2018-12-06 RX ORDER — SENNA PLUS 8.6 MG/1
2 TABLET ORAL AT BEDTIME
Qty: 0 | Refills: 0 | Status: DISCONTINUED | OUTPATIENT
Start: 2018-12-06 | End: 2018-12-06

## 2018-12-06 RX ORDER — MEROPENEM 1 G/30ML
INJECTION INTRAVENOUS
Qty: 0 | Refills: 0 | Status: DISCONTINUED | OUTPATIENT
Start: 2018-12-06 | End: 2018-12-10

## 2018-12-06 RX ORDER — DEXTROSE 50 % IN WATER 50 %
25 SYRINGE (ML) INTRAVENOUS ONCE
Qty: 0 | Refills: 0 | Status: DISCONTINUED | OUTPATIENT
Start: 2018-12-06 | End: 2018-12-14

## 2018-12-06 RX ORDER — LISINOPRIL 2.5 MG/1
10 TABLET ORAL DAILY
Qty: 0 | Refills: 0 | Status: DISCONTINUED | OUTPATIENT
Start: 2018-12-06 | End: 2018-12-06

## 2018-12-06 RX ORDER — SIMVASTATIN 20 MG/1
10 TABLET, FILM COATED ORAL AT BEDTIME
Qty: 0 | Refills: 0 | Status: DISCONTINUED | OUTPATIENT
Start: 2018-12-06 | End: 2018-12-14

## 2018-12-06 RX ORDER — MORPHINE SULFATE 50 MG/1
15 CAPSULE, EXTENDED RELEASE ORAL EVERY 4 HOURS
Qty: 0 | Refills: 0 | Status: DISCONTINUED | OUTPATIENT
Start: 2018-12-06 | End: 2018-12-11

## 2018-12-06 RX ORDER — VANCOMYCIN HCL 1 G
VIAL (EA) INTRAVENOUS
Qty: 0 | Refills: 0 | Status: DISCONTINUED | OUTPATIENT
Start: 2018-12-06 | End: 2018-12-06

## 2018-12-06 RX ORDER — VANCOMYCIN HCL 1 G
1000 VIAL (EA) INTRAVENOUS EVERY 24 HOURS
Qty: 0 | Refills: 0 | Status: DISCONTINUED | OUTPATIENT
Start: 2018-12-06 | End: 2018-12-06

## 2018-12-06 RX ORDER — DEXTROSE 50 % IN WATER 50 %
12.5 SYRINGE (ML) INTRAVENOUS ONCE
Qty: 0 | Refills: 0 | Status: DISCONTINUED | OUTPATIENT
Start: 2018-12-06 | End: 2018-12-14

## 2018-12-06 RX ORDER — VANCOMYCIN HCL 1 G
1000 VIAL (EA) INTRAVENOUS ONCE
Qty: 0 | Refills: 0 | Status: DISCONTINUED | OUTPATIENT
Start: 2018-12-06 | End: 2018-12-06

## 2018-12-06 RX ORDER — ASPIRIN/CALCIUM CARB/MAGNESIUM 324 MG
81 TABLET ORAL DAILY
Qty: 0 | Refills: 0 | Status: DISCONTINUED | OUTPATIENT
Start: 2018-12-06 | End: 2018-12-07

## 2018-12-06 RX ORDER — VANCOMYCIN HCL 1 G
1000 VIAL (EA) INTRAVENOUS EVERY 24 HOURS
Qty: 0 | Refills: 0 | Status: DISCONTINUED | OUTPATIENT
Start: 2018-12-06 | End: 2018-12-09

## 2018-12-06 RX ORDER — MEROPENEM 1 G/30ML
1000 INJECTION INTRAVENOUS ONCE
Qty: 0 | Refills: 0 | Status: COMPLETED | OUTPATIENT
Start: 2018-12-06 | End: 2018-12-06

## 2018-12-06 RX ORDER — SODIUM CHLORIDE 9 MG/ML
1000 INJECTION, SOLUTION INTRAVENOUS
Qty: 0 | Refills: 0 | Status: DISCONTINUED | OUTPATIENT
Start: 2018-12-06 | End: 2018-12-14

## 2018-12-06 RX ORDER — VANCOMYCIN HCL 1 G
750 VIAL (EA) INTRAVENOUS EVERY 12 HOURS
Qty: 0 | Refills: 0 | Status: DISCONTINUED | OUTPATIENT
Start: 2018-12-06 | End: 2018-12-06

## 2018-12-06 RX ORDER — VANCOMYCIN HCL 1 G
750 VIAL (EA) INTRAVENOUS
Qty: 0 | Refills: 0 | Status: DISCONTINUED | OUTPATIENT
Start: 2018-12-06 | End: 2018-12-06

## 2018-12-06 RX ORDER — POLYETHYLENE GLYCOL 3350 17 G/17G
17 POWDER, FOR SOLUTION ORAL DAILY
Qty: 0 | Refills: 0 | Status: DISCONTINUED | OUTPATIENT
Start: 2018-12-06 | End: 2018-12-14

## 2018-12-06 RX ORDER — METOPROLOL TARTRATE 50 MG
50 TABLET ORAL DAILY
Qty: 0 | Refills: 0 | Status: DISCONTINUED | OUTPATIENT
Start: 2018-12-06 | End: 2018-12-14

## 2018-12-06 RX ORDER — ENOXAPARIN SODIUM 100 MG/ML
40 INJECTION SUBCUTANEOUS EVERY 24 HOURS
Qty: 0 | Refills: 0 | Status: DISCONTINUED | OUTPATIENT
Start: 2018-12-06 | End: 2018-12-06

## 2018-12-06 RX ORDER — MULTIVIT-MIN/FERROUS GLUCONATE 9 MG/15 ML
1 LIQUID (ML) ORAL DAILY
Qty: 0 | Refills: 0 | Status: DISCONTINUED | OUTPATIENT
Start: 2018-12-06 | End: 2018-12-14

## 2018-12-06 RX ORDER — FENTANYL CITRATE 50 UG/ML
1 INJECTION INTRAVENOUS
Qty: 0 | Refills: 0 | Status: DISCONTINUED | OUTPATIENT
Start: 2018-12-06 | End: 2018-12-06

## 2018-12-06 RX ORDER — MORPHINE SULFATE 50 MG/1
2 CAPSULE, EXTENDED RELEASE ORAL EVERY 4 HOURS
Qty: 0 | Refills: 0 | Status: DISCONTINUED | OUTPATIENT
Start: 2018-12-06 | End: 2018-12-06

## 2018-12-06 RX ORDER — SENNA PLUS 8.6 MG/1
2 TABLET ORAL AT BEDTIME
Qty: 0 | Refills: 0 | Status: DISCONTINUED | OUTPATIENT
Start: 2018-12-06 | End: 2018-12-14

## 2018-12-06 RX ORDER — INSULIN LISPRO 100/ML
2 VIAL (ML) SUBCUTANEOUS ONCE
Qty: 0 | Refills: 0 | Status: COMPLETED | OUTPATIENT
Start: 2018-12-06 | End: 2018-12-06

## 2018-12-06 RX ADMIN — FENTANYL CITRATE 1 PATCH: 50 INJECTION INTRAVENOUS at 07:38

## 2018-12-06 RX ADMIN — MORPHINE SULFATE 4 MILLIGRAM(S): 50 CAPSULE, EXTENDED RELEASE ORAL at 17:27

## 2018-12-06 RX ADMIN — Medication 250 MILLIGRAM(S): at 22:29

## 2018-12-06 RX ADMIN — Medication 100 MILLIGRAM(S): at 05:42

## 2018-12-06 RX ADMIN — POLYETHYLENE GLYCOL 3350 17 GRAM(S): 17 POWDER, FOR SOLUTION ORAL at 13:15

## 2018-12-06 RX ADMIN — MORPHINE SULFATE 15 MILLIGRAM(S): 50 CAPSULE, EXTENDED RELEASE ORAL at 06:12

## 2018-12-06 RX ADMIN — MORPHINE SULFATE 15 MILLIGRAM(S): 50 CAPSULE, EXTENDED RELEASE ORAL at 22:57

## 2018-12-06 RX ADMIN — MEROPENEM 100 MILLIGRAM(S): 1 INJECTION INTRAVENOUS at 14:57

## 2018-12-06 RX ADMIN — Medication 1 TABLET(S): at 15:41

## 2018-12-06 RX ADMIN — SODIUM CHLORIDE 50 MILLILITER(S): 9 INJECTION INTRAMUSCULAR; INTRAVENOUS; SUBCUTANEOUS at 13:16

## 2018-12-06 RX ADMIN — MORPHINE SULFATE 15 MILLIGRAM(S): 50 CAPSULE, EXTENDED RELEASE ORAL at 22:27

## 2018-12-06 RX ADMIN — MEROPENEM 100 MILLIGRAM(S): 1 INJECTION INTRAVENOUS at 22:44

## 2018-12-06 RX ADMIN — SODIUM CHLORIDE 50 MILLILITER(S): 9 INJECTION INTRAMUSCULAR; INTRAVENOUS; SUBCUTANEOUS at 14:57

## 2018-12-06 RX ADMIN — Medication 2 UNIT(S): at 01:44

## 2018-12-06 RX ADMIN — MORPHINE SULFATE 4 MILLIGRAM(S): 50 CAPSULE, EXTENDED RELEASE ORAL at 13:27

## 2018-12-06 RX ADMIN — Medication 2: at 17:27

## 2018-12-06 RX ADMIN — PIPERACILLIN AND TAZOBACTAM 25 GRAM(S): 4; .5 INJECTION, POWDER, LYOPHILIZED, FOR SOLUTION INTRAVENOUS at 05:43

## 2018-12-06 RX ADMIN — Medication 50 MILLIGRAM(S): at 05:42

## 2018-12-06 RX ADMIN — FENTANYL CITRATE 1 PATCH: 50 INJECTION INTRAVENOUS at 05:45

## 2018-12-06 RX ADMIN — Medication 100 MILLIGRAM(S): at 22:29

## 2018-12-06 RX ADMIN — SIMVASTATIN 10 MILLIGRAM(S): 20 TABLET, FILM COATED ORAL at 22:29

## 2018-12-06 RX ADMIN — Medication 100 MILLIGRAM(S): at 13:22

## 2018-12-06 RX ADMIN — MORPHINE SULFATE 15 MILLIGRAM(S): 50 CAPSULE, EXTENDED RELEASE ORAL at 05:42

## 2018-12-06 RX ADMIN — SENNA PLUS 2 TABLET(S): 8.6 TABLET ORAL at 22:29

## 2018-12-06 RX ADMIN — MORPHINE SULFATE 4 MILLIGRAM(S): 50 CAPSULE, EXTENDED RELEASE ORAL at 09:22

## 2018-12-06 RX ADMIN — MORPHINE SULFATE 4 MILLIGRAM(S): 50 CAPSULE, EXTENDED RELEASE ORAL at 12:57

## 2018-12-06 RX ADMIN — Medication 1: at 09:26

## 2018-12-06 RX ADMIN — MORPHINE SULFATE 4 MILLIGRAM(S): 50 CAPSULE, EXTENDED RELEASE ORAL at 08:52

## 2018-12-06 NOTE — H&P ADULT - PROBLEM SELECTOR PLAN 3
Pt with platelets at 80, likely 2/2 to chemo.    - will monitor  - holding chemo DVT ppx at this time. Pt with current urothelial cell carcimona with mets, now on cycle 2 of chemo.    - will consult onc in am, oncologist is Dr. Oquendo at [facility]  - on fentanyl 25 mcg patch and morphine 15 mg q4 prn for pain control, will continue; ISTOP #: [id number] Pt with current urothelial cell carcimona with mets, now on cycle 2 of chemo.    - will consult onc in am, oncologist is Dr. Oquendo at [facility]  - on fentanyl 25 mcg patch and morphine 15 mg q4 prn for pain control + morphine 2mg IV for breakthrough pain, will continue; ISTOP #: [id number]

## 2018-12-06 NOTE — H&P ADULT - NSHPREVIEWOFSYSTEMS_GEN_ALL_CORE
REVIEW OF SYSTEMS:    CONSTITUTIONAL: No weakness, fevers or chills  EYES/ENT: No visual changes;  No vertigo or throat pain   NECK: No pain or stiffness  RESPIRATORY: No cough, wheezing, hemoptysis; No shortness of breath  CARDIOVASCULAR: No chest pain or palpitations  GASTROINTESTINAL: No abdominal or epigastric pain. No nausea, vomiting, or hematemesis; No diarrhea or constipation. No melena or hematochezia.  GENITOURINARY: See HPI  NEUROLOGICAL: No numbness or weakness  SKIN: No itching, rashes  PSYCH:  Feeling tired

## 2018-12-06 NOTE — H&P ADULT - PROBLEM SELECTOR PLAN 2
Pt with current urothelial cell carcimona with mets, now on cycle 2 of chemo.    - will consult onc in am, oncologist is Dr. Oquendo at [facility] Pt with current urothelial cell carcimona with mets, now on cycle 2 of chemo.    - will consult onc in am, oncologist is Dr. Oquendo at [facility]  - on fentanyl 25 mcg patch and morphine 15 mg q6 prn for pain control, will continue; ISTOP #: [id number] Pt meeting criteria for sepsis (fever at home to 101 and tachycardia on presentation to 104), likely in setting of abscess.  - vanc/zosyn  - NS @ 100  - f/u blood cultures and urine cultures  - repeat cultures if febrile again  - pt with resp alkalosis at time of presentation, will monitor  - UA was grossly positive for suprapubic catheter and nephrostomy tubes.

## 2018-12-06 NOTE — CONSULT NOTE ADULT - ASSESSMENT
83M with PMHx HTN, DM2, afib, bioprostethic AVR on ASA, prostate cancer 2006 with seed implantation and recently diagnosed stage IV urothelial cell cancer s/p suprapubic catheter and b/l nephrostomy tubes who presented with fevers and was found to have abscess     # Stage IV bladder cancer  - Patient with known metastasis to penis  - Recently started on carbo+gem in 11/19  - Now presents with fever and penile discharge. CT showed Abscess collection posterior to the bladder as described above with air tracking into the prostate and more inferiorly, new since 11/5/2018.   - Currently on broad spectrum abx with vanc and zosyn  - Urology consulted: recommend IR drainage and CT cystogram  - Follow cultures    Adelina Sanchez  PGY-4 Hematology Oncology  [phone] 83M with PMHx HTN, DM2, afib, bioprostethic AVR on ASA, prostate cancer 2006 with seed implantation and recently diagnosed stage IV urothelial cell cancer s/p suprapubic catheter and b/l nephrostomy tubes, recently started on carbo+gem on 11/19 who presented with fevers and was found to have prostatic/periprostatic necrotic mass with possible left inferior pubic ramus OM    Plan:  - CT read as prostatic/periprostatic abscess, however, according to IR this is rather a necrotic mass  - Currently on broad spectrum abx with vanc and zosyn  - Urology following  - ID consulted, will need recommendations re: abx duration for possible OM  - Patient was due for chemo on day 22, however, will have to hold off further treatments until infection resolves    Case seen and discussed with Dr. Montaño. Patient and family aware of findings and plan.     Adelina Sanchez  PGY-4 Hematology Oncology  [phone]

## 2018-12-06 NOTE — H&P ADULT - NSHPSOCIALHISTORY_GEN_ALL_CORE
Lives at home with wife, cat at home  Denies EtOH use  Former smoker 2 ppd x 30 yrs, quit 30 yrs ago  Denies drug use.

## 2018-12-06 NOTE — H&P ADULT - PROBLEM SELECTOR PLAN 6
No ac 2/2 to bleeding from suprapubic catheter  - c/w metop 50 mg ER pt on asa for AVR, will continue.  - if platelets drop < 50, will stop ASA

## 2018-12-06 NOTE — PROGRESS NOTE ADULT - PROBLEM SELECTOR PLAN 7
Pt with GFR 49 meeting criteria for CKD3.    - Hold lisinopril for now  - will c/w hydration to ensure no worsening of kidney function

## 2018-12-06 NOTE — H&P ADULT - PROBLEM SELECTOR PLAN 10
c/w simvasatin for ASCVD risk  c/w senna/colace  SCDs  NPO for possible procedure tomorrow    Elizabeth Lees MD, PhD  PGY-2| Internal Medicine  [phone] / [phone]

## 2018-12-06 NOTE — PROVIDER CONTACT NOTE (OTHER) - SITUATION
pt wife concerned fentanyl patch is too stronger for patient as patient continues to be restless w/ the equivalent pain relief as when the patient was on 12mcg.

## 2018-12-06 NOTE — H&P ADULT - NSHPPHYSICALEXAM_GEN_ALL_CORE
Vital Signs Last 24 Hrs  T(C): 37.1 (06 Dec 2018 00:38), Max: 37.7 (05 Dec 2018 21:31)  T(F): 98.7 (06 Dec 2018 00:38), Max: 99.8 (05 Dec 2018 21:31)  HR: 86 (06 Dec 2018 00:38) (64 - 104)  BP: 100/49 (06 Dec 2018 00:38) (100/49 - 172/65)  BP(mean): --  RR: 17 (06 Dec 2018 00:38) (16 - 18)  SpO2: 97% (06 Dec 2018 00:38) (95% - 100%)    PHYSICAL EXAM:  Appearance: Sitting in bed, NAD  HEENT:   Normal oral mucosa, PERRL, EOMI, anicteric sclera  Cardiovascular: RRR, No murmurs, gallops or rubs appreciated  Respiratory: Lungs clear to auscultation bilaterally, no wheezes, crackles appreciated  Gastrointestinal:  Soft, nondistended, nontender, +BS  :  Suprapubic catheter in place, no erythema at site of insertion.	  Skin: No rashes, eccymosis or cyanosis noted	  Neurologic: AOx3, CNII-XII grossly intact, motor and sensory function grossly intact  Extremities: Moving all extremities equally  Vascular: palpable dp and radial pulses 2+ bilaterally; no edema noted  Psych:  Normal mood and affect, responds to questions appropriately Vital Signs Last 24 Hrs  T(C): 37.1 (06 Dec 2018 00:38), Max: 37.7 (05 Dec 2018 21:31)  T(F): 98.7 (06 Dec 2018 00:38), Max: 99.8 (05 Dec 2018 21:31)  HR: 86 (06 Dec 2018 00:38) (64 - 104)  BP: 100/49 (06 Dec 2018 00:38) (100/49 - 172/65)  BP(mean): --  RR: 17 (06 Dec 2018 00:38) (16 - 18)  SpO2: 97% (06 Dec 2018 00:38) (95% - 100%)    PHYSICAL EXAM:  Appearance: Sitting in bed, NAD  HEENT:   Normal oral mucosa, PERRL, EOMI, anicteric sclera  Cardiovascular: RRR, No murmurs, gallops or rubs appreciated  Respiratory: Lungs clear to auscultation bilaterally, no wheezes, crackles appreciated  Gastrointestinal:  Soft, nondistended, nontender, +BS  :  Suprapubic catheter in place, no erythema at site of insertion; nephrostomy tubes in place  Skin: No rashes, eccymosis or cyanosis noted	  Neurologic: AOx3, CNII-XII grossly intact, motor and sensory function grossly intact  Extremities: Moving all extremities equally  Vascular: palpable dp and radial pulses 2+ bilaterally; no edema noted  Psych:  Normal mood and affect, responds to questions appropriately Vital Signs Last 24 Hrs  T(C): 37.1 (06 Dec 2018 00:38), Max: 37.7 (05 Dec 2018 21:31)  T(F): 98.7 (06 Dec 2018 00:38), Max: 99.8 (05 Dec 2018 21:31)  HR: 86 (06 Dec 2018 00:38) (64 - 104)  BP: 100/49 (06 Dec 2018 00:38) (100/49 - 172/65)  BP(mean): --  RR: 17 (06 Dec 2018 00:38) (16 - 18)  SpO2: 97% (06 Dec 2018 00:38) (95% - 100%)    PHYSICAL EXAM:  Appearance: Sitting in bed, NAD  HEENT:   Normal oral mucosa, PERRL, EOMI, anicteric sclera  Cardiovascular: RRR, No murmurs, gallops or rubs appreciated  Respiratory: Lungs clear to auscultation bilaterally, no wheezes, crackles appreciated  Gastrointestinal:  Soft, nondistended, nontender, +BS  :  Suprapubic catheter in place, no erythema at site of insertion; nephrostomy tubes in place. purulence draining from penis. severely tender penis  Skin: No rashes, eccymosis or cyanosis noted	  Neurologic: AOx3, CNII-XII grossly intact, motor and sensory function grossly intact  Extremities: Moving all extremities equally  Vascular: palpable dp and radial pulses 2+ bilaterally; no edema noted  Psych:  Normal mood and affect, responds to questions appropriately

## 2018-12-06 NOTE — CONSULT NOTE ADULT - ASSESSMENT
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  Possible periprostatic abscess ? fistula  Sseen by urology and CRS-IR drainage recommended  Patient also s/p recent gemzar and carboplatin  Prior urine Cx with E  coli intermediate to zosyn  Blood and urine cx pending    Rec:  1) Abscess drainage/aspiration either via IR or surgically per urology/CR surgery  2) Follow cultures  3) Follow blood Cx  4) Empiric vanco + meropenem  Adjust dosage per renal function  5) Monitor vanco trough and creatinine  6) Hold chemo   7) Overall prognosis poor-hem-onc input on prognosis and consider palliative eval    Will tailor plan for ID issues  per course,results.Will defer to primary team on management of other issues.  Case d/w wife and primary team    Will Follow.  Beeper [id number]-oowo/afterhours/No response-[id number]

## 2018-12-06 NOTE — CONSULT NOTE ADULT - ATTENDING COMMENTS
CT findings reviewed with patient. Currently IR feels he has necrotic mass rather than abscess which he did have in the past. CT also showing osteomyelitis. We reviewed with patient and wife there would be no further chemotherapy until osteomyelitis resolved.

## 2018-12-06 NOTE — PROGRESS NOTE ADULT - ASSESSMENT
83year old male with PMHx of HTN, DM, A.Fib (no longer taking eliquis), 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), BPH s/p TURP 6/26/18 c/b prostatic abscess requiring IR drain, prostate cancer s/p brachy radiation, 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 (last changed 11/17), and b/l hydronephrosis s/p perc nephrostomy tubes 11/8 recently diagnosed with infiltrating high grade urothelial ca started chemo 11/19 now with a UTI and abdominal abscess  -f/u CT cystogram, ensure bladder is fully distended, ~200-300cc of contrast, please capture upper thighs in order to capture entirety of penis and scrotum  -Recommend IR consult for drainage of abscess  -NPO after midnight for possible IR drain   -appreciate CRSx recs  -Vanco/zosyn based on previous cultures  -appreciate medical care

## 2018-12-06 NOTE — CONSULT NOTE ADULT - SUBJECTIVE AND OBJECTIVE BOX
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     Patient was diagnosed with prostate cancer in 2006 and had seed implantation performed. He has had no prostatic tissue since that time. In May of 2018, he developed urinary retention. He went to the emergency room at the [facility] and a Dooley catheter was placed. He saw his primary urologist and a CT scan was performed on May 21. Along with a bone scan on May 24. An MRI of the prostate was subsequently performed. He had a transurethral resection of the prostate performed at [facility]. Patient was then referred to Dr. Burroughs for evaluation. He was sent from the office to the hospital due to the apparent abscess. He then required placement of a suprapubic tube as he was unable to pass urine. Patient's pain persistent and he also developed pain in the penis and testicles as well as the perineum. A biopsy of the prostate revealed metastatic urothelial carcinoma, and a biopsy of his injury to penis revealed the same. Patient was started on carboplatin and gemcitabine on November 19th.     Yesterday, patient was noted to have fever of 101 along with penile discharged. He called Dr. Oquendo who encouraged him to go to the ED for evaluation.     Allergies    oxycodone (Other)    Intolerances        MEDICATIONS  (STANDING):  aspirin enteric coated 81 milliGRAM(s) Oral daily  dextrose 5%. 1000 milliLiter(s) (50 mL/Hr) IV Continuous <Continuous>  dextrose 50% Injectable 12.5 Gram(s) IV Push once  dextrose 50% Injectable 25 Gram(s) IV Push once  dextrose 50% Injectable 25 Gram(s) IV Push once  fentaNYL   Patch  25 MICROgram(s)/Hr 1 Patch Transdermal every 72 hours  insulin lispro (HumaLOG) corrective regimen sliding scale   SubCutaneous three times a day before meals  insulin lispro (HumaLOG) corrective regimen sliding scale   SubCutaneous at bedtime  metoprolol succinate ER 50 milliGRAM(s) Oral daily  multivitamin/minerals 1 Tablet(s) Oral daily  piperacillin/tazobactam IVPB. 3.375 Gram(s) IV Intermittent every 8 hours  simvastatin 10 milliGRAM(s) Oral at bedtime  sodium chloride 0.9%. 1000 milliLiter(s) (50 mL/Hr) IV Continuous <Continuous>  vancomycin  IVPB 750 milliGRAM(s) IV Intermittent <User Schedule>    MEDICATIONS  (PRN):  dextrose 40% Gel 15 Gram(s) Oral once PRN Blood Glucose LESS THAN 70 milliGRAM(s)/deciliter  docusate sodium 100 milliGRAM(s) Oral two times a day PRN Constipation  glucagon  Injectable 1 milliGRAM(s) IntraMuscular once PRN Glucose LESS THAN 70 milligrams/deciliter  morphine  - Injectable 4 milliGRAM(s) IV Push every 4 hours PRN breakthrough pain  morphine  IR 15 milliGRAM(s) Oral every 4 hours PRN Severe Pain (7 - 10)  senna 2 Tablet(s) Oral at bedtime PRN Constipation      PAST MEDICAL & SURGICAL HISTORY:  Benign prostatic hyperplasia with lower urinary tract symptoms, symptom details unspecified  Prostate cancer  Hypertension  HLD (hyperlipidemia)  Type 2 diabetes mellitus  Paroxysmal A-fib  S/P AVR (aortic valve replacement)  S/P TURP      FAMILY HISTORY:  No pertinent family history in first degree relatives      SOCIAL HISTORY: No EtOH, no tobacco    REVIEW OF SYSTEMS: per HPI     Height (cm): 175.26 (12-05 @ 18:10)  Weight (kg): 81.6 (12-05 @ 18:10)  BMI (kg/m2): 26.6 (12-05 @ 18:10)  BSA (m2): 1.97 (12-05 @ 18:10)    T(F): 98.3 (12-06-18 @ 08:17), Max: 99.8 (12-05-18 @ 21:31)  HR: 73 (12-06-18 @ 08:17)  BP: 100/53 (12-06-18 @ 08:17)  RR: 20 (12-06-18 @ 08:17)  SpO2: 98% (12-06-18 @ 08:17)  Wt(kg): --    GENERAL: NAD, well-developed  HEAD:  Atraumatic, Normocephalic  EYES: EOMI, PERRLA, conjunctiva and sclera clear  NECK: Supple, No JVD  CHEST/LUNG: Clear to auscultation bilaterally; No wheeze  HEART: Regular rate and rhythm; No murmurs, rubs, or gallops  ABDOMEN: Soft, Nontender, Nondistended; Bowel sounds present  EXTREMITIES:  2+ Peripheral Pulses, No clubbing, cyanosis, or edema  NEUROLOGY: non-focal  SKIN: No rashes or lesions                          8.2    5.0   )-----------( 80       ( 05 Dec 2018 19:49 )             24.4       12-06    129<L>  |  95<L>  |  21  ----------------------------<  171<H>  4.6   |  23  |  1.46<H>    Ca    8.4      06 Dec 2018 06:20  Phos  2.7     12-06  Mg     1.7     12-06    TPro  6.5  /  Alb  2.9<L>  /  TBili  0.6  /  DBili  x   /  AST  9<L>  /  ALT  6<L>  /  AlkPhos  59  12-05      Magnesium, Serum: 1.7 mg/dL (12-06 @ 06:20)  Phosphorus Level, Serum: 2.7 mg/dL (12-06 @ 06:20)

## 2018-12-06 NOTE — H&P ADULT - PROBLEM SELECTOR PLAN 9
c/w simvasatin for ASCVD risk  c/w senna/colace  SCDs  NPO for possible procedure tomorrow    Elizabeth Lees MD, PhD  PGY-2| Internal Medicine  [phone] / [id number] hold metoformin in hospital  - ISS

## 2018-12-06 NOTE — H&P ADULT - PROBLEM SELECTOR PROBLEM 7
Essential hypertension Type 2 diabetes mellitus without complication, without long-term current use of insulin Chronic kidney disease (CKD), stage III (moderate)

## 2018-12-06 NOTE — H&P ADULT - ASSESSMENT
83M PMH DM2, HTN, atrial fibrillation (no longer on AC 2/2 to bleeding from suprapubic catheter), history of prostate cancer with radiation beads 12 yrs ago now with urothelial cell cancer with mets to prostate and penis presenting with fever and new onset penile discharge, CT showing possible fistula vs abscess in rectovesicular space. 83M PMH DM2, HTN, atrial fibrillation (no longer on AC 2/2 to bleeding from suprapubic catheter), history of prostate cancer with radiation beads 12 yrs ago now with urothelial cell cancer with mets to prostate and penis and possible pulm nodules, presenting with fever and new onset penile discharge, CT showing possible fistula vs abscess in rectovesicular space. 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

## 2018-12-06 NOTE — PROGRESS NOTE ADULT - SUBJECTIVE AND OBJECTIVE BOX
Subjective  No overnight events. Pt reports persistent discomfort and some drainage from penis.    Objective    Vital signs  T(F): , Max: 99.8 (12-05-18 @ 21:31)  HR: 73 (12-06-18 @ 08:17)  BP: 100/53 (12-06-18 @ 08:17)  SpO2: 98% (12-06-18 @ 08:17)  Wt(kg): --    Output     12-05 @ 07:01  -  12-06 @ 07:00  --------------------------------------------------------  IN: 0 mL / OUT: 1550 mL / NET: -1550 mL        Gen: NAD  Abd: soft, mild discomfort, SPT site clean, dry, intact  Back: NT in place b/l, draining clear yellow  : uncircumcised penis, no drainage appreciated, tender, indurated base of penis, no erythema or crepitus    Labs      12-06 @ 06:20    WBC --    / Hct --    / SCr 1.46     12-05 @ 19:49    WBC 5.0   / Hct 24.4  / SCr 1.28       Urine Cx: P  Blood Cx: P

## 2018-12-06 NOTE — PROGRESS NOTE ADULT - ASSESSMENT
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

## 2018-12-06 NOTE — PROGRESS NOTE ADULT - ATTENDING COMMENTS
Pt with recent stage 4 urothelial cell cancer s/p suprapubic catheter and b/l nephrostomy tubes, pw fever. CTAP demonstrating 7.3 cm prostatic/periprostatic abscess, rectal fistula as well as OM.   -Consulted IR for possible drainage of the abscess - not drainable as per IR.   -Continue broad spectrum IV abx. Consult ID for further recommendation  -Appreciate urology and CRS recs on further management  -Appreciate heme/onc recs on further chemoRx/treatment options    Adriane Bates MD  Division of Hospital Medicine  Pager: [phone]  Office: [phone] Pt with recent stage 4 urothelial cell cancer s/p suprapubic catheter and b/l nephrostomy tubes, pw fever. CTAP demonstrating 7.3 cm prostatic/periprostatic abscess, rectal fistula as well as OM.   -Consulted IR for possible drainage of the abscess - discussed the care at length with Dr. Gutierrez. He states that he biopsied his prostate in the past 8/2018, 10/2018 at that time, saw necrotic mass. CT imagings at that time appear similar to the one from today. His abscess on non-contrast CT likely the necrotic tumor in his opinion and may not be the culprit for the OM. Risks of the procedure and the quality of the patient would be poor if the patient to get another drainage tube. Will discuss the care with Dr. Burroughs (urology) for further management. Will hold off on drainage/tube insertion at this time.    -Continue broad spectrum IV abx. Consult ID for further recommendation  -Appreciate urology and CRS recs on further management  -Appreciate heme/onc recs on further chemoRx/treatment options    Adriane Bates MD  Division of Hospital Medicine  Pager: [phone]  Office: [phone]

## 2018-12-06 NOTE — H&P ADULT - HISTORY OF PRESENT ILLNESS
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  Pt states he had cycle 2 of chemo with gemcitibine and carboplatin on 11/26.  Pt had home nursing visit today and nursing noticed pt was febrile to 101.  At that time, wife noticed patient was slightly flused.  She checked his temperature again and noted that he was febrile to 100.4.  Pt stated at this time, he was also noticing that he had been having pinkish-white discharge from penis that was new today.  At this point, pt called Dr. Lux (oncologist) today as he was having fevers at home to 101 with foul smelling discharge from his penis.  He was encouraged to go to ED for further evaluation.    In the ED, T 98.3, /65, , RR 16 and 95% on RA.  Pt was given ceftriaxone, vanc and zosyn, morphine for pain with improvement in BP and 1L bolus.  Pt had CT that showed evidence of abscess vs fistula in rectoversicular space.  Urology and colorectal surgery were consulted with recommendations for abx and IR drainage.  Pt was admitted to medicine for further treatment.    Pt was examined this evening at bedside.  Denied fevers, chills at time of exam.  Pt was sleeping at time of exam.  Denied nausea, vomiting, diarrhea.  Pt states he has been having rectal pain, however this is chronic. 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  Pt states he had cycle 2 of chemo with gemcitibine and carboplatin on 11/26.  Pt had home nursing visit today and nursing noticed pt was febrile to 101.  At that time, wife noticed patient was slightly flused.  She checked his temperature again and noted that he was febrile to 100.4.  Pt stated at this time, he was also noticing that he had been having pinkish-white discharge from penis that was new today.  At this point, pt called Dr. Lux (oncologist) today as he was having fevers at home to 101 with foul smelling discharge from his penis.  He was encouraged to go to ED for further evaluation.    In the ED, T 98.3, /65, , RR 16 and 95% on RA.  Pt was given ceftriaxone, vanc and zosyn, morphine for pain with improvement in BP and 1L bolus.  Pt had CT that showed evidence of abscess vs fistula in rectoversicular space.  Urology and colorectal surgery were consulted with recommendations for abx and IR drainage.  Pt was admitted to medicine for further treatment.    Pt was examined this evening at bedside.  Denied fevers, chills at time of exam.  Pt was sleeping at time of exam.  Denied nausea, vomiting, diarrhea.  Pt states he has been having rectal pain, however this is chronic. 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  Pt states he had cycle 2 of chemo with gemcitibine and carboplatin on 11/26.  Pt had home nursing visit today and nursing noticed pt was febrile to 101.  At that time, wife noticed patient was slightly flused.  She checked his temperature again and noted that he was febrile to 100.4.  Pt stated at this time, he was also noticing that he had been having pinkish-white discharge from penis that was new today.  At this point, pt called Dr. Lux (oncologist) today as he was having fevers at home to 101 with foul smelling discharge from his penis.  He was encouraged to go to ED for further evaluation.    In the ED, T 98.3, /65, , RR 16 and 95% on RA.  Pt was given ceftriaxone, vanc and zosyn, morphine for pain with improvement in BP and 1L bolus.  Pt had CT that showed evidence of abscess vs fistula in rectoversicular space.  Urology and colorectal surgery were consulted with recommendations for abx and IR drainage.  Pt was admitted to medicine for further treatment.    Pt was examined this evening at bedside.  Denied fevers, chills at time of exam.  Pt was sleeping at time of exam.  Denied nausea, vomiting, diarrhea.  Pt states he has been having rectal pain, however this is chronic. 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  Pt states he had cycle 2 of chemo with gemcitibine and carboplatin on 11/26.  Pt had home nursing visit today and nursing noticed pt was febrile to 101.  At that time, wife noticed patient was slightly flused.  She checked his temperature again and noted that he was febrile to 100.4.  Pt stated at this time, he was also noticing that he had been having pinkish-white discharge from penis that was new today.  At this point, pt called Dr. Lux (oncologist) today as he was having fevers at home to 101 with foul smelling discharge from his penis.  He was encouraged to go to ED for further evaluation. Of note, pt had sigmoidoscopy in Oct '18, which showed ulcerations.    In the ED, T 98.3, /65, , RR 16 and 95% on RA.  Pt was given ceftriaxone, vanc and zosyn, morphine for pain with improvement in BP and 1L bolus.  Pt had CT that showed evidence of abscess vs fistula in rectoversicular space.  Urology and colorectal surgery were consulted with recommendations for abx and IR drainage.  Pt was admitted to medicine for further treatment.    Pt was examined this evening at bedside.  Denied fevers, chills at time of exam.  Pt was sleeping at time of exam.  Denied nausea, vomiting, diarrhea.  Pt states he has been having rectal pain, however this is chronic.

## 2018-12-06 NOTE — PROGRESS NOTE ADULT - PROBLEM SELECTOR PLAN 10
c/w simvasatin for ASCVD risk  GI: c/w senna/colace and miralax  DVT: SCDs  Diet: NPO for possible IR drainage

## 2018-12-06 NOTE — H&P ADULT - NSHPLABSRESULTS_GEN_ALL_CORE
129<L>  |  93<L>  |  22  ----------------------------<  166<H>  5.3   |  22  |  1.28    Ca    8.9      05 Dec 2018 19:49    TPro  6.5  /  Alb  2.9<L>  /  TBili  0.6  /  DBili  x   /  AST  9<L>  /  ALT  6<L>  /  AlkPhos  59                      Urinalysis Basic - ( 05 Dec 2018 20:17 )    Color: Yellow / Appearance: Turbid / S.016 / pH: x  Gluc: x / Ketone: Negative  / Bili: Negative / Urobili: Negative   Blood: x / Protein: 300 mg/dL / Nitrite: Positive   Leuk Esterase: Large / RBC: 10 /hpf /  /hpf   Sq Epi: x / Non Sq Epi: 1 /hpf / Bacteria: Many                              8.2    5.0   )-----------( 80       ( 05 Dec 2018 19:49 )             24.4                         8.8    6.2   )-----------( 87.9     ( 03 Dec 2018 10:05 )             26.2     CAPILLARY BLOOD GLUCOSE      POCT Blood Glucose.: 266 mg/dL (06 Dec 2018 00:59)    Blood Gas Source Venous: Venous ( @ 19:49)      < from: CT Abdomen and Pelvis No Cont (18 @ 20:19) >    IMPRESSION: Limited evaluation due to lack of IV contrast    Abscess collection posterior to the bladder as described above with air   tracking into the prostate and more inferiorly, new since 2018.   Although not demonstrated on this examination, the possibility of   fistulization with the bladder or rectum is not excluded. A cystogram can   be obtained for further evaluation.    < end of copied text > Personally reviewed imaging, labs.        129<L>  |  93<L>  |  22  ----------------------------<  166<H>  5.3   |  22  |  1.28    Ca    8.9      05 Dec 2018 19:49    TPro  6.5  /  Alb  2.9<L>  /  TBili  0.6  /  DBili  x   /  AST  9<L>  /  ALT  6<L>  /  AlkPhos  59                      Urinalysis Basic - ( 05 Dec 2018 20:17 )    Color: Yellow / Appearance: Turbid / S.016 / pH: x  Gluc: x / Ketone: Negative  / Bili: Negative / Urobili: Negative   Blood: x / Protein: 300 mg/dL / Nitrite: Positive   Leuk Esterase: Large / RBC: 10 /hpf /  /hpf   Sq Epi: x / Non Sq Epi: 1 /hpf / Bacteria: Many                              8.2    5.0   )-----------( 80       ( 05 Dec 2018 19:49 )             24.4                         8.8    6.2   )-----------( 87.9     ( 03 Dec 2018 10:05 )             26.2     CAPILLARY BLOOD GLUCOSE      POCT Blood Glucose.: 266 mg/dL (06 Dec 2018 00:59)    Blood Gas Source Venous: Venous ( @ 19:49)      < from: CT Abdomen and Pelvis No Cont (18 @ 20:19) >    IMPRESSION: Limited evaluation due to lack of IV contrast    Abscess collection posterior to the bladder as described above with air   tracking into the prostate and more inferiorly, new since 2018.   Although not demonstrated on this examination, the possibility of   fistulization with the bladder or rectum is not excluded. A cystogram can   be obtained for further evaluation.    < end of copied text >

## 2018-12-06 NOTE — PROGRESS NOTE ADULT - SUBJECTIVE AND OBJECTIVE BOX
=========================================  CONTACT EDSON Goss M.D., PGY-1  Pager: NS- [phone], [facility]- [phone]    Mon-Fri: pager covered by day team 7am-7pm;   ***Academic conferences 12pm-1pm- page ONLY if URGENT or if Consultant  /Kelly: see chart, primary physician assigned available 7am-12pm  Sat/Sharma Cross Coverage 12pm-7pm: NS- page [phone] for Team1-4, [facility]- pager forwarded to covering Resident  For Night coverage 7pm-7am: NS- page [phone] Team1-3, page [phone] Team4 & Care Model; [facility]- page [phone] for Red, [phone] for Blue  =========================================    Patient is a 83y old  Male who presents with a chief complaint of abscess (06 Dec 2018 11:04)      SUBJECTIVE / OVERNIGHT EVENTS:  Patient seen and examined at bedside. No acute events. Patient with no complaints at present.     Other Review of Systems Negative.    MEDICATIONS  (STANDING):  aspirin enteric coated 81 milliGRAM(s) Oral daily  dextrose 5%. 1000 milliLiter(s) (50 mL/Hr) IV Continuous <Continuous>  dextrose 50% Injectable 12.5 Gram(s) IV Push once  dextrose 50% Injectable 25 Gram(s) IV Push once  dextrose 50% Injectable 25 Gram(s) IV Push once  docusate sodium 100 milliGRAM(s) Oral three times a day  fentaNYL   Patch  25 MICROgram(s)/Hr 1 Patch Transdermal every 72 hours  insulin lispro (HumaLOG) corrective regimen sliding scale   SubCutaneous three times a day before meals  insulin lispro (HumaLOG) corrective regimen sliding scale   SubCutaneous at bedtime  metoprolol succinate ER 50 milliGRAM(s) Oral daily  multivitamin/minerals 1 Tablet(s) Oral daily  piperacillin/tazobactam IVPB. 3.375 Gram(s) IV Intermittent every 8 hours  polyethylene glycol 3350 17 Gram(s) Oral daily  senna 2 Tablet(s) Oral at bedtime  simvastatin 10 milliGRAM(s) Oral at bedtime  sodium chloride 0.9%. 1000 milliLiter(s) (50 mL/Hr) IV Continuous <Continuous>  vancomycin  IVPB 750 milliGRAM(s) IV Intermittent <User Schedule>    MEDICATIONS  (PRN):  dextrose 40% Gel 15 Gram(s) Oral once PRN Blood Glucose LESS THAN 70 milliGRAM(s)/deciliter  glucagon  Injectable 1 milliGRAM(s) IntraMuscular once PRN Glucose LESS THAN 70 milligrams/deciliter  morphine  - Injectable 4 milliGRAM(s) IV Push every 4 hours PRN breakthrough pain  morphine  IR 15 milliGRAM(s) Oral every 4 hours PRN Severe Pain (7 - 10)      OBJECTIVE:    Vital Signs Last 24 Hrs  T(C): 36.8 (06 Dec 2018 08:17), Max: 37.7 (05 Dec 2018 21:31)  T(F): 98.3 (06 Dec 2018 08:17), Max: 99.8 (05 Dec 2018 21:31)  HR: 73 (06 Dec 2018 08:17) (64 - 104)  BP: 100/53 (06 Dec 2018 08:17) (100/49 - 172/65)  BP(mean): --  RR: 20 (06 Dec 2018 08:17) (16 - 20)  SpO2: 98% (06 Dec 2018 08:17) (95% - 100%)    CAPILLARY BLOOD GLUCOSE      POCT Blood Glucose.: 189 mg/dL (06 Dec 2018 09:03)  POCT Blood Glucose.: 266 mg/dL (06 Dec 2018 00:59)    I&O's Summary    05 Dec 2018 07:01  -  06 Dec 2018 07:00  --------------------------------------------------------  IN: 0 mL / OUT: 1550 mL / NET: -1550 mL        PHYSICAL EXAM:  GENERAL: NAD, well-developed  HEAD:  Atraumatic, Normocephalic  EYES: EOMI, PERRLA, conjunctiva and sclera clear  NECK: Supple, No JVD  CHEST/LUNG: Clear to auscultation bilaterally; No wheeze  HEART: Regular rate and rhythm; No murmurs, rubs, or gallops  ABDOMEN: Soft, Nontender, Nondistended; Bowel sounds present.   : Suprapubic catheter in place, no surrounding erythema. B/l nephrostomy tubes in place, draining clear urine.   EXTREMITIES:  2+ Peripheral Pulses, No clubbing, cyanosis, or edema  PSYCH: AAOx3  NEUROLOGY: non-focal  SKIN: No rashes or lesions    LABS:                        8.2    5.0   )-----------( 80       ( 05 Dec 2018 19:49 )             24.4     Auto Eosinophil # 0.0   / Auto Eosinophil % 0.8   / Auto Neutrophil # 3.6   / Auto Neutrophil % 73.0  / BANDS % x        12-06    129<L>  |  95<L>  |  21  ----------------------------<  171<H>  4.6   |  23  |  1.46<H>  12-    129<L>  |  93<L>  |  22  ----------------------------<  166<H>  5.3   |  22  |  1.28    Ca    8.4      06 Dec 2018 06:20  Mg     1.7     12-  Phos  2.7     12-  TPro  6.5  /  Alb  2.9<L>  /  TBili  0.6  /  DBili  x   /  AST  9<L>  /  ALT  6<L>  /  AlkPhos  59  12-          Urinalysis Basic - ( 05 Dec 2018 20:17 )    Color: Yellow / Appearance: Turbid / S.016 / pH: x  Gluc: x / Ketone: Negative  / Bili: Negative / Urobili: Negative   Blood: x / Protein: 300 mg/dL / Nitrite: Positive   Leuk Esterase: Large / RBC: 10 /hpf /  /hpf   Sq Epi: x / Non Sq Epi: 1 /hpf / Bacteria: Many            ABG:     VBG: ( 19:49 ) - VBG - pH: 7.51  | pCO2: 33    | pO2: 26    | Lactate: 2.9          Care Discussed with Consultants/Other Providers:    RADIOLOGY & ADDITIONAL TESTS:  (Imaging Personally Reviewed)    CT chest/abdome/pelvis noncon:    FINDINGS:    CHEST:     LUNGS AND LARGE AIRWAYS: Patent central airways. Multiple subcentimeter   bilateral pulmonary nodules, unchanged from 2018 and most   consistent with metastasis.  PLEURA: No pleural effusion.  VESSELS: Atherosclerotic changes of the thoracic aorta.  HEART: Heart size is normal. No pericardial effusion. Status post aortic   valve replacement. Coronary artery calcification.  MEDIASTINUM AND VINICIO: No lymphadenopathy.  CHEST WALL AND LOWER NECK: Unchanged 1.4 cm hypodense nodule in the left   thyroid lobe, unchanged.    ABDOMEN AND PELVIS:    Evaluation of the abdominal viscera due to lack of IV contrast.    LIVER: Within normal limits.  BILE DUCTS: Normal caliber.  GALLBLADDER: Within normal limits.  SPLEEN: Within normal limits.  PANCREAS: Within normal limits.  ADRENALS: Within normal limits.  KIDNEYS/URETERS: Bilateral nephrostomy tubes. No hydronephrosis.    BLADDER: Underdistended with a suprapubic catheter in place. There is a   new 1.5 x 5.4 x 4.3 cm (AP x TR x CC) collection with foci of air   posterior to the bladder and superior to the prostate. Air is also seen   tracking into the deep fascia of the proximal penis.  REPRODUCTIVE ORGANS: Prostatic radiation seeds again noted in an enlarged   prostate gland. Multiple foci of air are present within the prostate   gland.    BOWEL: No bowel obstruction. Appendix is normal.  PERITONEUM: No ascites.  VESSELS:  Cirrhotic changes.  RETROPERITONEUM: No lymphadenopathy.    ABDOMINAL WALL: Within normal limits.  BONES: Old healed right-sided rib fractures. Sternotomy. Degenerative   spinal changes.    IMPRESSION: Limited evaluation due to lack of IV contrast    Abscess collection posterior to the bladder as described above with air   tracking into the prostate and more inferiorly, new since 2018.   Although not demonstrated on this examination, the possibility of   fistulization with the bladder or rectum is not excluded. A cystogram can   be obtained for further evaluation. =========================================  CONTACT EDSON Goss M.D., PGY-1  Pager: NS- [phone], [facility]- [phone]    Mon-Fri: pager covered by day team 7am-7pm;   ***Academic conferences 12pm-1pm- page ONLY if URGENT or if Consultant  /Kelly: see chart, primary physician assigned available 7am-12pm  Sat/Sharma Cross Coverage 12pm-7pm: NS- page [phone] for Team1-4, [facility]- pager forwarded to covering Resident  For Night coverage 7pm-7am: NS- page [phone] Team1-3, page [phone] Team4 & Care Model; [facility]- page [phone] for Red, [phone] for Blue  =========================================    Patient is a 83y old  Male who presents with a chief complaint of abscess (06 Dec 2018 11:04)      SUBJECTIVE / OVERNIGHT EVENTS:  Patient seen and examined at bedside. No acute events. Patient with no complaints at present.     Other Review of Systems Negative.    MEDICATIONS  (STANDING):  aspirin enteric coated 81 milliGRAM(s) Oral daily  dextrose 5%. 1000 milliLiter(s) (50 mL/Hr) IV Continuous <Continuous>  dextrose 50% Injectable 12.5 Gram(s) IV Push once  dextrose 50% Injectable 25 Gram(s) IV Push once  dextrose 50% Injectable 25 Gram(s) IV Push once  docusate sodium 100 milliGRAM(s) Oral three times a day  fentaNYL   Patch  25 MICROgram(s)/Hr 1 Patch Transdermal every 72 hours  insulin lispro (HumaLOG) corrective regimen sliding scale   SubCutaneous three times a day before meals  insulin lispro (HumaLOG) corrective regimen sliding scale   SubCutaneous at bedtime  metoprolol succinate ER 50 milliGRAM(s) Oral daily  multivitamin/minerals 1 Tablet(s) Oral daily  piperacillin/tazobactam IVPB. 3.375 Gram(s) IV Intermittent every 8 hours  polyethylene glycol 3350 17 Gram(s) Oral daily  senna 2 Tablet(s) Oral at bedtime  simvastatin 10 milliGRAM(s) Oral at bedtime  sodium chloride 0.9%. 1000 milliLiter(s) (50 mL/Hr) IV Continuous <Continuous>  vancomycin  IVPB 750 milliGRAM(s) IV Intermittent <User Schedule>    MEDICATIONS  (PRN):  dextrose 40% Gel 15 Gram(s) Oral once PRN Blood Glucose LESS THAN 70 milliGRAM(s)/deciliter  glucagon  Injectable 1 milliGRAM(s) IntraMuscular once PRN Glucose LESS THAN 70 milligrams/deciliter  morphine  - Injectable 4 milliGRAM(s) IV Push every 4 hours PRN breakthrough pain  morphine  IR 15 milliGRAM(s) Oral every 4 hours PRN Severe Pain (7 - 10)      OBJECTIVE:    Vital Signs Last 24 Hrs  T(C): 36.8 (06 Dec 2018 08:17), Max: 37.7 (05 Dec 2018 21:31)  T(F): 98.3 (06 Dec 2018 08:17), Max: 99.8 (05 Dec 2018 21:31)  HR: 73 (06 Dec 2018 08:17) (64 - 104)  BP: 100/53 (06 Dec 2018 08:17) (100/49 - 172/65)  BP(mean): --  RR: 20 (06 Dec 2018 08:17) (16 - 20)  SpO2: 98% (06 Dec 2018 08:17) (95% - 100%)    CAPILLARY BLOOD GLUCOSE      POCT Blood Glucose.: 189 mg/dL (06 Dec 2018 09:03)  POCT Blood Glucose.: 266 mg/dL (06 Dec 2018 00:59)    I&O's Summary    05 Dec 2018 07:01  -  06 Dec 2018 07:00  --------------------------------------------------------  IN: 0 mL / OUT: 1550 mL / NET: -1550 mL        PHYSICAL EXAM:  GENERAL: NAD, well-developed  HEAD:  Atraumatic, Normocephalic  EYES: EOMI, PERRLA, conjunctiva and sclera clear  NECK: Supple, No JVD  CHEST/LUNG: Clear to auscultation bilaterally; No wheeze  HEART: Regular rate and rhythm; No murmurs, rubs, or gallops  ABDOMEN: Soft, Nontender, Nondistended; Bowel sounds present.   : Suprapubic catheter in place, no surrounding erythema. B/l nephrostomy tubes in place, draining clear urine.   EXTREMITIES:  2+ Peripheral Pulses, No clubbing, cyanosis, or edema  PSYCH: AAOx3  NEUROLOGY: non-focal  SKIN: No rashes or lesions    LABS:                        8.2    5.0   )-----------( 80       ( 05 Dec 2018 19:49 )             24.4     Auto Eosinophil # 0.0   / Auto Eosinophil % 0.8   / Auto Neutrophil # 3.6   / Auto Neutrophil % 73.0  / BANDS % x        12-06    129<L>  |  95<L>  |  21  ----------------------------<  171<H>  4.6   |  23  |  1.46<H>  12-    129<L>  |  93<L>  |  22  ----------------------------<  166<H>  5.3   |  22  |  1.28    Ca    8.4      06 Dec 2018 06:20  Mg     1.7     12-  Phos  2.7     12-  TPro  6.5  /  Alb  2.9<L>  /  TBili  0.6  /  DBili  x   /  AST  9<L>  /  ALT  6<L>  /  AlkPhos  59  12-          Urinalysis Basic - ( 05 Dec 2018 20:17 )    Color: Yellow / Appearance: Turbid / S.016 / pH: x  Gluc: x / Ketone: Negative  / Bili: Negative / Urobili: Negative   Blood: x / Protein: 300 mg/dL / Nitrite: Positive   Leuk Esterase: Large / RBC: 10 /hpf /  /hpf   Sq Epi: x / Non Sq Epi: 1 /hpf / Bacteria: Many            ABG:     VBG: ( 19:49 ) - VBG - pH: 7.51  | pCO2: 33    | pO2: 26    | Lactate: 2.9          Care Discussed with Consultants/Other Providers:    RADIOLOGY & ADDITIONAL TESTS:  (Imaging Personally Reviewed)    CT chest/abdome/pelvis noncon:    FINDINGS:    CHEST:     LUNGS AND LARGE AIRWAYS: Patent central airways. Multiple subcentimeter   bilateral pulmonary nodules, unchanged from 2018 and most   consistent with metastasis.  PLEURA: No pleural effusion.  VESSELS: Atherosclerotic changes of the thoracic aorta.  HEART: Heart size is normal. No pericardial effusion. Status post aortic   valve replacement. Coronary artery calcification.  MEDIASTINUM AND VINICIO: No lymphadenopathy.  CHEST WALL AND LOWER NECK: Unchanged 1.4 cm hypodense nodule in the left   thyroid lobe, unchanged.    ABDOMEN AND PELVIS:    Evaluation of the abdominal viscera due to lack of IV contrast.    LIVER: Within normal limits.  BILE DUCTS: Normal caliber.  GALLBLADDER: Within normal limits.  SPLEEN: Within normal limits.  PANCREAS: Within normal limits.  ADRENALS: Within normal limits.  KIDNEYS/URETERS: Bilateral nephrostomy tubes. No hydronephrosis.    BLADDER: Underdistended with a suprapubic catheter in place. There is a   new 1.5 x 5.4 x 4.3 cm (AP x TR x CC) collection with foci of air   posterior to the bladder and superior to the prostate. Air is also seen   tracking into the deep fascia of the proximal penis.  REPRODUCTIVE ORGANS: Prostatic radiation seeds again noted in an enlarged   prostate gland. Multiple foci of air are present within the prostate   gland.    BOWEL: No bowel obstruction. Appendix is normal.  PERITONEUM: No ascites.  VESSELS:  Cirrhotic changes.  RETROPERITONEUM: No lymphadenopathy.    ABDOMINAL WALL: Within normal limits.  BONES: Old healed right-sided rib fractures. Sternotomy. Degenerative   spinal changes.    IMPRESSION: Limited evaluation due to lack of IV contrast    Abscess collection posterior to the bladder as described above with air   tracking into the prostate and more inferiorly, new since 2018.   Although not demonstrated on this examination, the possibility of   fistulization with the bladder or rectum is not excluded. A cystogram can   be obtained for further evaluation.    CT pelvis with PO contrast:  FINDINGS:    BLADDER: Moderately distended with a suprapubic catheter in place.   Diffuse wall thickening and diverticula suggest chronic outlet   obstruction.  Bladder leak.  REPRODUCTIVE ORGANS: Brachytherapy seeds are present within the prostate.   A 7.3 x 4.3 x 4.1 cm abscess involves the prostate and extends superior   to the gland. The prostate also contains gas and gas extends along the   penile urethra and into the soft tissues of the penile shaft. A fistula   to the rectum is not excluded.  NODES: No pelvic lymphadenopathy.    VISUALIZED PORTIONS:    ABDOMINAL ORGANS: Within normal limits.  BOWEL: Within normal limits.  PERITONEUM: No ascites.  VESSELS: Atherosclerotic changes.  ABDOMINAL WALL: Small fat-containing left inguinal hernia.  BONES: Mottled appearance of the left inferior pubic ramus with cortical   destruction concerning for osteomyelitis.    IMPRESSION:     A 7.3 cm prostatic/periprostatic abscess.    Gas within the substance of the prostate as well as in the soft tissues   of the penile shaft concerning for extension of infection. A rectal   fistula is not excluded.    Mottled appearance of the left inferior pubic ramus with cortical   destruction concerning for osteomyelitis.

## 2018-12-06 NOTE — PROGRESS NOTE ADULT - ATTENDING COMMENTS
Pt seen/examined.  Case discussed with housestaff/PA team.  Agree with above note history, physical and assessment/plan.  IR for drainage - if not possible, ?transrectal drainage  Defer to colorectal surgery re evaluation/imaging to determine rectal fistula  IV abx

## 2018-12-06 NOTE — H&P ADULT - PROBLEM SELECTOR PLAN 1
Pt presenting with abscess vs fistula in rectovesicular space with discharge from penis.  Seen by both urology and colorectal surgery.  - will consult IR in am for possible drainage  - NPO at midnight  - vanc/zosyn for bacterial coverage Pt presenting with abscess vs fistula in rectovesicular space with discharge from penis.  Seen by both urology and colorectal surgery.  - will consult IR in am for possible drainage  - NPO at midnight  - vanc/zosyn for bacterial coverage  - CT with constrast in AM Pt presenting with abscess vs fistula in rectovesicular space with discharge from penis.  Seen by both urology and colorectal surgery.  - will consult IR in am for possible drainage  - NPO at midnight; NS @ 100 cc/hr  - vanc/zosyn for bacterial coverage  - CT with constrast in AM Pt presenting with abscess vs fistula in rectovesicular space with discharge from penis.  Seen by both urology and colorectal surgery.  - will consult IR in am for possible drainage  - NPO at midnight; NS @ 100 cc/hr  - vanc/zosyn for bacterial coverage  - CT with constrast in AM with cystogram to r/o possible fistulous connection between bladder and rectum Pt presenting with abscess with or without fistula in rectovesicular space with discharge from penis.  Seen by both urology and colorectal surgery.  - also with gross purulent drainage from penis  - will consult IR in am for possible drainage  - NPO at midnight; NS @ 100 cc/hr  - vanc/zosyn for bacterial coverage  - CT with constrast in AM with cystogram to r/o possible fistulous connection between bladder or rectum  - of note, pt had abscess 4 months ago s/p IR drain with ecolic/vse  - needs ID consult in AM

## 2018-12-06 NOTE — H&P ADULT - PROBLEM SELECTOR PLAN 4
Pt with chronic anenmia, Hb ~8  - will obtain iron studies in am  - likely anemia of chronic disease Pt with platelets at 80, likely 2/2 to chemo.    - will monitor  - holding chemo DVT ppx at this time.

## 2018-12-06 NOTE — H&P ADULT - PROBLEM SELECTOR PROBLEM 6
Type 2 diabetes mellitus without complication, without long-term current use of insulin Paroxysmal A-fib Aortic valve replaced

## 2018-12-06 NOTE — H&P ADULT - PROBLEM SELECTOR PLAN 7
hold metoformin in hospital  - ISS Pt with GFR 49 meeting criteria for CKD3.    - Given that pt will be getting contrast, will hold lisinopril  - will c/w hydration to ensure no worsening of kidney function

## 2018-12-06 NOTE — PROCEDURE NOTE - ADDITIONAL PROCEDURE DETAILS
Urology patient hx of UTI, SP tube last change 11/17  Procedure note: Patient is prepped and draped aseptically. old SP tube removed and  A 24 Fr Dooley was placed through SP opening with yellow urine return without difficulty. Patient tolerated the procedure very well.     keep Dooley now, next SP tube change due in one month

## 2018-12-06 NOTE — PROCEDURE NOTE - NSURITECHNIQUE_GU_A_CORE
Sterile gloves were worn for the duration of the procedure/The site was cleaned with soap/water and sterile solution (betadine)/The urinary drainage system is closed at the end of the procedure/Proper hand hygiene was performed/A sterile drape was used to cover all adjacent areas/The catheter was secured with a securement device (e.g. StatLock)/All applicable medical record documentation is completed/The catheter was appropriately lubricated/The collection bag is below the level of the patient and urinary bladder

## 2018-12-06 NOTE — PROGRESS NOTE ADULT - PROBLEM SELECTOR PLAN 3
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    - onc following patient (Dr. Oquendo at [facility] outpatient)  - c/w fentanyl 25 mcg patch and morphine 15 mg q4 prn for pain control + morphine 2mg IV for breakthrough pain, ISTOP #: [id number]

## 2018-12-06 NOTE — CONSULT NOTE ADULT - SUBJECTIVE AND OBJECTIVE BOX
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  Pt states he had cycle 2 of chemo with gemcitibine and carboplatin on 11/26.  Pt had home nursing visit today and nursing noticed pt was febrile to 101.  At that time, wife noticed patient was slightly flused.  She checked his temperature again and noted that he was febrile to 100.4.  Pt stated at this time, he was also noticing that he had been having pinkish-white discharge from penis that was new today.  At this point, pt called Dr. Lux (oncologist) today as he was having fevers at home to 101 with foul smelling discharge from his penis.  He was encouraged to go to ED for further evaluation. Of note, pt had sigmoidoscopy in Oct '18, which showed ulcerations.    In the ED, T 98.3, /65, , RR 16 and 95% on RA.  Pt was given ceftriaxone, vanc and zosyn, morphine for pain with improvement in BP and 1L bolus.  Pt had CT that showed evidence of abscess vs fistula in rectoversicular space.  Urology and colorectal surgery were consulted with recommendations for abx and IR drainage.  Pt was admitted to medicine for further treatment.    Pt was examined this evening at bedside.  Denied fevers, chills at time of exam.  Pt was sleeping at time of exam.  Denied nausea, vomiting, diarrhea.  Pt states he has been having rectal pain, however this is chronic. (06 Dec 2018 02:44)  "    Above verified-agree with above unless noted below.      ID consulted     PAST MEDICAL & SURGICAL HISTORY:  Benign prostatic hyperplasia with lower urinary tract symptoms, symptom details unspecified  Prostate cancer  Hypertension  HLD (hyperlipidemia)  Type 2 diabetes mellitus  Paroxysmal A-fib  S/P AVR (aortic valve replacement)  S/P TURP      Social history:   ex  Smoking,   no  ETOH,      IVDU       FAMILY HISTORY:  No pertinent family history in first degree relatives  - Family history of medical problems in all first degree relatives reviewed.None of these were found to be related to patients current illness.    REVIEW OF SYSTEMS  General:+ malaise fatigue some chills. Fevers +    Skin:No rash  	  Ophthalmologic:Denies any visual complaints,discharge redness or photophobia  	  ENMT:No nasal discharge,headache,sinus congestion or throat pain.No dental complaints    Respiratory and Thorax:No cough,sputum or chest pain.Denies shortness of breath  	  Cardiovascular:	No chest pain,palpitaions or dizziness    Gastrointestinal:	NO nausea,abdominal pain or diarrhea.    Genitourinary:	+ groin and penile pain  some discharge  No change in suprapubic catheter, nephrostomy output    Musculoskeletal:	No joint swelling or pain.No weakness    Neurological:No confusion,diziness.No extremity weakness.No bladder or bowel incontinence	    Psychiatric:No delusions or hallucinations	    Hematology/Lymphatics:	No LN swelling.No gum bleeding     Endocrine:	No recent weight gain or loss.No abnormal heat/cold intolerance    Allergic/Immunologic:	No hives or rash   Allergies    oxycodone (Other)    Intolerances        Antimicrobials:    meropenem  IVPB      meropenem  IVPB 1000 milliGRAM(s) IV Intermittent once  meropenem  IVPB 1000 milliGRAM(s) IV Intermittent every 8 hours  vancomycin  IVPB 1000 milliGRAM(s) IV Intermittent once  vancomycin  IVPB          Prior Antimicrobials:  MEDICATIONS  (STANDING):  cefTRIAXone   IVPB   100 mL/Hr IV Intermittent (12-05-18 @ 20:12)    piperacillin/tazobactam IVPB.   200 mL/Hr IV Intermittent (12-05-18 @ 22:34)    piperacillin/tazobactam IVPB.   25 mL/Hr IV Intermittent (12-06-18 @ 05:43)    vancomycin  IVPB   250 mL/Hr IV Intermittent (12-05-18 @ 23:39)      Other medications reviewed      Vital Signs Last 24 Hrs  T(C): 37.5 (06 Dec 2018 14:07), Max: 37.7 (05 Dec 2018 21:31)  T(F): 99.5 (06 Dec 2018 14:07), Max: 99.8 (05 Dec 2018 21:31)  HR: 81 (06 Dec 2018 14:07) (64 - 104)  BP: 104/60 (06 Dec 2018 14:07) (100/49 - 172/65)  BP(mean): --  RR: 16 (06 Dec 2018 14:07) (16 - 20)  SpO2: 99% (06 Dec 2018 14:07) (95% - 100%)    PHYSICAL EXAM:Pleasant patient in no acute distress.      Constitutional:Comfortable.Awake and alert  sleepy but fully arousable     Eyes:PERRL EOMI.NO discharge or conjunctival injection    ENMT:No sinus tenderness.No thrush.No pharyngeal exudate or erythema.Fair dental hygiene    Neck:Supple,No LN,no JVD      Respiratory:Good air entry bilaterally,CTA    Cardiovascular:S1 S2 wnl, No murmurs,rub or gallops    Gastrointestinal:Soft BS(+) no tenderness no masses ,No rebound or guarding    Genitourinary:No CVA tendereness     suprapubic catheter  PCN no erythema or purulence at site  Some discharge from urethra  Tenderness and some induration at base of penis    Extremities:No cyanosis,clubbing or edema.    Vascular:peripheral pulses felt    Neurological:letahrgic but fully arousable  answers queries appropriately     Skin:No rash     Lymph Nodes:No palpable LNs    Musculoskeletal:No joint swelling or LOM              Labs:                            8.2    5.0   )-----------( 80       ( 05 Dec 2018 19:49 )             24.4     WBC Count: 5.0 (12-05-18 @ 19:49)  WBC Count: 6.2 (12-03-18 @ 10:05)      12-06    129<L>  |  95<L>  |  21  ----------------------------<  171<H>  4.6   |  23  |  1.46<H>    Ca    8.4      06 Dec 2018 06:20  Phos  2.7     12-06  Mg     1.7     12-06    TPro  6.5  /  Alb  2.9<L>  /  TBili  0.6  /  DBili  x   /  AST  9<L>  /  ALT  6<L>  /  AlkPhos  59  12-05      < from: CT Pelvis w/ Oral Cont (12.06.18 @ 11:03) >    IMPRESSION:     A 7.3 cm prostatic/periprostatic abscess.    Gas withinthe substance of the prostate as well as in the soft tissues   of the penile shaft concerning for extension of infection. A rectal   fistula is not excluded.    Mottled appearance of the left inferior pubic ramus with cortical   destruction concerning for osteomyelitis.    < end of copied text >      < from: CT Chest No Cont (12.05.18 @ 19:46) >  CHEST:     LUNGS AND LARGE AIRWAYS: Patent central airways. Multiple subcentimeter   bilateral pulmonary nodules, unchanged from 11/21/2018 and most   consistent with metastasis.    < end of copied text >      Urine Microscopic-Add On (NC) (12.05.18 @ 20:17)    Red Blood Cell - Urine: 28 /hpf    White Blood Cell - Urine: 1148 /HPF    Epithelial Cells: 2    Bacteria: Many    Hyaline Casts: 2 /LPF    Comment - Urine: SUPRAPUBIC

## 2018-12-06 NOTE — H&P ADULT - PROBLEM SELECTOR PLAN 5
pt on asa for AVR, will continue.  - if platelets drop < 50, will stop ASA Pt with chronic anenmia, Hb ~8  - will obtain iron studies in am  - likely anemia of chronic disease

## 2018-12-07 ENCOUNTER — RESULT REVIEW (OUTPATIENT)
Age: 83
End: 2018-12-07

## 2018-12-07 LAB
-  AMIKACIN: SIGNIFICANT CHANGE UP
-  AMIKACIN: SIGNIFICANT CHANGE UP
-  AMOXICILLIN/CLAVULANIC ACID: SIGNIFICANT CHANGE UP
-  AMPICILLIN/SULBACTAM: SIGNIFICANT CHANGE UP
-  AMPICILLIN: SIGNIFICANT CHANGE UP
-  AMPICILLIN: SIGNIFICANT CHANGE UP
-  AZTREONAM: SIGNIFICANT CHANGE UP
-  AZTREONAM: SIGNIFICANT CHANGE UP
-  CEFAZOLIN: SIGNIFICANT CHANGE UP
-  CEFEPIME: SIGNIFICANT CHANGE UP
-  CEFEPIME: SIGNIFICANT CHANGE UP
-  CEFOXITIN: SIGNIFICANT CHANGE UP
-  CEFTAZIDIME: SIGNIFICANT CHANGE UP
-  CEFTRIAXONE: SIGNIFICANT CHANGE UP
-  CIPROFLOXACIN: SIGNIFICANT CHANGE UP
-  ERTAPENEM: SIGNIFICANT CHANGE UP
-  GENTAMICIN: SIGNIFICANT CHANGE UP
-  GENTAMICIN: SIGNIFICANT CHANGE UP
-  IMIPENEM: SIGNIFICANT CHANGE UP
-  IMIPENEM: SIGNIFICANT CHANGE UP
-  LEVOFLOXACIN: SIGNIFICANT CHANGE UP
-  MEROPENEM: SIGNIFICANT CHANGE UP
-  MEROPENEM: SIGNIFICANT CHANGE UP
-  NITROFURANTOIN: SIGNIFICANT CHANGE UP
-  NITROFURANTOIN: SIGNIFICANT CHANGE UP
-  PIPERACILLIN/TAZOBACTAM: SIGNIFICANT CHANGE UP
-  PIPERACILLIN/TAZOBACTAM: SIGNIFICANT CHANGE UP
-  TETRACYCLINE: SIGNIFICANT CHANGE UP
-  TIGECYCLINE: SIGNIFICANT CHANGE UP
-  TOBRAMYCIN: SIGNIFICANT CHANGE UP
-  TOBRAMYCIN: SIGNIFICANT CHANGE UP
-  TRIMETHOPRIM/SULFAMETHOXAZOLE: SIGNIFICANT CHANGE UP
-  VANCOMYCIN: SIGNIFICANT CHANGE UP
ANION GAP SERPL CALC-SCNC: 14 MMOL/L — SIGNIFICANT CHANGE UP (ref 5–17)
APTT BLD: 27.5 SEC — SIGNIFICANT CHANGE UP (ref 27.5–36.3)
BASOPHILS # BLD AUTO: 0.01 K/UL — SIGNIFICANT CHANGE UP (ref 0–0.2)
BASOPHILS NFR BLD AUTO: 0.2 % — SIGNIFICANT CHANGE UP (ref 0–2)
BLD GP AB SCN SERPL QL: NEGATIVE — SIGNIFICANT CHANGE UP
BUN SERPL-MCNC: 18 MG/DL — SIGNIFICANT CHANGE UP (ref 7–23)
CALCIUM SERPL-MCNC: 8.7 MG/DL — SIGNIFICANT CHANGE UP (ref 8.4–10.5)
CHLORIDE SERPL-SCNC: 95 MMOL/L — LOW (ref 96–108)
CO2 SERPL-SCNC: 22 MMOL/L — SIGNIFICANT CHANGE UP (ref 22–31)
CREAT SERPL-MCNC: 1.41 MG/DL — HIGH (ref 0.5–1.3)
CULTURE RESULTS: SIGNIFICANT CHANGE UP
EOSINOPHIL # BLD AUTO: 0.04 K/UL — SIGNIFICANT CHANGE UP (ref 0–0.5)
EOSINOPHIL NFR BLD AUTO: 0.9 % — SIGNIFICANT CHANGE UP (ref 0–6)
GAS PNL BLDV: SIGNIFICANT CHANGE UP
GLUCOSE BLDC GLUCOMTR-MCNC: 139 MG/DL — HIGH (ref 70–99)
GLUCOSE BLDC GLUCOMTR-MCNC: 166 MG/DL — HIGH (ref 70–99)
GLUCOSE BLDC GLUCOMTR-MCNC: 170 MG/DL — HIGH (ref 70–99)
GLUCOSE SERPL-MCNC: 146 MG/DL — HIGH (ref 70–99)
HBA1C BLD-MCNC: 7.3 % — HIGH (ref 4–5.6)
HCT VFR BLD CALC: 20.7 % — CRITICAL LOW (ref 39–50)
HGB BLD-MCNC: 6.8 G/DL — CRITICAL LOW (ref 13–17)
IMM GRANULOCYTES NFR BLD AUTO: 0.9 % — SIGNIFICANT CHANGE UP (ref 0–1.5)
INR BLD: 1.37 RATIO — HIGH (ref 0.88–1.16)
LYMPHOCYTES # BLD AUTO: 0.56 K/UL — LOW (ref 1–3.3)
LYMPHOCYTES # BLD AUTO: 13.1 % — SIGNIFICANT CHANGE UP (ref 13–44)
MAGNESIUM SERPL-MCNC: 1.8 MG/DL — SIGNIFICANT CHANGE UP (ref 1.6–2.6)
MCHC RBC-ENTMCNC: 26.2 PG — LOW (ref 27–34)
MCHC RBC-ENTMCNC: 32.9 GM/DL — SIGNIFICANT CHANGE UP (ref 32–36)
MCV RBC AUTO: 79.6 FL — LOW (ref 80–100)
METHOD TYPE: SIGNIFICANT CHANGE UP
MONOCYTES # BLD AUTO: 0.62 K/UL — SIGNIFICANT CHANGE UP (ref 0–0.9)
MONOCYTES NFR BLD AUTO: 14.6 % — HIGH (ref 2–14)
NEUTROPHILS # BLD AUTO: 2.99 K/UL — SIGNIFICANT CHANGE UP (ref 1.8–7.4)
NEUTROPHILS NFR BLD AUTO: 70.3 % — SIGNIFICANT CHANGE UP (ref 43–77)
ORGANISM # SPEC MICROSCOPIC CNT: SIGNIFICANT CHANGE UP
PHOSPHATE SERPL-MCNC: 3.4 MG/DL — SIGNIFICANT CHANGE UP (ref 2.5–4.5)
PLATELET # BLD AUTO: 118 K/UL — LOW (ref 150–400)
POTASSIUM SERPL-MCNC: 4.4 MMOL/L — SIGNIFICANT CHANGE UP (ref 3.5–5.3)
POTASSIUM SERPL-SCNC: 4.4 MMOL/L — SIGNIFICANT CHANGE UP (ref 3.5–5.3)
PROTHROM AB SERPL-ACNC: 15.8 SEC — HIGH (ref 10–13.1)
RBC # BLD: 2.6 M/UL — LOW (ref 4.2–5.8)
RBC # FLD: 15 % — HIGH (ref 10.3–14.5)
RH IG SCN BLD-IMP: POSITIVE — SIGNIFICANT CHANGE UP
SODIUM SERPL-SCNC: 131 MMOL/L — LOW (ref 135–145)
SPECIMEN SOURCE: SIGNIFICANT CHANGE UP
WBC # BLD: 4.26 K/UL — SIGNIFICANT CHANGE UP (ref 3.8–10.5)
WBC # FLD AUTO: 4.26 K/UL — SIGNIFICANT CHANGE UP (ref 3.8–10.5)

## 2018-12-07 PROCEDURE — 88341 IMHCHEM/IMCYTCHM EA ADD ANTB: CPT | Mod: 26

## 2018-12-07 PROCEDURE — 99233 SBSQ HOSP IP/OBS HIGH 50: CPT | Mod: GC

## 2018-12-07 PROCEDURE — 99232 SBSQ HOSP IP/OBS MODERATE 35: CPT

## 2018-12-07 PROCEDURE — 99233 SBSQ HOSP IP/OBS HIGH 50: CPT

## 2018-12-07 PROCEDURE — 88305 TISSUE EXAM BY PATHOLOGIST: CPT | Mod: 26

## 2018-12-07 PROCEDURE — 88173 CYTOPATH EVAL FNA REPORT: CPT | Mod: 26

## 2018-12-07 PROCEDURE — 88342 IMHCHEM/IMCYTCHM 1ST ANTB: CPT | Mod: 26

## 2018-12-07 PROCEDURE — 10030 IMG GID FLU COLL DRG SFT TIS: CPT

## 2018-12-07 RX ORDER — ASPIRIN/CALCIUM CARB/MAGNESIUM 324 MG
81 TABLET ORAL DAILY
Qty: 0 | Refills: 0 | Status: DISCONTINUED | OUTPATIENT
Start: 2018-12-09 | End: 2018-12-14

## 2018-12-07 RX ORDER — ACETAMINOPHEN 500 MG
650 TABLET ORAL EVERY 6 HOURS
Qty: 0 | Refills: 0 | Status: DISCONTINUED | OUTPATIENT
Start: 2018-12-07 | End: 2018-12-14

## 2018-12-07 RX ADMIN — Medication 1: at 08:32

## 2018-12-07 RX ADMIN — Medication 650 MILLIGRAM(S): at 02:30

## 2018-12-07 RX ADMIN — MORPHINE SULFATE 4 MILLIGRAM(S): 50 CAPSULE, EXTENDED RELEASE ORAL at 22:15

## 2018-12-07 RX ADMIN — SENNA PLUS 2 TABLET(S): 8.6 TABLET ORAL at 21:40

## 2018-12-07 RX ADMIN — MEROPENEM 100 MILLIGRAM(S): 1 INJECTION INTRAVENOUS at 21:41

## 2018-12-07 RX ADMIN — MEROPENEM 100 MILLIGRAM(S): 1 INJECTION INTRAVENOUS at 13:46

## 2018-12-07 RX ADMIN — Medication 250 MILLIGRAM(S): at 23:20

## 2018-12-07 RX ADMIN — MEROPENEM 100 MILLIGRAM(S): 1 INJECTION INTRAVENOUS at 05:03

## 2018-12-07 RX ADMIN — MORPHINE SULFATE 15 MILLIGRAM(S): 50 CAPSULE, EXTENDED RELEASE ORAL at 13:33

## 2018-12-07 RX ADMIN — Medication 100 MILLIGRAM(S): at 05:08

## 2018-12-07 RX ADMIN — Medication 100 MILLIGRAM(S): at 13:35

## 2018-12-07 RX ADMIN — POLYETHYLENE GLYCOL 3350 17 GRAM(S): 17 POWDER, FOR SOLUTION ORAL at 13:35

## 2018-12-07 RX ADMIN — Medication 1 TABLET(S): at 13:34

## 2018-12-07 RX ADMIN — Medication 100 MILLIGRAM(S): at 21:40

## 2018-12-07 RX ADMIN — Medication 650 MILLIGRAM(S): at 02:07

## 2018-12-07 RX ADMIN — MORPHINE SULFATE 15 MILLIGRAM(S): 50 CAPSULE, EXTENDED RELEASE ORAL at 08:36

## 2018-12-07 RX ADMIN — MORPHINE SULFATE 4 MILLIGRAM(S): 50 CAPSULE, EXTENDED RELEASE ORAL at 21:41

## 2018-12-07 RX ADMIN — Medication 50 MILLIGRAM(S): at 05:08

## 2018-12-07 RX ADMIN — SIMVASTATIN 10 MILLIGRAM(S): 20 TABLET, FILM COATED ORAL at 21:41

## 2018-12-07 NOTE — PROGRESS NOTE ADULT - PROBLEM SELECTOR PLAN 5
Pt with chronic anemia, hb 6.8 today, will transfuse 1 uPRBCs   - f/u  iron studies in am  - likely 2/2 ACD

## 2018-12-07 NOTE — PROGRESS NOTE ADULT - SUBJECTIVE AND OBJECTIVE BOX
Interventional Radiology Brief- Operative Note    Procedure: CT guided drainage of pelvic fluid collection    Operators: Matt    Anesthesia (type): Sedation administered by anesth attg. 2% lidocaine local    Contrast: none    EBL: none    Findings/Follow up Plan of Care: CT guided drainage of periprostatic collection performed. 10 Fr drain placed. Appx 48cc greenish viscous cloudy fluid drained. Drain placed to SACHA. Pt tolerated procedure without difficulty. Full report to follow.    Specimens Removed: samples sent for microbiology, cytology    Implants: 10 Fr drain as above    Complications: none    Condition/Disposition: stable / pacu    Please call Interventional Radiology x [phone] with any questions, concerns, or issues.

## 2018-12-07 NOTE — PROGRESS NOTE ADULT - ASSESSMENT
83 year old male with pelvic collection, concerning for possible rectal fistula; hemodynamically stable and afebrile.    - agree with management per primary team and urology  - drainage and cultures of abscess to target antibiotic therapy 83 year old male with pelvic collection, concerning for possible rectal fistula; hemodynamically stable and afebrile.    - agree with management per primary team and urology  - drainage and cultures of abscess to target antibiotic therapy  -Recommend CT abdomen and pelvis with rectal contrast to evaluate for fistula

## 2018-12-07 NOTE — PROVIDER CONTACT NOTE (OTHER) - ASSESSMENT
Patient remains a/o x4 but is restless, not making attempts to jump out of bed risking safety
vital signs as charted, already on ABX

## 2018-12-07 NOTE — PROVIDER CONTACT NOTE (OTHER) - ACTION/TREATMENT ORDERED:
Per Dr. Haque discontinue patch & team will reassess need in the morning, will consider putting patient back on 12mcg. Patch wasted on pyilas w/RAFAEL Finnegan.
tylenol

## 2018-12-07 NOTE — PROGRESS NOTE ADULT - SUBJECTIVE AND OBJECTIVE BOX
=========================================  CONTACT EDSON Goss M.D., PGY-1  Pager: [facility]- [phone], [facility]- [phone]    Mon-Fri: pager covered by day team 7am-7pm;   ***Academic conferences 12pm-1pm- page ONLY if URGENT or if Consultant  /Kelly: see chart, primary physician assigned available 7am-12pm  Sat/Sharma Cross Coverage 12pm-7pm: [facility]- page [phone] for Team1-4, [facility]- pager forwarded to covering Resident  For Night coverage 7pm-7am: [facility]- page [phone] Team1-3, page [phone] Team4 & Care Model; [facility]- page [phone] for Red, [phone] for Blue  =========================================    Patient is a 83y old  Male who presents with a chief complaint of abscess (07 Dec 2018 08:45)      SUBJECTIVE / OVERNIGHT EVENTS:  Patient seen and examined at bedside. Febrile to 101 overnight, no cx drawn. Patient afebrile this morning.     Other Review of Systems Negative.    MEDICATIONS  (STANDING):  dextrose 5%. 1000 milliLiter(s) (50 mL/Hr) IV Continuous <Continuous>  dextrose 50% Injectable 12.5 Gram(s) IV Push once  dextrose 50% Injectable 25 Gram(s) IV Push once  dextrose 50% Injectable 25 Gram(s) IV Push once  docusate sodium 100 milliGRAM(s) Oral three times a day  insulin lispro (HumaLOG) corrective regimen sliding scale   SubCutaneous three times a day before meals  insulin lispro (HumaLOG) corrective regimen sliding scale   SubCutaneous at bedtime  meropenem  IVPB 1000 milliGRAM(s) IV Intermittent every 8 hours  meropenem  IVPB      metoprolol succinate ER 50 milliGRAM(s) Oral daily  multivitamin/minerals 1 Tablet(s) Oral daily  polyethylene glycol 3350 17 Gram(s) Oral daily  senna 2 Tablet(s) Oral at bedtime  simvastatin 10 milliGRAM(s) Oral at bedtime  sodium chloride 0.9%. 1000 milliLiter(s) (50 mL/Hr) IV Continuous <Continuous>  vancomycin  IVPB 1000 milliGRAM(s) IV Intermittent every 24 hours    MEDICATIONS  (PRN):  acetaminophen   Tablet .. 650 milliGRAM(s) Oral every 6 hours PRN Temp greater or equal to 38C (100.4F)  dextrose 40% Gel 15 Gram(s) Oral once PRN Blood Glucose LESS THAN 70 milliGRAM(s)/deciliter  glucagon  Injectable 1 milliGRAM(s) IntraMuscular once PRN Glucose LESS THAN 70 milligrams/deciliter  morphine  - Injectable 4 milliGRAM(s) IV Push every 4 hours PRN breakthrough pain  morphine  IR 15 milliGRAM(s) Oral every 4 hours PRN Severe Pain (7 - 10)      OBJECTIVE:    Vital Signs Last 24 Hrs  T(C): 37.2 (07 Dec 2018 09:50), Max: 38.3 (07 Dec 2018 01:47)  T(F): 99 (07 Dec 2018 09:50), Max: 101 (07 Dec 2018 01:47)  HR: 81 (07 Dec 2018 09:50) (78 - 94)  BP: 118/51 (07 Dec 2018 09:50) (104/60 - 128/61)  BP(mean): --  RR: 18 (07 Dec 2018 09:50) (16 - 18)  SpO2: 99% (07 Dec 2018 09:50) (95% - 99%)    CAPILLARY BLOOD GLUCOSE      POCT Blood Glucose.: 170 mg/dL (07 Dec 2018 08:21)  POCT Blood Glucose.: 184 mg/dL (06 Dec 2018 22:09)  POCT Blood Glucose.: 225 mg/dL (06 Dec 2018 16:53)  POCT Blood Glucose.: 122 mg/dL (06 Dec 2018 12:45)    I&O's Summary    06 Dec 2018 07:  -  07 Dec 2018 07:00  --------------------------------------------------------  IN: 630 mL / OUT: 3440 mL / NET: -2810 mL    07 Dec 2018 07:01  -  07 Dec 2018 11:38  --------------------------------------------------------  IN: 0 mL / OUT: 850 mL / NET: -850 mL        PHYSICAL EXAM:  GENERAL: NAD, well-developed  HEAD:  Atraumatic, Normocephalic  EYES: EOMI, PERRLA, conjunctiva and sclera clear  NECK: Supple, No JVD  CHEST/LUNG: Clear to auscultation bilaterally; No wheeze  HEART: Regular rate and rhythm; No murmurs, rubs, or gallops  ABDOMEN: Soft, Nontender, Nondistended; Bowel sounds present.   : Suprapubic catheter in place, no surrounding erythema. B/l nephrostomy tubes in place, draining clear urine. Nephrostomy sites are c/d. Penis uncircumsized, unable to retract foreskin, (+) white discharge  EXTREMITIES:  2+ Peripheral Pulses, No clubbing, cyanosis, or edema  PSYCH: AAOx3  NEUROLOGY: non-focal  SKIN: No rashes or lesions    LABS:                        6.8    4.26  )-----------( 118      ( 07 Dec 2018 09:17 )             20.7     Auto Eosinophil # x     / Auto Eosinophil % x     / Auto Neutrophil # x     / Auto Neutrophil % x     / BANDS % x                            8.2    5.0   )-----------( 80       ( 05 Dec 2018 19:49 )             24.4     Auto Eosinophil # 0.0   / Auto Eosinophil % 0.8   / Auto Neutrophil # 3.6   / Auto Neutrophil % 73.0  / BANDS % x        12    131<L>  |  95<L>  |  18  ----------------------------<  146<H>  4.4   |  22  |  1.41<H>  12    129<L>  |  95<L>  |  21  ----------------------------<  171<H>  4.6   |  23  |  1.46<H>  12    129<L>  |  93<L>  |  22  ----------------------------<  166<H>  5.3   |  22  |  1.28    Ca    8.7      07 Dec 2018 07:30  Mg     1.8       Phos  3.4       TPro  6.5  /  Alb  2.9<L>  /  TBili  0.6  /  DBili  x   /  AST  9<L>  /  ALT  6<L>  /  AlkPhos  59      PT/INR - ( 07 Dec 2018 09:20 )   PT: 15.8 sec;   INR: 1.37 ratio         PTT - ( 07 Dec 2018 09:20 )  PTT:27.5 sec      Urinalysis Basic - ( 05 Dec 2018 20:17 )    Color: Yellow / Appearance: Turbid / S.016 / pH: x  Gluc: x / Ketone: Negative  / Bili: Negative / Urobili: Negative   Blood: x / Protein: 300 mg/dL / Nitrite: Positive   Leuk Esterase: Large / RBC: 10 /hpf /  /hpf   Sq Epi: x / Non Sq Epi: 1 /hpf / Bacteria: Many      ABG:     VBG: ( 07:32 ) - VBG - pH: 7.41  | pCO2: 42    | pO2: 86    | Lactate: 0.7          Care Discussed with Consultants/Other Providers:    RADIOLOGY & ADDITIONAL TESTS: No new imaging

## 2018-12-07 NOTE — PROGRESS NOTE ADULT - SUBJECTIVE AND OBJECTIVE BOX
Subjective  Pt w/fever overnight to 101. This AM, pt reports persistent minimal drainage from penis    Objective    Vital signs  T(F): , Max: 101 (12-07-18 @ 01:47)  HR: 78 (12-07-18 @ 04:54)  BP: 106/52 (12-07-18 @ 04:54)  SpO2: 95% (12-07-18 @ 04:54)  Wt(kg): --    Output     12-06 @ 07:01  -  12-07 @ 07:00  --------------------------------------------------------  IN: 630 mL / OUT: 3440 mL / NET: -2810 mL        Gen: NAD  Abd: soft, NT, ND  : uncircumcised phallus, indurated, no crepitus, overlying skin changes, scant discharge on adult diaper, testicles and scrotum nontender, no swelling    Labs      12-07 @ 07:30    WBC --    / Hct --    / SCr 1.41     12-06 @ 06:20    WBC --    / Hct --    / SCr 1.46       Urine Cx: Culture - Urine (12.05.18 @ 22:51)    Specimen Source: .Urine Suprapubic    Culture Results:   Numerous Escherichia coli  Numerous Pseudomonas aeruginosa  Numerous Enterococcus faecalis    Culture - Blood (12.06.18 @ 00:24)    Specimen Source: .Blood Blood-Peripheral    Culture Results:   No growth to date.        Imaging

## 2018-12-07 NOTE — PROGRESS NOTE ADULT - PROBLEM SELECTOR PLAN 1
P/w abscess vs necrotic mass +/- fistula in rectovesicular space  - CT pelvis with oral contrast - 7.3 cm prostatic/periprostatic abscess  - Pt with hx of abscess 4 months ago s/p IR drain with ecolic/vse     - Seen by both urology and colorectal surgery.  - IR drainage today  - NP) for procedure  - NS 1L at 100cc/hr  - Started on Meropenem 2g Q8 (12/6-)  - C/w Vancomycin 1g Q24 (12/6-)  - Cystogram pending  - ID following

## 2018-12-07 NOTE — PROGRESS NOTE ADULT - SUBJECTIVE AND OBJECTIVE BOX
Patient is a 83y old  Male who presents with a chief complaint of abscess (07 Dec 2018 11:38)    Being followed by ID for perineal abscess,fever    Interval history:feels slightly better  for IR drainage today    No acute events      ROS:  No cough,SOB,CP  No N/V/D./abd pain  still with penile pain and some drianage  No other complaints      Antimicrobials:    meropenem  IVPB 1000 milliGRAM(s) IV Intermittent every 8 hours  meropenem  IVPB      vancomycin  IVPB 1000 milliGRAM(s) IV Intermittent every 24 hours    Other medications reviewed    Vital Signs Last 24 Hrs  T(C): 36.9 (12-07-18 @ 14:30), Max: 38.3 (12-07-18 @ 01:47)  T(F): 98.5 (12-07-18 @ 14:30), Max: 101 (12-07-18 @ 01:47)  HR: 80 (12-07-18 @ 14:30) (78 - 94)  BP: 120/46 (12-07-18 @ 14:30) (106/52 - 128/61)  BP(mean): --  RR: 18 (12-07-18 @ 14:30) (17 - 18)  SpO2: 95% (12-07-18 @ 14:30) (95% - 99%)    Physical Exam:        HEENT PERRLA EOMI    No oral exudate or erythema    Chest Good AE,CTA    CVS RRR S1 S2 WNl No murmur or rub or gallop    Abd soft BS normal No tenderness   suprapubic catheter  tenderness base of penis  some discharge  Bilateral PCN    IV site no erythema tenderness or discharge    CNS AAO X 3 no focal    Lab Data:                          6.8    4.26  )-----------( 118      ( 07 Dec 2018 09:17 )             20.7       12-07    131<L>  |  95<L>  |  18  ----------------------------<  146<H>  4.4   |  22  |  1.41<H>    Ca    8.7      07 Dec 2018 07:30  Phos  3.4     12-07  Mg     1.8     12-07    TPro  6.5  /  Alb  2.9<L>  /  TBili  0.6  /  DBili  x   /  AST  9<L>  /  ALT  6<L>  /  AlkPhos  59  12-05        Culture - Blood (collected 06 Dec 2018 00:24)  Source: .Blood Blood-Peripheral  Preliminary Report (07 Dec 2018 01:02):    No growth to date.    Culture - Blood (collected 06 Dec 2018 00:24)  Source: .Blood Blood-Peripheral  Preliminary Report (07 Dec 2018 01:02):    No growth to date.    Culture - Urine (collected 05 Dec 2018 22:51)  Source: .Urine Suprapubic  Preliminary Report (06 Dec 2018 18:51):    Numerous Escherichia coli    Numerous Pseudomonas aeruginosa    Numerous Enterococcus faecalis    Culture - Urine (collected 05 Dec 2018 22:51)  Source: .Urine Nephrostomy - Right  Final Report (06 Dec 2018 22:37):    Culture grew 3 or more types of organisms which indicate    collection contamination; consider recollection only if clinically    indicated.    Culture - Urine (collected 05 Dec 2018 22:51)  Source: .Urine Nephrostomy - Left  Final Report (06 Dec 2018 22:37):    Culture grew 3 or more types of organisms which indicate    collection contamination; consider recollection only if clinically    indicated.          < from: CT Pelvis w/ Oral Cont (12.06.18 @ 11:03) >  IMPRESSION:     A 7.3 cm prostatic/periprostatic abscess.    Gas withinthe substance of the prostate as well as in the soft tissues   of the penile shaft concerning for extension of infection. A rectal   fistula is not excluded.    Mottled appearance of the left inferior pubic ramus with cortical   destruction concerning for osteomyelitis.    These findings were discussed with Dr. Fonseca 12/6/2018 11:37 AM by Dr. Chavez with read back confirmation.        < end of copied text >

## 2018-12-07 NOTE — PROGRESS NOTE ADULT - PROBLEM SELECTOR PLAN 3
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    - onc following patient (Dr. Oquendo at [facility] outpatient)  - c/w morphine 15 mg q4 prn for pain control + morphine 2mg IV for breakthrough pain, ISTOP #: [id number]

## 2018-12-07 NOTE — PROGRESS NOTE ADULT - ASSESSMENT
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 Gutierrez this AM, pt to undergo drainage of abscess as an add-on today vs Monday  - appreciate CRSx recs  - c/w Vanco/meropenem   - appreciate medical care

## 2018-12-07 NOTE — PROGRESS NOTE ADULT - PROBLEM SELECTOR PLAN 4
Pt with platelets at 118 this morning, likely 2/2 to chemo.    - will monitor  - holding DVT ppx at this time.

## 2018-12-07 NOTE — PROGRESS NOTE ADULT - SUBJECTIVE AND OBJECTIVE BOX
SURGERY  PROGRESS NOTE:     Overnight Events:  Resting comfortably overnight. No abdominal pain. No nausea or vomiting.       Physical Exam  Vital Signs Last 24 Hrs  T(C): 38.3 (07 Dec 2018 01:47), Max: 38.3 (07 Dec 2018 01:47)  T(F): 101 (07 Dec 2018 01:47), Max: 101 (07 Dec 2018 01:47)  HR: 90 (07 Dec 2018 01:47) (73 - 94)  BP: 116/53 (07 Dec 2018 01:47) (100/53 - 128/61)  RR: 17 (07 Dec 2018 01:47) (16 - 20)  SpO2: 96% (07 Dec 2018 01:47) (96% - 99%)    Gen: NAD  Abd: Soft, ND, NT    I&O's Detail    05 Dec 2018 07:01  -  06 Dec 2018 07:00  --------------------------------------------------------  IN:  Total IN: 0 mL    OUT:    Indwelling Catheter - Suprapubic: 250 mL    Nephrostomy Tube: 550 mL    Nephrostomy Tube: 750 mL  Total OUT: 1550 mL    Total NET: -1550 mL      06 Dec 2018 07:01  -  07 Dec 2018 04:20  --------------------------------------------------------  IN:    Oral Fluid: 630 mL  Total IN: 630 mL    OUT:    Indwelling Catheter - Suprapubic: 975 mL    Nephrostomy Tube: 1000 mL    Nephrostomy Tube: 675 mL  Total OUT: 2650 mL    Total NET: -2020 mL          Labs:                        8.2    5.0   )-----------( 80       ( 05 Dec 2018 19:49 )             24.4     12-    129<L>  |  95<L>  |  21  ----------------------------<  171<H>  4.6   |  23  |  1.46<H>    Ca    8.4      06 Dec 2018 06:20  Phos  2.7     12-  Mg     1.7     12-    TPro  6.5  /  Alb  2.9<L>  /  TBili  0.6  /  DBili  x   /  AST  9<L>  /  ALT  6<L>  /  AlkPhos  59  12-      Urinalysis Basic - ( 05 Dec 2018 20:17 )    Color: Yellow / Appearance: Turbid / S.016 / pH: x  Gluc: x / Ketone: Negative  / Bili: Negative / Urobili: Negative   Blood: x / Protein: 300 mg/dL / Nitrite: Positive   Leuk Esterase: Large / RBC: 10 /hpf /  /hpf   Sq Epi: x / Non Sq Epi: 1 /hpf / Bacteria: Many

## 2018-12-07 NOTE — PROGRESS NOTE ADULT - ASSESSMENT
83M PMH HTN, DM2, afib (not on AC), bioprostethic AVR on ASA, BPH s/p TURP c/b seminal vesicle/prostatic abscess (Aug 2018 with ecoli and vse faecalis) now with urothelial cell cancer with mets to penis, prostate and possible pulm mets, urinary retention s/p suprapubic catheter and b/l nephrostomy tubes, presenting with sepsis and penile pain/discharge, 2/2 prostatic/periprostatic (necrotic mass vs abscess) and poss fistula in rectovesicular space also found to have osteomyelitis of pubic ramus.

## 2018-12-07 NOTE — PROGRESS NOTE ADULT - SUBJECTIVE AND OBJECTIVE BOX
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     NPO status:  NPO past midnight.   Anticoagulation: Aspirin 81mg last dose on > 72hrs.  Antibiotics: Meropenem 13:00.    Allergies: oxycodone (Other)      PAST MEDICAL & SURGICAL HISTORY:  Benign prostatic hyperplasia with lower urinary tract symptoms, symptom details unspecified  Prostate cancer  Hypertension  HLD (hyperlipidemia)  Type 2 diabetes mellitus  Paroxysmal A-fib  S/P AVR (aortic valve replacement)  S/P TURP        Pertinent labs:                      6.8    4.26  )-----------( 118      ( 07 Dec 2018 09:17 )             20.7   12-07    131<L>  |  95<L>  |  18  ----------------------------<  146<H>  4.4   |  22  |  1.41<H>    Ca    8.7      07 Dec 2018 07:30  Phos  3.4     12-07  Mg     1.8     12-07    TPro  6.5  /  Alb  2.9<L>  /  TBili  0.6  /  DBili  x   /  AST  9<L>  /  ALT  6<L>  /  AlkPhos  59  12-05  PT/INR - ( 07 Dec 2018 09:20 )   PT: 15.8 sec;   INR: 1.37 ratio         PTT - ( 07 Dec 2018 09:20 )  PTT:27.5 sec    Consent: Procedure/risks/ Benefits explained. Informed consent obtained. Pt verbalizes understanding. 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     NPO status:  NPO past midnight.   Anticoagulation: Aspirin 81mg last dose on > 72hrs.  Antibiotics: Meropenem 13:00.    Allergies: oxycodone (Other)      PAST MEDICAL & SURGICAL HISTORY:  Benign prostatic hyperplasia with lower urinary tract symptoms, symptom details unspecified  Prostate cancer  Hypertension  HLD (hyperlipidemia)  Type 2 diabetes mellitus  Paroxysmal A-fib  S/P AVR (aortic valve replacement)  S/P TURP        Pertinent labs:                      6.8    4.26  )-----------( 118      ( 07 Dec 2018 09:17 )             20.7   12-07    131<L>  |  95<L>  |  18  ----------------------------<  146<H>  4.4   |  22  |  1.41<H>    Ca    8.7      07 Dec 2018 07:30  Phos  3.4     12-07  Mg     1.8     12-07    TPro  6.5  /  Alb  2.9<L>  /  TBili  0.6  /  DBili  x   /  AST  9<L>  /  ALT  6<L>  /  AlkPhos  59  12-05  PT/INR - ( 07 Dec 2018 09:20 )   PT: 15.8 sec;   INR: 1.37 ratio         PTT - ( 07 Dec 2018 09:20 )  PTT:27.5 sec  Plan is for drainage of pelvic abscess with a transgluteal approach.   Consent: Procedure/risks/ Benefits explained. Informed consent obtained. Pt verbalizes understanding. 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     NPO status:  NPO past midnight.   Anticoagulation: Aspirin 81mg last dose on > 72hrs.  Antibiotics: Meropenem 13:00.    Allergies: oxycodone (Other)      PAST MEDICAL & SURGICAL HISTORY:  Benign prostatic hyperplasia with lower urinary tract symptoms, symptom details unspecified  Prostate cancer  Hypertension  HLD (hyperlipidemia)  Type 2 diabetes mellitus  Paroxysmal A-fib  S/P AVR (aortic valve replacement)  S/P TURP        Pertinent labs:                      6.8    4.26  )-----------( 118      ( 07 Dec 2018 09:17 )             20.7   12-07    131<L>  |  95<L>  |  18  ----------------------------<  146<H>  4.4   |  22  |  1.41<H>    Ca    8.7      07 Dec 2018 07:30  Phos  3.4     12-07  Mg     1.8     12-07    TPro  6.5  /  Alb  2.9<L>  /  TBili  0.6  /  DBili  x   /  AST  9<L>  /  ALT  6<L>  /  AlkPhos  59  12-05  PT/INR - ( 07 Dec 2018 09:20 )   PT: 15.8 sec;   INR: 1.37 ratio         PTT - ( 07 Dec 2018 09:20 )  PTT:27.5 sec  Plan is for drainage of pelvic abscess with a transgluteal approach.   Consent: Procedure/risks/ Benefits explained. Informed consent obtained from wife. Pt and wife verbalizes understanding.

## 2018-12-07 NOTE — PROGRESS NOTE ADULT - ATTENDING COMMENTS
Pt with necrotic mass + abscess - scheduled for IR guided drainage today  Discussed care with Dr. Gutierrez and Dr. Hathaway  Will continue current medical management     Adriane Bates MD  Division of Hospital Medicine  Pager: [phone]  Office: [phone]

## 2018-12-08 DIAGNOSIS — E87.1 HYPO-OSMOLALITY AND HYPONATREMIA: ICD-10-CM

## 2018-12-08 LAB
ANION GAP SERPL CALC-SCNC: 13 MMOL/L — SIGNIFICANT CHANGE UP (ref 5–17)
ANION GAP SERPL CALC-SCNC: 14 MMOL/L — SIGNIFICANT CHANGE UP (ref 5–17)
BUN SERPL-MCNC: 16 MG/DL — SIGNIFICANT CHANGE UP (ref 7–23)
BUN SERPL-MCNC: 20 MG/DL — SIGNIFICANT CHANGE UP (ref 7–23)
CALCIUM SERPL-MCNC: 8.2 MG/DL — LOW (ref 8.4–10.5)
CALCIUM SERPL-MCNC: 8.7 MG/DL — SIGNIFICANT CHANGE UP (ref 8.4–10.5)
CHLORIDE SERPL-SCNC: 92 MMOL/L — LOW (ref 96–108)
CHLORIDE SERPL-SCNC: 92 MMOL/L — LOW (ref 96–108)
CO2 SERPL-SCNC: 22 MMOL/L — SIGNIFICANT CHANGE UP (ref 22–31)
CO2 SERPL-SCNC: 24 MMOL/L — SIGNIFICANT CHANGE UP (ref 22–31)
CREAT SERPL-MCNC: 1.18 MG/DL — SIGNIFICANT CHANGE UP (ref 0.5–1.3)
CREAT SERPL-MCNC: 1.34 MG/DL — HIGH (ref 0.5–1.3)
GLUCOSE BLDC GLUCOMTR-MCNC: 324 MG/DL — HIGH (ref 70–99)
GLUCOSE BLDC GLUCOMTR-MCNC: 332 MG/DL — HIGH (ref 70–99)
GLUCOSE BLDC GLUCOMTR-MCNC: 337 MG/DL — HIGH (ref 70–99)
GLUCOSE BLDC GLUCOMTR-MCNC: 348 MG/DL — HIGH (ref 70–99)
GLUCOSE SERPL-MCNC: 328 MG/DL — HIGH (ref 70–99)
GLUCOSE SERPL-MCNC: 380 MG/DL — HIGH (ref 70–99)
GRAM STN FLD: SIGNIFICANT CHANGE UP
HCT VFR BLD CALC: 23.6 % — LOW (ref 39–50)
HGB BLD-MCNC: 7.8 G/DL — LOW (ref 13–17)
MAGNESIUM SERPL-MCNC: 1.8 MG/DL — SIGNIFICANT CHANGE UP (ref 1.6–2.6)
MCHC RBC-ENTMCNC: 26.4 PG — LOW (ref 27–34)
MCHC RBC-ENTMCNC: 33.1 GM/DL — SIGNIFICANT CHANGE UP (ref 32–36)
MCV RBC AUTO: 80 FL — SIGNIFICANT CHANGE UP (ref 80–100)
NIGHT BLUE STAIN TISS: SIGNIFICANT CHANGE UP
PHOSPHATE SERPL-MCNC: 3.4 MG/DL — SIGNIFICANT CHANGE UP (ref 2.5–4.5)
PLATELET # BLD AUTO: 192 K/UL — SIGNIFICANT CHANGE UP (ref 150–400)
POTASSIUM SERPL-MCNC: 4.6 MMOL/L — SIGNIFICANT CHANGE UP (ref 3.5–5.3)
POTASSIUM SERPL-MCNC: 5 MMOL/L — SIGNIFICANT CHANGE UP (ref 3.5–5.3)
POTASSIUM SERPL-SCNC: 4.6 MMOL/L — SIGNIFICANT CHANGE UP (ref 3.5–5.3)
POTASSIUM SERPL-SCNC: 5 MMOL/L — SIGNIFICANT CHANGE UP (ref 3.5–5.3)
RBC # BLD: 2.95 M/UL — LOW (ref 4.2–5.8)
RBC # FLD: 15.2 % — HIGH (ref 10.3–14.5)
SODIUM SERPL-SCNC: 127 MMOL/L — LOW (ref 135–145)
SODIUM SERPL-SCNC: 130 MMOL/L — LOW (ref 135–145)
SPECIMEN SOURCE: SIGNIFICANT CHANGE UP
SPECIMEN SOURCE: SIGNIFICANT CHANGE UP
UUN UR-MCNC: 704 MG/DL — SIGNIFICANT CHANGE UP
VANCOMYCIN TROUGH SERPL-MCNC: 11.3 UG/ML — SIGNIFICANT CHANGE UP (ref 10–20)
WBC # BLD: 4.08 K/UL — SIGNIFICANT CHANGE UP (ref 3.8–10.5)
WBC # FLD AUTO: 4.08 K/UL — SIGNIFICANT CHANGE UP (ref 3.8–10.5)

## 2018-12-08 PROCEDURE — 99233 SBSQ HOSP IP/OBS HIGH 50: CPT | Mod: GC

## 2018-12-08 PROCEDURE — 99233 SBSQ HOSP IP/OBS HIGH 50: CPT

## 2018-12-08 RX ORDER — SODIUM CHLORIDE 9 MG/ML
1000 INJECTION INTRAMUSCULAR; INTRAVENOUS; SUBCUTANEOUS
Qty: 0 | Refills: 0 | Status: DISCONTINUED | OUTPATIENT
Start: 2018-12-08 | End: 2018-12-09

## 2018-12-08 RX ORDER — INSULIN GLARGINE 100 [IU]/ML
5 INJECTION, SOLUTION SUBCUTANEOUS AT BEDTIME
Qty: 0 | Refills: 0 | Status: DISCONTINUED | OUTPATIENT
Start: 2018-12-08 | End: 2018-12-09

## 2018-12-08 RX ADMIN — MORPHINE SULFATE 15 MILLIGRAM(S): 50 CAPSULE, EXTENDED RELEASE ORAL at 22:40

## 2018-12-08 RX ADMIN — SODIUM CHLORIDE 75 MILLILITER(S): 9 INJECTION INTRAMUSCULAR; INTRAVENOUS; SUBCUTANEOUS at 12:38

## 2018-12-08 RX ADMIN — POLYETHYLENE GLYCOL 3350 17 GRAM(S): 17 POWDER, FOR SOLUTION ORAL at 12:36

## 2018-12-08 RX ADMIN — MORPHINE SULFATE 4 MILLIGRAM(S): 50 CAPSULE, EXTENDED RELEASE ORAL at 23:49

## 2018-12-08 RX ADMIN — Medication 4: at 16:55

## 2018-12-08 RX ADMIN — SIMVASTATIN 10 MILLIGRAM(S): 20 TABLET, FILM COATED ORAL at 22:07

## 2018-12-08 RX ADMIN — Medication 4: at 12:37

## 2018-12-08 RX ADMIN — Medication 2: at 22:06

## 2018-12-08 RX ADMIN — MEROPENEM 100 MILLIGRAM(S): 1 INJECTION INTRAVENOUS at 12:37

## 2018-12-08 RX ADMIN — MORPHINE SULFATE 4 MILLIGRAM(S): 50 CAPSULE, EXTENDED RELEASE ORAL at 06:00

## 2018-12-08 RX ADMIN — Medication 50 MILLIGRAM(S): at 05:22

## 2018-12-08 RX ADMIN — Medication 100 MILLIGRAM(S): at 22:07

## 2018-12-08 RX ADMIN — Medication 4: at 08:08

## 2018-12-08 RX ADMIN — MORPHINE SULFATE 15 MILLIGRAM(S): 50 CAPSULE, EXTENDED RELEASE ORAL at 17:24

## 2018-12-08 RX ADMIN — MORPHINE SULFATE 15 MILLIGRAM(S): 50 CAPSULE, EXTENDED RELEASE ORAL at 11:01

## 2018-12-08 RX ADMIN — MORPHINE SULFATE 4 MILLIGRAM(S): 50 CAPSULE, EXTENDED RELEASE ORAL at 05:22

## 2018-12-08 RX ADMIN — MORPHINE SULFATE 15 MILLIGRAM(S): 50 CAPSULE, EXTENDED RELEASE ORAL at 23:10

## 2018-12-08 RX ADMIN — Medication 1 TABLET(S): at 12:36

## 2018-12-08 RX ADMIN — MEROPENEM 100 MILLIGRAM(S): 1 INJECTION INTRAVENOUS at 05:22

## 2018-12-08 RX ADMIN — MORPHINE SULFATE 15 MILLIGRAM(S): 50 CAPSULE, EXTENDED RELEASE ORAL at 16:32

## 2018-12-08 RX ADMIN — Medication 250 MILLIGRAM(S): at 22:07

## 2018-12-08 RX ADMIN — MORPHINE SULFATE 15 MILLIGRAM(S): 50 CAPSULE, EXTENDED RELEASE ORAL at 09:47

## 2018-12-08 RX ADMIN — SENNA PLUS 2 TABLET(S): 8.6 TABLET ORAL at 22:07

## 2018-12-08 RX ADMIN — INSULIN GLARGINE 5 UNIT(S): 100 INJECTION, SOLUTION SUBCUTANEOUS at 22:06

## 2018-12-08 RX ADMIN — Medication 100 MILLIGRAM(S): at 05:21

## 2018-12-08 RX ADMIN — Medication 100 MILLIGRAM(S): at 12:37

## 2018-12-08 RX ADMIN — MEROPENEM 100 MILLIGRAM(S): 1 INJECTION INTRAVENOUS at 22:07

## 2018-12-08 NOTE — PROGRESS NOTE ADULT - PROBLEM SELECTOR PLAN 1
P/w abscess vs necrotic mass +/- fistula in rectovesicular space  - CT pelvis with oral contrast - 7.3 cm prostatic/periprostatic abscess  - Pt with hx of abscess 4 months ago s/p IR drain with ecolic/vse     - Seen by both urology and colorectal surgery.  - IR drainage today  - NP) for procedure  - NS 1L at 100cc/hr  - Started on Meropenem 2g Q8 (12/6-)  - C/w Vancomycin 1g Q24 (12/6-)  - Cystogram pending  - ID following P/w abscess vs necrotic mass +/- fistula in rectovesicular space  - CT pelvis with oral contrast - 7.3 cm prostatic/periprostatic abscess  - Pt with hx of abscess 4 months ago s/p IR drain with ecolic/vse     - Seen by both urology and colorectal surgery.  - IR drainage today  - NP) for procedure  - NS 1L at 75cc/hr for 12 hrs  - Started on Meropenem 2g Q8 (12/6-)  - C/w Vancomycin 1g Q24 (12/6-), vanco trough 11 on 12/8  - Cystogram pending  - ID following P/w abscess vs necrotic mass +/- fistula in rectovesicular space s/p IR drainage on 12/7 w/ 48 cc of cloudy green fluid w/ 10F drain in place, gram + cocci prelim drainage  - CT pelvis with oral contrast - 7.3 cm prostatic/periprostatic abscess  - Pt with hx of abscess 4 months ago s/p IR drain with ecolic/vse     - Seen by both urology and colorectal surgery.  - Colorectal surgery recs of CT AP w/ rectal contrast to r/o fistula  -c/w Meropenem 2g Q8 (12/6-)  - C/w Vancomycin 1g Q24 (12/6-), vanco trough 11 on 12/8  - Cystogram pending  - ID following

## 2018-12-08 NOTE — PROGRESS NOTE ADULT - PROBLEM SELECTOR PLAN 3
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    - onc following patient (Dr. Oquendo at [facility] outpatient)  - c/w morphine 15 mg q4 prn for pain control + morphine 2mg IV for breakthrough pain, ISTOP #: [id number] Pt with chronic anemia, hb 6.8 on 12/7 s/p 1 uPRBC transfused and had bleeding from suprapubic cath previously before and Pt with platelets at 118 on 12/8 and likely 2/2 to chemo, recovering plts uptrending now  - f/u  iron studies in am  - likely 2/2 ACD  - trend CBC qd for now and monitor for any signs of active bleeding  - holding DVT ppx at this time.  -stop ASA if continues to downtrend H/H

## 2018-12-08 NOTE — PROGRESS NOTE ADULT - SUBJECTIVE AND OBJECTIVE BOX
Junior Marshall  PGY2 Internal Medicine  Pager [phone] or [phone]    Patient is a 83y old  Male who presents with a chief complaint of abscess (07 Dec 2018 19:23)      SUBJECTIVE / OVERNIGHT EVENTS:  No ON events, no f/n/v/d/CP/SOB    MEDICATIONS  (STANDING):  dextrose 5%. 1000 milliLiter(s) (50 mL/Hr) IV Continuous <Continuous>  dextrose 50% Injectable 12.5 Gram(s) IV Push once  dextrose 50% Injectable 25 Gram(s) IV Push once  dextrose 50% Injectable 25 Gram(s) IV Push once  docusate sodium 100 milliGRAM(s) Oral three times a day  insulin lispro (HumaLOG) corrective regimen sliding scale   SubCutaneous three times a day before meals  insulin lispro (HumaLOG) corrective regimen sliding scale   SubCutaneous at bedtime  meropenem  IVPB 1000 milliGRAM(s) IV Intermittent every 8 hours  meropenem  IVPB      metoprolol succinate ER 50 milliGRAM(s) Oral daily  multivitamin/minerals 1 Tablet(s) Oral daily  polyethylene glycol 3350 17 Gram(s) Oral daily  senna 2 Tablet(s) Oral at bedtime  simvastatin 10 milliGRAM(s) Oral at bedtime  sodium chloride 0.9%. 1000 milliLiter(s) (50 mL/Hr) IV Continuous <Continuous>  vancomycin  IVPB 1000 milliGRAM(s) IV Intermittent every 24 hours    MEDICATIONS  (PRN):  acetaminophen   Tablet .. 650 milliGRAM(s) Oral every 6 hours PRN Temp greater or equal to 38C (100.4F)  dextrose 40% Gel 15 Gram(s) Oral once PRN Blood Glucose LESS THAN 70 milliGRAM(s)/deciliter  glucagon  Injectable 1 milliGRAM(s) IntraMuscular once PRN Glucose LESS THAN 70 milligrams/deciliter  morphine  - Injectable 4 milliGRAM(s) IV Push every 4 hours PRN breakthrough pain  morphine  IR 15 milliGRAM(s) Oral every 4 hours PRN Severe Pain (7 - 10)      T(C): 37.3 (12-08-18 @ 05:19), Max: 37.3 (12-08-18 @ 05:19)  HR: 79 (12-08-18 @ 05:19) (78 - 91)  BP: 116/61 (12-08-18 @ 05:19) (113/54 - 126/62)  RR: 18 (12-08-18 @ 05:19) (18 - 19)  SpO2: 98% (12-08-18 @ 05:19) (95% - 99%)  CAPILLARY BLOOD GLUCOSE      POCT Blood Glucose.: 166 mg/dL (07 Dec 2018 21:26)  POCT Blood Glucose.: 139 mg/dL (07 Dec 2018 13:30)  POCT Blood Glucose.: 170 mg/dL (07 Dec 2018 08:21)    I&O's Summary    06 Dec 2018 07:01  -  07 Dec 2018 07:00  --------------------------------------------------------  IN: 630 mL / OUT: 3440 mL / NET: -2810 mL    07 Dec 2018 07:01  -  08 Dec 2018 06:18  --------------------------------------------------------  IN: 100 mL / OUT: 1785 mL / NET: -1685 mL    PHYSICAL EXAM:  GENERAL: NAD, well-developed  HEAD:  Atraumatic, Normocephalic  EYES: EOMI, PERRLA, conjunctiva and sclera clear  NECK: Supple, No JVD  CHEST/LUNG: Clear to auscultation bilaterally; No wheeze  HEART: Regular rate and rhythm; No murmurs, rubs, or gallops  ABDOMEN: Soft, Nontender, Nondistended; Bowel sounds present.   : Suprapubic catheter in place, no surrounding erythema. B/l nephrostomy tubes in place, draining clear urine. Nephrostomy sites are c/d. Penis uncircumsized, unable to retract foreskin, (+) white discharge  EXTREMITIES:  2+ Peripheral Pulses, No clubbing, cyanosis, or edema  PSYCH: AAOx3  NEUROLOGY: non-focal  SKIN: No rashes or lesions    LABS:                        6.8    4.26  )-----------( 118      ( 07 Dec 2018 09:17 )             20.7     12-07    131<L>  |  95<L>  |  18  ----------------------------<  146<H>  4.4   |  22  |  1.41<H>    Ca    8.7      07 Dec 2018 07:30  Phos  3.4     12-07  Mg     1.8     12-07      PT/INR - ( 07 Dec 2018 09:20 )   PT: 15.8 sec;   INR: 1.37 ratio         PTT - ( 07 Dec 2018 09:20 )  PTT:27.5 sec          Micro:    Culture - Body Fluid with Gram Stain (collected 12-08-18 @ 00:31)  Source: .Body Fluid Abdominal Fluid  Gram Stain (12-08-18 @ 03:57):    polymorphonuclear leukocytes seen    Gram positive cocci in pairs seen    by cytocentrifuge        RADIOLOGY & ADDITIONAL TESTS:    Imaging Personally Reviewed:    Consultant(s) Notes Reviewed:      Care Discussed with Consultants/Other Providers:

## 2018-12-08 NOTE — PROGRESS NOTE ADULT - PROBLEM SELECTOR PLAN 5
Pt with chronic anemia, hb 6.8 today, will transfuse 1 uPRBCs   - f/u  iron studies in am  - likely 2/2 ACD Current urothelial cell carcimona with mets (penis and possible lungs) s/p s/p carboplatin and gemcitabine 11/19/18 (cycle 2, day 22)  - onc following patient (Dr. Oquendo at [facility] outpatient)  - c/w morphine 15 mg q4 prn for pain control + morphine 2mg IV for breakthrough pain, ISTOP #: [id number]

## 2018-12-08 NOTE — PROGRESS NOTE ADULT - PROBLEM SELECTOR PLAN 9
hold metformin in hospital  - ISS No ac 2/2 to bleeding from suprapubic catheter  - c/w metop 50 mg ER

## 2018-12-08 NOTE — PROGRESS NOTE ADULT - PROBLEM SELECTOR PLAN 4
Pt with platelets at 118 this morning, likely 2/2 to chemo.    - will monitor  - holding DVT ppx at this time. Na 127 this morning, most likely hypovolemic hyponatremia, Urine Na 38, Urine osmolality 243 (12/6), serum omolality 292 (10/26)  -f/u Urine Na, osmolality, serum osmolality and uric acid

## 2018-12-08 NOTE — PROGRESS NOTE ADULT - PROBLEM SELECTOR PROBLEM 9
Type 2 diabetes mellitus without complication, without long-term current use of insulin Paroxysmal A-fib

## 2018-12-08 NOTE — PROGRESS NOTE ADULT - SUBJECTIVE AND OBJECTIVE BOX
Subjective  Now s/p IR drainage of pelvic collection. Pt reports some drainage per urethra  Objective    Vital signs  T(F): , Max: 99.1 (12-08-18 @ 05:19)  HR: 79 (12-08-18 @ 05:19)  BP: 116/61 (12-08-18 @ 05:19)  SpO2: 98% (12-08-18 @ 05:19)  Wt(kg): --    Output     12-07 @ 07:01  -  12-08 @ 07:00  --------------------------------------------------------  IN: 1050 mL / OUT: 2090 mL / NET: -1040 mL        Gen: NAD  Abd: soft, NT, ND, suprapubic tube site c/d/i  Back: drain site c/d/i, drain w/seropurulent fluid  : uncircumcised phallus, some purulence per urethra, staining pad, indurated shaft, no skin changes or crepitus    Labs      12-08 @ 07:14    WBC --    / Hct --    / SCr 1.34     12-07 @ 09:17    WBC 4.26  / Hct 20.7  / SCr --         Urine Cx: Culture - Urine (12.05.18 @ 22:51)    -  Trimethoprim/Sulfamethoxazole: S <=0.5/9.5    -  Vancomycin: S 2    -  Tobramycin: S <=2    -  Tobramycin: S <=2    -  Tetra/Doxy: S <=1    -  Tigecycline: S <=1    -  Meropenem: S <=1    -  Meropenem: S <=1    -  Nitrofurantoin: S <=32 Should not be used to treat pyelonephritis    -  Nitrofurantoin: S <=32 Should not be used to treat pyelonephritis.    -  Piperacillin/Tazobactam: S <=8    -  Piperacillin/Tazobactam: S <=8    -  Imipenem: S <=1    -  Imipenem: S <=1    -  Levofloxacin: S <=1    -  Levofloxacin: S 1    -  Levofloxacin: S <=1    -  Gentamicin: S <=1    -  Gentamicin: S 2    -  Ampicillin: R >16 These ampicillin results predict results for amoxicillin    -  Ampicillin: S <=2 Predicts results to ampicillin/sulbactam, amoxacillin-clavulanate and  piperacillin-tazobactam.    -  Ampicillin/Sulbactam: S 8/4    -  Aztreonam: S <=4    -  Aztreonam: S <=4    -  Cefazolin: R 8 For uncomplicated UTI with K. pneumoniae, E. coli, or P. mirablis: MARK ANTHONY <=16 is sensitive and MARK ANTHONY >=32 is resistant. This also predicts results for oral agents cefaclor, cefdinir, cefpodoxime, cefprozil, cefuroxime axetil, cephalexin and locarbef for uncomplicated UTI. Note that some isolates may be susceptible to these agents while testing resistant to cefazolin.    -  Amikacin: S <=8    -  Amikacin: S <=8    -  Amoxicillin/Clavulanic Acid: I 16/8    -  Ciprofloxacin: S <=0.5    -  Ciprofloxacin: S <=0.5    -  Ciprofloxacin: S <=1    -  Ertapenem: S <=0.5    -  Ceftazidime: S <=1    -  Ceftriaxone: S <=1 Enterobacter, Citrobacter, and Serratia may develop resistance during prolonged therapy    -  Cefoxitin: S <=4    -  Cefepime: S <=2    -  Cefepime: S <=2    Specimen Source: .Urine Suprapubic    Culture Results:   Numerous Escherichia coli  Numerous Pseudomonas aeruginosa  Numerous Enterococcus faecalis    Organism Identification: Escherichia coli  Pseudomonas aeruginosa  Enterococcus faecalis    Organism: Escherichia coli    Organism: Pseudomonas aeruginosa    Organism: Enterococcus faecalis    Method Type: MARK ANTHONY    Method Type: MARK ANTHONY    Method Type: MARK ANTHONY      Blood Cx: Culture - Blood (12.06.18 @ 00:24)    Specimen Source: .Blood Blood-Peripheral    Culture Results:   No growth to date.

## 2018-12-08 NOTE — PROGRESS NOTE ADULT - PROBLEM SELECTOR PLAN 6
pt on asa for AVR, will continue.  - if platelets drop < 50, will stop ASA A1c 7.3, hold metformin in hospital, ISS  elevated POCT and serum glucose this morning  can start lantus 5U qhs for now and monitor

## 2018-12-08 NOTE — PROGRESS NOTE ADULT - PROBLEM SELECTOR PROBLEM 6
Aortic valve replaced Type 2 diabetes mellitus without complication, without long-term current use of insulin

## 2018-12-08 NOTE — PROGRESS NOTE ADULT - ASSESSMENT
83year old male with PMHx of HTN, DM, A.Fib (no longer taking eliquis), 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), BPH s/p TURP 6/26/18 c/b prostatic abscess requiring IR drain, prostate cancer s/p brachy radiation, 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 (last changed 11/17), and b/l hydronephrosis s/p perc nephrostomy tubes 11/8 recently diagnosed with infiltrating high grade urothelial ca started chemo 11/19 now with a UTI and abdominal abscess now s/p IR drainage  -- c/w vanc and meropenem  -- polymicrobial UCx, mostly pan-sensitive  -- monitor drain output  -- f/u drain Cx  - appreciate CRSx recs and medical care

## 2018-12-08 NOTE — PROGRESS NOTE ADULT - ASSESSMENT
83 year old male HD#4 with pelvic collection, concerning for possible rectal fistula; hemodynamically stable and afebrile.    - care per primary team and urology  - request CT scan with rectal contrast to assess for fistula  - recommend bowel regimen with colace TID and senna (two tabs at bedtime)  - will follow with Colorectal surgery service, [phone]    Discussed with Dr. Diop (covering for Dr. Copeland)  --TANYA Blum, PGY-4

## 2018-12-08 NOTE — PROGRESS NOTE ADULT - PROBLEM SELECTOR PLAN 7
Pt with GFR 49 meeting criteria for CKD3.    - Hold lisinopril for now  - will c/w hydration to ensure no worsening of kidney function Pt with GFR 49 meeting criteria for CKD3.  - Hold lisinopril for now  - will c/w hydration to ensure no worsening of kidney function  -f/u Urine Na, Cr, Urea

## 2018-12-08 NOTE — PROGRESS NOTE ADULT - PROBLEM SELECTOR PLAN 8
No ac 2/2 to bleeding from suprapubic catheter  - c/w metop 50 mg ER pt on asa for AVR (bioprosthetic on 2008), will continue.  - if platelets drop < 50, will stop ASA

## 2018-12-08 NOTE — PROGRESS NOTE ADULT - SUBJECTIVE AND OBJECTIVE BOX
S: Patient doing well, denies fevers, chills. Tolerating regular diet, no nausea, emesis. Passing flatus. States his rectal pain is somewhat improved. Underwent interval placement of IR drain for pelvic abscess. Awaiting CT with rectal contrast.    O: Vital Signs  T(C): 37.1 (12-08 @ 13:41), Max: 37.3 (12-08 @ 05:19)  HR: 71 (12-08 @ 13:41) (71 - 91)  BP: 113/54 (12-08 @ 13:41) (113/54 - 126/62)  RR: 20 (12-08 @ 13:41) (18 - 20)  SpO2: 97% (12-08 @ 13:41) (95% - 98%)  12-07-18 @ 07:01  -  12-08-18 @ 07:00  --------------------------------------------------------  IN: 1050 mL / OUT: 2090 mL / NET: -1040 mL    12-08-18 @ 07:01  -  12-08-18 @ 15:26  --------------------------------------------------------  IN: 120 mL / OUT: 925 mL / NET: -805 mL    General: alert and oriented, NAD  Resp: airway patent, respirations unlabored  CVS: regular rate and rhythm  Abdomen: soft, nontender, nondistended, SP cath in place  Extremities: no edema  Skin: warm, dry, appropriate color                          7.8    4.08  )-----------( 192      ( 08 Dec 2018 09:01 )             23.6   12-08    127<L>  |  92<L>  |  20  ----------------------------<  380<H>  4.6   |  22  |  1.34<H>    Ca    8.7      08 Dec 2018 07:14  Phos  3.4     12-08  Mg     1.8     12-08

## 2018-12-08 NOTE — PROGRESS NOTE ADULT - ATTENDING COMMENTS
Pt seen and examined with wife at bedside.  No complaints at this time.  Agree with Dr Marshall's note above and appreciate colorectal surgery recommendations for CT scan with rectal contrast to assess for fistula.  C/w bowel regimen, c/w abx.      Estrella Shah MD  [phone] pager  [phone] office  Internal Medicine, Hospitalist

## 2018-12-09 LAB
ANION GAP SERPL CALC-SCNC: 11 MMOL/L — SIGNIFICANT CHANGE UP (ref 5–17)
BUN SERPL-MCNC: 14 MG/DL — SIGNIFICANT CHANGE UP (ref 7–23)
CALCIUM SERPL-MCNC: 8.3 MG/DL — LOW (ref 8.4–10.5)
CHLORIDE SERPL-SCNC: 92 MMOL/L — LOW (ref 96–108)
CO2 SERPL-SCNC: 23 MMOL/L — SIGNIFICANT CHANGE UP (ref 22–31)
CREAT SERPL-MCNC: 1.12 MG/DL — SIGNIFICANT CHANGE UP (ref 0.5–1.3)
GLUCOSE BLDC GLUCOMTR-MCNC: 239 MG/DL — HIGH (ref 70–99)
GLUCOSE BLDC GLUCOMTR-MCNC: 269 MG/DL — HIGH (ref 70–99)
GLUCOSE BLDC GLUCOMTR-MCNC: 349 MG/DL — HIGH (ref 70–99)
GLUCOSE BLDC GLUCOMTR-MCNC: 391 MG/DL — HIGH (ref 70–99)
GLUCOSE SERPL-MCNC: 221 MG/DL — HIGH (ref 70–99)
HCT VFR BLD CALC: 25 % — LOW (ref 39–50)
HGB BLD-MCNC: 8.3 G/DL — LOW (ref 13–17)
MAGNESIUM SERPL-MCNC: 1.8 MG/DL — SIGNIFICANT CHANGE UP (ref 1.6–2.6)
MCHC RBC-ENTMCNC: 27.4 PG — SIGNIFICANT CHANGE UP (ref 27–34)
MCHC RBC-ENTMCNC: 33.2 GM/DL — SIGNIFICANT CHANGE UP (ref 32–36)
MCV RBC AUTO: 82.5 FL — SIGNIFICANT CHANGE UP (ref 80–100)
OSMOLALITY SERPL: 272 MOS/KG — LOW (ref 275–300)
OSMOLALITY UR: 379 MOS/KG — SIGNIFICANT CHANGE UP (ref 50–1200)
PHOSPHATE SERPL-MCNC: 2 MG/DL — LOW (ref 2.5–4.5)
PLATELET # BLD AUTO: 256 K/UL — SIGNIFICANT CHANGE UP (ref 150–400)
POTASSIUM SERPL-MCNC: 4.5 MMOL/L — SIGNIFICANT CHANGE UP (ref 3.5–5.3)
POTASSIUM SERPL-SCNC: 4.5 MMOL/L — SIGNIFICANT CHANGE UP (ref 3.5–5.3)
RBC # BLD: 3.03 M/UL — LOW (ref 4.2–5.8)
RBC # FLD: 14.9 % — HIGH (ref 10.3–14.5)
SODIUM SERPL-SCNC: 126 MMOL/L — LOW (ref 135–145)
SODIUM UR-SCNC: 101 MMOL/L — SIGNIFICANT CHANGE UP
URATE SERPL-MCNC: 3.7 MG/DL — SIGNIFICANT CHANGE UP (ref 3.4–8.8)
WBC # BLD: 3.94 K/UL — SIGNIFICANT CHANGE UP (ref 3.8–10.5)
WBC # FLD AUTO: 3.94 K/UL — SIGNIFICANT CHANGE UP (ref 3.8–10.5)

## 2018-12-09 PROCEDURE — 99233 SBSQ HOSP IP/OBS HIGH 50: CPT

## 2018-12-09 PROCEDURE — 99232 SBSQ HOSP IP/OBS MODERATE 35: CPT

## 2018-12-09 PROCEDURE — 74176 CT ABD & PELVIS W/O CONTRAST: CPT | Mod: 26

## 2018-12-09 PROCEDURE — 99233 SBSQ HOSP IP/OBS HIGH 50: CPT | Mod: GC

## 2018-12-09 RX ORDER — SODIUM,POTASSIUM PHOSPHATES 278-250MG
1 POWDER IN PACKET (EA) ORAL
Qty: 0 | Refills: 0 | Status: COMPLETED | OUTPATIENT
Start: 2018-12-09 | End: 2018-12-09

## 2018-12-09 RX ORDER — VANCOMYCIN HCL 1 G
750 VIAL (EA) INTRAVENOUS EVERY 12 HOURS
Qty: 0 | Refills: 0 | Status: DISCONTINUED | OUTPATIENT
Start: 2018-12-09 | End: 2018-12-10

## 2018-12-09 RX ORDER — INSULIN GLARGINE 100 [IU]/ML
8 INJECTION, SOLUTION SUBCUTANEOUS AT BEDTIME
Qty: 0 | Refills: 0 | Status: DISCONTINUED | OUTPATIENT
Start: 2018-12-09 | End: 2018-12-11

## 2018-12-09 RX ORDER — SODIUM CHLORIDE 9 MG/ML
1 INJECTION INTRAMUSCULAR; INTRAVENOUS; SUBCUTANEOUS THREE TIMES A DAY
Qty: 0 | Refills: 0 | Status: DISCONTINUED | OUTPATIENT
Start: 2018-12-09 | End: 2018-12-14

## 2018-12-09 RX ADMIN — SODIUM CHLORIDE 1 GRAM(S): 9 INJECTION INTRAMUSCULAR; INTRAVENOUS; SUBCUTANEOUS at 13:54

## 2018-12-09 RX ADMIN — Medication 1 TABLET(S): at 10:41

## 2018-12-09 RX ADMIN — MEROPENEM 100 MILLIGRAM(S): 1 INJECTION INTRAVENOUS at 22:18

## 2018-12-09 RX ADMIN — MORPHINE SULFATE 15 MILLIGRAM(S): 50 CAPSULE, EXTENDED RELEASE ORAL at 06:22

## 2018-12-09 RX ADMIN — MORPHINE SULFATE 15 MILLIGRAM(S): 50 CAPSULE, EXTENDED RELEASE ORAL at 05:52

## 2018-12-09 RX ADMIN — MORPHINE SULFATE 4 MILLIGRAM(S): 50 CAPSULE, EXTENDED RELEASE ORAL at 00:20

## 2018-12-09 RX ADMIN — POLYETHYLENE GLYCOL 3350 17 GRAM(S): 17 POWDER, FOR SOLUTION ORAL at 13:29

## 2018-12-09 RX ADMIN — MEROPENEM 100 MILLIGRAM(S): 1 INJECTION INTRAVENOUS at 14:59

## 2018-12-09 RX ADMIN — Medication 50 MILLIGRAM(S): at 05:52

## 2018-12-09 RX ADMIN — MORPHINE SULFATE 15 MILLIGRAM(S): 50 CAPSULE, EXTENDED RELEASE ORAL at 22:19

## 2018-12-09 RX ADMIN — MORPHINE SULFATE 15 MILLIGRAM(S): 50 CAPSULE, EXTENDED RELEASE ORAL at 13:36

## 2018-12-09 RX ADMIN — Medication 100 MILLIGRAM(S): at 05:52

## 2018-12-09 RX ADMIN — MORPHINE SULFATE 15 MILLIGRAM(S): 50 CAPSULE, EXTENDED RELEASE ORAL at 18:02

## 2018-12-09 RX ADMIN — MORPHINE SULFATE 15 MILLIGRAM(S): 50 CAPSULE, EXTENDED RELEASE ORAL at 15:06

## 2018-12-09 RX ADMIN — Medication 250 MILLIGRAM(S): at 17:51

## 2018-12-09 RX ADMIN — Medication 2: at 08:37

## 2018-12-09 RX ADMIN — Medication 5: at 13:54

## 2018-12-09 RX ADMIN — SENNA PLUS 2 TABLET(S): 8.6 TABLET ORAL at 22:18

## 2018-12-09 RX ADMIN — SIMVASTATIN 10 MILLIGRAM(S): 20 TABLET, FILM COATED ORAL at 22:18

## 2018-12-09 RX ADMIN — Medication 1 TABLET(S): at 13:28

## 2018-12-09 RX ADMIN — SODIUM CHLORIDE 1 GRAM(S): 9 INJECTION INTRAMUSCULAR; INTRAVENOUS; SUBCUTANEOUS at 22:18

## 2018-12-09 RX ADMIN — Medication 4: at 17:50

## 2018-12-09 RX ADMIN — MORPHINE SULFATE 15 MILLIGRAM(S): 50 CAPSULE, EXTENDED RELEASE ORAL at 22:50

## 2018-12-09 RX ADMIN — Medication 81 MILLIGRAM(S): at 13:29

## 2018-12-09 RX ADMIN — Medication 1 TABLET(S): at 08:54

## 2018-12-09 RX ADMIN — MEROPENEM 100 MILLIGRAM(S): 1 INJECTION INTRAVENOUS at 05:52

## 2018-12-09 RX ADMIN — Medication 100 MILLIGRAM(S): at 13:29

## 2018-12-09 RX ADMIN — Medication 1: at 22:18

## 2018-12-09 RX ADMIN — Medication 100 MILLIGRAM(S): at 22:18

## 2018-12-09 RX ADMIN — INSULIN GLARGINE 8 UNIT(S): 100 INJECTION, SOLUTION SUBCUTANEOUS at 22:18

## 2018-12-09 NOTE — PROGRESS NOTE ADULT - SUBJECTIVE AND OBJECTIVE BOX
=========================================  CONTACT EDSON Goss M.D., PGY-1  Pager: [facility]- [phone], [facility]- [phone]    Mon-Fri: pager covered by day team 7am-7pm;   ***Academic conferences 12pm-1pm- page ONLY if URGENT or if Consultant  /Kelly: see chart, primary physician assigned available 7am-12pm  Sat/Sharma Cross Coverage 12pm-7pm: [facility]- page [phone] for Team1-4, [facility]- pager forwarded to covering Resident  For Night coverage 7pm-7am: [facility]- page [phone] Team1-3, page [phone] Team4 & Care Model; [facility]- page [phone] for Red, [phone] for Blue  =========================================\    Patient is a 83y old  Male who presents with a chief complaint of abscess (08 Dec 2018 15:26)      SUBJECTIVE / OVERNIGHT EVENTS:  Patient seen and examined at bedside. No acute events overnight. No CP, fever, chills, n/v/d.     Other Review of Systems Negative.    MEDICATIONS  (STANDING):  aspirin enteric coated 81 milliGRAM(s) Oral daily  dextrose 5%. 1000 milliLiter(s) (50 mL/Hr) IV Continuous <Continuous>  dextrose 50% Injectable 12.5 Gram(s) IV Push once  dextrose 50% Injectable 25 Gram(s) IV Push once  dextrose 50% Injectable 25 Gram(s) IV Push once  docusate sodium 100 milliGRAM(s) Oral three times a day  insulin glargine Injectable (LANTUS) 5 Unit(s) SubCutaneous at bedtime  insulin lispro (HumaLOG) corrective regimen sliding scale   SubCutaneous three times a day before meals  insulin lispro (HumaLOG) corrective regimen sliding scale   SubCutaneous at bedtime  meropenem  IVPB 1000 milliGRAM(s) IV Intermittent every 8 hours  meropenem  IVPB      metoprolol succinate ER 50 milliGRAM(s) Oral daily  multivitamin/minerals 1 Tablet(s) Oral daily  polyethylene glycol 3350 17 Gram(s) Oral daily  potassium acid phosphate/sodium acid phosphate tablet (K-PHOS No. 2) 1 Tablet(s) Oral every 2 hours  senna 2 Tablet(s) Oral at bedtime  simvastatin 10 milliGRAM(s) Oral at bedtime  vancomycin  IVPB 750 milliGRAM(s) IV Intermittent every 12 hours    MEDICATIONS  (PRN):  acetaminophen   Tablet .. 650 milliGRAM(s) Oral every 6 hours PRN Temp greater or equal to 38C (100.4F)  dextrose 40% Gel 15 Gram(s) Oral once PRN Blood Glucose LESS THAN 70 milliGRAM(s)/deciliter  glucagon  Injectable 1 milliGRAM(s) IntraMuscular once PRN Glucose LESS THAN 70 milligrams/deciliter  morphine  - Injectable 4 milliGRAM(s) IV Push every 4 hours PRN breakthrough pain  morphine  IR 15 milliGRAM(s) Oral every 4 hours PRN Severe Pain (7 - 10)      OBJECTIVE:    Vital Signs Last 24 Hrs  T(C): 36.8 (09 Dec 2018 05:27), Max: 37.6 (08 Dec 2018 21:34)  T(F): 98.3 (09 Dec 2018 05:27), Max: 99.7 (08 Dec 2018 21:34)  HR: 74 (09 Dec 2018 05:27) (71 - 77)  BP: 123/71 (09 Dec 2018 05:27) (113/54 - 135/56)  BP(mean): --  RR: 18 (09 Dec 2018 05:27) (18 - 20)  SpO2: 99% (09 Dec 2018 05:27) (97% - 99%)    CAPILLARY BLOOD GLUCOSE      POCT Blood Glucose.: 348 mg/dL (08 Dec 2018 21:40)  POCT Blood Glucose.: 337 mg/dL (08 Dec 2018 16:37)  POCT Blood Glucose.: 324 mg/dL (08 Dec 2018 12:04)  POCT Blood Glucose.: 332 mg/dL (08 Dec 2018 08:04)    I&O's Summary    08 Dec 2018 07:01  -  09 Dec 2018 07:00  --------------------------------------------------------  IN: 480 mL / OUT: 2675 mL / NET: -2195 mL        PHYSICAL EXAM:  GENERAL: NAD, well-developed  HEAD:  Atraumatic, Normocephalic  EYES: EOMI, PERRLA, conjunctiva and sclera clear  NECK: Supple, No JVD  CHEST/LUNG: Clear to auscultation bilaterally; No wheeze  HEART: Regular rate and rhythm; No murmurs, rubs, or gallops  ABDOMEN: Soft, Nontender, Nondistended; Bowel sounds present.   : Suprapubic catheter in place, no surrounding erythema. B/l nephrostomy tubes in place, draining clear urine. Nephrostomy sites are c/d.  EXTREMITIES:  2+ Peripheral Pulses, No clubbing, cyanosis, or edema  PSYCH: AAOx3  NEUROLOGY: non-focal  SKIN: No rashes or lesions    LABS:                        7.8    4.08  )-----------( 192      ( 08 Dec 2018 09:01 )             23.6     Auto Eosinophil # x     / Auto Eosinophil % x     / Auto Neutrophil # x     / Auto Neutrophil % x     / BANDS % x                            6.8    4.26  )-----------( 118      ( 07 Dec 2018 09:17 )             20.7     Auto Eosinophil # 0.04  / Auto Eosinophil % 0.9   / Auto Neutrophil # 2.99  / Auto Neutrophil % 70.3  / BANDS % x        12-09    126<L>  |  92<L>  |  14  ----------------------------<  221<H>  4.5   |  23  |  1.12  12-08    130<L>  |  92<L>  |  16  ----------------------------<  328<H>  5.0   |  24  |  1.18  12-08    127<L>  |  92<L>  |  20  ----------------------------<  380<H>  4.6   |  22  |  1.34<H>    Ca    8.3<L>      09 Dec 2018 05:59  Mg     1.8     12-09  Phos  2.0     12-09    PT/INR - ( 07 Dec 2018 09:20 )   PT: 15.8 sec;   INR: 1.37 ratio         PTT - ( 07 Dec 2018 09:20 )  PTT:27.5 sec        Care Discussed with Consultants/Other Providers:    RADIOLOGY & ADDITIONAL TESTS:    Abscess wound culture - pending

## 2018-12-09 NOTE — PROVIDER CONTACT NOTE (CRITICAL VALUE NOTIFICATION) - SITUATION
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
Body cx positive polymorphonuclear leukocytes and gram negative cocci seen in pairs

## 2018-12-09 NOTE — PROGRESS NOTE ADULT - PROBLEM SELECTOR PLAN 6
A1c 7.3, hold metformin in hospital, ISS  elevated POCT and serum glucose this morning  can start lantus 5U qhs for now and monitor

## 2018-12-09 NOTE — PROGRESS NOTE ADULT - SUBJECTIVE AND OBJECTIVE BOX
CC: F/U for Abscess    Saw/spoke to patient. No fevers, no chills. Feels fatigued. Otherwise no new complaints.    Allergies  oxycodone (Other)    ANTIMICROBIALS:  meropenem  IVPB 1000 every 8 hours  meropenem  IVPB    vancomycin  IVPB 750 every 12 hours    PE:    Vital Signs Last 24 Hrs  T(C): 37.2 (09 Dec 2018 10:37), Max: 37.6 (08 Dec 2018 21:34)  T(F): 99 (09 Dec 2018 10:37), Max: 99.7 (08 Dec 2018 21:34)  HR: 81 (09 Dec 2018 10:37) (71 - 81)  BP: 115/63 (09 Dec 2018 10:37) (113/54 - 135/56)  RR: 18 (09 Dec 2018 10:37) (18 - 20)  SpO2: 97% (09 Dec 2018 10:37) (97% - 99%)    Gen: AOx3, NAD, non-toxic, pleasant  CV: S1+S2 normal, nontachycardic  Resp: Clear bilat, no resp distress, no crackles/wheezes  Abd: Soft, nontender, +BS  Ext: No LE edema, no wounds  : Suprapubic catheter, nephrostomy tubes; SACHA drain    LABS:                        8.3    3.94  )-----------( 256      ( 09 Dec 2018 07:57 )             25.0     12-09    126<L>  |  92<L>  |  14  ----------------------------<  221<H>  4.5   |  23  |  1.12    Ca    8.3<L>      09 Dec 2018 05:59  Phos  2.0     12-09  Mg     1.8     12-09    MICROBIOLOGY:    .Body Fluid Abdominal Fluid  12-08-18   No growth   polymorphonuclear leukocytes seen  Gram positive cocci in pairs seen  by cytocentrifuge    .Blood Blood-Peripheral  12-06-18   No growth to date.     Rapid RVP Result: NotDetec (12-05 @ 19:49)    (otherwise reviewed)    RADIOLOGY:    12/6 CT:    IMPRESSION:     A 7.3 cm prostatic/periprostatic abscess.    Gas within the substance of the prostate as well as in the soft tissues   of the penile shaft concerning for extension of infection. A rectal   fistula is not excluded.    Mottled appearance of the left inferior pubic ramus with cortical   destruction concerning for osteomyelitis.

## 2018-12-09 NOTE — PROVIDER CONTACT NOTE (CRITICAL VALUE NOTIFICATION) - BACKGROUND
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
Hx of prostate CA, s/p B/L nephrostomy tubes, T2DM, HTN. Body cx positive polymorphonuclear leukocytes and gram negative cocci seen in pairs

## 2018-12-09 NOTE — PROGRESS NOTE ADULT - ASSESSMENT
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  Possible periprostatic abscess ? fistula  Seen by urology and CRS-IR drainage recommended  Patient also s/p recent gemzar and carboplatin  Prior urine Cx with E  coli intermediate to zosyn  Blood cx now negative  Urine Cx  polymicrobial  12/7 s/p drain periprostatic collection with IR--culture NGTD  Overall, periprostatic/prostate abscess, fever, urothelial CA    Rec:  1) Follow up pending drain cultures  2) Follow blood Cx  3) Empiric vanco + meropenem (Adjust dosage per renal function)  4) Monitor vanco trough and creatinine, a vanco level >10 is adequate  5) Hold chemo   6) Overall prognosis poor-hem-onc input on prognosis and consider palliative eval    Wing Charles MD  Pager [phone]  After 5pm and on weekends call [phone]

## 2018-12-09 NOTE — PROGRESS NOTE ADULT - ATTENDING COMMENTS
Patient seen and examined.   Recto vesicular space abscess s/p IR drain follow up drain cultures,  CT abdomen with rectal contrast r/o fistula.   ID follow up appreciated. Chemo deferred for next 6 weeks til the completion of antibiotics. Patient ambulating with assistance, plan discussed at length with wife bed side.

## 2018-12-09 NOTE — PROGRESS NOTE ADULT - PROBLEM SELECTOR PLAN 5
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 Oquendo at [facility] outpatient)  - c/w morphine 15 mg q4 prn for pain control + morphine 2mg IV for breakthrough pain, ISTOP #: [id number]

## 2018-12-09 NOTE — PROGRESS NOTE ADULT - PROBLEM SELECTOR PLAN 1
P/w abscess vs necrotic mass +/- fistula in rectovesicular space s/p IR drainage on 12/7 w/ 48 cc of cloudy green fluid w/ 10F drain in place, gram + cocci prelim drainage  - CT pelvis with oral contrast - 7.3 cm prostatic/periprostatic abscess  - Pt with hx of abscess 4 months ago s/p IR drain with ecolic/vse     - Seen by both urology and colorectal surgery.  - Colorectal surgery recs of CT AP w/ rectal contrast to r/o fistula  -c/w Meropenem 2g Q8 (12/6-)  - C/w Vancomycin 1g Q24 (12/6-), vanco trough 11 on 12/8  - Cystogram pending  - ID following

## 2018-12-09 NOTE — PROGRESS NOTE ADULT - PROBLEM SELECTOR PLAN 3
Pt with chronic anemia, hb 6.8 on 12/7 s/p 1 uPRBC transfused and had bleeding from suprapubic cath previously before. Platlets now stable 190s.   - f/u  iron studies in am  - likely 2/2 ACD  - trend CBC qd for now and monitor for any signs of active bleeding  - holding DVT ppx at this time.  -stop ASA if continues to downtrend H/H

## 2018-12-09 NOTE — CHART NOTE - NSCHARTNOTEFT_GEN_A_CORE
R3 MEDICINE UPDATE NOTE    Spoke to pt and his wife with medical student DARLEEN Casper.  Discussed current hospital admission - OM of pubic ramus, necrotic mass / abscess in the retrovesicular space, metastatic cancer.  Explained plan for hospital admission, including PICC placement.  Explained that course of abx will be long due to OM.  Also discussed cancer diagnosis.  Explained that given metastatic cancer, intention of chemo is to treat but that cancer is almost certainly incurable.  Pt reports that his reaction to chemotherapy was very poor.  Explained that chemo will not be given during this upcoming six-week period given active infection.  Pt seemed that he might not want further chemo - explained that this decision does not need to be made soon, as chemo is deferred for at least six weeks in the setting of active infection.  Did not address code status during this discussion.  Pt's questions and wife's questions answered.    Ирина Haque MD  PGY-3 | Internal Medicine  [phone] / [phone]

## 2018-12-09 NOTE — PROVIDER CONTACT NOTE (CRITICAL VALUE NOTIFICATION) - ASSESSMENT
Denies
Pt A&Ox4, VSS. Body cx positive polymorphonuclear leukocytes and gram negative cocci seen in pairs

## 2018-12-09 NOTE — PROGRESS NOTE ADULT - PROBLEM SELECTOR PLAN 7
Pt with GFR 49 meeting criteria for CKD3.  - Hold lisinopril for now  - will c/w hydration to ensure no worsening of kidney function  -f/u Urine Na, Cr, Urea

## 2018-12-09 NOTE — PROGRESS NOTE ADULT - ASSESSMENT
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

## 2018-12-09 NOTE — PROGRESS NOTE ADULT - SUBJECTIVE AND OBJECTIVE BOX
Subjective    No events overnight, patient doing well, no complaints this morning.    Objective    Vital signs  T(F): , Max: 99.7 (12-08-18 @ 21:34)  HR: 74 (12-09-18 @ 05:27)  BP: 123/71 (12-09-18 @ 05:27)  SpO2: 99% (12-09-18 @ 05:27)  Wt(kg): --    Output     OUT:    Bulb: 25 mL    Nephrostomy Tube: 1000 mL    Nephrostomy Tube: 750 mL  Total OUT: 1775 mL    Total NET: -1775 mL      OUT:    Nephrostomy Tube: 150 mL    Nephrostomy Tube: 250 mL  Total OUT: 400 mL    Total NET: -400 mL          Physical Exam  Gen: NAD  Abd: soft, NT, ND, suprapubic tube site c/d/i  Back: drain site c/d/i, draining milky brown fluid  : uncircumcised phallus, minimal purulence per urethra, small area of stain on pad, indurated shaft, no skin changes or crepitus    Labs      12-09 @ 07:57    WBC 3.94  / Hct 25.0  / SCr --       12-09 @ 05:59    WBC --    / Hct --    / SCr 1.12        Culture - Body Fluid with Gram Stain (12.08.18 @ 00:31)    Gram Stain:   polymorphonuclear leukocytes seen  Gram positive cocci in pairs seen  by cytocentrifuge    Specimen Source: .Body Fluid Abdominal Fluid    Culture Results:   No growth    Culture - Urine (12.05.18 @ 22:51)    -  Amikacin: S <=8    -  Amikacin: S <=8    -  Ampicillin: R >16 These ampicillin results predict results for amoxicillin    -  Ampicillin: S <=2 Predicts results to ampicillin/sulbactam, amoxacillin-clavulanate and  piperacillin-tazobactam.    -  Ampicillin/Sulbactam: S 8/4    -  Amoxicillin/Clavulanic Acid: I 16/8    -  Cefepime: S <=2    -  Cefepime: S <=2    -  Cefazolin: R 8 For uncomplicated UTI with K. pneumoniae, E. coli, or P. mirablis: MARK ANTHONY <=16 is sensitive and MARK ANTHONY >=32 is resistant. This also predicts results for oral agents cefaclor, cefdinir, cefpodoxime, cefprozil, cefuroxime axetil, cephalexin and locarbef for uncomplicated UTI. Note that some isolates may be susceptible to these agents while testing resistant to cefazolin.    -  Aztreonam: S <=4    -  Aztreonam: S <=4    -  Ciprofloxacin: S <=0.5    -  Ciprofloxacin: S <=0.5    -  Ciprofloxacin: S <=1    -  Ceftriaxone: S <=1 Enterobacter, Citrobacter, and Serratia may develop resistance during prolonged therapy    -  Cefoxitin: S <=4    -  Imipenem: S <=1    -  Imipenem: S <=1    -  Levofloxacin: S <=1    -  Levofloxacin: S 1    -  Levofloxacin: S <=1    -  Ertapenem: S <=0.5    -  Ceftazidime: S <=1    -  Tobramycin: S <=2    -  Tobramycin: S <=2    -  Trimethoprim/Sulfamethoxazole: S <=0.5/9.5    -  Vancomycin: S 2    -  Tetra/Doxy: S <=1    -  Tigecycline: S <=1    -  Piperacillin/Tazobactam: S <=8    -  Piperacillin/Tazobactam: S <=8    -  Meropenem: S <=1    -  Meropenem: S <=1    -  Nitrofurantoin: S <=32 Should not be used to treat pyelonephritis    -  Nitrofurantoin: S <=32 Should not be used to treat pyelonephritis.    -  Gentamicin: S <=1    -  Gentamicin: S 2    Specimen Source: .Urine Suprapubic    Culture Results:   Numerous Escherichia coli  Numerous Pseudomonas aeruginosa  Numerous Enterococcus faecalis    Organism Identification: Escherichia coli  Pseudomonas aeruginosa  Enterococcus faecalis    Organism: Escherichia coli    Organism: Pseudomonas aeruginosa    Organism: Enterococcus faecalis    Method Type: MARK ANTHONY    Method Type: MARK ANTHONY    Method Type: MARK ANTHONY        Blood Cx: ?    Imaging

## 2018-12-09 NOTE — PROGRESS NOTE ADULT - PROBLEM SELECTOR PLAN 4
Na 126 this morning, most likely hypovolemic hyponatremia, Urine Na 38, Urine osmolality 243 (12/6), serum omolality 292 (10/26)  Fluid restriction to 1500cc, consider salt tabs

## 2018-12-09 NOTE — PROVIDER CONTACT NOTE (CRITICAL VALUE NOTIFICATION) - ACTION/TREATMENT ORDERED:
type and screen and 1 unit of prbc ordered and moniter CBC as ordered accordingly.
Continue ABX therapy.

## 2018-12-10 ENCOUNTER — APPOINTMENT (OUTPATIENT)
Dept: INFUSION THERAPY | Facility: HOSPITAL | Age: 83
End: 2018-12-10

## 2018-12-10 LAB
ANION GAP SERPL CALC-SCNC: 13 MMOL/L — SIGNIFICANT CHANGE UP (ref 5–17)
BUN SERPL-MCNC: 15 MG/DL — SIGNIFICANT CHANGE UP (ref 7–23)
CALCIUM SERPL-MCNC: 8.3 MG/DL — LOW (ref 8.4–10.5)
CHLORIDE SERPL-SCNC: 95 MMOL/L — LOW (ref 96–108)
CO2 SERPL-SCNC: 23 MMOL/L — SIGNIFICANT CHANGE UP (ref 22–31)
CREAT SERPL-MCNC: 1.21 MG/DL — SIGNIFICANT CHANGE UP (ref 0.5–1.3)
GLUCOSE BLDC GLUCOMTR-MCNC: 207 MG/DL — HIGH (ref 70–99)
GLUCOSE BLDC GLUCOMTR-MCNC: 221 MG/DL — HIGH (ref 70–99)
GLUCOSE BLDC GLUCOMTR-MCNC: 234 MG/DL — HIGH (ref 70–99)
GLUCOSE BLDC GLUCOMTR-MCNC: 288 MG/DL — HIGH (ref 70–99)
GLUCOSE SERPL-MCNC: 174 MG/DL — HIGH (ref 70–99)
HCT VFR BLD CALC: 24.3 % — LOW (ref 39–50)
HGB BLD-MCNC: 8 G/DL — LOW (ref 13–17)
MAGNESIUM SERPL-MCNC: 2 MG/DL — SIGNIFICANT CHANGE UP (ref 1.6–2.6)
MCHC RBC-ENTMCNC: 27 PG — SIGNIFICANT CHANGE UP (ref 27–34)
MCHC RBC-ENTMCNC: 32.9 GM/DL — SIGNIFICANT CHANGE UP (ref 32–36)
MCV RBC AUTO: 82.1 FL — SIGNIFICANT CHANGE UP (ref 80–100)
PHOSPHATE SERPL-MCNC: 3 MG/DL — SIGNIFICANT CHANGE UP (ref 2.5–4.5)
PLATELET # BLD AUTO: 294 K/UL — SIGNIFICANT CHANGE UP (ref 150–400)
POTASSIUM SERPL-MCNC: 4.4 MMOL/L — SIGNIFICANT CHANGE UP (ref 3.5–5.3)
POTASSIUM SERPL-SCNC: 4.4 MMOL/L — SIGNIFICANT CHANGE UP (ref 3.5–5.3)
RBC # BLD: 2.96 M/UL — LOW (ref 4.2–5.8)
RBC # FLD: 14.7 % — HIGH (ref 10.3–14.5)
SODIUM SERPL-SCNC: 131 MMOL/L — LOW (ref 135–145)
VANCOMYCIN TROUGH SERPL-MCNC: 6.1 UG/ML — LOW (ref 10–20)
WBC # BLD: 3.3 K/UL — LOW (ref 3.8–10.5)
WBC # FLD AUTO: 3.3 K/UL — LOW (ref 3.8–10.5)

## 2018-12-10 PROCEDURE — 99233 SBSQ HOSP IP/OBS HIGH 50: CPT

## 2018-12-10 PROCEDURE — 99232 SBSQ HOSP IP/OBS MODERATE 35: CPT | Mod: 24

## 2018-12-10 PROCEDURE — 99233 SBSQ HOSP IP/OBS HIGH 50: CPT | Mod: GC

## 2018-12-10 PROCEDURE — 99231 SBSQ HOSP IP/OBS SF/LOW 25: CPT

## 2018-12-10 RX ORDER — PIPERACILLIN AND TAZOBACTAM 4; .5 G/20ML; G/20ML
3.38 INJECTION, POWDER, LYOPHILIZED, FOR SOLUTION INTRAVENOUS EVERY 8 HOURS
Qty: 0 | Refills: 0 | Status: DISCONTINUED | OUTPATIENT
Start: 2018-12-10 | End: 2018-12-12

## 2018-12-10 RX ADMIN — INSULIN GLARGINE 8 UNIT(S): 100 INJECTION, SOLUTION SUBCUTANEOUS at 22:06

## 2018-12-10 RX ADMIN — Medication 100 MILLIGRAM(S): at 22:06

## 2018-12-10 RX ADMIN — MORPHINE SULFATE 15 MILLIGRAM(S): 50 CAPSULE, EXTENDED RELEASE ORAL at 12:22

## 2018-12-10 RX ADMIN — Medication 1: at 22:07

## 2018-12-10 RX ADMIN — SODIUM CHLORIDE 1 GRAM(S): 9 INJECTION INTRAMUSCULAR; INTRAVENOUS; SUBCUTANEOUS at 14:20

## 2018-12-10 RX ADMIN — MORPHINE SULFATE 15 MILLIGRAM(S): 50 CAPSULE, EXTENDED RELEASE ORAL at 06:00

## 2018-12-10 RX ADMIN — Medication 1 TABLET(S): at 12:24

## 2018-12-10 RX ADMIN — Medication 100 MILLIGRAM(S): at 05:32

## 2018-12-10 RX ADMIN — Medication 100 MILLIGRAM(S): at 14:20

## 2018-12-10 RX ADMIN — Medication 50 MILLIGRAM(S): at 05:32

## 2018-12-10 RX ADMIN — MORPHINE SULFATE 15 MILLIGRAM(S): 50 CAPSULE, EXTENDED RELEASE ORAL at 22:51

## 2018-12-10 RX ADMIN — MEROPENEM 100 MILLIGRAM(S): 1 INJECTION INTRAVENOUS at 05:32

## 2018-12-10 RX ADMIN — MORPHINE SULFATE 15 MILLIGRAM(S): 50 CAPSULE, EXTENDED RELEASE ORAL at 05:32

## 2018-12-10 RX ADMIN — SODIUM CHLORIDE 1 GRAM(S): 9 INJECTION INTRAMUSCULAR; INTRAVENOUS; SUBCUTANEOUS at 22:06

## 2018-12-10 RX ADMIN — Medication 250 MILLIGRAM(S): at 05:32

## 2018-12-10 RX ADMIN — MORPHINE SULFATE 15 MILLIGRAM(S): 50 CAPSULE, EXTENDED RELEASE ORAL at 22:21

## 2018-12-10 RX ADMIN — PIPERACILLIN AND TAZOBACTAM 25 GRAM(S): 4; .5 INJECTION, POWDER, LYOPHILIZED, FOR SOLUTION INTRAVENOUS at 22:26

## 2018-12-10 RX ADMIN — Medication 2: at 17:21

## 2018-12-10 RX ADMIN — SODIUM CHLORIDE 1 GRAM(S): 9 INJECTION INTRAMUSCULAR; INTRAVENOUS; SUBCUTANEOUS at 05:32

## 2018-12-10 RX ADMIN — MORPHINE SULFATE 15 MILLIGRAM(S): 50 CAPSULE, EXTENDED RELEASE ORAL at 13:22

## 2018-12-10 RX ADMIN — PIPERACILLIN AND TAZOBACTAM 25 GRAM(S): 4; .5 INJECTION, POWDER, LYOPHILIZED, FOR SOLUTION INTRAVENOUS at 14:19

## 2018-12-10 RX ADMIN — MORPHINE SULFATE 15 MILLIGRAM(S): 50 CAPSULE, EXTENDED RELEASE ORAL at 18:12

## 2018-12-10 RX ADMIN — Medication 2: at 12:23

## 2018-12-10 RX ADMIN — Medication 2: at 08:46

## 2018-12-10 RX ADMIN — SIMVASTATIN 10 MILLIGRAM(S): 20 TABLET, FILM COATED ORAL at 22:07

## 2018-12-10 RX ADMIN — SENNA PLUS 2 TABLET(S): 8.6 TABLET ORAL at 22:07

## 2018-12-10 NOTE — PROGRESS NOTE ADULT - ASSESSMENT
83year old male with PMHx of HTN, DM, A.Fib (no longer taking eliquis), 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), BPH s/p TURP 6/26/18 c/b prostatic abscess requiring IR drain, prostate cancer s/p brachy radiation, 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 (last changed 11/17), and b/l hydronephrosis s/p perc nephrostomy tubes 11/8 recently diagnosed with infiltrating high grade urothelial ca started chemo 11/19 now with a UTI and abdominal abscess now s/p IR drainage  -- drain Cx w/GPC, f/u Cx  -- c/w vanc and meropenem  -- polymicrobial UCx, mostly pan-sensitive  -- monitor drain output  -- appreciate CRSx recs and medical care

## 2018-12-10 NOTE — PROGRESS NOTE ADULT - SUBJECTIVE AND OBJECTIVE BOX
Subjective  No overnight events, pt remains afebrile    Objective    Vital signs  T(F): , Max: 99 (12-09-18 @ 10:37)  HR: 73 (12-10-18 @ 05:22)  BP: 134/64 (12-10-18 @ 05:22)  SpO2: 94% (12-10-18 @ 05:22)  Wt(kg): --    Output     12-09 @ 07:01  -  12-10 @ 07:00  --------------------------------------------------------  IN: 1850 mL / OUT: 3250 mL / NET: -1400 mL    12-10 @ 07:01  -  12-10 @ 10:11  --------------------------------------------------------  IN: 0 mL / OUT: 400 mL / NET: -400 mL        Gen: NAD  Abd: SPT site c/d/i  : SPT output clear yellow, uncircumcised phallus, scant purulence per urethra, minimally decreased induration of shaft, no skin changes or crepitus  Back: NT output clear yellow b/l    Labs      12-10 @ 07:58    WBC 3.30  / Hct 24.3  / SCr --       12-10 @ 06:58    WBC --    / Hct --    / SCr 1.21       Drain Cx: Culture - Body Fluid with Gram Stain (12.08.18 @ 00:31)    Gram Stain:   polymorphonuclear leukocytes seen  Gram positive cocci in pairs seen  by cytocentrifuge    Specimen Source: .Body Fluid Abdominal Fluid    Culture Results:   No growth      Imaging

## 2018-12-10 NOTE — PROGRESS NOTE ADULT - PROBLEM SELECTOR PLAN 1
P/w abscess vs necrotic mass +/- fistula in rectovesicular space s/p IR drainage on 12/7 w/ 48 cc of cloudy green fluid w/ 10F drain in place, gram + cocci prelim drainage  - CT pelvis with oral contrast - 7.3 cm prostatic/periprostatic abscess  - Pt with hx of abscess 4 months ago s/p IR drain with ecolic/vse     - Seen by both urology and colorectal surgery.  - Colorectal surgery recs of CT AP w/ rectal contrast to r/o fistula - negative  -c/w Meropenem 2g Q8 (12/6-)  - C/w Vancomycin 1g Q24 (12/6-), f/u vanco trough  - Cystogram pending  - ID following

## 2018-12-10 NOTE — PROGRESS NOTE ADULT - PROBLEM SELECTOR PLAN 3
Pt with chronic anemia, hb 6.8 on 12/7 s/p 1 uPRBC transfused and had bleeding from suprapubic cath previously before. Platlets now stable  - likely 2/2 ACD  - trend CBC qd for now and monitor for any signs of active bleeding  - holding DVT ppx at this time.  -stop ASA if continues to downtrend H/H

## 2018-12-10 NOTE — CHART NOTE - NSCHARTNOTEFT_GEN_A_CORE
I was paged by primary team regarding ID's concern regarding the plan for chemotherapy. As stated in my note from last week, patient just recently started chemo and has not even finished cycle 1 yet. Even though his treatment is palliative and not curable intent, there is still treatment options for him when he is cleared from ID's standpoint to resume treatment. I spoke with Dr. Hathaway who states that the patient was told (unclear by whom) that he was not a candidate for further chemo and we clarified that he is. Dr. Hathaway states that given it is uncertain he has osteomyelitis, it is quite possible for patient to eventually be discharged on fluoroquinolone and resume chemo even on oral antibiotics. Plan will be for patient to follow with Dr. Oquendo at discharge and discuss in details the specifics regarding when to restart treatment.

## 2018-12-10 NOTE — PROGRESS NOTE ADULT - ASSESSMENT
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  Possible periprostatic abscess ? fistula  ?ischial OM  Seen by urology and CRS-No intervention   Patient also s/p recent gemzar and carboplatin  s/p IR aspiration -Cx pending  Urine CX from nephrostomy with polymicrobial jani as above-all sensitive to FQs-though may represent colonization related to PCN   Await IR aspirate Cx  Given metastatic CA prognosis is poor  Rec:  1) switch coverage to zosyn  2) May be able to de-escalate further depending on IR CX  3) may need a long course-though if FQs a option may not need PICC  4) Oncology input-?prognosis of tumor  5) Will tailor plan for ID issues  per course,results.Will defer to primary team on management of other issues.  Overall prognosis guarded  Case d/w Med team  Will Follow.  Beeper [id number]/afterhours/No response-[id number]

## 2018-12-10 NOTE — CHART NOTE - NSCHARTNOTEFT_GEN_A_CORE
Patient doing well, continues to tolerate diet. Constipation at baseline, no rectal pain. CT scan performed with rectal contrast (12.9.18), demonstrated no evidence of rectal fistula.    In setting of known rectal ulcers likely secondary to prostatic radiation, recommend continued sigmoidoscopy/surveillance. No colorectal surgery needs at this time, please contact [facility] ([phone]) with further questions or concerns.    --TANYA Blum, PGY-4 Patient doing well, continues to tolerate diet. Constipation at baseline, no rectal pain. CT scan performed with rectal contrast (12.9.18), demonstrated no evidence of rectal fistula.    In setting of known rectal ulcers likely secondary to prostatic radiation, recommend continued sigmoidoscopy/surveillance. No colorectal surgery needs at this time, please contact [facility] ([phone]) with further questions or concerns.    --TANYA Blum, PGY-4        Attending Attestation    pt seen and examined, agree with above  no evidence of fistula on CT  care per  and primary team  no colorectal surgical intervention needed at this time

## 2018-12-10 NOTE — PROGRESS NOTE ADULT - SUBJECTIVE AND OBJECTIVE BOX
=========================================  CONTACT EDSON Goss M.D., PGY-1  Pager: [facility]- [phone], [facility]- [phone]    Mon-Fri: pager covered by day team 7am-7pm;   ***Academic conferences 12pm-1pm- page ONLY if URGENT or if Consultant  /Kelly: see chart, primary physician assigned available 7am-12pm  Sat/Sharma Cross Coverage 12pm-7pm: [facility]- page [phone] for Team1-4, [facility]- pager forwarded to covering Resident  For Night coverage 7pm-7am: [facility]- page [phone] Team1-3, page [phone] Team4 & Care Model; [facility]- page [phone] for Red, [phone] for Blue  =========================================      Patient is a 83y old  Male who presents with a chief complaint of abscess (09 Dec 2018 10:09)      SUBJECTIVE / OVERNIGHT EVENTS:  Patient seen and examined at bedside.    Other Review of Systems Negative.    MEDICATIONS  (STANDING):  aspirin enteric coated 81 milliGRAM(s) Oral daily  dextrose 5%. 1000 milliLiter(s) (50 mL/Hr) IV Continuous <Continuous>  dextrose 50% Injectable 12.5 Gram(s) IV Push once  dextrose 50% Injectable 25 Gram(s) IV Push once  dextrose 50% Injectable 25 Gram(s) IV Push once  docusate sodium 100 milliGRAM(s) Oral three times a day  insulin glargine Injectable (LANTUS) 8 Unit(s) SubCutaneous at bedtime  insulin lispro (HumaLOG) corrective regimen sliding scale   SubCutaneous three times a day before meals  insulin lispro (HumaLOG) corrective regimen sliding scale   SubCutaneous at bedtime  meropenem  IVPB 1000 milliGRAM(s) IV Intermittent every 8 hours  meropenem  IVPB      metoprolol succinate ER 50 milliGRAM(s) Oral daily  multivitamin/minerals 1 Tablet(s) Oral daily  polyethylene glycol 3350 17 Gram(s) Oral daily  senna 2 Tablet(s) Oral at bedtime  simvastatin 10 milliGRAM(s) Oral at bedtime  sodium chloride 1 Gram(s) Oral three times a day  vancomycin  IVPB 750 milliGRAM(s) IV Intermittent every 12 hours    MEDICATIONS  (PRN):  acetaminophen   Tablet .. 650 milliGRAM(s) Oral every 6 hours PRN Temp greater or equal to 38C (100.4F)  dextrose 40% Gel 15 Gram(s) Oral once PRN Blood Glucose LESS THAN 70 milliGRAM(s)/deciliter  glucagon  Injectable 1 milliGRAM(s) IntraMuscular once PRN Glucose LESS THAN 70 milligrams/deciliter  morphine  - Injectable 4 milliGRAM(s) IV Push every 4 hours PRN breakthrough pain  morphine  IR 15 milliGRAM(s) Oral every 4 hours PRN Severe Pain (7 - 10)      OBJECTIVE:    Vital Signs Last 24 Hrs  T(C): 36.7 (10 Dec 2018 05:22), Max: 37.2 (09 Dec 2018 10:37)  T(F): 98.1 (10 Dec 2018 05:22), Max: 99 (09 Dec 2018 10:37)  HR: 73 (10 Dec 2018 05:22) (73 - 82)  BP: 134/64 (10 Dec 2018 05:22) (115/63 - 134/64)  BP(mean): --  RR: 19 (10 Dec 2018 05:22) (18 - 19)  SpO2: 94% (10 Dec 2018 05:22) (94% - 97%)    CAPILLARY BLOOD GLUCOSE      POCT Blood Glucose.: 269 mg/dL (09 Dec 2018 21:35)  POCT Blood Glucose.: 349 mg/dL (09 Dec 2018 17:33)  POCT Blood Glucose.: 391 mg/dL (09 Dec 2018 13:51)  POCT Blood Glucose.: 239 mg/dL (09 Dec 2018 08:33)    I&O's Summary    09 Dec 2018 07:01  -  10 Dec 2018 07:00  --------------------------------------------------------  IN: 1850 mL / OUT: 3250 mL / NET: -1400 mL        PHYSICAL EXAM:  GENERAL: NAD, well-developed  HEAD:  Atraumatic, Normocephalic  EYES: EOMI, PERRLA, conjunctiva and sclera clear  NECK: Supple, No JVD  CHEST/LUNG: Clear to auscultation bilaterally; No wheeze  HEART: Regular rate and rhythm; No murmurs, rubs, or gallops  ABDOMEN: Soft, Nontender, Nondistended; Bowel sounds present  EXTREMITIES:  2+ Peripheral Pulses, No clubbing, cyanosis, or edema  PSYCH: AAOx3  NEUROLOGY: non-focal  SKIN: No rashes or lesions    LABS:                        8.3    3.94  )-----------( 256      ( 09 Dec 2018 07:57 )             25.0     Auto Eosinophil # x     / Auto Eosinophil % x     / Auto Neutrophil # x     / Auto Neutrophil % x     / BANDS % x                            7.8    4.08  )-----------( 192      ( 08 Dec 2018 09:01 )             23.6     Auto Eosinophil # x     / Auto Eosinophil % x     / Auto Neutrophil # x     / Auto Neutrophil % x     / BANDS % x        12-10    131<L>  |  95<L>  |  15  ----------------------------<  174<H>  4.4   |  23  |  1.21  12-09    126<L>  |  92<L>  |  14  ----------------------------<  221<H>  4.5   |  23  |  1.12  12-08    130<L>  |  92<L>  |  16  ----------------------------<  328<H>  5.0   |  24  |  1.18    Ca    8.3<L>      10 Dec 2018 06:58  Mg     2.0     12-10  Phos  3.0     12-10        Care Discussed with Consultants/Other Providers:    RADIOLOGY & ADDITIONAL TESTS:  CT Abdomen/Pelvis w/ rectal contrast:  FINDINGS:    LOWER CHEST: Aortic valve replacement. Coronary artery calcifications.   Multiple bilateral subcentimeter pulmonary nodules are again noted in the   visualized lung bases. Bibasilar subsegmental atelectasis.     LIVER: Within normal limits.  BILE DUCTS: Normal caliber.  GALLBLADDER: Within normal limits.  SPLEEN: Splenomegaly with the spleen measuring 15.3 cm in AP dimension.  PANCREAS: Within normal limits.  ADRENALS: Within normal limits.  KIDNEYS/URETERS: Bilateral nephrostomies. No hydronephrosis. Right renal   cyst.    BLADDER: Thickened bladder wall with mild perivesicular infiltration, not   significantly changed. Suprapubic catheter in place.  REPRODUCTIVE ORGANS: Prostatic brachytherapy seeds. A 7.5 x 3.8 cm   abscess centered on the prostate with extension of gas along the penile   urethra and the soft tissues of the penile shaft is unchanged. There has   been interval interval placement of a posterior approach percutaneous   drainage catheter. No gross evidence of fistulization to the rectum,   which contains positive contrast.    BOWEL: No bowel obstruction. Rectal tube with administration of positive   contrast, which opacifies the colon and terminal ileum.  PERITONEUM: No ascites.  VESSELS:  Atherosclerotic changes.  RETROPERITONEUM: No lymphadenopathy.    ABDOMINAL WALL: Within normal limits.  BONES: A mottled appearance of the left inferior pubic ramus is again   seen with cortical destruction, concerning for osteomyelitis.    IMPRESSION:   No gross evidence of prostate-rectal fistula.    Interval placement of a percutaneous drainage catheter within the   prostatic abscess.

## 2018-12-10 NOTE — PROGRESS NOTE ADULT - PROBLEM SELECTOR PLAN 5
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 Oquendo at [facility] outpatient)  - c/w morphine 15 mg q4 prn for pain control + morphine 2mg IV for breakthrough pain, ISTOP #: [id number]

## 2018-12-10 NOTE — PROGRESS NOTE ADULT - ATTENDING COMMENTS
s/p drainage of the abscess without complication  pending the culture of the fluid   appreciate onc blayne on further chemoRx plan    Adriane Bates MD  Division of Hospital Medicine  Pager: [phone]  Office: [phone]

## 2018-12-10 NOTE — PROGRESS NOTE ADULT - SUBJECTIVE AND OBJECTIVE BOX
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 Pt is currently s/p Drainage of pelvic fluid collection via right transgluteal approach on 12/7 with Dr. Gutierrez.     Pt seen and examined at bedside. Offers no complaints at this time. Afebrile.     PAST MEDICAL & SURGICAL HISTORY:  Benign prostatic hyperplasia with lower urinary tract symptoms, symptom details unspecified  Prostate cancer  Hypertension  HLD (hyperlipidemia)  Type 2 diabetes mellitus  Paroxysmal A-fib  S/P AVR (aortic valve replacement)  S/P TURP      Allergies: oxycodone (Other)    LABS:                        8.0    3.30  )-----------( 294      ( 10 Dec 2018 07:58 )             24.3     12-10    131<L>  |  95<L>  |  15  ----------------------------<  174<H>  4.4   |  23  |  1.21    Ca    8.3<L>      10 Dec 2018 06:58  Phos  3.0     12-10  Mg     2.0     12-10    Culture - Body Fluid with Gram Stain (12.08.18 @ 00:31)    Gram Stain:   polymorphonuclear leukocytes seen  Gram positive cocci in pairs seen by cytocentrifuge    Specimen Source: .Body Fluid Abdominal Fluid    Culture Results: No growth        Vitals: T(F): 98.1 (12-10-18 @ 05:22), Max: 98.8 (12-09-18 @ 18:08)  HR: 73 (12-10-18 @ 05:22) (73 - 82)  BP: 134/64 (12-10-18 @ 05:22) (116/66 - 134/64)  RR: 19 (12-10-18 @ 05:22) (18 - 19)  SpO2: 94% (12-10-18 @ 05:22) (94% - 97%)    PHYSICAL EXAM:  General: Nontoxic, in NAD, A&O x3  Abd: ND, soft, NTTP, B/L NT intact to drainage bag with clear yellow urine, suprapubic intact to barbour bag. Right gluteal drain intact to SACHA with 25cc of cloudy yellow fluid. 50cc/24hr documented in chart record.        A/P: 83y Male with pelvic fluid collection s/p drainage via right transgluteal approach on 12/7 with Dr. Gutierrez.   -continue to monitor output    -flush drain per doctor orders  -Continue abx therapy as per ID recs  -trend vitals, labs  -will discuss with IR attending     Please call IR at extension [phone] with any questions, concerns, or issues regarding above.

## 2018-12-10 NOTE — PROGRESS NOTE ADULT - PROBLEM SELECTOR PLAN 10
c/w simvasatin for ASCVD risk  GI: c/w senna/colace and miralax  DVT: SCDs  Diet: DASH, cc c/w simvastatin for ASCVD risk  GI: c/w senna/colace and miralax  DVT: SCDs  Diet: DASH, cc

## 2018-12-10 NOTE — PROGRESS NOTE ADULT - SUBJECTIVE AND OBJECTIVE BOX
Patient is a 83y old  Male who presents with a chief complaint of abscess (10 Dec 2018 10:10)    Being followed by ID for periprostatic abscess,tumor    Interval history:feels better  still penile pain but able to walk around  No other acute events      ROS:  No cough,SOB,CP  No N/V/D  No abd pain    No HA  No joint or limb pain  No other complaints      Antimicrobials:    meropenem  IVPB 1000 milliGRAM(s) IV Intermittent every 8 hours  meropenem  IVPB      vancomycin  IVPB 750 milliGRAM(s) IV Intermittent every 12 hours      other medications reviewed    Vital Signs Last 24 Hrs  T(C): 36.7 (12-10-18 @ 05:22), Max: 37.2 (12-09-18 @ 10:37)  T(F): 98.1 (12-10-18 @ 05:22), Max: 99 (12-09-18 @ 10:37)  HR: 73 (12-10-18 @ 05:22) (73 - 82)  BP: 134/64 (12-10-18 @ 05:22) (115/63 - 134/64)  BP(mean): --  RR: 19 (12-10-18 @ 05:22) (18 - 19)  SpO2: 94% (12-10-18 @ 05:22) (94% - 97%)    Physical Exam:    HEENT PERRLA EOMI    No oral exudate or erythema    Chest Good AE,CTA    CVS RRR S1 S2 WNl No murmur or rub or gallop    Abd soft BS normal No tenderness   suprapubic catheter  tenderness base of penis  some discharge  Bilateral PCN    IV site no erythema tenderness or discharge    CNS AAO X 3 no focalLab Data:                          8.0    3.30  )-----------( 294      ( 10 Dec 2018 07:58 )             24.3     WBC Count: 3.30 (12-10-18 @ 07:58)  WBC Count: 3.94 (12-09-18 @ 07:57)  WBC Count: 4.08 (12-08-18 @ 09:01)  WBC Count: 4.26 (12-07-18 @ 09:17)  WBC Count: 5.0 (12-05-18 @ 19:49)    12-10    131<L>  |  95<L>  |  15  ----------------------------<  174<H>  4.4   |  23  |  1.21    Ca    8.3<L>      10 Dec 2018 06:58  Phos  3.0     12-10  Mg     2.0     12-10          Culture - Acid Fast - Body Fluid w/Smear (collected 08 Dec 2018 00:31)  Source: .Body Fluid Abdominal Fluid    Culture - Fungal, Body Fluid (collected 08 Dec 2018 00:31)  Source: .Body Fluid Abdominal Fluid  Preliminary Report (10 Dec 2018 09:11):    Testing in progress    Culture - Body Fluid with Gram Stain (collected 08 Dec 2018 00:31)  Source: .Body Fluid Abdominal Fluid  Gram Stain (08 Dec 2018 03:57):    polymorphonuclear leukocytes seen    Gram positive cocci in pairs seen    by cytocentrifuge  Preliminary Report (08 Dec 2018 21:39):    No growth    Culture - Blood (collected 06 Dec 2018 00:24)  Source: .Blood Blood-Peripheral  Preliminary Report (07 Dec 2018 01:02):    No growth to date.    Culture - Blood (collected 06 Dec 2018 00:24)  Source: .Blood Blood-Peripheral  Preliminary Report (07 Dec 2018 01:02):    No growth to date.    Culture - Urine (collected 05 Dec 2018 22:51)  Source: .Urine Suprapubic  Final Report (07 Dec 2018 22:38):    Numerous Escherichia coli    Numerous Pseudomonas aeruginosa    Numerous Enterococcus faecalis  Organism: Escherichia coli  Pseudomonas aeruginosa  Enterococcus faecalis (07 Dec 2018 22:38)  Organism: Enterococcus faecalis (07 Dec 2018 22:38)      -  Ampicillin: S <=2 Predicts results to ampicillin/sulbactam, amoxacillin-clavulanate and  piperacillin-tazobactam.      -  Ciprofloxacin: S <=1      -  Levofloxacin: S 1      -  Nitrofurantoin: S <=32 Should not be used to treat pyelonephritis.      -  Tetra/Doxy: S <=1      -  Vancomycin: S 2      Method Type: MARK ANTHONY  Organism: Pseudomonas aeruginosa (07 Dec 2018 22:38)      -  Amikacin: S <=8      -  Aztreonam: S <=4      -  Cefepime: S <=2      -  Ceftazidime: S <=1      -  Ciprofloxacin: S <=0.5      -  Gentamicin: S 2      -  Imipenem: S <=1      -  Levofloxacin: S <=1      -  Meropenem: S <=1      -  Piperacillin/Tazobactam: S <=8      -  Tobramycin: S <=2      Method Type: MARK ANTHONY  Organism: Escherichia coli (07 Dec 2018 22:38)      -  Amikacin: S <=8      -  Amoxicillin/Clavulanic Acid: I 16/8      -  Ampicillin: R >16 These ampicillin results predict results for amoxicillin      -  Ampicillin/Sulbactam: S 8/4      -  Aztreonam: S <=4      -  Cefazolin: R 8 For uncomplicated UTI with K. pneumoniae, E. coli, or P. mirablis: MARK ANTHONY <=16 is sensitive and MARK ANTHONY >=32 is resistant. This also predicts results for oral agents cefaclor, cefdinir, cefpodoxime, cefprozil, cefuroxime axetil, cephalexin and locarbef for uncomplicated UTI. Note that some isolates may be susceptible to these agents while testing resistant to cefazolin.      -  Cefepime: S <=2      -  Cefoxitin: S <=4      -  Ceftriaxone: S <=1 Enterobacter, Citrobacter, and Serratia may develop resistance during prolonged therapy      -  Ciprofloxacin: S <=0.5      -  Ertapenem: S <=0.5      -  Gentamicin: S <=1      -  Imipenem: S <=1      -  Levofloxacin: S <=1      -  Meropenem: S <=1      -  Nitrofurantoin: S <=32 Should not be used to treat pyelonephritis      -  Piperacillin/Tazobactam: S <=8      -  Tigecycline: S <=1      -  Tobramycin: S <=2      -  Trimethoprim/Sulfamethoxazole: S <=0.5/9.5      Method Type: MARK ANTHONY    Culture - Urine (collected 05 Dec 2018 22:51)  Source: .Urine Nephrostomy - Right  Final Report (06 Dec 2018 22:37):    Culture grew 3 or more types of organisms which indicate    collection contamination; consider recollection only if clinically    indicated.    Culture - Urine (collected 05 Dec 2018 22:51)  Source: .Urine Nephrostomy - Left  Final Report (06 Dec 2018 22:37):    Culture grew 3 or more types of organisms which indicate    collection contamination; consider recollection only if clinically    indicated.              Vancomycin Level, Trough: 6.1 ug/mL (12-10-18 @ 06:58)      < from: CT Abdomen and Pelvis w/ Oral Cont (12.09.18 @ 12:33) >  ABDOMINAL WALL: Within normal limits.  BONES: A mottled appearance of the left inferior pubic ramus is again   seen with cortical destruction, concerning for osteomyelitis.    IMPRESSION:   No gross evidence of prostate-rectal fistula.    Interval placement of a percutaneous drainage catheter within the   prostatic abscess.    Other unchanged findings as above.    < end of copied text >

## 2018-12-10 NOTE — PROGRESS NOTE ADULT - PROBLEM SELECTOR PLAN 4
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

## 2018-12-11 LAB
-  AMIKACIN: SIGNIFICANT CHANGE UP
-  AMIKACIN: SIGNIFICANT CHANGE UP
-  AMOXICILLIN/CLAVULANIC ACID: SIGNIFICANT CHANGE UP
-  AMOXICILLIN/CLAVULANIC ACID: SIGNIFICANT CHANGE UP
-  AMPICILLIN/SULBACTAM: SIGNIFICANT CHANGE UP
-  AMPICILLIN/SULBACTAM: SIGNIFICANT CHANGE UP
-  AMPICILLIN: SIGNIFICANT CHANGE UP
-  AZTREONAM: SIGNIFICANT CHANGE UP
-  AZTREONAM: SIGNIFICANT CHANGE UP
-  CEFAZOLIN: SIGNIFICANT CHANGE UP
-  CEFAZOLIN: SIGNIFICANT CHANGE UP
-  CEFEPIME: SIGNIFICANT CHANGE UP
-  CEFEPIME: SIGNIFICANT CHANGE UP
-  CEFOTAXIME: SIGNIFICANT CHANGE UP
-  CEFOXITIN: SIGNIFICANT CHANGE UP
-  CEFOXITIN: SIGNIFICANT CHANGE UP
-  CEFTAZIDIME: SIGNIFICANT CHANGE UP
-  CEFTRIAXONE: SIGNIFICANT CHANGE UP
-  CEFTRIAXONE: SIGNIFICANT CHANGE UP
-  CEFUROXIME: SIGNIFICANT CHANGE UP
-  CIPROFLOXACIN: SIGNIFICANT CHANGE UP
-  CIPROFLOXACIN: SIGNIFICANT CHANGE UP
-  ERTAPENEM: SIGNIFICANT CHANGE UP
-  ERTAPENEM: SIGNIFICANT CHANGE UP
-  GENTAMICIN: SIGNIFICANT CHANGE UP
-  GENTAMICIN: SIGNIFICANT CHANGE UP
-  IMIPENEM: SIGNIFICANT CHANGE UP
-  IMIPENEM: SIGNIFICANT CHANGE UP
-  LEVOFLOXACIN: SIGNIFICANT CHANGE UP
-  LEVOFLOXACIN: SIGNIFICANT CHANGE UP
-  MEROPENEM: SIGNIFICANT CHANGE UP
-  MEROPENEM: SIGNIFICANT CHANGE UP
-  MOXIFLOXACIN(AEROBIC): SIGNIFICANT CHANGE UP
-  PIPERACILLIN/TAZOBACTAM: SIGNIFICANT CHANGE UP
-  PIPERACILLIN/TAZOBACTAM: SIGNIFICANT CHANGE UP
-  TETRACYCLINE: SIGNIFICANT CHANGE UP
-  TETRACYCLINE: SIGNIFICANT CHANGE UP
-  TIGECYCLINE: SIGNIFICANT CHANGE UP
-  TOBRAMYCIN: SIGNIFICANT CHANGE UP
-  TOBRAMYCIN: SIGNIFICANT CHANGE UP
-  TRIMETHOPRIM/SULFAMETHOXAZOLE: SIGNIFICANT CHANGE UP
-  TRIMETHOPRIM/SULFAMETHOXAZOLE: SIGNIFICANT CHANGE UP
-  VANCOMYCIN: SIGNIFICANT CHANGE UP
ANION GAP SERPL CALC-SCNC: 11 MMOL/L — SIGNIFICANT CHANGE UP (ref 5–17)
BUN SERPL-MCNC: 18 MG/DL — SIGNIFICANT CHANGE UP (ref 7–23)
CALCIUM SERPL-MCNC: 8.4 MG/DL — SIGNIFICANT CHANGE UP (ref 8.4–10.5)
CHLORIDE SERPL-SCNC: 94 MMOL/L — LOW (ref 96–108)
CO2 SERPL-SCNC: 25 MMOL/L — SIGNIFICANT CHANGE UP (ref 22–31)
CREAT SERPL-MCNC: 1.28 MG/DL — SIGNIFICANT CHANGE UP (ref 0.5–1.3)
CULTURE RESULTS: SIGNIFICANT CHANGE UP
CULTURE RESULTS: SIGNIFICANT CHANGE UP
GLUCOSE BLDC GLUCOMTR-MCNC: 272 MG/DL — HIGH (ref 70–99)
GLUCOSE BLDC GLUCOMTR-MCNC: 284 MG/DL — HIGH (ref 70–99)
GLUCOSE BLDC GLUCOMTR-MCNC: 310 MG/DL — HIGH (ref 70–99)
GLUCOSE BLDC GLUCOMTR-MCNC: 318 MG/DL — HIGH (ref 70–99)
GLUCOSE SERPL-MCNC: 232 MG/DL — HIGH (ref 70–99)
HCT VFR BLD CALC: 25.8 % — LOW (ref 39–50)
HGB BLD-MCNC: 8.4 G/DL — LOW (ref 13–17)
MAGNESIUM SERPL-MCNC: 2 MG/DL — SIGNIFICANT CHANGE UP (ref 1.6–2.6)
MCHC RBC-ENTMCNC: 26.8 PG — LOW (ref 27–34)
MCHC RBC-ENTMCNC: 32.6 GM/DL — SIGNIFICANT CHANGE UP (ref 32–36)
MCV RBC AUTO: 82.4 FL — SIGNIFICANT CHANGE UP (ref 80–100)
METHOD TYPE: SIGNIFICANT CHANGE UP
PHOSPHATE SERPL-MCNC: 2.8 MG/DL — SIGNIFICANT CHANGE UP (ref 2.5–4.5)
PLATELET # BLD AUTO: 318 K/UL — SIGNIFICANT CHANGE UP (ref 150–400)
POTASSIUM SERPL-MCNC: 4.5 MMOL/L — SIGNIFICANT CHANGE UP (ref 3.5–5.3)
POTASSIUM SERPL-SCNC: 4.5 MMOL/L — SIGNIFICANT CHANGE UP (ref 3.5–5.3)
RBC # BLD: 3.13 M/UL — LOW (ref 4.2–5.8)
RBC # FLD: 14.7 % — HIGH (ref 10.3–14.5)
SODIUM SERPL-SCNC: 130 MMOL/L — LOW (ref 135–145)
SPECIMEN SOURCE: SIGNIFICANT CHANGE UP
SPECIMEN SOURCE: SIGNIFICANT CHANGE UP
WBC # BLD: 4.01 K/UL — SIGNIFICANT CHANGE UP (ref 3.8–10.5)
WBC # FLD AUTO: 4.01 K/UL — SIGNIFICANT CHANGE UP (ref 3.8–10.5)

## 2018-12-11 PROCEDURE — 99232 SBSQ HOSP IP/OBS MODERATE 35: CPT

## 2018-12-11 PROCEDURE — 99233 SBSQ HOSP IP/OBS HIGH 50: CPT | Mod: GC

## 2018-12-11 RX ORDER — INSULIN GLARGINE 100 [IU]/ML
11 INJECTION, SOLUTION SUBCUTANEOUS AT BEDTIME
Qty: 0 | Refills: 0 | Status: DISCONTINUED | OUTPATIENT
Start: 2018-12-11 | End: 2018-12-11

## 2018-12-11 RX ORDER — INSULIN GLARGINE 100 [IU]/ML
14 INJECTION, SOLUTION SUBCUTANEOUS AT BEDTIME
Qty: 0 | Refills: 0 | Status: DISCONTINUED | OUTPATIENT
Start: 2018-12-11 | End: 2018-12-14

## 2018-12-11 RX ORDER — MORPHINE SULFATE 50 MG/1
4 CAPSULE, EXTENDED RELEASE ORAL EVERY 4 HOURS
Qty: 0 | Refills: 0 | Status: DISCONTINUED | OUTPATIENT
Start: 2018-12-11 | End: 2018-12-14

## 2018-12-11 RX ORDER — MORPHINE SULFATE 50 MG/1
15 CAPSULE, EXTENDED RELEASE ORAL EVERY 4 HOURS
Qty: 0 | Refills: 0 | Status: DISCONTINUED | OUTPATIENT
Start: 2018-12-11 | End: 2018-12-14

## 2018-12-11 RX ORDER — INSULIN LISPRO 100/ML
4 VIAL (ML) SUBCUTANEOUS
Qty: 0 | Refills: 0 | Status: DISCONTINUED | OUTPATIENT
Start: 2018-12-11 | End: 2018-12-14

## 2018-12-11 RX ADMIN — Medication 1 TABLET(S): at 12:52

## 2018-12-11 RX ADMIN — INSULIN GLARGINE 14 UNIT(S): 100 INJECTION, SOLUTION SUBCUTANEOUS at 21:54

## 2018-12-11 RX ADMIN — POLYETHYLENE GLYCOL 3350 17 GRAM(S): 17 POWDER, FOR SOLUTION ORAL at 12:52

## 2018-12-11 RX ADMIN — Medication 2: at 21:51

## 2018-12-11 RX ADMIN — PIPERACILLIN AND TAZOBACTAM 25 GRAM(S): 4; .5 INJECTION, POWDER, LYOPHILIZED, FOR SOLUTION INTRAVENOUS at 15:25

## 2018-12-11 RX ADMIN — SODIUM CHLORIDE 1 GRAM(S): 9 INJECTION INTRAMUSCULAR; INTRAVENOUS; SUBCUTANEOUS at 21:51

## 2018-12-11 RX ADMIN — SODIUM CHLORIDE 1 GRAM(S): 9 INJECTION INTRAMUSCULAR; INTRAVENOUS; SUBCUTANEOUS at 15:25

## 2018-12-11 RX ADMIN — PIPERACILLIN AND TAZOBACTAM 25 GRAM(S): 4; .5 INJECTION, POWDER, LYOPHILIZED, FOR SOLUTION INTRAVENOUS at 06:01

## 2018-12-11 RX ADMIN — Medication 100 MILLIGRAM(S): at 21:51

## 2018-12-11 RX ADMIN — Medication 100 MILLIGRAM(S): at 15:26

## 2018-12-11 RX ADMIN — SENNA PLUS 2 TABLET(S): 8.6 TABLET ORAL at 21:51

## 2018-12-11 RX ADMIN — Medication 3: at 08:43

## 2018-12-11 RX ADMIN — SIMVASTATIN 10 MILLIGRAM(S): 20 TABLET, FILM COATED ORAL at 21:51

## 2018-12-11 RX ADMIN — PIPERACILLIN AND TAZOBACTAM 25 GRAM(S): 4; .5 INJECTION, POWDER, LYOPHILIZED, FOR SOLUTION INTRAVENOUS at 21:54

## 2018-12-11 RX ADMIN — Medication 81 MILLIGRAM(S): at 12:52

## 2018-12-11 RX ADMIN — Medication 100 MILLIGRAM(S): at 06:08

## 2018-12-11 RX ADMIN — MORPHINE SULFATE 15 MILLIGRAM(S): 50 CAPSULE, EXTENDED RELEASE ORAL at 12:50

## 2018-12-11 RX ADMIN — MORPHINE SULFATE 15 MILLIGRAM(S): 50 CAPSULE, EXTENDED RELEASE ORAL at 20:55

## 2018-12-11 RX ADMIN — MORPHINE SULFATE 15 MILLIGRAM(S): 50 CAPSULE, EXTENDED RELEASE ORAL at 06:11

## 2018-12-11 RX ADMIN — Medication 3: at 12:51

## 2018-12-11 RX ADMIN — Medication 50 MILLIGRAM(S): at 06:08

## 2018-12-11 RX ADMIN — SODIUM CHLORIDE 1 GRAM(S): 9 INJECTION INTRAMUSCULAR; INTRAVENOUS; SUBCUTANEOUS at 06:08

## 2018-12-11 RX ADMIN — MORPHINE SULFATE 15 MILLIGRAM(S): 50 CAPSULE, EXTENDED RELEASE ORAL at 13:50

## 2018-12-11 RX ADMIN — Medication 4: at 17:06

## 2018-12-11 RX ADMIN — Medication 4 UNIT(S): at 17:05

## 2018-12-11 NOTE — PROGRESS NOTE ADULT - ASSESSMENT
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

## 2018-12-11 NOTE — PROGRESS NOTE ADULT - SUBJECTIVE AND OBJECTIVE BOX
=========================================  CONTACT EDSON Goss M.D., PGY-1  Pager: NS- [phone], [facility]- [phone]    Mon-Fri: pager covered by day team 7am-7pm;   ***Academic conferences 12pm-1pm- page ONLY if URGENT or if Consultant  /Kelly: see chart, primary physician assigned available 7am-12pm  Sat/Sharma Cross Coverage 12pm-7pm: NS- page [phone] for Team1-4, [facility]- pager forwarded to covering Resident  For Night coverage 7pm-7am: NS- page [phone] Team1-3, page [phone] Team4 & Care Model; [facility]- page [phone] for Red, [phone] for Blue  =========================================    Patient is a 83y old  Male who presents with a chief complaint of abscess (10 Dec 2018 12:18)      SUBJECTIVE / OVERNIGHT EVENTS:  Patient seen and examined at bedside.    Other Review of Systems Negative.    MEDICATIONS  (STANDING):  aspirin enteric coated 81 milliGRAM(s) Oral daily  dextrose 5%. 1000 milliLiter(s) (50 mL/Hr) IV Continuous <Continuous>  dextrose 50% Injectable 12.5 Gram(s) IV Push once  dextrose 50% Injectable 25 Gram(s) IV Push once  dextrose 50% Injectable 25 Gram(s) IV Push once  docusate sodium 100 milliGRAM(s) Oral three times a day  insulin glargine Injectable (LANTUS) 8 Unit(s) SubCutaneous at bedtime  insulin lispro (HumaLOG) corrective regimen sliding scale   SubCutaneous three times a day before meals  insulin lispro (HumaLOG) corrective regimen sliding scale   SubCutaneous at bedtime  metoprolol succinate ER 50 milliGRAM(s) Oral daily  multivitamin/minerals 1 Tablet(s) Oral daily  piperacillin/tazobactam IVPB. 3.375 Gram(s) IV Intermittent every 8 hours  polyethylene glycol 3350 17 Gram(s) Oral daily  senna 2 Tablet(s) Oral at bedtime  simvastatin 10 milliGRAM(s) Oral at bedtime  sodium chloride 1 Gram(s) Oral three times a day    MEDICATIONS  (PRN):  acetaminophen   Tablet .. 650 milliGRAM(s) Oral every 6 hours PRN Temp greater or equal to 38C (100.4F)  dextrose 40% Gel 15 Gram(s) Oral once PRN Blood Glucose LESS THAN 70 milliGRAM(s)/deciliter  glucagon  Injectable 1 milliGRAM(s) IntraMuscular once PRN Glucose LESS THAN 70 milligrams/deciliter  morphine  - Injectable 4 milliGRAM(s) IV Push every 4 hours PRN breakthrough pain  morphine  IR 15 milliGRAM(s) Oral every 4 hours PRN Severe Pain (7 - 10)      OBJECTIVE:    Vital Signs Last 24 Hrs  T(C): 37.1 (11 Dec 2018 04:59), Max: 37.5 (10 Dec 2018 21:28)  T(F): 98.8 (11 Dec 2018 04:59), Max: 99.5 (10 Dec 2018 21:28)  HR: 73 (11 Dec 2018 04:59) (73 - 82)  BP: 124/55 (11 Dec 2018 04:59) (116/62 - 145/62)  BP(mean): --  RR: 18 (11 Dec 2018 04:59) (18 - 20)  SpO2: 98% (11 Dec 2018 04:59) (95% - 98%)    CAPILLARY BLOOD GLUCOSE      POCT Blood Glucose.: 284 mg/dL (11 Dec 2018 08:16)  POCT Blood Glucose.: 288 mg/dL (10 Dec 2018 21:54)  POCT Blood Glucose.: 234 mg/dL (10 Dec 2018 16:45)  POCT Blood Glucose.: 221 mg/dL (10 Dec 2018 12:15)    I&O's Summary    10 Dec 2018 07:01  -  11 Dec 2018 07:00  --------------------------------------------------------  IN: 900 mL / OUT: 2205 mL / NET: -1305 mL    11 Dec 2018 07:01  -  11 Dec 2018 08:44  --------------------------------------------------------  IN: 0 mL / OUT: 580 mL / NET: -580 mL        PHYSICAL EXAM:  GENERAL: NAD, well-developed  HEAD:  Atraumatic, Normocephalic  EYES: EOMI, PERRLA, conjunctiva and sclera clear  NECK: Supple, No JVD  CHEST/LUNG: Clear to auscultation bilaterally; No wheeze  HEART: Regular rate and rhythm; No murmurs, rubs, or gallops  ABDOMEN: Soft, Nontender, Nondistended; Bowel sounds present  EXTREMITIES:  2+ Peripheral Pulses, No clubbing, cyanosis, or edema  PSYCH: AAOx3  NEUROLOGY: non-focal  SKIN: No rashes or lesions    LABS:                        8.0    3.30  )-----------( 294      ( 10 Dec 2018 07:58 )             24.3     Auto Eosinophil # x     / Auto Eosinophil % x     / Auto Neutrophil # x     / Auto Neutrophil % x     / BANDS % x        12-11    130<L>  |  94<L>  |  18  ----------------------------<  232<H>  4.5   |  25  |  1.28  12-10    131<L>  |  95<L>  |  15  ----------------------------<  174<H>  4.4   |  23  |  1.21    Ca    8.4      11 Dec 2018 07:07  Mg     2.0     12-11  Phos  2.8     12-11    Care Discussed with Consultants/Other Providers:    RADIOLOGY & ADDITIONAL TESTS:  No new imaging =========================================  CONTACT EDSON Goss M.D., PGY-1  Pager: NS- [phone], [facility]- [phone]    Mon-Fri: pager covered by day team 7am-7pm;   ***Academic conferences 12pm-1pm- page ONLY if URGENT or if Consultant  /Kelly: see chart, primary physician assigned available 7am-12pm  Sat/Sharma Cross Coverage 12pm-7pm: NS- page [phone] for Team1-4, [facility]- pager forwarded to covering Resident  For Night coverage 7pm-7am: NS- page [phone] Team1-3, page [phone] Team4 & Care Model; [facility]- page [phone] for Red, [phone] for Blue  =========================================    Patient is a 83y old  Male who presents with a chief complaint of abscess (10 Dec 2018 12:18)      SUBJECTIVE / OVERNIGHT EVENTS:  Patient seen and examined at bedside. No acute events overnight. No fever, chills, n/v/d.     Other Review of Systems Negative.    MEDICATIONS  (STANDING):  aspirin enteric coated 81 milliGRAM(s) Oral daily  dextrose 5%. 1000 milliLiter(s) (50 mL/Hr) IV Continuous <Continuous>  dextrose 50% Injectable 12.5 Gram(s) IV Push once  dextrose 50% Injectable 25 Gram(s) IV Push once  dextrose 50% Injectable 25 Gram(s) IV Push once  docusate sodium 100 milliGRAM(s) Oral three times a day  insulin glargine Injectable (LANTUS) 8 Unit(s) SubCutaneous at bedtime  insulin lispro (HumaLOG) corrective regimen sliding scale   SubCutaneous three times a day before meals  insulin lispro (HumaLOG) corrective regimen sliding scale   SubCutaneous at bedtime  metoprolol succinate ER 50 milliGRAM(s) Oral daily  multivitamin/minerals 1 Tablet(s) Oral daily  piperacillin/tazobactam IVPB. 3.375 Gram(s) IV Intermittent every 8 hours  polyethylene glycol 3350 17 Gram(s) Oral daily  senna 2 Tablet(s) Oral at bedtime  simvastatin 10 milliGRAM(s) Oral at bedtime  sodium chloride 1 Gram(s) Oral three times a day    MEDICATIONS  (PRN):  acetaminophen   Tablet .. 650 milliGRAM(s) Oral every 6 hours PRN Temp greater or equal to 38C (100.4F)  dextrose 40% Gel 15 Gram(s) Oral once PRN Blood Glucose LESS THAN 70 milliGRAM(s)/deciliter  glucagon  Injectable 1 milliGRAM(s) IntraMuscular once PRN Glucose LESS THAN 70 milligrams/deciliter  morphine  - Injectable 4 milliGRAM(s) IV Push every 4 hours PRN breakthrough pain  morphine  IR 15 milliGRAM(s) Oral every 4 hours PRN Severe Pain (7 - 10)      OBJECTIVE:    Vital Signs Last 24 Hrs  T(C): 37.1 (11 Dec 2018 04:59), Max: 37.5 (10 Dec 2018 21:28)  T(F): 98.8 (11 Dec 2018 04:59), Max: 99.5 (10 Dec 2018 21:28)  HR: 73 (11 Dec 2018 04:59) (73 - 82)  BP: 124/55 (11 Dec 2018 04:59) (116/62 - 145/62)  BP(mean): --  RR: 18 (11 Dec 2018 04:59) (18 - 20)  SpO2: 98% (11 Dec 2018 04:59) (95% - 98%)    CAPILLARY BLOOD GLUCOSE      POCT Blood Glucose.: 284 mg/dL (11 Dec 2018 08:16)  POCT Blood Glucose.: 288 mg/dL (10 Dec 2018 21:54)  POCT Blood Glucose.: 234 mg/dL (10 Dec 2018 16:45)  POCT Blood Glucose.: 221 mg/dL (10 Dec 2018 12:15)    I&O's Summary    10 Dec 2018 07:01  -  11 Dec 2018 07:00  --------------------------------------------------------  IN: 900 mL / OUT: 2205 mL / NET: -1305 mL    11 Dec 2018 07:01  -  11 Dec 2018 08:44  --------------------------------------------------------  IN: 0 mL / OUT: 580 mL / NET: -580 mL        PHYSICAL EXAM:  GENERAL: NAD, well-developed  HEAD:  Atraumatic, Normocephalic  EYES: EOMI, PERRLA, conjunctiva and sclera clear  NECK: Supple, No JVD  CHEST/LUNG: Clear to auscultation bilaterally; No wheeze  HEART: Regular rate and rhythm; No murmurs, rubs, or gallops  ABDOMEN: Soft, Nontender, Nondistended; Bowel sounds present.   : Suprapubic catheter in place, no surrounding erythema. B/l nephrostomy tubes in place, draining clear urine. Nephrostomy sites are c/d.  EXTREMITIES:  2+ Peripheral Pulses, No clubbing, cyanosis, or edema  PSYCH: AAOx3  NEUROLOGY: non-focal  SKIN: No rashes or lesions    LABS:                        8.0    3.30  )-----------( 294      ( 10 Dec 2018 07:58 )             24.3     Auto Eosinophil # x     / Auto Eosinophil % x     / Auto Neutrophil # x     / Auto Neutrophil % x     / BANDS % x        12-11    130<L>  |  94<L>  |  18  ----------------------------<  232<H>  4.5   |  25  |  1.28  12-10    131<L>  |  95<L>  |  15  ----------------------------<  174<H>  4.4   |  23  |  1.21    Ca    8.4      11 Dec 2018 07:07  Mg     2.0     12-11  Phos  2.8     12-11    Care Discussed with Consultants/Other Providers:    RADIOLOGY & ADDITIONAL TESTS:  No new imaging

## 2018-12-11 NOTE — PROGRESS NOTE ADULT - SUBJECTIVE AND OBJECTIVE BOX
Subjective  No overnight events, pt afebrile. States penile pain is much improved from admission.    Objective    Vital signs  T(F): , Max: 99.5 (12-10-18 @ 21:28)  HR: 73 (12-11-18 @ 04:59)  BP: 124/55 (12-11-18 @ 04:59)  SpO2: 98% (12-11-18 @ 04:59)  Wt(kg): --    Output     12-10 @ 07:01  -  12-11 @ 07:00  --------------------------------------------------------  IN: 900 mL / OUT: 2205 mL / NET: -1305 mL    12-11 @ 07:01  -  12-11 @ 12:28  --------------------------------------------------------  IN: 240 mL / OUT: 1195 mL / NET: -955 mL      Gen: NAD  Abd: SPT site c/d/i  : SPT output clear yellow, uncircumcised phallus, scant purulence per urethra, minimally decreased induration of shaft, no skin changes or crepitus  Back: NT output clear yellow b/l    Labs      12-11 @ 07:57    WBC 4.01  / Hct 25.8  / SCr --       12-11 @ 07:07    WBC --    / Hct --    / SCr 1.28     Imaging

## 2018-12-11 NOTE — PROGRESS NOTE ADULT - SUBJECTIVE AND OBJECTIVE BOX
Patient is a 83y old  Male who presents with a chief complaint of abscess (11 Dec 2018 12:27)    Being followed by ID for abscess,fever    Interval history:feels better  Penile pain decreased   No acute events      ROS:  No cough,SOB,CP  No N/V/D./abd pain  No other complaints      Antimicrobials:    piperacillin/tazobactam IVPB. 3.375 Gram(s) IV Intermittent every 8 hours    Other medications reviewed    Vital Signs Last 24 Hrs  T(C): 37.1 (12-11-18 @ 04:59), Max: 37.5 (12-10-18 @ 21:28)  T(F): 98.8 (12-11-18 @ 04:59), Max: 99.5 (12-10-18 @ 21:28)  HR: 73 (12-11-18 @ 04:59) (73 - 82)  BP: 124/55 (12-11-18 @ 04:59) (116/62 - 145/62)  BP(mean): --  RR: 18 (12-11-18 @ 04:59) (18 - 20)  SpO2: 98% (12-11-18 @ 04:59) (95% - 98%)    Physical Exam:      HEENT PERRLA EOMI    No oral exudate or erythema    Chest Good AE,CTA    CVS RRR S1 S2 WNl No murmur or rub or gallop    Abd soft BS normal No tenderness   suprapubic catheter  tenderness base of penis  some discharge  Bilateral PCN    IV site no erythema tenderness or discharge    CNS AAO X 3 no focal    Lab Data                          8.4    4.01  )-----------( 318      ( 11 Dec 2018 07:57 )             25.8       12-11    130<L>  |  94<L>  |  18  ----------------------------<  232<H>  4.5   |  25  |  1.28    Ca    8.4      11 Dec 2018 07:07  Phos  2.8     12-11  Mg     2.0     12-11          Culture - Acid Fast - Body Fluid w/Smear (collected 08 Dec 2018 00:31)  Source: .Body Fluid Abdominal Fluid    Culture - Fungal, Body Fluid (collected 08 Dec 2018 00:31)  Source: .Body Fluid Abdominal Fluid  Preliminary Report (10 Dec 2018 09:11):    Testing in progress    Culture - Body Fluid with Gram Stain (collected 08 Dec 2018 00:31)  Source: .Body Fluid Abdominal Fluid  Gram Stain (08 Dec 2018 03:57):    polymorphonuclear leukocytes seen    Gram positive cocci in pairs seen    by cytocentrifuge  Preliminary Report (08 Dec 2018 21:39):    No growth            Vancomycin Level, Trough: 6.1 ug/mL (12-10-18 @ 06:58)

## 2018-12-11 NOTE — PROGRESS NOTE ADULT - SUBJECTIVE AND OBJECTIVE BOX
INTERVAL HPI/OVERNIGHT EVENTS:  Patient seen and examined at bedside. Discussed with patient that we have to treat the acute issue at the moment which is his infection, and once he is discharged Dr. Oquendo will discuss when to resume chemo depending on ID's recommendations.     VITAL SIGNS:  T(F): 98.8 (12-11-18 @ 04:59)  HR: 73 (12-11-18 @ 04:59)  BP: 124/55 (12-11-18 @ 04:59)  RR: 18 (12-11-18 @ 04:59)  SpO2: 98% (12-11-18 @ 04:59)  Wt(kg): --    PHYSICAL EXAM:   NAD, sitting in bed  NC/AT  EOMI  CTAB  RRR  soft, non tender  aa&o x 3, no focal deficits       MEDICATIONS  (STANDING):  aspirin enteric coated 81 milliGRAM(s) Oral daily  dextrose 5%. 1000 milliLiter(s) (50 mL/Hr) IV Continuous <Continuous>  dextrose 50% Injectable 12.5 Gram(s) IV Push once  dextrose 50% Injectable 25 Gram(s) IV Push once  dextrose 50% Injectable 25 Gram(s) IV Push once  docusate sodium 100 milliGRAM(s) Oral three times a day  insulin glargine Injectable (LANTUS) 14 Unit(s) SubCutaneous at bedtime  insulin lispro (HumaLOG) corrective regimen sliding scale   SubCutaneous three times a day before meals  insulin lispro (HumaLOG) corrective regimen sliding scale   SubCutaneous at bedtime  insulin lispro Injectable (HumaLOG) 4 Unit(s) SubCutaneous three times a day before meals  metoprolol succinate ER 50 milliGRAM(s) Oral daily  multivitamin/minerals 1 Tablet(s) Oral daily  piperacillin/tazobactam IVPB. 3.375 Gram(s) IV Intermittent every 8 hours  polyethylene glycol 3350 17 Gram(s) Oral daily  senna 2 Tablet(s) Oral at bedtime  simvastatin 10 milliGRAM(s) Oral at bedtime  sodium chloride 1 Gram(s) Oral three times a day    MEDICATIONS  (PRN):  acetaminophen   Tablet .. 650 milliGRAM(s) Oral every 6 hours PRN Temp greater or equal to 38C (100.4F)  dextrose 40% Gel 15 Gram(s) Oral once PRN Blood Glucose LESS THAN 70 milliGRAM(s)/deciliter  glucagon  Injectable 1 milliGRAM(s) IntraMuscular once PRN Glucose LESS THAN 70 milligrams/deciliter  morphine  - Injectable 4 milliGRAM(s) IV Push every 4 hours PRN breakthrough pain  morphine  IR 15 milliGRAM(s) Oral every 4 hours PRN Severe Pain (7 - 10)      Allergies    oxycodone (Other)    Intolerances        LABS:                        8.4    4.01  )-----------( 318      ( 11 Dec 2018 07:57 )             25.8     12-11    130<L>  |  94<L>  |  18  ----------------------------<  232<H>  4.5   |  25  |  1.28    Ca    8.4      11 Dec 2018 07:07  Phos  2.8     12-11  Mg     2.0     12-11            RADIOLOGY & ADDITIONAL TESTS:  Studies reviewed.    ASSESSMENT & PLAN:

## 2018-12-11 NOTE — PROGRESS NOTE ADULT - ATTENDING COMMENTS
Pt and wife re-asssured that a 1-2 week delay in chemo wouldnot impact on his response.To cont po Abx at home and f/u w Dr Oquendo

## 2018-12-11 NOTE — PROGRESS NOTE ADULT - ATTENDING COMMENTS
Pt on zosyn and has been stable. After discussion with oncology team, pt/wife willing to undergo 2nd round of chemoRx. Discussed care with Dr. Hathaway - if drain culture remains negative by tomorrow, will dc home with po abx x 4-6 weeks. Pt to follow up with Dr. Oquendo early next week to discuss the chemoRx regimen. Will coordinate with CM to set up home cares as well as home PT    Adriane Bates MD  Division of Hospital Medicine  Pager: [phone]  Office: [phone]

## 2018-12-11 NOTE — PROGRESS NOTE ADULT - ASSESSMENT
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 Hathaway who stated that given it is uncertain he has osteomyelitis, it is quite possible for patient to eventually be discharged on fluoroquinolone and resume chemo even on oral antibiotics  - Plan will be for patient to follow with Dr. Oquendo at discharge and discuss in details the specifics regarding when to restart treatment.    Plan discussed with patient and wife.     Adelina Sanchez  PGY-4 Hematology Oncology  [phone]

## 2018-12-11 NOTE — PROGRESS NOTE ADULT - PROBLEM SELECTOR PLAN 3
Pt with chronic anemia, hb 6.8 on 12/7 s/p 1 uPRBC transfused and had bleeding from suprapubic cath previously before. Platelets now stable  - likely 2/2 ACD  - trend CBC qd for now and monitor for any signs of active bleeding  - holding DVT ppx at this time.  -stop ASA if continues to downtrend H/H

## 2018-12-11 NOTE — PROGRESS NOTE ADULT - PROBLEM SELECTOR PLAN 1
P/w abscess vs necrotic mass s/p IR drainage on 12/7 w/ 48 cc of cloudy green fluid w/ 10F drain in place, gram + cocci prelim drainage   Initial concern for fistula in rectovesicular space but ruled out on CT ab/pelvis with rectal contrast  - CT pelvis with oral contrast - 7.3 cm prostatic/periprostatic abscess  - Pt with hx of abscess 4 months ago s/p IR drain with ecolic/vse     - Seen by both urology and colorectal surgery, no surigcal intervention at this time.  -c/w Zosyn 3.375mg Q8 as per ID, will tailor abx once abscess culture results and may need long course abx given possible osteomyelitis. Also possible for patient to d/c on a FQ pending cx.   - s/p Meropenem 2g Q8 (12/6-12/10) and Vancomycin 1g Q24 (12/6-12/10)  - Cystogram pending  - ID following

## 2018-12-11 NOTE — PROGRESS NOTE ADULT - ASSESSMENT
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 Irvin Burroughs  - wife states she already made appointment  -- please call if questions

## 2018-12-11 NOTE — PROGRESS NOTE ADULT - PROBLEM SELECTOR PLAN 4
c/w fluid restriction, Na 130 this morning, most likely SIADH, serum osm 272, Urine Na 101, Urine osmolality 379  Fluid restriction to 1500cc

## 2018-12-11 NOTE — PROGRESS NOTE ADULT - ASSESSMENT
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  Possible periprostatic abscess ? fistula  ?ischial OM  Seen by urology and CRS-No intervention   Patient also s/p recent gemzar and carboplatin  s/p IR aspiration -Cx pending  Urine CX from nephrostomy with polymicrobial jani as above-all sensitive to FQs-though may represent colonization related to PCN   Await IR aspirate Cx  Given metastatic CA prognosis is poor  Rec:  1) continue  zosyn  2) May be able to de-escalate further depending on IR CX  3) may need a long course-though if FQs a option may not need PICC  4)  Will tailor plan for ID issues  per course,results.Will defer to primary team on management of other issues.  Overall prognosis guarded  Case d/w Med team  Will Follow.  Beeper [id number]-gikr/afterhours/No response-[id number]

## 2018-12-11 NOTE — PROGRESS NOTE ADULT - PROBLEM SELECTOR PLAN 5
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 Oquendo at [facility])  - c/w morphine 15 mg q4 prn for pain control + morphine 2mg IV for breakthrough pain, ISTOP #: [id number]  - f/u oncology recs regarding future chemotherapy. Patient and wife deciding on whether or not to pursue additional chemo if offered. They are requesting to speak with oncology.

## 2018-12-12 ENCOUNTER — TRANSCRIPTION ENCOUNTER (OUTPATIENT)
Age: 83
End: 2018-12-12

## 2018-12-12 LAB
ANION GAP SERPL CALC-SCNC: 11 MMOL/L — SIGNIFICANT CHANGE UP (ref 5–17)
BASOPHILS # BLD AUTO: 0.02 K/UL — SIGNIFICANT CHANGE UP (ref 0–0.2)
BASOPHILS NFR BLD AUTO: 0.5 % — SIGNIFICANT CHANGE UP (ref 0–2)
BUN SERPL-MCNC: 16 MG/DL — SIGNIFICANT CHANGE UP (ref 7–23)
CALCIUM SERPL-MCNC: 8.6 MG/DL — SIGNIFICANT CHANGE UP (ref 8.4–10.5)
CHLORIDE SERPL-SCNC: 96 MMOL/L — SIGNIFICANT CHANGE UP (ref 96–108)
CO2 SERPL-SCNC: 25 MMOL/L — SIGNIFICANT CHANGE UP (ref 22–31)
CREAT SERPL-MCNC: 1.33 MG/DL — HIGH (ref 0.5–1.3)
CULTURE RESULTS: SIGNIFICANT CHANGE UP
EOSINOPHIL # BLD AUTO: 0.05 K/UL — SIGNIFICANT CHANGE UP (ref 0–0.5)
EOSINOPHIL NFR BLD AUTO: 1.2 % — SIGNIFICANT CHANGE UP (ref 0–6)
GLUCOSE BLDC GLUCOMTR-MCNC: 166 MG/DL — HIGH (ref 70–99)
GLUCOSE BLDC GLUCOMTR-MCNC: 184 MG/DL — HIGH (ref 70–99)
GLUCOSE BLDC GLUCOMTR-MCNC: 231 MG/DL — HIGH (ref 70–99)
GLUCOSE BLDC GLUCOMTR-MCNC: 237 MG/DL — HIGH (ref 70–99)
GLUCOSE SERPL-MCNC: 180 MG/DL — HIGH (ref 70–99)
HCT VFR BLD CALC: 23.9 % — LOW (ref 39–50)
HGB BLD-MCNC: 7.9 G/DL — LOW (ref 13–17)
IMM GRANULOCYTES NFR BLD AUTO: 1.2 % — SIGNIFICANT CHANGE UP (ref 0–1.5)
LYMPHOCYTES # BLD AUTO: 0.77 K/UL — LOW (ref 1–3.3)
LYMPHOCYTES # BLD AUTO: 19 % — SIGNIFICANT CHANGE UP (ref 13–44)
MAGNESIUM SERPL-MCNC: 2 MG/DL — SIGNIFICANT CHANGE UP (ref 1.6–2.6)
MCHC RBC-ENTMCNC: 26.2 PG — LOW (ref 27–34)
MCHC RBC-ENTMCNC: 33.1 GM/DL — SIGNIFICANT CHANGE UP (ref 32–36)
MCV RBC AUTO: 79.4 FL — LOW (ref 80–100)
MONOCYTES # BLD AUTO: 0.49 K/UL — SIGNIFICANT CHANGE UP (ref 0–0.9)
MONOCYTES NFR BLD AUTO: 12.1 % — SIGNIFICANT CHANGE UP (ref 2–14)
NEUTROPHILS # BLD AUTO: 2.67 K/UL — SIGNIFICANT CHANGE UP (ref 1.8–7.4)
NEUTROPHILS NFR BLD AUTO: 66 % — SIGNIFICANT CHANGE UP (ref 43–77)
ORGANISM # SPEC MICROSCOPIC CNT: SIGNIFICANT CHANGE UP
PHOSPHATE SERPL-MCNC: 2.8 MG/DL — SIGNIFICANT CHANGE UP (ref 2.5–4.5)
PLATELET # BLD AUTO: 327 K/UL — SIGNIFICANT CHANGE UP (ref 150–400)
POTASSIUM SERPL-MCNC: 4.4 MMOL/L — SIGNIFICANT CHANGE UP (ref 3.5–5.3)
POTASSIUM SERPL-SCNC: 4.4 MMOL/L — SIGNIFICANT CHANGE UP (ref 3.5–5.3)
RBC # BLD: 3.01 M/UL — LOW (ref 4.2–5.8)
RBC # FLD: 14.7 % — HIGH (ref 10.3–14.5)
SODIUM SERPL-SCNC: 132 MMOL/L — LOW (ref 135–145)
SPECIMEN SOURCE: SIGNIFICANT CHANGE UP
WBC # BLD: 4.05 K/UL — SIGNIFICANT CHANGE UP (ref 3.8–10.5)
WBC # FLD AUTO: 4.05 K/UL — SIGNIFICANT CHANGE UP (ref 3.8–10.5)

## 2018-12-12 PROCEDURE — 99233 SBSQ HOSP IP/OBS HIGH 50: CPT

## 2018-12-12 PROCEDURE — 99233 SBSQ HOSP IP/OBS HIGH 50: CPT | Mod: GC

## 2018-12-12 RX ORDER — VANCOMYCIN HCL 1 G
750 VIAL (EA) INTRAVENOUS EVERY 12 HOURS
Qty: 0 | Refills: 0 | Status: DISCONTINUED | OUTPATIENT
Start: 2018-12-12 | End: 2018-12-12

## 2018-12-12 RX ORDER — ERTAPENEM SODIUM 1 G/1
INJECTION, POWDER, LYOPHILIZED, FOR SOLUTION INTRAMUSCULAR; INTRAVENOUS
Qty: 0 | Refills: 0 | Status: DISCONTINUED | OUTPATIENT
Start: 2018-12-12 | End: 2018-12-14

## 2018-12-12 RX ORDER — VANCOMYCIN HCL 1 G
1000 VIAL (EA) INTRAVENOUS EVERY 24 HOURS
Qty: 0 | Refills: 0 | Status: DISCONTINUED | OUTPATIENT
Start: 2018-12-12 | End: 2018-12-14

## 2018-12-12 RX ORDER — ERTAPENEM SODIUM 1 G/1
1000 INJECTION, POWDER, LYOPHILIZED, FOR SOLUTION INTRAMUSCULAR; INTRAVENOUS EVERY 24 HOURS
Qty: 0 | Refills: 0 | Status: DISCONTINUED | OUTPATIENT
Start: 2018-12-13 | End: 2018-12-14

## 2018-12-12 RX ORDER — ERTAPENEM SODIUM 1 G/1
1000 INJECTION, POWDER, LYOPHILIZED, FOR SOLUTION INTRAMUSCULAR; INTRAVENOUS ONCE
Qty: 0 | Refills: 0 | Status: COMPLETED | OUTPATIENT
Start: 2018-12-12 | End: 2018-12-12

## 2018-12-12 RX ADMIN — MORPHINE SULFATE 15 MILLIGRAM(S): 50 CAPSULE, EXTENDED RELEASE ORAL at 16:37

## 2018-12-12 RX ADMIN — Medication 250 MILLIGRAM(S): at 17:24

## 2018-12-12 RX ADMIN — SENNA PLUS 2 TABLET(S): 8.6 TABLET ORAL at 21:39

## 2018-12-12 RX ADMIN — ERTAPENEM SODIUM 120 MILLIGRAM(S): 1 INJECTION, POWDER, LYOPHILIZED, FOR SOLUTION INTRAMUSCULAR; INTRAVENOUS at 12:32

## 2018-12-12 RX ADMIN — MORPHINE SULFATE 15 MILLIGRAM(S): 50 CAPSULE, EXTENDED RELEASE ORAL at 13:02

## 2018-12-12 RX ADMIN — Medication 100 MILLIGRAM(S): at 05:29

## 2018-12-12 RX ADMIN — Medication 1 TABLET(S): at 12:30

## 2018-12-12 RX ADMIN — Medication 1: at 08:53

## 2018-12-12 RX ADMIN — Medication 4 UNIT(S): at 17:24

## 2018-12-12 RX ADMIN — POLYETHYLENE GLYCOL 3350 17 GRAM(S): 17 POWDER, FOR SOLUTION ORAL at 12:32

## 2018-12-12 RX ADMIN — MORPHINE SULFATE 15 MILLIGRAM(S): 50 CAPSULE, EXTENDED RELEASE ORAL at 21:38

## 2018-12-12 RX ADMIN — PIPERACILLIN AND TAZOBACTAM 25 GRAM(S): 4; .5 INJECTION, POWDER, LYOPHILIZED, FOR SOLUTION INTRAVENOUS at 05:23

## 2018-12-12 RX ADMIN — SODIUM CHLORIDE 1 GRAM(S): 9 INJECTION INTRAMUSCULAR; INTRAVENOUS; SUBCUTANEOUS at 21:40

## 2018-12-12 RX ADMIN — SODIUM CHLORIDE 1 GRAM(S): 9 INJECTION INTRAMUSCULAR; INTRAVENOUS; SUBCUTANEOUS at 05:28

## 2018-12-12 RX ADMIN — SODIUM CHLORIDE 1 GRAM(S): 9 INJECTION INTRAMUSCULAR; INTRAVENOUS; SUBCUTANEOUS at 14:07

## 2018-12-12 RX ADMIN — MORPHINE SULFATE 15 MILLIGRAM(S): 50 CAPSULE, EXTENDED RELEASE ORAL at 05:28

## 2018-12-12 RX ADMIN — INSULIN GLARGINE 14 UNIT(S): 100 INJECTION, SOLUTION SUBCUTANEOUS at 21:42

## 2018-12-12 RX ADMIN — Medication 50 MILLIGRAM(S): at 05:29

## 2018-12-12 RX ADMIN — Medication 100 MILLIGRAM(S): at 21:40

## 2018-12-12 RX ADMIN — MORPHINE SULFATE 15 MILLIGRAM(S): 50 CAPSULE, EXTENDED RELEASE ORAL at 12:30

## 2018-12-12 RX ADMIN — Medication 2: at 17:24

## 2018-12-12 RX ADMIN — Medication 1: at 12:30

## 2018-12-12 RX ADMIN — SIMVASTATIN 10 MILLIGRAM(S): 20 TABLET, FILM COATED ORAL at 21:40

## 2018-12-12 RX ADMIN — Medication 4 UNIT(S): at 08:54

## 2018-12-12 RX ADMIN — MORPHINE SULFATE 15 MILLIGRAM(S): 50 CAPSULE, EXTENDED RELEASE ORAL at 17:10

## 2018-12-12 RX ADMIN — Medication 100 MILLIGRAM(S): at 14:38

## 2018-12-12 RX ADMIN — Medication 4 UNIT(S): at 12:29

## 2018-12-12 NOTE — PROGRESS NOTE ADULT - SUBJECTIVE AND OBJECTIVE BOX
=========================================  CONTACT EDSON Goss M.D., PGY-1  Pager: [facility]- [phone], [facility]- [phone]    Mon-Fri: pager covered by day team 7am-7pm;   ***Academic conferences 12pm-1pm- page ONLY if URGENT or if Consultant  /Kelly: see chart, primary physician assigned available 7am-12pm  Sat/Sharma Cross Coverage 12pm-7pm: [facility]- page [phone] for Team1-4, [facility]- pager forwarded to covering Resident  For Night coverage 7pm-7am: [facility]- page [phone] Team1-3, page [phone] Team4 & Care Model; [facility]- page [phone] for Red, [phone] for Blue  =========================================    Patient is a 83y old  Male who presents with a chief complaint of abscess (11 Dec 2018 13:12)      SUBJECTIVE / OVERNIGHT EVENTS:  Patient seen and examined at bedside. No acute events overnight.     Other Review of Systems Negative.    MEDICATIONS  (STANDING):  aspirin enteric coated 81 milliGRAM(s) Oral daily  dextrose 5%. 1000 milliLiter(s) (50 mL/Hr) IV Continuous <Continuous>  dextrose 50% Injectable 12.5 Gram(s) IV Push once  dextrose 50% Injectable 25 Gram(s) IV Push once  dextrose 50% Injectable 25 Gram(s) IV Push once  docusate sodium 100 milliGRAM(s) Oral three times a day  ertapenem  IVPB 1000 milliGRAM(s) IV Intermittent once  ertapenem  IVPB      insulin glargine Injectable (LANTUS) 14 Unit(s) SubCutaneous at bedtime  insulin lispro (HumaLOG) corrective regimen sliding scale   SubCutaneous three times a day before meals  insulin lispro (HumaLOG) corrective regimen sliding scale   SubCutaneous at bedtime  insulin lispro Injectable (HumaLOG) 4 Unit(s) SubCutaneous three times a day before meals  metoprolol succinate ER 50 milliGRAM(s) Oral daily  multivitamin/minerals 1 Tablet(s) Oral daily  polyethylene glycol 3350 17 Gram(s) Oral daily  senna 2 Tablet(s) Oral at bedtime  simvastatin 10 milliGRAM(s) Oral at bedtime  sodium chloride 1 Gram(s) Oral three times a day    MEDICATIONS  (PRN):  acetaminophen   Tablet .. 650 milliGRAM(s) Oral every 6 hours PRN Temp greater or equal to 38C (100.4F)  dextrose 40% Gel 15 Gram(s) Oral once PRN Blood Glucose LESS THAN 70 milliGRAM(s)/deciliter  glucagon  Injectable 1 milliGRAM(s) IntraMuscular once PRN Glucose LESS THAN 70 milligrams/deciliter  morphine  - Injectable 4 milliGRAM(s) IV Push every 4 hours PRN breakthrough pain  morphine  IR 15 milliGRAM(s) Oral every 4 hours PRN Severe Pain (7 - 10)      OBJECTIVE:    Vital Signs Last 24 Hrs  T(C): 36.8 (12 Dec 2018 06:00), Max: 36.9 (11 Dec 2018 14:14)  T(F): 98.2 (12 Dec 2018 06:00), Max: 98.5 (11 Dec 2018 14:14)  HR: 66 (12 Dec 2018 06:00) (66 - 81)  BP: 122/62 (12 Dec 2018 06:00) (122/62 - 153/72)  BP(mean): --  RR: 18 (12 Dec 2018 06:00) (18 - 18)  SpO2: 96% (12 Dec 2018 06:00) (96% - 98%)    CAPILLARY BLOOD GLUCOSE      POCT Blood Glucose.: 184 mg/dL (12 Dec 2018 07:56)  POCT Blood Glucose.: 318 mg/dL (11 Dec 2018 21:35)  POCT Blood Glucose.: 310 mg/dL (11 Dec 2018 16:42)  POCT Blood Glucose.: 272 mg/dL (11 Dec 2018 12:30)    I&O's Summary    11 Dec 2018 07:01  -  12 Dec 2018 07:00  --------------------------------------------------------  IN: 700 mL / OUT: 3930 mL / NET: -3230 mL    12 Dec 2018 07:01  -  12 Dec 2018 11:26  --------------------------------------------------------  IN: 0 mL / OUT: 585 mL / NET: -585 mL        PHYSICAL EXAM:  GENERAL: NAD, well-developed  HEAD:  Atraumatic, Normocephalic  EYES: EOMI, PERRLA, conjunctiva and sclera clear  NECK: Supple, No JVD  CHEST/LUNG: Clear to auscultation bilaterally; No wheeze  HEART: Regular rate and rhythm; No murmurs, rubs, or gallops  ABDOMEN: Soft, Nontender, Nondistended; Bowel sounds present.   : Suprapubic catheter in place, no surrounding erythema. B/l nephrostomy tubes in place, draining clear urine. Nephrostomy sites are c/d. SACHA drain draining clear/yellowish fluid.   EXTREMITIES:  2+ Peripheral Pulses, No clubbing, cyanosis, or edema  PSYCH: AAOx3  NEUROLOGY: non-focal  SKIN: No rashes or lesions    LABS:                        7.9    4.05  )-----------( 327      ( 12 Dec 2018 07:53 )             23.9     Auto Eosinophil # 0.05  / Auto Eosinophil % 1.2   / Auto Neutrophil # 2.67  / Auto Neutrophil % 66.0  / BANDS % x                            8.4    4.01  )-----------( 318      ( 11 Dec 2018 07:57 )             25.8     Auto Eosinophil # x     / Auto Eosinophil % x     / Auto Neutrophil # x     / Auto Neutrophil % x     / BANDS % x        12-12    132<L>  |  96  |  16  ----------------------------<  180<H>  4.4   |  25  |  1.33<H>  12-11    130<L>  |  94<L>  |  18  ----------------------------<  232<H>  4.5   |  25  |  1.28    Ca    8.6      12 Dec 2018 06:42  Mg     2.0     12-12  Phos  2.8     12-12        Care Discussed with Consultants/Other Providers:    RADIOLOGY & ADDITIONAL TESTS: No new imaging    Culture - Body Fluid with Gram Stain (12.08.18 @ 00:31)    -  Trimethoprim/Sulfamethoxazole: R >2/38    -  Trimethoprim/Sulfamethoxazole: S <=0.5/9.5    -  Vancomycin: S 2    -  Amikacin: S <=8    -  Amikacin: S <=8    -  Ampicillin: R >16 These ampicillin results predict results for amoxicillin    -  Ampicillin: I 16 These ampicillin results predict results for amoxicillin    -  Ampicillin: S <=2 Predicts results to ampicillin/sulbactam, amoxacillin-clavulanate and  piperacillin-tazobactam.    -  Ampicillin/Sulbactam: R >16/8    -  Ampicillin/Sulbactam: S 8/4    -  Amoxicillin/Clavulanic Acid: I 16/8    -  Amoxicillin/Clavulanic Acid: I 16/8    Gram Stain:   polymorphonuclear leukocytes seen  Gram positive cocci in pairs seen  by cytocentrifuge    -  Cefepime: S <=2    -  Cefepime: S <=2    -  Aztreonam: S <=4    -  Aztreonam: S <=4    -  Cefazolin: R >16    -  Cefazolin: I 4    -  Cefotaxime: S <=2    -  Ceftazidime: S <=1    -  Cefoxitin: S <=4    -  Cefoxitin: S <=4    -  Cefuroxime: S <=4    -  Ciprofloxacin: R >2    -  Ciprofloxacin: S <=0.5    -  Ceftriaxone: S <=1 Enterobacter, Citrobacter, and Serratia may develop resistance during prolonged therapy    -  Ceftriaxone: S <=1 Enterobacter, Citrobacter, and Serratia may develop resistance during prolonged therapy    -  Levofloxacin: R >4    -  Levofloxacin: S <=1    -  Meropenem: S <=1    -  Meropenem: S <=1    -  Moxifloxacin(Aerobic): R >4    -  Imipenem: S <=1    -  Imipenem: S <=1    -  Ertapenem: S <=0.5    -  Ertapenem: S <=0.5    -  Gentamicin: S <=1    -  Gentamicin: S <=1    -  Piperacillin/Tazobactam: I 64    -  Piperacillin/Tazobactam: S <=8    -  Tetra/Doxy: R >8    -  Tetra/Doxy: R >8    -  Tigecycline: S <=1    -  Tobramycin: S <=2    -  Tobramycin: S <=2    Specimen Source: .Body Fluid Abdominal Fluid    Culture Results:   Moderate Escherichia coli Multiple Morphological Strains  Few Enterococcus faecalis  Moderate Alpha hemolytic strep "Susceptibilities not performed"    Organism Identification: Escherichia coli  Escherichia coli  Enterococcus faecalis    Organism: Escherichia coli    Organism: Escherichia coli    Organism: Enterococcus faecalis    Method Type: MARK ANTHONY    Method Type: MARK ANTHONY    Method Type: MARK ANTHONY

## 2018-12-12 NOTE — DIETITIAN INITIAL EVALUATION ADULT. - PT NOT SOURCE
pt was partially able to particiapte dueit interview secondary to morphine/other (specify) pt was partially able to participate dueit interview secondary to morphine/other (specify)

## 2018-12-12 NOTE — DIETITIAN INITIAL EVALUATION ADULT. - PROBLEM SELECTOR PLAN 7
Pt with GFR 49 meeting criteria for CKD3.    - Given that pt will be getting contrast, will hold lisinopril  - will c/w hydration to ensure no worsening of kidney function

## 2018-12-12 NOTE — DISCHARGE NOTE ADULT - HOSPITAL COURSE
The patient is an 83-year-old man with a past medical history of metastatic urothelial carcinoma and with a history of an E. coli/VSE abscess of the seminal vesicle/prostate in August 2018 who presented to the emergency department on 12/6 with sepsis secondary to retrovesicular abscess vs. necrotic mass accompanied by milky drainage from the penis. Patient with b/l nephrostomy tubes and suprapubic catheter on presentation.On admission, his sepsis was treated empirically with vancomycin and zosyn, blood and urine cultures were drawn in order to target antibiotic coverage, and medical oncology was consulted for ongoing treatment of the patient’s cancer. Medical oncology commented that chemotherapy should be discontinued until the patient’s infection resolved. Given the results of a CT scan performed in the emergency department, urology was consulted for ongoing management of the patient’s urothelial cancer, and colorectal surgery was consulted for the rule-out of a possible recto-vesicular abscess. Urology did not state that the patient was a candidate for any surgical treatment, and colorectal surgery recommended CT scan with rectal contrast and an interventional radiology consult for possible drainage of the patient’s abscess and fluid along with culture. Dr. Gutierrez of interventional radiology drained the patient’s fluid from the recto-vesicular space on 12/7 and left a SACHA drain. Fluid was sent for culture and sensitivity in order to target antibiotic therapy. The CT scan with rectal contrast was performed on 12/9 and demonstrated no evidence of a recto-vesicular fistula; however, mottling of the left pubic ramus was evident and concerning for osteomyelitis. The patient became slightly hyponatremic (to 127) as noted on the BMP performed on the morning of 12/7. Intense po fluid resuscitation was recommended out of suspicion that the patient was hypovolemic. His hyponatremia did not improve, so fluid restriction was suspected out of suspicion for SIADH given the patient’s cancer diagnosis. The hyponatremia resolved prior to discharge. The patient’s urine culture was polymicrobial and pan-sensitive. A family meeting was held on 12/9 regarding the patient’s chronic illness and possible chemotherapeutic regimen following completion of antibiotic course. The culture of the patient’s intra-abdominal fluid resulted on 12/12 and was significant for growth VSE and multiple strains of E coli. Given the results of the abscess culture, patient had PICC placed for underlying osteomyelitis requiring abx treatment for 6 weeks. He was discharged on Vanc and Ertapenem. The patient is an 83-year-old man with a past medical history of metastatic urothelial carcinoma and with a history of an E. coli/VSE abscess of the seminal vesicle/prostate in August 2018 who presented to the emergency department on 12/6 with sepsis secondary to retrovesicular abscess vs. necrotic mass accompanied by milky drainage from the penis. Patient with b/l nephrostomy tubes and suprapubic catheter on presentation.On admission, his sepsis was treated empirically with vancomycin and zosyn, blood and urine cultures were drawn in order to target antibiotic coverage, and medical oncology was consulted for ongoing treatment of the patient’s cancer. Medical oncology commented that chemotherapy should be discontinued until the patient’s infection resolved. Given the results of a CT scan performed in the emergency department, urology was consulted for ongoing management of the patient’s urothelial cancer, and colorectal surgery was consulted for the rule-out of a possible recto-vesicular abscess. Urology did not state that the patient was a candidate for any surgical treatment, and colorectal surgery recommended CT scan with rectal contrast and an interventional radiology consult for possible drainage of the patient’s abscess and fluid along with culture. Dr. Gutierrez of interventional radiology drained the patient’s fluid from the recto-vesicular space on 12/7 and left a SACHA drain. Fluid was sent for culture and sensitivity in order to target antibiotic therapy. The CT scan with rectal contrast was performed on 12/9 and demonstrated no evidence of a recto-vesicular fistula; however, mottling of the left pubic ramus was evident and concerning for osteomyelitis. The patient became slightly hyponatremic (to 127) as noted on the BMP performed on the morning of 12/7. Intense po fluid resuscitation was recommended out of suspicion that the patient was hypovolemic. His hyponatremia did not improve, so fluid restriction was suspected out of suspicion for SIADH given the patient’s cancer diagnosis. The hyponatremia resolved prior to discharge. The patient’s urine culture was polymicrobial and pan-sensitive. A family meeting was held on 12/9 regarding the patient’s chronic illness and possible chemotherapeutic regimen following completion of antibiotic course. The culture of the patient’s intra-abdominal fluid resulted on 12/12 and was significant for growth VSE and multiple strains of E coli. Given the results of the abscess culture, patient had PICC placed for underlying osteomyelitis requiring abx treatment for 6 weeks. He was discharged on Vanc and Ertapenem.  Fentanyl patch was discontinued due to confusion / overmedication.

## 2018-12-12 NOTE — PROGRESS NOTE ADULT - ASSESSMENT
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  Possible periprostatic abscess ? fistula(repeat Ct with none)  ?ischial OM  Seen by urology and CRS-No intervention   Patient also s/p recent gemzar and carboplatin  s/p IR aspiration -Cx pending  Urine CX from nephrostomy with polymicrobial jani as above-all sensitive to FQs-though may represent colonization related to PCN    IR aspirate Cx as above -not sensitive to FQs  Patient has already recd >5 days of zosyn-the pseudomonas in his urine Cx could represent colonization and does not require further RX  Given metastatic CA prognosis is poor  Rec:  1)Switch abx to vanco + Invanz  2) Will need PICC  Given ?OM will recommend total 6 week course   PICC side effects,abx side effects discussed.Risks/benefits and alternatives explained.  CBC,CMP vanco trough weekly,fax results to [phone].Call [phone] with critical results.  PICC care per home care.  To follow in ID clinic in ,asked to call and make appointment.  4)  Will tailor plan for ID issues  per course,results.Will defer to primary team on management of other issues.  Overall prognosis guarded  Case d/w Med team, patient and family   Will Follow.   Beeper [id number]-[facility]/afterhours/No response-[id number]

## 2018-12-12 NOTE — DIETITIAN INITIAL EVALUATION ADULT. - OTHER INFO
seen for length of stay, admitted for abscess, consuming 25% of meals, denies nausea/vomit, denies difficulty chewing /swallow. last BM 3 days ago. complains of chronic constipation. NKFA. agrees to Glucerna 2 x daily

## 2018-12-12 NOTE — DISCHARGE NOTE ADULT - CARE PLAN
Principal Discharge DX:	Sepsis  Secondary Diagnosis:	Urothelial cancer Principal Discharge DX:	Sepsis  Goal:	Follow up with Dr. Filippo Oquendo as scheduled  Assessment and plan of treatment:	You were admitted to the hospital due to an infection which caused you to develop a fever, increased heart rate, likely due to a pelvic abscess found on cat scan. This abscess was drained by interventional radiology and a sample was taken to send off for culture. The abscess culture grew bacteria called E. Coli and E. facaelis. You were also found to have an infection in the pelvic bone. You were started on antibiotics in the hospital and discharged on antibiotics called Vancomycin and Ertapenem which you will take for a total of 6 weeks, to be completed on January 25, 2019. You had a PICC line place in your arm which allows us to administer the antibiotics. Please follow up with Dr. Filippo Oquendo as scheduled. You may also follow up with Dr. Hathaway in 1-2 weeks of discharge.  Secondary Diagnosis:	Urothelial cancer  Goal:	Follow up with Dr. Filippo Oquendo as scheduled  Assessment and plan of treatment:	While in the hospital you were seen by the oncology team who decided to hold off on chemotherapy due to the presence of an infection. Please Follow up with Dr. Oquendo as scheduled regarding restarting chemotherapy.  Secondary Diagnosis:	Osteomyelitis of pelvis  Goal:	Follow up with Infectious disease (Dr. Hathaway) in 1-2 weeks of discharge  Assessment and plan of treatment:	You were found to have an infection in the pelvic bone (pubic ramus). You were started on antibiotics in the hospital and discharged on antibiotics called Vancomycin and Ertapenem which you will take for a total of 6 weeks, to be completed on January 25, 2019. You had a PICC line place in your arm which allows us to administer the antibiotics. Please follow up with Dr. Filippo Oquendo as scheduled. You may also follow up with Dr. Hathaway in 1-2 weeks of discharge.

## 2018-12-12 NOTE — DIETITIAN INITIAL EVALUATION ADULT. - NS AS NUTRI INTERV MEDICAL AND FOOD SUPPLEMENTS
Glucerna 2 x daily, consider changing to Suplena if renal indices (K+ and PO4 trend up)/Commercial beverage

## 2018-12-12 NOTE — PROGRESS NOTE ADULT - SUBJECTIVE AND OBJECTIVE BOX
Patient is a 83y old  Male who presents with a chief complaint of abscess (12 Dec 2018 13:43)    Being followed by ID for penile/bladder abscess    Interval history:feels well  pain better  No other acute events      ROS:  No cough,SOB,CP  No N/V/D  No abd pain  No urinary complaints  No HA  No joint or limb pain  No other complaints      Antimicrobials:    ertapenem  IVPB      vancomycin  IVPB 1000 milliGRAM(s) IV Intermittent every 24 hours  zosyn DCed today       other medications reviewed    Vital Signs Last 24 Hrs  T(C): 36.9 (12-12-18 @ 13:55), Max: 36.9 (12-12-18 @ 13:55)  T(F): 98.5 (12-12-18 @ 13:55), Max: 98.5 (12-12-18 @ 13:55)  HR: 75 (12-12-18 @ 13:55) (65 - 81)  BP: 121/62 (12-12-18 @ 13:55) (121/62 - 153/72)  BP(mean): --  RR: 18 (12-12-18 @ 13:55) (18 - 18)  SpO2: 98% (12-12-18 @ 13:55) (96% - 99%)    Physical Exam:  HEENT PERRLA EOMI    No oral exudate or erythema    Chest Good AE,CTA    CVS RRR S1 S2 WNl No murmur or rub or gallop    Abd soft BS normal No tenderness   suprapubic catheter  tenderness base of penis improved   some discharge  Bilateral PCN    IV site no erythema tenderness or discharge    CNS AAO X 3 no focalLab Data:    Lab Data:                          7.9    4.05  )-----------( 327      ( 12 Dec 2018 07:53 )             23.9       12-12    132<L>  |  96  |  16  ----------------------------<  180<H>  4.4   |  25  |  1.33<H>    Ca    8.6      12 Dec 2018 06:42  Phos  2.8     12-12  Mg     2.0     12-12          Culture - Acid Fast - Body Fluid w/Smear (collected 08 Dec 2018 00:31)  Source: .Body Fluid Abdominal Fluid    Culture - Fungal, Body Fluid (collected 08 Dec 2018 00:31)  Source: .Body Fluid Abdominal Fluid  Preliminary Report (10 Dec 2018 09:11):    Testing in progress    Culture - Body Fluid with Gram Stain (collected 08 Dec 2018 00:31)  Source: .Body Fluid Abdominal Fluid  Gram Stain (08 Dec 2018 03:57):    polymorphonuclear leukocytes seen    Gram positive cocci in pairs seen    by cytocentrifuge  Preliminary Report (11 Dec 2018 21:34):    Moderate Escherichia coli Multiple Morphological Strains    Few Enterococcus faecalis    Moderate Alpha hemolytic strep "Susceptibilities not performed"  Organism: Escherichia coli  Escherichia coli  Enterococcus faecalis (11 Dec 2018 21:29)  Organism: Enterococcus faecalis (11 Dec 2018 21:29)      -  Ampicillin: S <=2 Predicts results to ampicillin/sulbactam, amoxacillin-clavulanate and  piperacillin-tazobactam.      -  Tetra/Doxy: R >8      -  Vancomycin: S 2      Method Type: MARK ANTHONY  Organism: Escherichia coli (11 Dec 2018 21:29)      -  Amikacin: S <=8      -  Amoxicillin/Clavulanic Acid: I 16/8      -  Ampicillin: I 16 These ampicillin results predict results for amoxicillin      -  Ampicillin/Sulbactam: S 8/4      -  Aztreonam: S <=4      -  Cefazolin: I 4      -  Cefepime: S <=2      -  Cefoxitin: S <=4      -  Ceftriaxone: S <=1 Enterobacter, Citrobacter, and Serratia may develop resistance during prolonged therapy      -  Ciprofloxacin: S <=0.5      -  Ertapenem: S <=0.5      -  Gentamicin: S <=1      -  Imipenem: S <=1      -  Levofloxacin: S <=1      -  Meropenem: S <=1      -  Piperacillin/Tazobactam: S <=8      -  Tobramycin: S <=2      -  Trimethoprim/Sulfamethoxazole: S <=0.5/9.5      Method Type: MARK ANTHONY  Organism: Escherichia coli (11 Dec 2018 21:28)      -  Amikacin: S <=8      -  Amoxicillin/Clavulanic Acid: I 16/8      -  Ampicillin: R >16 These ampicillin results predict results for amoxicillin      -  Ampicillin/Sulbactam: R >16/8      -  Aztreonam: S <=4      -  Cefazolin: R >16      -  Cefepime: S <=2      -  Cefotaxime: S <=2      -  Cefoxitin: S <=4      -  Ceftazidime: S <=1      -  Ceftriaxone: S <=1 Enterobacter, Citrobacter, and Serratia may develop resistance during prolonged therapy      -  Cefuroxime: S <=4      -  Ciprofloxacin: R >2      -  Ertapenem: S <=0.5      -  Gentamicin: S <=1      -  Imipenem: S <=1      -  Levofloxacin: R >4      -  Meropenem: S <=1      -  Moxifloxacin(Aerobic): R >4      -  Piperacillin/Tazobactam: I 64      -  Tetra/Doxy: R >8      -  Tigecycline: S <=1      -  Tobramycin: S <=2      -  Trimethoprim/Sulfamethoxazole: R >2/38      Method Type: MARK ANTHONY

## 2018-12-12 NOTE — DISCHARGE NOTE ADULT - PATIENT PORTAL LINK FT
You can access the [facility], offered by [facility], by registering with the following website: [email or web address]

## 2018-12-12 NOTE — DIETITIAN INITIAL EVALUATION ADULT. - PROBLEM SELECTOR PLAN 10
c/w simvasatin for ASCVD risk  c/w senna/colace  SCDs  NPO for possible procedure tomorrow    Elizabeth Lees MD, PhD  PGY-2| Internal Medicine  [phone] / [phone]

## 2018-12-12 NOTE — DIETITIAN INITIAL EVALUATION ADULT. - PROBLEM SELECTOR PLAN 3
Pt with current urothelial cell carcimona with mets, now on cycle 2 of chemo.    - will consult onc in am, oncologist is Dr. Oquendo at [facility]  - on fentanyl 25 mcg patch and morphine 15 mg q4 prn for pain control + morphine 2mg IV for breakthrough pain, will continue; ISTOP #: [id number]

## 2018-12-12 NOTE — DIETITIAN INITIAL EVALUATION ADULT. - PROBLEM SELECTOR PLAN 1
Pt presenting with abscess with or without fistula in rectovesicular space with discharge from penis.  Seen by both urology and colorectal surgery.  - also with gross purulent drainage from penis  - will consult IR in am for possible drainage  - NPO at midnight; NS @ 100 cc/hr  - vanc/zosyn for bacterial coverage  - CT with constrast in AM with cystogram to r/o possible fistulous connection between bladder or rectum  - of note, pt had abscess 4 months ago s/p IR drain with ecolic/vse  - needs ID consult in AM

## 2018-12-12 NOTE — PROGRESS NOTE ADULT - PROBLEM SELECTOR PLAN 4
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

## 2018-12-12 NOTE — DIETITIAN INITIAL EVALUATION ADULT. - NUTRITION INTERVENTION
Meals and Snack/Medical Food Supplements Meals and Snack/Nutrition Education/Medical Food Supplements

## 2018-12-12 NOTE — DIETITIAN INITIAL EVALUATION ADULT. - NS AS NUTRI INTERV MEALS SNACK
if po intake improves and creatinine continues to to trend upward consider need for 60gram protein restriction if po intake improves and creatinine continues to to trend upward consider need for 60gram protein restriction, prunes at lunch

## 2018-12-12 NOTE — PROGRESS NOTE ADULT - ASSESSMENT
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 Coli and E. facaelis.

## 2018-12-12 NOTE — DIETITIAN INITIAL EVALUATION ADULT. - SOURCE
chart since 11/5/2018/patient/other (specify)/family/significant other wife and 2 cousins at bedside, chart since 11/5/2018/family/significant other/patient/other (specify)

## 2018-12-12 NOTE — DISCHARGE NOTE ADULT - MEDICATION SUMMARY - MEDICATIONS TO TAKE
I will START or STAY ON the medications listed below when I get home from the hospital:    aspirin 81 mg oral delayed release tablet  -- 1 tab(s) by mouth once a day  -- Indication: For Prophylactic measure    Morphine IR 15 mg oral tablet  -- 1 tab(s) by mouth every 4 hours, As Needed  -- Indication: For Chronic pain    fentaNYL 25 mcg/hr transdermal film, extended release  -- 1 film(s) by transdermal patch every 72 hours  -- Indication: For Chronic Pain    lisinopril 10 mg oral tablet  -- 1 tab(s) by mouth once a day  -- Indication: For Hypertension    metFORMIN  -- 1000 milligram(s) by mouth 2 times a day  -- Indication: For Diabetes Melliltus    simvastatin  -- 10 milligram(s) by mouth once a day  -- Indication: For Hyperlipidemia    metoprolol succinate 50 mg oral tablet, extended release  -- 1 tab(s) by mouth once a day  -- Indication: For Hypertension    INVanz 1 g injection  -- 1 gram(s) intravenously every 24 hours MDD:END JANUARY 25, 2019  -- Indication: For Osteomyelitis    vancomycin 1 g intravenous injection  -- 1 gram(s) intravenously every 24 hours MDD:END JANUARY 25, 2019  -- Indication: For Osteomyelitis    senna oral tablet  -- 2 tab(s) by mouth once a day (at bedtime), As Needed  -- Indication: For Constipation    docusate sodium 100 mg oral capsule  -- 1 cap(s) by mouth 2 times a day, As Needed  -- Indication: For Constipation    PreserVision  -- 2 tab(s) by mouth 2 times a day  -- Indication: For Prophylactic measure I will START or STAY ON the medications listed below when I get home from the hospital:    aspirin 81 mg oral delayed release tablet  -- 1 tab(s) by mouth once a day  -- Indication: For Prophylactic measure    Morphine IR 15 mg oral tablet  -- 1 tab(s) by mouth every 4 hours, As Needed  -- Indication: For Chronic pain    lisinopril 10 mg oral tablet  -- 1 tab(s) by mouth once a day  -- Indication: For Hypertension    metFORMIN  -- 1000 milligram(s) by mouth 2 times a day  -- Indication: For Diabetes Melliltus    simvastatin  -- 10 milligram(s) by mouth once a day  -- Indication: For Hyperlipidemia    metoprolol succinate 50 mg oral tablet, extended release  -- 1 tab(s) by mouth once a day  -- Indication: For Hypertension    INVanz 1 g injection  -- 1 gram(s) intravenously every 24 hours MDD:END JANUARY 25, 2019  -- Indication: For Osteomyelitis    vancomycin 1 g intravenous injection  -- 1 gram(s) intravenously every 24 hours MDD:END JANUARY 25, 2019  -- Indication: For Osteomyelitis    senna oral tablet  -- 2 tab(s) by mouth once a day (at bedtime), As Needed  -- Indication: For Constipation    docusate sodium 100 mg oral capsule  -- 1 cap(s) by mouth 2 times a day, As Needed  -- Indication: For Constipation    PreserVision  -- 2 tab(s) by mouth 2 times a day  -- Indication: For Prophylactic measure

## 2018-12-12 NOTE — DISCHARGE NOTE ADULT - CARE PROVIDER_API CALL
Irvin Burroughs), Urology  [id number]  [place] [address]  Phone: [phone]  Fax: [phone]    Seth Oquendo), Hematology; Internal Medicine; Medical Oncology  [address] [place] [address]  Phone: [phone]  Fax: [phone]    Kerwin Hathaway), Infectious Disease; Internal Medicine  [address] [place] [address]  Phone: [phone]  Fax: [phone]

## 2018-12-12 NOTE — PROGRESS NOTE ADULT - PROBLEM SELECTOR PLAN 1
P/w 7.3 cm prostatic/periprostatic abscess vs necrotic mass s/p IR drainage on 12/7 w/ 48 cc of cloudy green fluid w/ 10F drain in place -abscess cx (+) E. Coli and E. Facaelis  - Will need PICC line for long term abx with - Vanc 1g Q24 and Ertapenem 1g Q24 for 6 weeks  - Initial concern for fistula in rectovesicular space but ruled out on CT ab/pelvis with rectal contrast  - Seen by both urology and colorectal surgery, no surgical intervention at this time.  - s/p Meropenem 2g Q8 (12/6-12/10) and Vancomycin 1g Q24 (12/6-12/10).  - ID following

## 2018-12-12 NOTE — DISCHARGE NOTE ADULT - CARE PROVIDERS DIRECT ADDRESSES
[email or web address]

## 2018-12-12 NOTE — PROGRESS NOTE ADULT - ATTENDING COMMENTS
Culture with E.Coli resistant to all po abx options. Discussed care with Dr. Hathaway - plan for PICC line and total 6 week course of vanc, ertapenem. CM (Lorrie) made aware of the dc planning, working on home infusion as well as home care for tube care. Updated the patient/wife at bedside, who are in agreement with the plan. Emotional support provided to them    Adriane Bates MD  Division of Hospital Medicine  Pager: [phone]  Office: [phone]

## 2018-12-12 NOTE — DIETITIAN INITIAL EVALUATION ADULT. - PROBLEM SELECTOR PLAN 5
Pt with chronic anenmia, Hb ~8  - will obtain iron studies in am  - likely anemia of chronic disease

## 2018-12-12 NOTE — DISCHARGE NOTE ADULT - HOME CARE AGENCY
[facility] [phone]. Agency will call to arrange home care visit for management of all of your drains & home physical therapy.  [facility] [phone]. Agency will call & arrange teaching of IV antibiotics on Sataurday 12/15.

## 2018-12-12 NOTE — DISCHARGE NOTE ADULT - MEDICATION SUMMARY - MEDICATIONS TO STOP TAKING
I will STOP taking the medications listed below when I get home from the hospital:  None I will STOP taking the medications listed below when I get home from the hospital:    fentaNYL 25 mcg/hr transdermal film, extended release  -- 1 film(s) by transdermal patch every 72 hours

## 2018-12-12 NOTE — PROGRESS NOTE ADULT - PROBLEM SELECTOR PLAN 6
A1c 7.3, hold metformin in hospital, ISS  FS controlled  c/w Lantus 4units TID and 14 units qhs  Continue to monitor FS

## 2018-12-12 NOTE — DISCHARGE NOTE ADULT - ADDITIONAL INSTRUCTIONS
Nephrostomy Tubes - need to be flushed/irrigated once daily with 5cc sterile saline. Will need to be exchanged in Late January or February. Please follow up with Dr. Burroughs and Dr. Gutierrez to schedule appointment.     Suprapubic catheter - Follow up with Dr. Burroughs in 1 month for catheter exchange at [facility]  [phone]     abscess drain - Need to be flushed once daily with 5cc sterile saline. Will need outpatient appointment within 2 weeks of discharge. Will also need Catscan of pelvis (non contrast) and Fluoroscopic tube check (will need prescription for both) - need to call [phone] to schedule. Please follow up with Dr. Gutierrez.    Labs: Will need WEEKLY labs including: CBC,CMP vancomycin trough. Please fax results to [phone]. Call [phone] with critical results.    PICC - PICC care per home care.

## 2018-12-12 NOTE — DIETITIAN INITIAL EVALUATION ADULT. - PROBLEM SELECTOR PLAN 2
Pt meeting criteria for sepsis (fever at home to 101 and tachycardia on presentation to 104), likely in setting of abscess.  - vanc/zosyn  - NS @ 100  - f/u blood cultures and urine cultures  - repeat cultures if febrile again  - pt with resp alkalosis at time of presentation, will monitor  - UA was grossly positive for suprapubic catheter and nephrostomy tubes.

## 2018-12-12 NOTE — DIETITIAN INITIAL EVALUATION ADULT. - PROBLEM SELECTOR PLAN 4
Pt with platelets at 80, likely 2/2 to chemo.    - will monitor  - holding chemo DVT ppx at this time.

## 2018-12-12 NOTE — DIETITIAN INITIAL EVALUATION ADULT. - PERTINENT MEDS FT
acetaminophen   Tablet .. 650 PRN  aspirin enteric coated 81  dextrose 40% Gel 15 PRN  dextrose 5%. 1000  dextrose 50% Injectable 12.5  dextrose 50% Injectable 25  dextrose 50% Injectable 25  docusate sodium 100  glucagon  Injectable 1 PRN  insulin glargine Injectable (LANTUS) 14  insulin lispro (HumaLOG) corrective regimen sliding scale   insulin lispro (HumaLOG) corrective regimen sliding scale   insulin lispro Injectable (HumaLOG) 4  metoprolol succinate ER 50  morphine  - Injectable 4 PRN  morphine  IR 15 PRN  multivitamin/minerals 1  piperacillin/tazobactam IVPB. 3.375  polyethylene glycol 3350 17  senna 2  simvastatin 10  sodium chloride 1

## 2018-12-12 NOTE — DISCHARGE NOTE ADULT - PLAN OF CARE
Follow up with Dr. Burroughs and Dr. Oquendo as scheduled You were admitted to the hospital due to an infection which caused you to develop a fever, increased heart rate, likely due to a pelvic abscess found on cat scan. This abscess was drained by interventional radiology and a sample was taken to send off for culture. The abscess culture grew bacteria called E. Coli and E. facaelis. You were also found to have an infection in the pelvic bone. You were started on antibiotics in the hospital and discharged on antibiotics called Vancomycin and Ertapenem which you will take for a total of 6 weeks, to be completed on January 25, 2019. You had a PICC line place in your arm which allows us to administer the antibiotics. Please follow up with Dr. Burroughs and Dr. Oquendo as scheduled. You may also follow up with Dr. Hathaway in 1-2 weeks of discharge. While in the hospital you were seen by the oncology team who decided to hold off on chemotherapy due to the presence of an infection. Please Follow up with Dr. Oquendo as scheduled regarding restarting chemotherapy. Follow up with Infectious disease (Dr. Hathaway) in 1-2 weeks of discharge You were found to have an infection in the pelvic bone (pubic ramus). You were started on antibiotics in the hospital and discharged on antibiotics called Vancomycin and Ertapenem which you will take for a total of 6 weeks, to be completed on January 25, 2019. You had a PICC line place in your arm which allows us to administer the antibiotics. Please follow up with Dr. Burroughs and Dr. Oquendo as scheduled. You may also follow up with Dr. Hathaway in 1-2 weeks of discharge.

## 2018-12-12 NOTE — PROGRESS NOTE ADULT - PROBLEM SELECTOR PLAN 5
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 Oquendo at [facility])  - c/w morphine 15 mg q4 prn for pain control + morphine 2mg IV for breakthrough pain, ISTOP #: [id number]  - f/u oncology recs regarding future chemotherapy. Patient and wife deciding on whether or not to pursue additional chemo if offered. They are requesting to speak with oncology.

## 2018-12-13 LAB
ANION GAP SERPL CALC-SCNC: 11 MMOL/L — SIGNIFICANT CHANGE UP (ref 5–17)
BUN SERPL-MCNC: 17 MG/DL — SIGNIFICANT CHANGE UP (ref 7–23)
CALCIUM SERPL-MCNC: 8.8 MG/DL — SIGNIFICANT CHANGE UP (ref 8.4–10.5)
CHLORIDE SERPL-SCNC: 95 MMOL/L — LOW (ref 96–108)
CO2 SERPL-SCNC: 25 MMOL/L — SIGNIFICANT CHANGE UP (ref 22–31)
CREAT SERPL-MCNC: 1.27 MG/DL — SIGNIFICANT CHANGE UP (ref 0.5–1.3)
GLUCOSE BLDC GLUCOMTR-MCNC: 162 MG/DL — HIGH (ref 70–99)
GLUCOSE BLDC GLUCOMTR-MCNC: 194 MG/DL — HIGH (ref 70–99)
GLUCOSE BLDC GLUCOMTR-MCNC: 223 MG/DL — HIGH (ref 70–99)
GLUCOSE BLDC GLUCOMTR-MCNC: 231 MG/DL — HIGH (ref 70–99)
GLUCOSE BLDC GLUCOMTR-MCNC: 267 MG/DL — HIGH (ref 70–99)
GLUCOSE SERPL-MCNC: 239 MG/DL — HIGH (ref 70–99)
HCT VFR BLD CALC: 25.1 % — LOW (ref 39–50)
HGB BLD-MCNC: 8.2 G/DL — LOW (ref 13–17)
MAGNESIUM SERPL-MCNC: 2 MG/DL — SIGNIFICANT CHANGE UP (ref 1.6–2.6)
MCHC RBC-ENTMCNC: 26.7 PG — LOW (ref 27–34)
MCHC RBC-ENTMCNC: 32.7 GM/DL — SIGNIFICANT CHANGE UP (ref 32–36)
MCV RBC AUTO: 81.8 FL — SIGNIFICANT CHANGE UP (ref 80–100)
NON-GYNECOLOGICAL CYTOLOGY STUDY: SIGNIFICANT CHANGE UP
PHOSPHATE SERPL-MCNC: 3.3 MG/DL — SIGNIFICANT CHANGE UP (ref 2.5–4.5)
PLATELET # BLD AUTO: 308 K/UL — SIGNIFICANT CHANGE UP (ref 150–400)
POTASSIUM SERPL-MCNC: 4.4 MMOL/L — SIGNIFICANT CHANGE UP (ref 3.5–5.3)
POTASSIUM SERPL-SCNC: 4.4 MMOL/L — SIGNIFICANT CHANGE UP (ref 3.5–5.3)
RBC # BLD: 3.07 M/UL — LOW (ref 4.2–5.8)
RBC # FLD: 14.7 % — HIGH (ref 10.3–14.5)
SODIUM SERPL-SCNC: 131 MMOL/L — LOW (ref 135–145)
WBC # BLD: 4.21 K/UL — SIGNIFICANT CHANGE UP (ref 3.8–10.5)
WBC # FLD AUTO: 4.21 K/UL — SIGNIFICANT CHANGE UP (ref 3.8–10.5)

## 2018-12-13 PROCEDURE — 71045 X-RAY EXAM CHEST 1 VIEW: CPT | Mod: 26

## 2018-12-13 PROCEDURE — 99233 SBSQ HOSP IP/OBS HIGH 50: CPT | Mod: GC

## 2018-12-13 PROCEDURE — 99232 SBSQ HOSP IP/OBS MODERATE 35: CPT

## 2018-12-13 RX ORDER — VANCOMYCIN HCL 1 G
1 VIAL (EA) INTRAVENOUS
Qty: 44 | Refills: 0 | OUTPATIENT
Start: 2018-12-13 | End: 2019-01-25

## 2018-12-13 RX ORDER — ERTAPENEM SODIUM 1 G/1
1 INJECTION, POWDER, LYOPHILIZED, FOR SOLUTION INTRAMUSCULAR; INTRAVENOUS
Qty: 44 | Refills: 0 | OUTPATIENT
Start: 2018-12-13 | End: 2019-01-25

## 2018-12-13 RX ORDER — MULTIVIT WITH MIN/MFOLATE/K2 340-15/3 G
150 POWDER (GRAM) ORAL ONCE
Qty: 0 | Refills: 0 | Status: COMPLETED | OUTPATIENT
Start: 2018-12-13 | End: 2018-12-13

## 2018-12-13 RX ADMIN — SODIUM CHLORIDE 1 GRAM(S): 9 INJECTION INTRAMUSCULAR; INTRAVENOUS; SUBCUTANEOUS at 21:40

## 2018-12-13 RX ADMIN — MORPHINE SULFATE 15 MILLIGRAM(S): 50 CAPSULE, EXTENDED RELEASE ORAL at 15:10

## 2018-12-13 RX ADMIN — MORPHINE SULFATE 15 MILLIGRAM(S): 50 CAPSULE, EXTENDED RELEASE ORAL at 22:13

## 2018-12-13 RX ADMIN — MORPHINE SULFATE 15 MILLIGRAM(S): 50 CAPSULE, EXTENDED RELEASE ORAL at 10:38

## 2018-12-13 RX ADMIN — MORPHINE SULFATE 15 MILLIGRAM(S): 50 CAPSULE, EXTENDED RELEASE ORAL at 15:40

## 2018-12-13 RX ADMIN — Medication 250 MILLIGRAM(S): at 17:19

## 2018-12-13 RX ADMIN — Medication 4 UNIT(S): at 17:21

## 2018-12-13 RX ADMIN — SODIUM CHLORIDE 1 GRAM(S): 9 INJECTION INTRAMUSCULAR; INTRAVENOUS; SUBCUTANEOUS at 13:32

## 2018-12-13 RX ADMIN — POLYETHYLENE GLYCOL 3350 17 GRAM(S): 17 POWDER, FOR SOLUTION ORAL at 13:32

## 2018-12-13 RX ADMIN — SIMVASTATIN 10 MILLIGRAM(S): 20 TABLET, FILM COATED ORAL at 21:41

## 2018-12-13 RX ADMIN — MORPHINE SULFATE 15 MILLIGRAM(S): 50 CAPSULE, EXTENDED RELEASE ORAL at 22:49

## 2018-12-13 RX ADMIN — MORPHINE SULFATE 15 MILLIGRAM(S): 50 CAPSULE, EXTENDED RELEASE ORAL at 06:17

## 2018-12-13 RX ADMIN — Medication 150 MILLILITER(S): at 18:32

## 2018-12-13 RX ADMIN — ERTAPENEM SODIUM 120 MILLIGRAM(S): 1 INJECTION, POWDER, LYOPHILIZED, FOR SOLUTION INTRAMUSCULAR; INTRAVENOUS at 08:32

## 2018-12-13 RX ADMIN — SENNA PLUS 2 TABLET(S): 8.6 TABLET ORAL at 21:42

## 2018-12-13 RX ADMIN — SODIUM CHLORIDE 1 GRAM(S): 9 INJECTION INTRAMUSCULAR; INTRAVENOUS; SUBCUTANEOUS at 06:21

## 2018-12-13 RX ADMIN — Medication 100 MILLIGRAM(S): at 21:41

## 2018-12-13 RX ADMIN — Medication 81 MILLIGRAM(S): at 13:32

## 2018-12-13 RX ADMIN — Medication 3: at 17:20

## 2018-12-13 RX ADMIN — Medication 4 UNIT(S): at 08:28

## 2018-12-13 RX ADMIN — INSULIN GLARGINE 14 UNIT(S): 100 INJECTION, SOLUTION SUBCUTANEOUS at 21:54

## 2018-12-13 RX ADMIN — Medication 2: at 08:28

## 2018-12-13 RX ADMIN — Medication 100 MILLIGRAM(S): at 13:32

## 2018-12-13 RX ADMIN — Medication 100 MILLIGRAM(S): at 06:21

## 2018-12-13 RX ADMIN — Medication 50 MILLIGRAM(S): at 06:22

## 2018-12-13 RX ADMIN — Medication 1 TABLET(S): at 13:32

## 2018-12-13 RX ADMIN — MORPHINE SULFATE 15 MILLIGRAM(S): 50 CAPSULE, EXTENDED RELEASE ORAL at 06:21

## 2018-12-13 RX ADMIN — Medication 4 UNIT(S): at 13:32

## 2018-12-13 RX ADMIN — Medication 1: at 13:33

## 2018-12-13 RX ADMIN — MORPHINE SULFATE 15 MILLIGRAM(S): 50 CAPSULE, EXTENDED RELEASE ORAL at 11:08

## 2018-12-13 NOTE — PROGRESS NOTE ADULT - PROBLEM SELECTOR PLAN 1
P/w 7.3 cm prostatic/periprostatic abscess vs necrotic mass s/p IR drainage on 12/7 w/ 48 cc of cloudy green fluid w/ 10F drain in place -abscess cx (+) E. Coli and E. Faecalis  - Will need PICC line for long term abx with - Vanc 1g Q24 and Ertapenem 1g Q24 for 6 weeks  - Initial concern for fistula in rectovesicular space but ruled out on CT ab/pelvis with rectal contrast  - Seen by both urology and colorectal surgery, no surgical intervention at this time.  - s/p  Meropenem 2g Q8 (12/6-12/10) and Vancomycin 1g Q24 (12/6-12/10) for six weeks   - ID following  - Pending PICC today

## 2018-12-13 NOTE — PROGRESS NOTE ADULT - PROBLEM SELECTOR PLAN 4
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

## 2018-12-13 NOTE — PROGRESS NOTE ADULT - SUBJECTIVE AND OBJECTIVE BOX
=========================================  CONTACT EDSON Goss M.D., PGY-1  Pager: [facility]- [phone], [facility]- [phone]    Mon-Fri: pager covered by day team 7am-7pm;   ***Academic conferences 12pm-1pm- page ONLY if URGENT or if Consultant  /Kelly: see chart, primary physician assigned available 7am-12pm  Sat/Sharma Cross Coverage 12pm-7pm: [facility]- page [phone] for Team1-4, [facility]- pager forwarded to covering Resident  For Night coverage 7pm-7am: [facility]- page [phone] Team1-3, page [phone] Team4 & Care Model; [facility]- page [phone] for Red, [phone] for Blue  =========================================    Patient is a 83y old  Male who presents with a chief complaint of abscess (12 Dec 2018 14:35)      SUBJECTIVE / OVERNIGHT EVENTS:  Patient seen and examined at bedside.    Other Review of Systems Negative.    MEDICATIONS  (STANDING):  aspirin enteric coated 81 milliGRAM(s) Oral daily  dextrose 5%. 1000 milliLiter(s) (50 mL/Hr) IV Continuous <Continuous>  dextrose 50% Injectable 12.5 Gram(s) IV Push once  dextrose 50% Injectable 25 Gram(s) IV Push once  dextrose 50% Injectable 25 Gram(s) IV Push once  docusate sodium 100 milliGRAM(s) Oral three times a day  ertapenem  IVPB      ertapenem  IVPB 1000 milliGRAM(s) IV Intermittent every 24 hours  insulin glargine Injectable (LANTUS) 14 Unit(s) SubCutaneous at bedtime  insulin lispro (HumaLOG) corrective regimen sliding scale   SubCutaneous three times a day before meals  insulin lispro (HumaLOG) corrective regimen sliding scale   SubCutaneous at bedtime  insulin lispro Injectable (HumaLOG) 4 Unit(s) SubCutaneous three times a day before meals  metoprolol succinate ER 50 milliGRAM(s) Oral daily  multivitamin/minerals 1 Tablet(s) Oral daily  polyethylene glycol 3350 17 Gram(s) Oral daily  senna 2 Tablet(s) Oral at bedtime  simvastatin 10 milliGRAM(s) Oral at bedtime  sodium chloride 1 Gram(s) Oral three times a day  vancomycin  IVPB 1000 milliGRAM(s) IV Intermittent every 24 hours    MEDICATIONS  (PRN):  acetaminophen   Tablet .. 650 milliGRAM(s) Oral every 6 hours PRN Temp greater or equal to 38C (100.4F)  dextrose 40% Gel 15 Gram(s) Oral once PRN Blood Glucose LESS THAN 70 milliGRAM(s)/deciliter  glucagon  Injectable 1 milliGRAM(s) IntraMuscular once PRN Glucose LESS THAN 70 milligrams/deciliter  morphine  - Injectable 4 milliGRAM(s) IV Push every 4 hours PRN breakthrough pain  morphine  IR 15 milliGRAM(s) Oral every 4 hours PRN Severe Pain (7 - 10)      OBJECTIVE:    Vital Signs Last 24 Hrs  T(C): 36.7 (13 Dec 2018 04:40), Max: 37 (13 Dec 2018 01:00)  T(F): 98.1 (13 Dec 2018 04:40), Max: 98.6 (13 Dec 2018 01:00)  HR: 83 (13 Dec 2018 04:40) (65 - 83)  BP: 135/65 (13 Dec 2018 04:40) (119/60 - 135/65)  BP(mean): --  RR: 19 (13 Dec 2018 04:40) (18 - 19)  SpO2: 100% (13 Dec 2018 04:40) (98% - 100%)    CAPILLARY BLOOD GLUCOSE      POCT Blood Glucose.: 231 mg/dL (12 Dec 2018 21:41)  POCT Blood Glucose.: 237 mg/dL (12 Dec 2018 16:34)  POCT Blood Glucose.: 166 mg/dL (12 Dec 2018 12:04)  POCT Blood Glucose.: 184 mg/dL (12 Dec 2018 07:56)    I&O's Summary    12 Dec 2018 07:01  -  13 Dec 2018 07:00  --------------------------------------------------------  IN: 240 mL / OUT: 1850 mL / NET: -1610 mL        PHYSICAL EXAM:  GENERAL: NAD, elderly ill appearing male  HEAD:  Atraumatic, Normocephalic  EYES: EOMI, PERRLA, conjunctiva and sclera clear  NECK: Supple, No JVD  CHEST/LUNG: Clear to auscultation bilaterally; No wheeze  HEART: Regular rate and rhythm; No murmurs, rubs, or gallops  ABDOMEN: Soft, Nontender, Nondistended; Bowel sounds present.   : Suprapubic catheter in place, no surrounding erythema. B/l nephrostomy tubes in place, draining clear urine. Nephrostomy sites are c/d. SACHA drain draining clear/yellowish fluid.   EXTREMITIES:  2+ Peripheral Pulses, No clubbing, cyanosis, or edema  PSYCH: AAOx3  NEUROLOGY: non-focal  SKIN: No rashes or lesions    LABS:                        7.9    4.05  )-----------( 327      ( 12 Dec 2018 07:53 )             23.9     Auto Eosinophil # 0.05  / Auto Eosinophil % 1.2   / Auto Neutrophil # 2.67  / Auto Neutrophil % 66.0  / BANDS % x                            8.4    4.01  )-----------( 318      ( 11 Dec 2018 07:57 )             25.8     Auto Eosinophil # x     / Auto Eosinophil % x     / Auto Neutrophil # x     / Auto Neutrophil % x     / BANDS % x        12-13    131<L>  |  95<L>  |  17  ----------------------------<  239<H>  4.4   |  25  |  1.27  12-12    132<L>  |  96  |  16  ----------------------------<  180<H>  4.4   |  25  |  1.33<H>    Ca    8.8      13 Dec 2018 06:31  Mg     2.0     12-13  Phos  3.3     12-13      Care Discussed with Consultants/Other Providers:     RADIOLOGY & ADDITIONAL TESTS: No new imaging

## 2018-12-13 NOTE — PROGRESS NOTE ADULT - SUBJECTIVE AND OBJECTIVE BOX
Patient is a 83y old  Male who presents with a chief complaint of abscess (13 Dec 2018 07:21)    Being followed by ID for penile/pelvic abscess    Interval history:  No acute events      ROS: penile pain improved   No cough,SOB,CP  No N/V/D./abd pain  No other complaints      Antimicrobials:    ertapenem  IVPB      ertapenem  IVPB 1000 milliGRAM(s) IV Intermittent every 24 hours  vancomycin  IVPB 1000 milliGRAM(s) IV Intermittent every 24 hours    Other medications reviewed    Vital Signs Last 24 Hrs  T(C): 37.1 (12-13-18 @ 14:47), Max: 37.1 (12-13-18 @ 14:47)  T(F): 98.7 (12-13-18 @ 14:47), Max: 98.7 (12-13-18 @ 14:47)  HR: 79 (12-13-18 @ 14:47) (68 - 83)  BP: 113/60 (12-13-18 @ 14:47) (113/60 - 135/65)  BP(mean): --  RR: 18 (12-13-18 @ 14:47) (18 - 19)  SpO2: 96% (12-13-18 @ 14:47) (96% - 100%)    Physical Exam:    Physical Exam:  HEENT PERRLA EOMI    No oral exudate or erythema    Chest Good AE,CTA    CVS RRR S1 S2 WNl No murmur or rub or gallop    Abd soft BS normal No tenderness   suprapubic catheter  tenderness base of penis improved   some discharge  Bilateral PCN    IV site no erythema tenderness or discharge    CNS AAO X 3 no focal    Lab Data:                          8.2    4.21  )-----------( 308      ( 13 Dec 2018 07:29 )             25.1       12-13    131<L>  |  95<L>  |  17  ----------------------------<  239<H>  4.4   |  25  |  1.27    Ca    8.8      13 Dec 2018 06:31  Phos  3.3     12-13  Mg     2.0     12-13

## 2018-12-13 NOTE — PROGRESS NOTE ADULT - PROBLEM SELECTOR PLAN 10
c/w simvastatin for ASCVD risk  GI: c/w senna/colace and miralax  DVT: SCDs  Diet: DASH, cc  Dispo: Home with PICC

## 2018-12-13 NOTE — PROGRESS NOTE ADULT - ASSESSMENT
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  Possible periprostatic abscess ? fistula(repeat Ct with none)  ?ischial OM  Seen by urology and CRS-No intervention   Patient also s/p recent gemzar and carboplatin  Urine CX from nephrostomy with polymicrobial jani as above-all sensitive to FQs-though may represent colonization related to PCN    IR aspirate Cx with polymicrobial jani not sensitive to FQs  Rec:  1)contiue abx to vanco + Invanz  2) OPAT being setup   3) CBC CMP weekly,vanco tr weekly   4)  Will tailor plan for ID issues  per course,results.Will defer to primary team on management of other issues.  DC home once OPAT setup   To follow in ID clinic after DC    Will Follow.   Beeper [id number]-blnd/afterhours/No response-[id number]

## 2018-12-13 NOTE — PROGRESS NOTE ADULT - ATTENDING COMMENTS
pending PICC line insertion for prolonged IV abx. Will check post-PICC CXR to confirm the position and then discharge home afterwards. Patient and wife at bedside in agreement with the plan. All questions and concerns were addressed    49 minutes spent on discharge process    Adriane Bates MD  Division of Hospital Medicine  Pager: [phone]  Office: [phone]

## 2018-12-13 NOTE — PROGRESS NOTE ADULT - ASSESSMENT
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 Coli and E. faecalis, pending PICC placement today

## 2018-12-13 NOTE — PROGRESS NOTE ADULT - PROBLEM SELECTOR PLAN 5
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 Oquendo at [facility])  - c/w morphine 15 mg q4 prn for pain control + morphine 2mg IV for breakthrough pain, ISTOP #: [id number]  - f/u oncology recs regarding future chemotherapy. Patient and wife deciding on whether or not to pursue additional chemo if offered. They are requesting to speak with oncology.

## 2018-12-14 VITALS
HEART RATE: 80 BPM | TEMPERATURE: 98 F | RESPIRATION RATE: 18 BRPM | DIASTOLIC BLOOD PRESSURE: 66 MMHG | OXYGEN SATURATION: 98 % | SYSTOLIC BLOOD PRESSURE: 116 MMHG

## 2018-12-14 LAB
GLUCOSE BLDC GLUCOMTR-MCNC: 135 MG/DL — HIGH (ref 70–99)
GLUCOSE BLDC GLUCOMTR-MCNC: 204 MG/DL — HIGH (ref 70–99)

## 2018-12-14 PROCEDURE — 71250 CT THORAX DX C-: CPT

## 2018-12-14 PROCEDURE — 85014 HEMATOCRIT: CPT

## 2018-12-14 PROCEDURE — 84300 ASSAY OF URINE SODIUM: CPT

## 2018-12-14 PROCEDURE — 84132 ASSAY OF SERUM POTASSIUM: CPT

## 2018-12-14 PROCEDURE — 72192 CT PELVIS W/O DYE: CPT

## 2018-12-14 PROCEDURE — C1751: CPT

## 2018-12-14 PROCEDURE — 10030 IMG GID FLU COLL DRG SFT TIS: CPT

## 2018-12-14 PROCEDURE — 36430 TRANSFUSION BLD/BLD COMPNT: CPT

## 2018-12-14 PROCEDURE — 71045 X-RAY EXAM CHEST 1 VIEW: CPT

## 2018-12-14 PROCEDURE — 87015 SPECIMEN INFECT AGNT CONCNTJ: CPT

## 2018-12-14 PROCEDURE — C1769: CPT

## 2018-12-14 PROCEDURE — 85610 PROTHROMBIN TIME: CPT

## 2018-12-14 PROCEDURE — 87486 CHLMYD PNEUM DNA AMP PROBE: CPT

## 2018-12-14 PROCEDURE — 84295 ASSAY OF SERUM SODIUM: CPT

## 2018-12-14 PROCEDURE — 86850 RBC ANTIBODY SCREEN: CPT

## 2018-12-14 PROCEDURE — 87116 MYCOBACTERIA CULTURE: CPT

## 2018-12-14 PROCEDURE — 36569 INSJ PICC 5 YR+ W/O IMAGING: CPT

## 2018-12-14 PROCEDURE — 86923 COMPATIBILITY TEST ELECTRIC: CPT

## 2018-12-14 PROCEDURE — 87040 BLOOD CULTURE FOR BACTERIA: CPT

## 2018-12-14 PROCEDURE — 87798 DETECT AGENT NOS DNA AMP: CPT

## 2018-12-14 PROCEDURE — 87206 SMEAR FLUORESCENT/ACID STAI: CPT

## 2018-12-14 PROCEDURE — 85730 THROMBOPLASTIN TIME PARTIAL: CPT

## 2018-12-14 PROCEDURE — 83036 HEMOGLOBIN GLYCOSYLATED A1C: CPT

## 2018-12-14 PROCEDURE — 88173 CYTOPATH EVAL FNA REPORT: CPT

## 2018-12-14 PROCEDURE — 84100 ASSAY OF PHOSPHORUS: CPT

## 2018-12-14 PROCEDURE — 74176 CT ABD & PELVIS W/O CONTRAST: CPT

## 2018-12-14 PROCEDURE — 99232 SBSQ HOSP IP/OBS MODERATE 35: CPT

## 2018-12-14 PROCEDURE — 84540 ASSAY OF URINE/UREA-N: CPT

## 2018-12-14 PROCEDURE — 87070 CULTURE OTHR SPECIMN AEROBIC: CPT

## 2018-12-14 PROCEDURE — 83605 ASSAY OF LACTIC ACID: CPT

## 2018-12-14 PROCEDURE — 80053 COMPREHEN METABOLIC PANEL: CPT

## 2018-12-14 PROCEDURE — 87205 SMEAR GRAM STAIN: CPT

## 2018-12-14 PROCEDURE — 96374 THER/PROPH/DIAG INJ IV PUSH: CPT

## 2018-12-14 PROCEDURE — 99285 EMERGENCY DEPT VISIT HI MDM: CPT | Mod: 25

## 2018-12-14 PROCEDURE — 87186 SC STD MICRODIL/AGAR DIL: CPT

## 2018-12-14 PROCEDURE — 82947 ASSAY GLUCOSE BLOOD QUANT: CPT

## 2018-12-14 PROCEDURE — 86900 BLOOD TYPING SEROLOGIC ABO: CPT

## 2018-12-14 PROCEDURE — 87086 URINE CULTURE/COLONY COUNT: CPT

## 2018-12-14 PROCEDURE — 84550 ASSAY OF BLOOD/URIC ACID: CPT

## 2018-12-14 PROCEDURE — 83735 ASSAY OF MAGNESIUM: CPT

## 2018-12-14 PROCEDURE — 83935 ASSAY OF URINE OSMOLALITY: CPT

## 2018-12-14 PROCEDURE — 87075 CULTR BACTERIA EXCEPT BLOOD: CPT

## 2018-12-14 PROCEDURE — 81001 URINALYSIS AUTO W/SCOPE: CPT

## 2018-12-14 PROCEDURE — C1729: CPT

## 2018-12-14 PROCEDURE — 87581 M.PNEUMON DNA AMP PROBE: CPT

## 2018-12-14 PROCEDURE — 85027 COMPLETE CBC AUTOMATED: CPT

## 2018-12-14 PROCEDURE — 88305 TISSUE EXAM BY PATHOLOGIST: CPT

## 2018-12-14 PROCEDURE — 82435 ASSAY OF BLOOD CHLORIDE: CPT

## 2018-12-14 PROCEDURE — 88341 IMHCHEM/IMCYTCHM EA ADD ANTB: CPT

## 2018-12-14 PROCEDURE — 82330 ASSAY OF CALCIUM: CPT

## 2018-12-14 PROCEDURE — 88342 IMHCHEM/IMCYTCHM 1ST ANTB: CPT

## 2018-12-14 PROCEDURE — 96375 TX/PRO/DX INJ NEW DRUG ADDON: CPT

## 2018-12-14 PROCEDURE — 82803 BLOOD GASES ANY COMBINATION: CPT

## 2018-12-14 PROCEDURE — 82962 GLUCOSE BLOOD TEST: CPT

## 2018-12-14 PROCEDURE — 83930 ASSAY OF BLOOD OSMOLALITY: CPT

## 2018-12-14 PROCEDURE — 86901 BLOOD TYPING SEROLOGIC RH(D): CPT

## 2018-12-14 PROCEDURE — 80202 ASSAY OF VANCOMYCIN: CPT

## 2018-12-14 PROCEDURE — P9016: CPT

## 2018-12-14 PROCEDURE — 87102 FUNGUS ISOLATION CULTURE: CPT

## 2018-12-14 PROCEDURE — 87633 RESP VIRUS 12-25 TARGETS: CPT

## 2018-12-14 PROCEDURE — 93005 ELECTROCARDIOGRAM TRACING: CPT

## 2018-12-14 PROCEDURE — 80048 BASIC METABOLIC PNL TOTAL CA: CPT

## 2018-12-14 PROCEDURE — 99239 HOSP IP/OBS DSCHRG MGMT >30: CPT

## 2018-12-14 RX ORDER — PETROLATUM,WHITE
1 JELLY (GRAM) TOPICAL ONCE
Qty: 0 | Refills: 0 | Status: COMPLETED | OUTPATIENT
Start: 2018-12-14 | End: 2018-12-14

## 2018-12-14 RX ORDER — FENTANYL CITRATE 50 UG/ML
1 INJECTION INTRAVENOUS
Qty: 0 | Refills: 0 | COMMUNITY

## 2018-12-14 RX ADMIN — MORPHINE SULFATE 4 MILLIGRAM(S): 50 CAPSULE, EXTENDED RELEASE ORAL at 01:40

## 2018-12-14 RX ADMIN — MORPHINE SULFATE 4 MILLIGRAM(S): 50 CAPSULE, EXTENDED RELEASE ORAL at 02:15

## 2018-12-14 RX ADMIN — POLYETHYLENE GLYCOL 3350 17 GRAM(S): 17 POWDER, FOR SOLUTION ORAL at 12:06

## 2018-12-14 RX ADMIN — Medication 100 MILLIGRAM(S): at 06:06

## 2018-12-14 RX ADMIN — MORPHINE SULFATE 15 MILLIGRAM(S): 50 CAPSULE, EXTENDED RELEASE ORAL at 06:36

## 2018-12-14 RX ADMIN — MORPHINE SULFATE 15 MILLIGRAM(S): 50 CAPSULE, EXTENDED RELEASE ORAL at 09:13

## 2018-12-14 RX ADMIN — Medication 100 MILLIGRAM(S): at 13:50

## 2018-12-14 RX ADMIN — Medication 1 TABLET(S): at 12:06

## 2018-12-14 RX ADMIN — Medication 1 APPLICATION(S): at 13:51

## 2018-12-14 RX ADMIN — Medication 4 UNIT(S): at 08:18

## 2018-12-14 RX ADMIN — ERTAPENEM SODIUM 120 MILLIGRAM(S): 1 INJECTION, POWDER, LYOPHILIZED, FOR SOLUTION INTRAMUSCULAR; INTRAVENOUS at 09:15

## 2018-12-14 RX ADMIN — Medication 4 UNIT(S): at 12:46

## 2018-12-14 RX ADMIN — Medication 2: at 12:45

## 2018-12-14 RX ADMIN — Medication 81 MILLIGRAM(S): at 12:06

## 2018-12-14 RX ADMIN — SODIUM CHLORIDE 1 GRAM(S): 9 INJECTION INTRAMUSCULAR; INTRAVENOUS; SUBCUTANEOUS at 06:06

## 2018-12-14 RX ADMIN — MORPHINE SULFATE 15 MILLIGRAM(S): 50 CAPSULE, EXTENDED RELEASE ORAL at 14:39

## 2018-12-14 RX ADMIN — MORPHINE SULFATE 15 MILLIGRAM(S): 50 CAPSULE, EXTENDED RELEASE ORAL at 09:59

## 2018-12-14 RX ADMIN — Medication 50 MILLIGRAM(S): at 06:06

## 2018-12-14 RX ADMIN — MORPHINE SULFATE 15 MILLIGRAM(S): 50 CAPSULE, EXTENDED RELEASE ORAL at 06:06

## 2018-12-14 RX ADMIN — Medication 250 MILLIGRAM(S): at 13:51

## 2018-12-14 RX ADMIN — SODIUM CHLORIDE 1 GRAM(S): 9 INJECTION INTRAMUSCULAR; INTRAVENOUS; SUBCUTANEOUS at 13:50

## 2018-12-14 NOTE — PROGRESS NOTE ADULT - PROBLEM SELECTOR PLAN 4
stable c/w fluid restriction most likely SIADH, serum osm 272, Urine Na 101, Urine osmolality 379  Fluid restriction to 1500cc

## 2018-12-14 NOTE — PROGRESS NOTE ADULT - ATTENDING COMMENTS
s/p PICC line yesterday without complication. Home infusion/tube care set up as per CM. Pt is medically stable for discharge. Discussed care with patient/family members at bedside. All questions are addressed    49 minutes spent on discharge process    Adriane Bates MD  Division of Hospital Medicine  Pager: [phone]  Office: [phone]

## 2018-12-14 NOTE — CHART NOTE - NSCHARTNOTEFT_GEN_A_CORE
LETTER OF MEDICAL NECESSITY    Based on the patient's ongoing issues with deconditioning and generalized weakness secondary to patient's diagnosis of osteomyelitis, pelvic abscess, and metastatic urothelial carcinoma, the patient will require a semi-electric hospital bed.  This is necessary to achieve positioning and elevation that is not able to be obtained in an ordinary bed.  The patient also has multiple tubes and drains including bilateral nephrostomy tubes, a suprapubic catheter, a SACHA drain for abscess drainage - these appendages have made the patient essentially immobile.  Additionally, bed pillows and wedges have been tried and have been deemed ineffective in this case.  A gel overlay will be required to prevent bedsores, as the patient is essentially immobile due to multiple drains and tubes that are medically necessary.    Ирина Haque MD  PGY-3 | Internal Medicine  [phone] / [phone]

## 2018-12-14 NOTE — PROGRESS NOTE ADULT - ASSESSMENT
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 Coli and E. faecalis, s/p PICC placement

## 2018-12-14 NOTE — PROGRESS NOTE ADULT - SUBJECTIVE AND OBJECTIVE BOX
=========================================  CONTACT EDSON Goss M.D., PGY-1  Pager: NS- [phone], [facility]- [phone]    Mon-Fri: pager covered by day team 7am-7pm;   ***Academic conferences 12pm-1pm- page ONLY if URGENT or if Consultant  /Kelly: see chart, primary physician assigned available 7am-12pm  Sat/Sharma Cross Coverage 12pm-7pm: NS- page [phone] for Team1-4, [facility]- pager forwarded to covering Resident  For Night coverage 7pm-7am: NS- page [phone] Team1-3, page [phone] Team4 & Care Model; [facility]- page [phone] for Red, [phone] for Blue  =========================================    Patient is a 83y old  Male who presents with a chief complaint of abscess (13 Dec 2018 14:50)      SUBJECTIVE / OVERNIGHT EVENTS:  Patient seen and examined at bedside.    Other Review of Systems Negative.    MEDICATIONS  (STANDING):  aspirin enteric coated 81 milliGRAM(s) Oral daily  dextrose 5%. 1000 milliLiter(s) (50 mL/Hr) IV Continuous <Continuous>  dextrose 50% Injectable 12.5 Gram(s) IV Push once  dextrose 50% Injectable 25 Gram(s) IV Push once  dextrose 50% Injectable 25 Gram(s) IV Push once  docusate sodium 100 milliGRAM(s) Oral three times a day  ertapenem  IVPB      ertapenem  IVPB 1000 milliGRAM(s) IV Intermittent every 24 hours  insulin glargine Injectable (LANTUS) 14 Unit(s) SubCutaneous at bedtime  insulin lispro (HumaLOG) corrective regimen sliding scale   SubCutaneous three times a day before meals  insulin lispro (HumaLOG) corrective regimen sliding scale   SubCutaneous at bedtime  insulin lispro Injectable (HumaLOG) 4 Unit(s) SubCutaneous three times a day before meals  metoprolol succinate ER 50 milliGRAM(s) Oral daily  multivitamin/minerals 1 Tablet(s) Oral daily  polyethylene glycol 3350 17 Gram(s) Oral daily  senna 2 Tablet(s) Oral at bedtime  simvastatin 10 milliGRAM(s) Oral at bedtime  sodium chloride 1 Gram(s) Oral three times a day  vancomycin  IVPB 1000 milliGRAM(s) IV Intermittent every 24 hours    MEDICATIONS  (PRN):  acetaminophen   Tablet .. 650 milliGRAM(s) Oral every 6 hours PRN Temp greater or equal to 38C (100.4F)  dextrose 40% Gel 15 Gram(s) Oral once PRN Blood Glucose LESS THAN 70 milliGRAM(s)/deciliter  glucagon  Injectable 1 milliGRAM(s) IntraMuscular once PRN Glucose LESS THAN 70 milligrams/deciliter  morphine  - Injectable 4 milliGRAM(s) IV Push every 4 hours PRN breakthrough pain  morphine  IR 15 milliGRAM(s) Oral every 4 hours PRN Severe Pain (7 - 10)      OBJECTIVE:    Vital Signs Last 24 Hrs  T(C): 36.6 (14 Dec 2018 05:04), Max: 37.1 (13 Dec 2018 14:47)  T(F): 97.9 (14 Dec 2018 05:04), Max: 98.8 (13 Dec 2018 20:47)  HR: 74 (14 Dec 2018 05:04) (74 - 79)  BP: 125/54 (14 Dec 2018 05:04) (113/60 - 125/54)  BP(mean): --  RR: 18 (14 Dec 2018 05:04) (18 - 18)  SpO2: 98% (14 Dec 2018 05:04) (96% - 98%)    CAPILLARY BLOOD GLUCOSE      POCT Blood Glucose.: 162 mg/dL (13 Dec 2018 21:39)  POCT Blood Glucose.: 267 mg/dL (13 Dec 2018 16:34)  POCT Blood Glucose.: 194 mg/dL (13 Dec 2018 13:23)  POCT Blood Glucose.: 223 mg/dL (13 Dec 2018 12:16)  POCT Blood Glucose.: 231 mg/dL (13 Dec 2018 08:13)    I&O's Summary    13 Dec 2018 07:01  -  14 Dec 2018 07:00  --------------------------------------------------------  IN: 900 mL / OUT: 2650 mL / NET: -1750 mL        PHYSICAL EXAM:  GENERAL: NAD, well-developed  HEAD:  Atraumatic, Normocephalic  EYES: EOMI, PERRLA, conjunctiva and sclera clear  NECK: Supple, No JVD  CHEST/LUNG: Clear to auscultation bilaterally; No wheeze  HEART: Regular rate and rhythm; No murmurs, rubs, or gallops  ABDOMEN: Soft, Nontender, Nondistended; Bowel sounds present.   : Suprapubic catheter in place, no surrounding erythema. B/l nephrostomy tubes in place, draining clear urine. Nephrostomy sites are c/d. SACHA drain draining serosanguinous fluids   EXTREMITIES:  2+ Peripheral Pulses, No clubbing, cyanosis, or edema  PSYCH: AAOx3  NEUROLOGY: non-focal  SKIN: No rashes or lesions    LABS: No new labs        Care Discussed with Consultants/Other Providers:    RADIOLOGY & ADDITIONAL TESTS: No new imaging  (Imaging Personally Reviewed) =========================================  CONTACT EDSON Goss M.D., PGY-1  Pager: NS- [phone], [facility]- [phone]    Mon-Fri: pager covered by day team 7am-7pm;   ***Academic conferences 12pm-1pm- page ONLY if URGENT or if Consultant  /Kelly: see chart, primary physician assigned available 7am-12pm  Sat/Sharma Cross Coverage 12pm-7pm: NS- page [phone] for Team1-4, [facility]- pager forwarded to covering Resident  For Night coverage 7pm-7am: NS- page [phone] Team1-3, page [phone] Team4 & Care Model; [facility]- page [phone] for Red, [phone] for Blue  =========================================    Patient is a 83y old  Male who presents with a chief complaint of abscess (13 Dec 2018 14:50)      SUBJECTIVE / OVERNIGHT EVENTS:  Patient seen and examined at bedside.    Other Review of Systems Negative.    MEDICATIONS  (STANDING):  aspirin enteric coated 81 milliGRAM(s) Oral daily  dextrose 5%. 1000 milliLiter(s) (50 mL/Hr) IV Continuous <Continuous>  dextrose 50% Injectable 12.5 Gram(s) IV Push once  dextrose 50% Injectable 25 Gram(s) IV Push once  dextrose 50% Injectable 25 Gram(s) IV Push once  docusate sodium 100 milliGRAM(s) Oral three times a day  ertapenem  IVPB      ertapenem  IVPB 1000 milliGRAM(s) IV Intermittent every 24 hours  insulin glargine Injectable (LANTUS) 14 Unit(s) SubCutaneous at bedtime  insulin lispro (HumaLOG) corrective regimen sliding scale   SubCutaneous three times a day before meals  insulin lispro (HumaLOG) corrective regimen sliding scale   SubCutaneous at bedtime  insulin lispro Injectable (HumaLOG) 4 Unit(s) SubCutaneous three times a day before meals  metoprolol succinate ER 50 milliGRAM(s) Oral daily  multivitamin/minerals 1 Tablet(s) Oral daily  polyethylene glycol 3350 17 Gram(s) Oral daily  senna 2 Tablet(s) Oral at bedtime  simvastatin 10 milliGRAM(s) Oral at bedtime  sodium chloride 1 Gram(s) Oral three times a day  vancomycin  IVPB 1000 milliGRAM(s) IV Intermittent every 24 hours    MEDICATIONS  (PRN):  acetaminophen   Tablet .. 650 milliGRAM(s) Oral every 6 hours PRN Temp greater or equal to 38C (100.4F)  dextrose 40% Gel 15 Gram(s) Oral once PRN Blood Glucose LESS THAN 70 milliGRAM(s)/deciliter  glucagon  Injectable 1 milliGRAM(s) IntraMuscular once PRN Glucose LESS THAN 70 milligrams/deciliter  morphine  - Injectable 4 milliGRAM(s) IV Push every 4 hours PRN breakthrough pain  morphine  IR 15 milliGRAM(s) Oral every 4 hours PRN Severe Pain (7 - 10)      OBJECTIVE:    Vital Signs Last 24 Hrs  T(C): 36.6 (14 Dec 2018 05:04), Max: 37.1 (13 Dec 2018 14:47)  T(F): 97.9 (14 Dec 2018 05:04), Max: 98.8 (13 Dec 2018 20:47)  HR: 74 (14 Dec 2018 05:04) (74 - 79)  BP: 125/54 (14 Dec 2018 05:04) (113/60 - 125/54)  BP(mean): --  RR: 18 (14 Dec 2018 05:04) (18 - 18)  SpO2: 98% (14 Dec 2018 05:04) (96% - 98%)    CAPILLARY BLOOD GLUCOSE      POCT Blood Glucose.: 162 mg/dL (13 Dec 2018 21:39)  POCT Blood Glucose.: 267 mg/dL (13 Dec 2018 16:34)  POCT Blood Glucose.: 194 mg/dL (13 Dec 2018 13:23)  POCT Blood Glucose.: 223 mg/dL (13 Dec 2018 12:16)  POCT Blood Glucose.: 231 mg/dL (13 Dec 2018 08:13)    I&O's Summary    13 Dec 2018 07:01  -  14 Dec 2018 07:00  --------------------------------------------------------  IN: 900 mL / OUT: 2650 mL / NET: -1750 mL        PHYSICAL EXAM:  GENERAL: NAD, well-developed  HEAD:  Atraumatic, Normocephalic  EYES: EOMI, PERRLA, conjunctiva and sclera clear  NECK: Supple, No JVD  CHEST/LUNG: Clear to auscultation bilaterally; No wheeze  HEART: Regular rate and rhythm; No murmurs, rubs, or gallops  ABDOMEN: Soft, Nontender, Nondistended; Bowel sounds present.   : Suprapubic catheter in place, no surrounding erythema. B/l nephrostomy tubes in place, draining clear urine. Nephrostomy sites are c/d. SACHA drain draining serosanguinous fluids   EXTREMITIES:  2+ Peripheral Pulses, No clubbing, cyanosis, or edema  PSYCH: AAOx3  NEUROLOGY: non-focal  SKIN: No rashes or lesions  Lines: PICC line c/d/i, no surrounding erythema    LABS: No new labs        Care Discussed with Consultants/Other Providers:    RADIOLOGY & ADDITIONAL TESTS: No new imaging  (Imaging Personally Reviewed)

## 2018-12-14 NOTE — PROGRESS NOTE ADULT - PROVIDER SPECIALTY LIST ADULT
Colorectal Surgery
Heme/Onc
Infectious Disease
Internal Medicine
Intervent Radiology
Surgery
Urology
Infectious Disease

## 2018-12-14 NOTE — PROGRESS NOTE ADULT - PROBLEM SELECTOR PLAN 1
P/w 7.3 cm prostatic/periprostatic abscess vs necrotic mass s/p IR drainage on 12/7 w/ 48 cc of cloudy green fluid w/ 10F drain in place -abscess cx (+) E. Coli and E. Faecalis  - Will need PICC line for long term abx with - Vanc 1g Q24 and Ertapenem 1g Q24 for 6 weeks  - Initial concern for fistula in rectovesicular space but ruled out on CT ab/pelvis with rectal contrast  - Seen by both urology and colorectal surgery, no surgical intervention at this time.  - s/p  Meropenem 2g Q8 (12/6-12/10) and Vancomycin 1g Q24 (12/6-12/10) for six weeks   - ID following  - s/p PICC placemetn  - Pending home care, case management

## 2018-12-14 NOTE — PROGRESS NOTE ADULT - PROBLEM SELECTOR PLAN 2
Pt meeting criteria for sepsis upon admission with fever 101, abscess likely source, tachycardia, now resolved - afebrile overnight, no tachycardia, no tachypnea.   - Blood cx NGTD  -urine cx with E. Coli, Pseudomonas, E. facealis (pansensitive)  - UA was grossly positive for suprapubic catheter and nephrostomy tubes.  - ID following  - c/w plan and abx as above
Pt meeting criteria for sepsis upon admission with fever 101, abscess likely source, tachycardia.  - afebrile overnight  - prostatic/periprostatic abscess and osteomyelitis on CT pelvis  - c/w abx as above  - f/u blood cultures and urine cultures  - UA was grossly positive for suprapubic catheter and nephrostomy tubes.  - ID following
Pt meeting criteria for sepsis upon admission with fever 101, abscess likely source, tachycardia.  - afebrile overnight  - prostatic/periprostatic abscess and osteomyelitis on CT pelvis  - c/w abx as above  - f/u blood cultures and urine cultures  - UA was grossly positive for suprapubic catheter and nephrostomy tubes.  - ID following
Pt meeting criteria for sepsis upon admission with fever 101, abscess likely source, tachycardia.  - febrile overnight  - prostatic/periprostatic abscess and osteomyelitis on CT pelvis  - c/w abx as above  - f/u blood cultures and urine cultures  - UA was grossly positive for suprapubic catheter and nephrostomy tubes.  - ID following
Pt meeting criteria for sepsis upon admission with fever 101, abscess likely source, tachycardia.  - febrile overnight  - prostatic/periprostatic abscess and osteomyelitis on CT pelvis  - c/w abx as above  - f/u blood cultures and urine cultures  - UA was grossly positive for suprapubic catheter and nephrostomy tubes.  - ID following
Pt meeting criteria for sepsis with fever 101, abscess likely source, tachycardia.  - prostatic/periprostatic abscess and osteomyelitis on CT pelvis  - c/w vanc/zosyn  - NS 1L at 50cc/hr   - f/u blood cultures and urine cultures  - repeat cultures if febrile again  - UA was grossly positive for suprapubic catheter and nephrostomy tubes.  -f/u ID recs
Pt meeting criteria for sepsis upon admission with fever 101, abscess likely source, tachycardia, now resolved  - afebrile overnight, no tachycardia, no tachypnea.   - prostatic/periprostatic abscess and osteomyelitis on CT pelvis  - c/w abx as above  - blood cultures NGTD  -urine cx with E. Coli, Pseudomonas, E. facealis (pansensitive)  - UA was grossly positive for suprapubic catheter and nephrostomy tubes.  - ID following

## 2018-12-14 NOTE — PROGRESS NOTE ADULT - PROBLEM SELECTOR PROBLEM 7
Chronic kidney disease (CKD), stage III (moderate)

## 2018-12-14 NOTE — PROGRESS NOTE ADULT - REASON FOR ADMISSION
abscess

## 2018-12-14 NOTE — PROGRESS NOTE ADULT - ASSESSMENT
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  Possible periprostatic abscess ? fistula(repeat Ct with none)  ?ischial OM  Seen by urology and CRS-No intervention   Patient also s/p recent gemzar and carboplatin  Urine CX from nephrostomy with polymicrobial jani as above-all sensitive to FQs-though may represent colonization related to PCN    IR aspirate Cx with polymicrobial jani not sensitive to FQs  Rec:  1)contiue abx to vanco + Invanz  2) OPAT being setup   3) CBC CMP weekly,vanco tr weekly-to fax to [id number] values to [id number]   4)  Will tailor plan for ID issues  per course,results.Will defer to primary team on management of other issues.  DC home once OPAT setup   To follow in ID clinic after DC asked to make appt

## 2018-12-14 NOTE — PROGRESS NOTE ADULT - SUBJECTIVE AND OBJECTIVE BOX
Patient is a 83y old  Male who presents with a chief complaint of abscess (14 Dec 2018 07:13)    Being followed by ID for pelvic abscess    Interval history:feels well   Foe DC home today   No acute events      ROS:  No cough,SOB,CP  No N/V/D./abd pain  No other complaints      Antimicrobials:    ertapenem  IVPB      ertapenem  IVPB 1000 milliGRAM(s) IV Intermittent every 24 hours  vancomycin  IVPB 1000 milliGRAM(s) IV Intermittent every 24 hours    Other medications reviewed    Vital Signs Last 24 Hrs  T(C): 36.7 (12-14-18 @ 13:54), Max: 37.1 (12-13-18 @ 20:47)  T(F): 98.1 (12-14-18 @ 13:54), Max: 98.8 (12-13-18 @ 20:47)  HR: 80 (12-14-18 @ 13:54) (74 - 80)  BP: 116/66 (12-14-18 @ 13:54) (116/66 - 125/54)  BP(mean): --  RR: 18 (12-14-18 @ 13:54) (18 - 18)  SpO2: 98% (12-14-18 @ 13:54) (97% - 98%)    Physical Exam:        Physical Exam:  HEENT PERRLA EOMI    No oral exudate or erythema    Chest Good AE,CTA    CVS RRR S1 S2 WNl No murmur or rub or gallop    Abd soft BS normal No tenderness   suprapubic catheter  tenderness base of penis improved   some discharge  Bilateral PCN    IV site no erythema tenderness or discharge    CNS AAO X 3 no focalLab Data:                          8.2    4.21  )-----------( 308      ( 13 Dec 2018 07:29 )             25.1       12-13    131<L>  |  95<L>  |  17  ----------------------------<  239<H>  4.4   |  25  |  1.27    Ca    8.8      13 Dec 2018 06:31  Phos  3.3     12-13  Mg     2.0     12-13

## 2018-12-14 NOTE — PROGRESS NOTE ADULT - PROBLEM SELECTOR PLAN 5
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 Oquendo at [facility])  - c/w morphine 15 mg q4 prn for pain control + morphine 2mg IV for breakthrough pain, ISTOP #: [id number]  - f/u oncology recs regarding future chemotherapy. Patient and wife deciding on whether or not to pursue additional chemo if offered. They are requesting to speak with oncology.  - Prostate mass biopsy (+) for urothelial CA

## 2018-12-17 ENCOUNTER — APPOINTMENT (OUTPATIENT)
Dept: CARDIOLOGY | Facility: CLINIC | Age: 83
End: 2018-12-17

## 2018-12-17 ENCOUNTER — APPOINTMENT (OUTPATIENT)
Dept: INFUSION THERAPY | Facility: HOSPITAL | Age: 83
End: 2018-12-17

## 2018-12-17 ENCOUNTER — INBOUND DOCUMENT (OUTPATIENT)
Age: 83
End: 2018-12-17

## 2018-12-19 PROBLEM — C68.9 UROTHELIAL CANCER: Status: ACTIVE | Noted: 2018-11-13

## 2018-12-20 ENCOUNTER — APPOINTMENT (OUTPATIENT)
Dept: HEMATOLOGY ONCOLOGY | Facility: CLINIC | Age: 83
End: 2018-12-20
Payer: MEDICARE

## 2018-12-20 VITALS
SYSTOLIC BLOOD PRESSURE: 124 MMHG | OXYGEN SATURATION: 99 % | HEART RATE: 86 BPM | DIASTOLIC BLOOD PRESSURE: 64 MMHG | RESPIRATION RATE: 17 BRPM | TEMPERATURE: 97.6 F

## 2018-12-20 DIAGNOSIS — N48.89 OTHER SPECIFIED DISORDERS OF PENIS: ICD-10-CM

## 2018-12-20 DIAGNOSIS — C68.9 MALIGNANT NEOPLASM OF URINARY ORGAN, UNSPECIFIED: ICD-10-CM

## 2018-12-20 PROCEDURE — 99215 OFFICE O/P EST HI 40 MIN: CPT

## 2018-12-20 RX ORDER — ACETAMINOPHEN AND CODEINE PHOSPHATE 300; 30 MG/1; MG/1
TABLET ORAL
Refills: 0 | Status: DISCONTINUED | COMMUNITY
End: 2018-12-20

## 2018-12-20 RX ORDER — MORPHINE SULFATE 15 MG/1
15 TABLET ORAL
Qty: 180 | Refills: 0 | Status: ACTIVE | COMMUNITY
Start: 2018-12-20 | End: 1900-01-01

## 2018-12-20 RX ORDER — GABAPENTIN 100 MG/1
100 CAPSULE ORAL 3 TIMES DAILY
Qty: 90 | Refills: 3 | Status: DISCONTINUED | COMMUNITY
Start: 2018-11-13 | End: 2018-12-20

## 2018-12-20 RX ORDER — DEXAMETHASONE 4 MG/1
4 TABLET ORAL DAILY
Qty: 36 | Refills: 2 | Status: DISCONTINUED | COMMUNITY
Start: 2018-11-13 | End: 2018-12-20

## 2018-12-20 RX ORDER — ERTAPENEM SODIUM 1 G/1
INJECTION, POWDER, LYOPHILIZED, FOR SOLUTION INTRAMUSCULAR; INTRAVENOUS
Refills: 0 | Status: ACTIVE | COMMUNITY

## 2018-12-20 RX ORDER — FENTANYL 25 UG/H
25 PATCH, EXTENDED RELEASE TRANSDERMAL
Qty: 10 | Refills: 0 | Status: DISCONTINUED | COMMUNITY
Start: 2018-11-13 | End: 2018-12-20

## 2018-12-20 RX ORDER — VANCOMYCIN HYDROCHLORIDE 1 G/20ML
1000 INJECTION, POWDER, LYOPHILIZED, FOR SOLUTION INTRAVENOUS
Refills: 0 | Status: ACTIVE | COMMUNITY

## 2018-12-20 RX ORDER — LIDOCAINE AND PRILOCAINE 25; 25 MG/G; MG/G
2.5-2.5 CREAM TOPICAL
Qty: 15 | Refills: 2 | Status: COMPLETED | COMMUNITY
Start: 2018-10-12 | End: 2018-12-20

## 2018-12-20 RX ORDER — METOCLOPRAMIDE 5 MG/1
5 TABLET ORAL
Qty: 30 | Refills: 2 | Status: DISCONTINUED | COMMUNITY
Start: 2018-11-13 | End: 2018-12-20

## 2018-12-20 NOTE — REASON FOR VISIT
[Follow-Up Visit] : a follow-up [Spouse] : spouse [Family Member] : family member [FreeTextEntry2] : met urothelial cancer

## 2018-12-20 NOTE — HISTORY OF PRESENT ILLNESS
[Disease: _____________________] : Disease: [unfilled] [T: ___] : T[unfilled] [de-identified] : This 83 year old man is seen in consultation on November 13, 2018. He was diagnosed with prostate cancer in 2006 and had seed implantation. He has had no issues since that time. In May, he developed urinary retention. 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 He went to his urologist, . He had a CT on 5/21/18, a bone scan on 5/24/18, MRI of prostate on 8/15/18, MRI prostate 10/23/18. He had a TURP at [facility] and the prostate was described as cement, according to the wife. This is not available. He was referred to Irvin Burroughs. He was sent to [facility] from the office as there was an apparent abscess. He had the abscess drained. Discharged home. He then had a suprapubic tune after urodynamic studies in September 2018. He had a sigmoidoscopy showing rectal ulcerations, attributed to prior RT. The pain persisted over time, started before the TURP in May 2018. The pain continues to worsen. The pain is in the penis and testicles as well as the perineum. He has trouble sitting in a hard chair. It is described as stabbing and burning. It does awaken him at night. He was receiving morphine in the hospital with brief relief. On Tylenol #3 at this time, his wife says it makes him "loopy". He prefers the Tylenol #3 as it provides a slightly longer benefit, about 3 hours. Pain currently at 9, best in past 324 hours at 5-6, worst at 9. The pain has been much worse over the past 2 months. Just recently hospitalized at [facility], discharged last night. He was having a lot of blood in the bag as well as around the suprapubic. he apparently had clot retention. PCNs were placed on November 8, 2018. Cysto was done on 11/7/18. He had a biopsy of the penis, revealing met urothelial cancer. A prostate biopsy was done, showing the same.  He received 6 units of blood while in the hospital. Appetite has been good, but has lost about 35 pounds over the course of this illness. \par \par He had delirium from oxycodone during on hospitalization. \par \par 12/3/18 : Cycle 1 day 15 : carbo + Fitzgerald regimen. Has been feeling fatigued and with suprapubic pain which is worse. There is clear urine out put from bilateral nephrostomy tubes and slight tinge of pink blood from suprapubic catheter. Pain meds : he is on fentanyl 12 mcg.hr.72 Hr, not effective and he is afraid of constipation which he has been struggling with. He is on Movantik daily and has taking Mag Citrate x 2. He has cyclical diarrhea and constipation.  [de-identified] : Admission Date: \par -  Admission Date 05-Dec-2018 22:28. \par Discharge Date: \par - Discharge Date	14-Dec-2018 \par ***************************************\par - Hospital Course	 \par The patient is an 83-year-old man with a past medical history of metastatic urothelial carcinoma and with a history of an E. coli/VSE abscess of the seminal vesicle/prostate in August 2018 who presented to the emergency department on 12/6 with sepsis secondary to retrovesicular abscess vs. necrotic mass accompanied by milky drainage from the penis. Patient with b/l nephrostomy tubes and suprapubic catheter on presentation.On admission, his sepsis was treated empirically with vancomycin and zosyn, blood and urine cultures were drawn in order to target antibiotic coverage, and medical oncology was consulted for ongoing treatment of the patients cancer. Medical oncology \par commented that chemotherapy should be discontinued until the patients infection resolved. Given the results of a CT scan performed in the emergency \par department, urology was consulted for ongoing management of the patients urothelial cancer, and colorectal surgery was consulted for the rule-out of a \par possible recto-vesicular abscess. Urology did not state that the patient was a candidate for any surgical treatment, and colorectal surgery recommended CT \par scan with rectal contrast and an interventional radiology consult for possible drainage of the patients abscess and fluid along with culture. Dr. Gutierrez \par of interventional radiology drained the patients fluid from the recto-vesicular space on 12/7 and left a SACHA drain. Fluid was sent for culture \par and sensitivity in order to target antibiotic therapy. The CT scan with rectal contrast was performed on 12/9 and demonstrated no evidence of a recto-vesicular fistula; however, mottling of the left pubic ramus was evident and concerning for osteomyelitis. The patient became slightly hyponatremic (to 127) as noted on the BMP performed on the morning of 12/7. Intense po fluid resuscitation was recommended out of suspicion that the patient was hypovolemic. His hyponatremia did not improve, so fluid restriction was \par suspected out of suspicion for SIADH given the patients cancer diagnosis. The hyponatremia resolved prior to discharge. The patients urine culture was \par polymicrobial and pan-sensitive. A family meeting was held on 12/9 regarding the patients chronic illness and possible chemotherapeutic regimen following \par completion of antibiotic course. The culture of the patients intra-abdominal fluid resulted on 12/12 and was significant for growth VSE and multiple strains \par of E coli. Given the results of the abscess culture, patient had PICC placed for underlying osteomyelitis requiring abx treatment for 6 weeks. He was \par discharged on Vanc and Ertapenem.  Fentanyl patch was discontinued due to confusion / overmedication. \par *********************************************************\par started chemo with gem/carbo on 11/9, both drugs gibe on 11/26. Admitted on 12/5/18 with fever, not neutropenic. He had a collection in the pelvis with osteomyelitis, on Invanz and vanco, Had Pseudomonas, Klebs and E Coli. Home on the 14th, has a hospital bed at home. VN comes 3 times a week, for dressing change, wife gives the antibiotics, has a SACHA in place. Due for 6 weeks of antibiotics. He is not waking much, has a walker, has a wheelchair. Goes form bed to bathroom wit the walker. Needs assist with dressing/bathing. He cannot stand by himself, requires assistance. He has pain in the groin area as before. He has swelling of the penis and scrotum with some oozing, started just before hospitalization. Takes morphine 15 mg every 4 hours. Did not tolerate the fentanyl, with AMS. Pain score at 8 currently, drops to 5 at times. He says his skin is sensitive to touch, especially the genitals. Appetite is good. No N/V. No chills. No blood in the urine except for mild tint at times in SPC. MiraLAX daily. Significant weight loss of more than 30 pounds over time

## 2018-12-20 NOTE — PHYSICAL EXAM
[Completely disabled. Cannot carry on any self care. Totally confined to bed or chair] : Status 4- Completely disabled. Cannot carry on any self care. Totally confined to bed or chair [Normal] : affect appropriate [de-identified] : no gynecomastia no gynecomastia [de-identified] : Suprapubic tube, mild suprapubic tenderness [de-identified] : Indurated penis throughout the entire extent. No skin erosions. Similar to previous examination, with tumor infiltration of the entire penile shaft and glans [de-identified] : Unsteady gait, requires assistance to transfer. Motor power 3/5 bilaterally at the pelvic girdle

## 2018-12-20 NOTE — ASSESSMENT
[FreeTextEntry1] : Mr. Nation received one cycle of carboplatin and gemcitabine. He was admitted to the hospital in early December as he was febrile and found to have a recurrence of the previously noted abscess in the pelvis. This was drained, and is currently receiving to antibiotics at home via a PICC line. He is totally dependent on others for care at this time, and he walks of short distances with a walker. His wife says that he is lost more than 30 pounds over the course of his illness. He is however eating reasonably well at this time. He is taking morphine sulfate IR 15 mg for pain, and this does provide some relief. While in the hospital, he had delusions from pain medications. His wife says that he talks in his sleep much of the night.\par \par His performance status is 4 at this time, and no further chemotherapy would be considered in the face of his active infection as well as his declining performance status. I was hoping that he would have a resolution of some of his pain with chemotherapy. One cycle is inadequate with which to  response, however he is not able to tolerate any further chemotherapy. If he were to make some improvements in his condition, we could revisit the possibility of immunotherapy, but he should be at least a performance status 2 for that treatment.\par \par I discussed what hospice care would provide for him in the event he decides she does not want to return to the hospital. At this particular time, he does not feel he is ready for hospice care. Another family member is present helping his wife care for him. That man's father was on hospice care at the end of his life, and he contributed to the discussion as well. He has a hospital bed at home and a bedside commode at this time. He has a walker as well as a wheelchair. I encouraged him to be out of bed several times per day, but he is often refused to get out of bed in the past few days due to fatigue.\par \par At the end of the visit, he wanted to speak with me alone and asked me what his life expectancy was. I explained to him that he has no visceral metastases, which certainly shortens survival in metastatic bladder cancer, but it is very difficult to predict the outcome for when he one particular patient.\par \par He will have significant fatigue after being out to visit with me today. He has multiple other appointments for change in at the nephrostomy tubes in suprapubic tube as well as visits to infectious diseases. I told his wife to keep in touch with me and let me know how he is doing. There is no point in him coming to see me on a routine basis at this particular time and we can keep in touch by telephone.\par \par His morphine sulfate 15 mg tablets were renewed today. All questions were answered to the best of my ability and to their apparent satisfaction. [Palliative Care Plan] : Patient was apprised of incurable nature of disease.  Hospice and Palliative care options discussed.

## 2018-12-20 NOTE — REVIEW OF SYSTEMS
[Fatigue] : fatigue [Recent Change In Weight] : ~T recent weight change [Vision Problems] : vision problems [Constipation] : constipation [Dizziness] : dizziness [Difficulty Walking] : difficulty walking [Muscle Weakness] : muscle weakness [Easy Bruising] : a tendency for easy bruising [Negative] : Respiratory [Fever] : no fever [Chills] : no chills [Abdominal Pain] : no abdominal pain [Vomiting] : no vomiting [Diarrhea] : no diarrhea [Joint Pain] : no joint pain [Joint Stiffness] : no joint stiffness [Muscle Pain] : no muscle pain [Skin Rash] : no skin rash [Anxiety] : no anxiety [Depression] : no depression [Easy Bleeding] : no tendency for easy bleeding [FreeTextEntry3] : decreased acuity recently [FreeTextEntry4] : no dysgeusia [FreeTextEntry8] : SPC, bilateral PCNs [FreeTextEntry9] : left shoulder pain yesterday [de-identified] : skin sensitive, no pruritus [de-identified] : no HA, no paresthesias

## 2018-12-23 ENCOUNTER — MESSAGE (OUTPATIENT)
Age: 83
End: 2018-12-23

## 2018-12-24 ENCOUNTER — APPOINTMENT (OUTPATIENT)
Dept: HEMATOLOGY ONCOLOGY | Facility: CLINIC | Age: 83
End: 2018-12-24

## 2018-12-26 ENCOUNTER — INPATIENT (INPATIENT)
Facility: HOSPITAL | Age: 83
LOS: 12 days | Discharge: HOME | DRG: 696 | End: 2019-01-08
Attending: INTERNAL MEDICINE | Admitting: INTERNAL MEDICINE
Payer: MEDICARE

## 2018-12-26 VITALS
RESPIRATION RATE: 20 BRPM | OXYGEN SATURATION: 95 % | SYSTOLIC BLOOD PRESSURE: 94 MMHG | WEIGHT: 169.98 LBS | DIASTOLIC BLOOD PRESSURE: 60 MMHG | HEART RATE: 88 BPM

## 2018-12-26 DIAGNOSIS — Z90.79 ACQUIRED ABSENCE OF OTHER GENITAL ORGAN(S): Chronic | ICD-10-CM

## 2018-12-26 DIAGNOSIS — Z95.2 PRESENCE OF PROSTHETIC HEART VALVE: Chronic | ICD-10-CM

## 2018-12-26 DIAGNOSIS — R31.9 HEMATURIA, UNSPECIFIED: ICD-10-CM

## 2018-12-26 LAB
ALBUMIN SERPL ELPH-MCNC: 2.8 G/DL — LOW (ref 3.3–5)
ALP SERPL-CCNC: 63 U/L — SIGNIFICANT CHANGE UP (ref 40–120)
ALT FLD-CCNC: <5 U/L — LOW (ref 10–45)
ANION GAP SERPL CALC-SCNC: 11 MMOL/L — SIGNIFICANT CHANGE UP (ref 5–17)
APPEARANCE UR: ABNORMAL
APTT BLD: 29.6 SEC — SIGNIFICANT CHANGE UP (ref 27.5–36.3)
AST SERPL-CCNC: 13 U/L — SIGNIFICANT CHANGE UP (ref 10–40)
BACTERIA # UR AUTO: NEGATIVE — SIGNIFICANT CHANGE UP
BASOPHILS # BLD AUTO: 0 K/UL — SIGNIFICANT CHANGE UP (ref 0–0.2)
BASOPHILS NFR BLD AUTO: 0.4 % — SIGNIFICANT CHANGE UP (ref 0–2)
BILIRUB SERPL-MCNC: 0.4 MG/DL — SIGNIFICANT CHANGE UP (ref 0.2–1.2)
BILIRUB UR-MCNC: NEGATIVE — SIGNIFICANT CHANGE UP
BLD GP AB SCN SERPL QL: POSITIVE — SIGNIFICANT CHANGE UP
BUN SERPL-MCNC: 23 MG/DL — SIGNIFICANT CHANGE UP (ref 7–23)
CALCIUM SERPL-MCNC: 8.9 MG/DL — SIGNIFICANT CHANGE UP (ref 8.4–10.5)
CHLORIDE SERPL-SCNC: 94 MMOL/L — LOW (ref 96–108)
CO2 SERPL-SCNC: 27 MMOL/L — SIGNIFICANT CHANGE UP (ref 22–31)
COLOR SPEC: YELLOW — SIGNIFICANT CHANGE UP
CREAT SERPL-MCNC: 1.3 MG/DL — SIGNIFICANT CHANGE UP (ref 0.5–1.3)
DAT POLY-SP REAG RBC QL: NEGATIVE — SIGNIFICANT CHANGE UP
DIFF PNL FLD: ABNORMAL
EOSINOPHIL # BLD AUTO: 0.1 K/UL — SIGNIFICANT CHANGE UP (ref 0–0.5)
EOSINOPHIL NFR BLD AUTO: 2 % — SIGNIFICANT CHANGE UP (ref 0–6)
EPI CELLS # UR: 10 /HPF — HIGH
GAS PNL BLDV: SIGNIFICANT CHANGE UP
GLUCOSE SERPL-MCNC: 170 MG/DL — HIGH (ref 70–99)
GLUCOSE UR QL: NEGATIVE — SIGNIFICANT CHANGE UP
HCT VFR BLD CALC: 20 % — CRITICAL LOW (ref 39–50)
HGB BLD-MCNC: 6.9 G/DL — CRITICAL LOW (ref 13–17)
HYALINE CASTS # UR AUTO: 4 /LPF — HIGH (ref 0–2)
INR BLD: 1.19 RATIO — HIGH (ref 0.88–1.16)
KETONES UR-MCNC: NEGATIVE — SIGNIFICANT CHANGE UP
LEUKOCYTE ESTERASE UR-ACNC: ABNORMAL
LYMPHOCYTES # BLD AUTO: 0.8 K/UL — LOW (ref 1–3.3)
LYMPHOCYTES # BLD AUTO: 16.9 % — SIGNIFICANT CHANGE UP (ref 13–44)
MCHC RBC-ENTMCNC: 28.4 PG — SIGNIFICANT CHANGE UP (ref 27–34)
MCHC RBC-ENTMCNC: 34.3 GM/DL — SIGNIFICANT CHANGE UP (ref 32–36)
MCV RBC AUTO: 82.7 FL — SIGNIFICANT CHANGE UP (ref 80–100)
MONOCYTES # BLD AUTO: 0.4 K/UL — SIGNIFICANT CHANGE UP (ref 0–0.9)
MONOCYTES NFR BLD AUTO: 8.1 % — SIGNIFICANT CHANGE UP (ref 2–14)
NEUTROPHILS # BLD AUTO: 3.5 K/UL — SIGNIFICANT CHANGE UP (ref 1.8–7.4)
NEUTROPHILS NFR BLD AUTO: 72.5 % — SIGNIFICANT CHANGE UP (ref 43–77)
NITRITE UR-MCNC: NEGATIVE — SIGNIFICANT CHANGE UP
PH UR: 6 — SIGNIFICANT CHANGE UP (ref 5–8)
PLATELET # BLD AUTO: 125 K/UL — LOW (ref 150–400)
POTASSIUM SERPL-MCNC: 4.8 MMOL/L — SIGNIFICANT CHANGE UP (ref 3.5–5.3)
POTASSIUM SERPL-SCNC: 4.8 MMOL/L — SIGNIFICANT CHANGE UP (ref 3.5–5.3)
PROT SERPL-MCNC: 6.9 G/DL — SIGNIFICANT CHANGE UP (ref 6–8.3)
PROT UR-MCNC: ABNORMAL
PROTHROM AB SERPL-ACNC: 13.8 SEC — HIGH (ref 10–12.9)
RBC # BLD: 2.42 M/UL — LOW (ref 4.2–5.8)
RBC # FLD: 17.6 % — HIGH (ref 10.3–14.5)
RBC CASTS # UR COMP ASSIST: 3 /HPF — SIGNIFICANT CHANGE UP (ref 0–4)
RH IG SCN BLD-IMP: POSITIVE — SIGNIFICANT CHANGE UP
SODIUM SERPL-SCNC: 132 MMOL/L — LOW (ref 135–145)
SP GR SPEC: 1.01 — SIGNIFICANT CHANGE UP (ref 1.01–1.02)
UROBILINOGEN FLD QL: NEGATIVE — SIGNIFICANT CHANGE UP
WBC # BLD: 4.8 K/UL — SIGNIFICANT CHANGE UP (ref 3.8–10.5)
WBC # FLD AUTO: 4.8 K/UL — SIGNIFICANT CHANGE UP (ref 3.8–10.5)
WBC UR QL: 81 /HPF — HIGH (ref 0–5)

## 2018-12-26 PROCEDURE — 71045 X-RAY EXAM CHEST 1 VIEW: CPT | Mod: 26

## 2018-12-26 PROCEDURE — 99285 EMERGENCY DEPT VISIT HI MDM: CPT | Mod: GC

## 2018-12-26 PROCEDURE — 86077 PHYS BLOOD BANK SERV XMATCH: CPT

## 2018-12-26 RX ORDER — ERTAPENEM SODIUM 1 G/1
1000 INJECTION, POWDER, LYOPHILIZED, FOR SOLUTION INTRAMUSCULAR; INTRAVENOUS EVERY 24 HOURS
Qty: 0 | Refills: 0 | Status: DISCONTINUED | OUTPATIENT
Start: 2018-12-26 | End: 2018-12-27

## 2018-12-26 RX ORDER — DEXTROSE 50 % IN WATER 50 %
15 SYRINGE (ML) INTRAVENOUS ONCE
Qty: 0 | Refills: 0 | Status: DISCONTINUED | OUTPATIENT
Start: 2018-12-26 | End: 2019-01-08

## 2018-12-26 RX ORDER — DEXTROSE 50 % IN WATER 50 %
25 SYRINGE (ML) INTRAVENOUS ONCE
Qty: 0 | Refills: 0 | Status: DISCONTINUED | OUTPATIENT
Start: 2018-12-26 | End: 2019-01-08

## 2018-12-26 RX ORDER — NALOXEGOL OXALATE 12.5 MG/1
12.5 TABLET, FILM COATED ORAL DAILY
Qty: 0 | Refills: 0 | Status: DISCONTINUED | OUTPATIENT
Start: 2018-12-26 | End: 2019-01-08

## 2018-12-26 RX ORDER — GLUCAGON INJECTION, SOLUTION 0.5 MG/.1ML
1 INJECTION, SOLUTION SUBCUTANEOUS ONCE
Qty: 0 | Refills: 0 | Status: DISCONTINUED | OUTPATIENT
Start: 2018-12-26 | End: 2019-01-08

## 2018-12-26 RX ORDER — VANCOMYCIN HCL 1 G
1000 VIAL (EA) INTRAVENOUS EVERY 24 HOURS
Qty: 0 | Refills: 0 | Status: DISCONTINUED | OUTPATIENT
Start: 2018-12-26 | End: 2018-12-31

## 2018-12-26 RX ORDER — INSULIN LISPRO 100/ML
VIAL (ML) SUBCUTANEOUS
Qty: 0 | Refills: 0 | Status: DISCONTINUED | OUTPATIENT
Start: 2018-12-26 | End: 2019-01-03

## 2018-12-26 RX ORDER — MORPHINE SULFATE 50 MG/1
15 CAPSULE, EXTENDED RELEASE ORAL ONCE
Qty: 0 | Refills: 0 | Status: DISCONTINUED | OUTPATIENT
Start: 2018-12-26 | End: 2018-12-26

## 2018-12-26 RX ORDER — SODIUM CHLORIDE 9 MG/ML
1000 INJECTION, SOLUTION INTRAVENOUS
Qty: 0 | Refills: 0 | Status: DISCONTINUED | OUTPATIENT
Start: 2018-12-26 | End: 2019-01-08

## 2018-12-26 RX ORDER — METFORMIN HYDROCHLORIDE 850 MG/1
1000 TABLET ORAL
Qty: 0 | Refills: 0 | COMMUNITY

## 2018-12-26 RX ORDER — MULTIVIT-MIN/FERROUS GLUCONATE 9 MG/15 ML
2 LIQUID (ML) ORAL
Qty: 0 | Refills: 0 | COMMUNITY

## 2018-12-26 RX ORDER — SODIUM CHLORIDE 9 MG/ML
1000 INJECTION INTRAMUSCULAR; INTRAVENOUS; SUBCUTANEOUS ONCE
Qty: 0 | Refills: 0 | Status: COMPLETED | OUTPATIENT
Start: 2018-12-26 | End: 2018-12-26

## 2018-12-26 RX ORDER — METOPROLOL TARTRATE 50 MG
50 TABLET ORAL DAILY
Qty: 0 | Refills: 0 | Status: DISCONTINUED | OUTPATIENT
Start: 2018-12-26 | End: 2019-01-08

## 2018-12-26 RX ORDER — DEXTROSE 50 % IN WATER 50 %
12.5 SYRINGE (ML) INTRAVENOUS ONCE
Qty: 0 | Refills: 0 | Status: DISCONTINUED | OUTPATIENT
Start: 2018-12-26 | End: 2019-01-08

## 2018-12-26 RX ORDER — LISINOPRIL 2.5 MG/1
10 TABLET ORAL DAILY
Qty: 0 | Refills: 0 | Status: DISCONTINUED | OUTPATIENT
Start: 2018-12-26 | End: 2019-01-06

## 2018-12-26 RX ORDER — SODIUM CHLORIDE 9 MG/ML
1000 INJECTION INTRAMUSCULAR; INTRAVENOUS; SUBCUTANEOUS
Qty: 0 | Refills: 0 | Status: DISCONTINUED | OUTPATIENT
Start: 2018-12-26 | End: 2019-01-05

## 2018-12-26 RX ORDER — MORPHINE SULFATE 50 MG/1
4 CAPSULE, EXTENDED RELEASE ORAL ONCE
Qty: 0 | Refills: 0 | Status: DISCONTINUED | OUTPATIENT
Start: 2018-12-26 | End: 2018-12-26

## 2018-12-26 RX ADMIN — SODIUM CHLORIDE 1000 MILLILITER(S): 9 INJECTION INTRAMUSCULAR; INTRAVENOUS; SUBCUTANEOUS at 13:45

## 2018-12-26 RX ADMIN — MORPHINE SULFATE 15 MILLIGRAM(S): 50 CAPSULE, EXTENDED RELEASE ORAL at 16:00

## 2018-12-26 RX ADMIN — SODIUM CHLORIDE 1000 MILLILITER(S): 9 INJECTION INTRAMUSCULAR; INTRAVENOUS; SUBCUTANEOUS at 14:45

## 2018-12-26 RX ADMIN — MORPHINE SULFATE 4 MILLIGRAM(S): 50 CAPSULE, EXTENDED RELEASE ORAL at 20:21

## 2018-12-26 RX ADMIN — MORPHINE SULFATE 15 MILLIGRAM(S): 50 CAPSULE, EXTENDED RELEASE ORAL at 13:45

## 2018-12-26 NOTE — ED PROVIDER NOTE - PHYSICAL EXAMINATION
General: uncomfortable appearing male, no acute distress   HEENT: normocephalic, atraumatic   Respiratory: normal work of breathing, lungs clear to auscultation bilaterally   Cardiac: regular rate and rhythm   Abdomen: soft, suprapubic tenderness to palpation  MSK: no swelling or tenderness of lower extremities   : suprapubic cathter in place with blood in bag, clear urine in bilateral nephrostomy tubes  Neuro: A&Ox3

## 2018-12-26 NOTE — PROCEDURE NOTE - NSURITECHNIQUE_GU_A_CORE
Proper hand hygiene was performed/Sterile gloves were worn for the duration of the procedure/A sterile drape was used to cover all adjacent areas/The catheter was secured with a securement device (e.g. StatLock)/The catheter was appropriately lubricated/The collection bag is below the level of the patient and urinary bladder/The site was cleaned with soap/water and sterile solution (betadine)/The urinary drainage system is closed at the end of the procedure/All applicable medical record documentation is completed

## 2018-12-26 NOTE — ED ADULT NURSE REASSESSMENT NOTE - COMFORT CARE
warm blanket provided/plan of care explained/darkened lights/treatment delay explained/wait time explained/side rails up

## 2018-12-26 NOTE — PROCEDURE NOTE - NSPROCDETAILS_GEN_ALL_CORE
sterile technique, indwelling urinary device inserted/a urinary catheter insertion kit was used for all insertion materials

## 2018-12-26 NOTE — H&P ADULT - PSYCHIATRIC
Verified Results  CBC WITH AUTOMATED DIFFERENTIAL 17Oct2018 12:01AM MYRANDA HAYWOOD     Test Name Result Flag Reference   WHITE BLOOD COUNT 5.3 K/mcL  4.2-11.0   RED CELL COUNT 4.26 mil/mcL  4.00-5.20   HEMOGLOBIN 13.5 g/dl  12.0-15.5   HEMATOCRIT 41.2 %  36.0-46.5   MEAN CORPUSCULAR VOLUME 96.7 fL  78.0-100.0   MEAN CORPUSCULAR HEMOGLOBIN 31.7 pg  26.0-34.0   MEAN CORPUSCULAR HGB CONC 32.8 g/dl  32.0-36.5   RDW-CV 13.8 %  11.0-15.0   PLATELET COUNT 155 K/mcL  140-450   Platelet count verified by smear review.   GI% 46 %     LYM% 40 %     MON% 9 %     EOS% 4 %     BASO% 1 %     GI ABS 2.4 K/mcL  1.8-7.7   LYM ABS 2.1 K/mcL  1.0-4.0   MON ABS 0.5 K/mcL  0.3-0.9   EOS ABS 0.2 K/mcL  0.1-0.5   BASO ABS 0.1 K/mcL  0.0-0.3   DIFF TYPE      AUTO DIFF verified by manual smear review.   NRBC 0 /100 WBC  0   PERCENT IMMATURE GRANULOCYTES 0 %     ABSOLUTE IMMATURE GRANULOCYTES 0.0 K/mcL  0-0.2     SGOT/AST 17Oct2018 12:01AM MYRANDA HAYWOOD     Test Name Result Flag Reference   GOT/AST 69 Units/L H <38     SGPT/ALT 17Oct2018 12:01AM MYRANDA HAYWOOD     Test Name Result Flag Reference   GPT/ALT 82 Units/L H <79     RBC SED RATE 17Oct2018 12:01AM MYRANDA HAYWOOD     Test Name Result Flag Reference   RBC SED RATE 17 mm/hr  0-20       Message   Okay except elevated liver test.  Follow-up as discussed.  Repeat blood test in 3 months      negative Affect and characteristics of appearance, verbalizations, behaviors are appropriate

## 2018-12-26 NOTE — ED ADULT NURSE REASSESSMENT NOTE - NS ED NURSE REASSESS COMMENT FT1
Patient educated by MD and RN regarding risks/benefits of blood transfusion. RN explained s/s of transfusion reactions; patient verbalized understanding of possible signs of reaction and when to alert the nurse. Pre- transfusion vitals recorded and blood transfusion to be initiated with second RN at the bedside. Consent for blood transfusion in patient chart.

## 2018-12-26 NOTE — ED PROVIDER NOTE - OBJECTIVE STATEMENT
83M, pmh of HTN, DM, metastatic bladder cancer presenting with hematuria. Patient has bilateral nephrostomy tubes and a suprapubic catheter. For the past 4 4 days the patient has had blood coming from the suprapubic catheter. Nephrostomy tubes have been clear. Reports some weakness and pelvic pain. Denies fever, chest pain, difficulty breathing, nausea, vomiting, pain or swelling in lower extremities.

## 2018-12-26 NOTE — ED ADULT NURSE REASSESSMENT NOTE - NS ED NURSE REASSESS COMMENT FT1
15 min blood transfusion vitals remain stable for patient. Patient displays no signs of transfusion reaction. Patient denies headache, back/chest pain, SOB, fever, nausea and itchiness. Blood transfusion continued- patient tolerating well.

## 2018-12-26 NOTE — ED ADULT NURSE NOTE - OBJECTIVE STATEMENT
84 y/o Male presenting to the ED by EMS, A&Ox3 complaining of blood in the urine. Pt Hx of stage 4 bladder cancer that has spread to the prostate, urethra, and penis. Pt had two days of chemo but spiked a high fever so had to discontinue this treatment. Pt was placed on hospice care. Pt states he has an infection in the pelvis and pubic bone and has a SACHA drain. Pt on vancomycin and InVance for 2 weeks for this infection. Pt also has a suprapubic catheter as well as two nephrostomy tubes in the back. Pt also has a picc line in right arm, not currently accessed because we needed access quickly to get the H&H and fluids because he has a low BP. Pt and family states there has been blood in the suprapubic catheter since Saturday and states he is feeling weak and looks pale so they came to see if he needs blood. Blood noted in suprapubic catheter as well as around it. Pt appears pale and weak. Pt denies CP, SOB, fever, chills, N/V/D. Pt abdomen feels hard and bowel sounds are hyperactive. Pts wife states "he has not had a bowel movement in weeks and had to get an enema last time he was here". BP is very low, MD aware, complete blood count has been drawn. All other VS stable. Safety and comfort measures provided. Call bell within reach. Awaiting lab results. Will continue to monitor.

## 2018-12-26 NOTE — ED PROVIDER NOTE - ATTENDING CONTRIBUTION TO CARE
****ATTENDING**** 84yo male hx HTN, DM, metastatic bladder ca s/p suprapubic catheter and b/l nephrostomy tube bib family for hematuria from suprapubic catheter. PT denies any pain or complaints, noticed bleeding last night. Pt does feel week since bleeding. no fever or chills.   On exam, Patient is awake,alert,oriented x 3. Patient is well appearing and in no acute distress. Patient's chest is clear to ausculation, +s1s2. Abdomen is soft nd/nt +BS. + hematuria in suprapubic, nephrostomy w clear urine.   Check labs, type and consult urology.  eval for hematuria and possible transfusion.

## 2018-12-26 NOTE — CONSULT NOTE ADULT - SUBJECTIVE AND OBJECTIVE BOX
HPI:  Patient is a 83y Male with metastatic urothelial ca, s/p bilateral nephrostomy tubes and suprapubic tube here with recurrent hematuria from the SP tube. The patient has had NT placed in order to divert urine and in an attempt to allow the bladder to clot off. He also recently had a seminal vesical abscess drained and has an indwelling drain.   He is no longer on chemotherapy and recently the family decided to have home hospice.  However the hospice was rejected due to the PICC line and IV antibiotics which he will require for about another 45 days.      His wife states that the hematuria started a few days ago and some clots passed, as well as some drainage around the catheter.  The patient is comfortable, denies pain, but c/o fatigue and some mild dizziness.          PAST MEDICAL & SURGICAL HISTORY:  Benign prostatic hyperplasia with lower urinary tract symptoms, symptom details unspecified  Prostate cancer  Hypertension  HLD (hyperlipidemia)  Type 2 diabetes mellitus  Paroxysmal A-fib  S/P AVR (aortic valve replacement)  S/P TURP    MEDICATIONS  (STANDING):    MEDICATIONS  (PRN):    FAMILY HISTORY:  No pertinent family history in first degree relatives    Allergies    oxycodone (Other)    Intolerances      SOCIAL HISTORY:   Tobacco hx:    REVIEW OF SYSTEMS: Pertinent positives and negatives as stated in HPI, otherwise negative    Vital signs  T(C): 36.9 (18 @ 13:00), Max: 36.9 (18 @ 13:00)  HR: 76 (18 @ 13:00)  BP: 91/49 (18 @ 13:00)  SpO2: 99% (18 @ 13:00)  Wt(kg): --    Output    UOP    Physical Exam  Gen: NAD  Pulm: No respiratory distress, no subcostal retractions  CV: RRR, no JVD  Abd: Soft, NT, ND  : SPT draining maroon colored urine.  Back: Bilateral nephrostomy tubes draining clear yellow urine  MSK: No edema present    LABS:           @ 13:45    WBC 4.8   / Hct 20.0  / SCr 1.30         132<L>  |  94<L>  |  23  ----------------------------<  170<H>  4.8   |  27  |  1.30    Ca    8.9      26 Dec 2018 13:45    TPro  6.9  /  Alb  2.8<L>  /  TBili  0.4  /  DBili  x   /  AST  13  /  ALT  <5<L>  /  AlkPhos  63  12-    PT/INR - ( 26 Dec 2018 13:58 )   PT: 13.8 sec;   INR: 1.19 ratio         PTT - ( 26 Dec 2018 13:58 )  PTT:29.6 sec  Urinalysis Basic - ( 26 Dec 2018 13:55 )    Color: Yellow / Appearance: Slightly Turbid / S.013 / pH: x  Gluc: x / Ketone: Negative  / Bili: Negative / Urobili: Negative   Blood: x / Protein: 100 mg/dL / Nitrite: Negative   Leuk Esterase: Large / RBC: 3 /hpf / WBC 81 /hpf   Sq Epi: x / Non Sq Epi: 10 /hpf / Bacteria: Negative

## 2018-12-26 NOTE — H&P ADULT - ASSESSMENT
pt w/ met urothelial ca   hematuria  b/l nt tubes  abcess  cont iv abs  id eval  urology eval noted  anemia from blood loss  transfuse prbc  hospice eval  iv fluids  hold asa

## 2018-12-26 NOTE — H&P ADULT - NSHPLABSRESULTS_GEN_ALL_CORE
6.9    4.8   )-----------( 125      ( 26 Dec 2018 13:45 )             20.0           132<L>  |  94<L>  |  23  ----------------------------<  170<H>  4.8   |  27  |  1.30    Ca    8.9      26 Dec 2018 13:45    TPro  6.9  /  Alb  2.8<L>  /  TBili  0.4  /  DBili  x   /  AST  13  /  ALT  <5<L>  /  AlkPhos  63                Urinalysis Basic - ( 26 Dec 2018 13:55 )    Color: Yellow / Appearance: Slightly Turbid / S.013 / pH: x  Gluc: x / Ketone: Negative  / Bili: Negative / Urobili: Negative   Blood: x / Protein: 100 mg/dL / Nitrite: Negative   Leuk Esterase: Large / RBC: 3 /hpf / WBC 81 /hpf   Sq Epi: x / Non Sq Epi: 10 /hpf / Bacteria: Negative        PT/INR - ( 26 Dec 2018 13:58 )   PT: 13.8 sec;   INR: 1.19 ratio         PTT - ( 26 Dec 2018 13:58 )  PTT:29.6 sec    Lactate Trend            CAPILLARY BLOOD GLUCOSE            Culture Results:   Moderate Escherichia coli Multiple Morphological Strains  Few Enterococcus faecalis  Moderate Alpha hemolytic strep "Susceptibilities not performed" ( @ 00:31)  Culture Results:   No fungus isolated at 1 week. No additional interim reports will be  issued unless there is a change in culture status. ( @ 00:31)  Culture Results:   No growth at 1 week. ( @ 00:31)  Culture Results:   No growth at 5 days. ( @ 00:24)  Culture Results:   No growth at 5 days. ( @ 00:24)  Culture Results:   Culture grew 3 or more types of organisms which indicate  collection contamination; consider recollection only if clinically  indicated. ( @ 22:51)  Culture Results:   Culture grew 3 or more types of organisms which indicate  collection contamination; consider recollection only if clinically  indicated. ( @ 22:51)  Culture Results:   Numerous Escherichia coli  Numerous Pseudomonas aeruginosa  Numerous Enterococcus faecalis ( @ 22:51)

## 2018-12-26 NOTE — H&P ADULT - HISTORY OF PRESENT ILLNESS
Patient is a 83y Male with metastatic urothelial ca, s/p bilateral nephrostomy tubes and suprapubic tube here with recurrent hematuria from the SP tube  . The patient has had NT placed in order to divert urine and in an attempt to allow the bladder to clot off. He also recently had a seminal vesical abscess drained and has an indwelling drain.     He is no longer on chemotherapy and recently the family decided to have home hospice, However the hospice was rejected due to the PICC line and IV antibiotics which he will require for about another 45 days.      His wife states that the hematuria started a few days ago and some clots passed, as well as some drainage around the catheter.    The patient  denies pain, but c/o fatigue

## 2018-12-26 NOTE — ED PROVIDER NOTE - PROGRESS NOTE DETAILS
updated patient and family on results. agree with plan for blood transfusion and admission. urology consulted and will see patient. - resident Osbaldo Rodriguez patient seen by urology, ntd at this time. patient considering home hospice. consult out to palliative. - resident Osbaldo Rodriguez

## 2018-12-26 NOTE — PATIENT PROFILE ADULT - SURGICAL SITE DESCRIPTION
two nephrostomy tubes and one suprapubic catheter and right buttock transgluteal pigtail to SACHA drain

## 2018-12-26 NOTE — CONSULT NOTE ADULT - ASSESSMENT
Hematuria  - No active  intervention, family understands there is no intervention and are still interested in hospice.  - Transfuse as needed  - Consider Palliative consult  - f/u ID regarding abx plan  - f/u Oncology  - continue NTs to bag drainage   - will change SP tube   - will follow

## 2018-12-26 NOTE — ED PROVIDER NOTE - MEDICAL DECISION MAKING DETAILS
83M presenting with hematuria in suprapubic catheter. concern for hemorrhagic cystitis vs anemia. plan for cbc, cmp, type and screen, inr, ua, uro consult. will reassess.

## 2018-12-27 ENCOUNTER — TRANSCRIPTION ENCOUNTER (OUTPATIENT)
Age: 83
End: 2018-12-27

## 2018-12-27 DIAGNOSIS — R31.0 GROSS HEMATURIA: ICD-10-CM

## 2018-12-27 DIAGNOSIS — Z71.89 OTHER SPECIFIED COUNSELING: ICD-10-CM

## 2018-12-27 DIAGNOSIS — Z51.5 ENCOUNTER FOR PALLIATIVE CARE: ICD-10-CM

## 2018-12-27 DIAGNOSIS — C61 MALIGNANT NEOPLASM OF PROSTATE: ICD-10-CM

## 2018-12-27 DIAGNOSIS — M86.9 OSTEOMYELITIS, UNSPECIFIED: ICD-10-CM

## 2018-12-27 DIAGNOSIS — N41.2 ABSCESS OF PROSTATE: ICD-10-CM

## 2018-12-27 LAB
ANION GAP SERPL CALC-SCNC: 10 MMOL/L — SIGNIFICANT CHANGE UP (ref 5–17)
BUN SERPL-MCNC: 18 MG/DL — SIGNIFICANT CHANGE UP (ref 7–23)
CALCIUM SERPL-MCNC: 8.4 MG/DL — SIGNIFICANT CHANGE UP (ref 8.4–10.5)
CHLORIDE SERPL-SCNC: 98 MMOL/L — SIGNIFICANT CHANGE UP (ref 96–108)
CO2 SERPL-SCNC: 24 MMOL/L — SIGNIFICANT CHANGE UP (ref 22–31)
CREAT SERPL-MCNC: 1.18 MG/DL — SIGNIFICANT CHANGE UP (ref 0.5–1.3)
GLUCOSE SERPL-MCNC: 151 MG/DL — HIGH (ref 70–99)
HCT VFR BLD CALC: 20 % — CRITICAL LOW (ref 39–50)
HCT VFR BLD CALC: 22.7 % — LOW (ref 39–50)
HGB BLD-MCNC: 7.1 G/DL — LOW (ref 13–17)
HGB BLD-MCNC: 7.9 G/DL — LOW (ref 13–17)
LACTATE SERPL-SCNC: 1.4 MMOL/L — SIGNIFICANT CHANGE UP (ref 0.7–2)
MCHC RBC-ENTMCNC: 29.1 PG — SIGNIFICANT CHANGE UP (ref 27–34)
MCHC RBC-ENTMCNC: 29.6 PG — SIGNIFICANT CHANGE UP (ref 27–34)
MCHC RBC-ENTMCNC: 34.8 GM/DL — SIGNIFICANT CHANGE UP (ref 32–36)
MCHC RBC-ENTMCNC: 35.6 GM/DL — SIGNIFICANT CHANGE UP (ref 32–36)
MCV RBC AUTO: 83.1 FL — SIGNIFICANT CHANGE UP (ref 80–100)
MCV RBC AUTO: 83.6 FL — SIGNIFICANT CHANGE UP (ref 80–100)
PLATELET # BLD AUTO: 116 K/UL — LOW (ref 150–400)
PLATELET # BLD AUTO: 128 K/UL — LOW (ref 150–400)
POTASSIUM SERPL-MCNC: 4.4 MMOL/L — SIGNIFICANT CHANGE UP (ref 3.5–5.3)
POTASSIUM SERPL-SCNC: 4.4 MMOL/L — SIGNIFICANT CHANGE UP (ref 3.5–5.3)
RBC # BLD: 2.41 M/UL — LOW (ref 4.2–5.8)
RBC # BLD: 2.72 M/UL — LOW (ref 4.2–5.8)
RBC # FLD: 16.3 % — HIGH (ref 10.3–14.5)
RBC # FLD: 16.5 % — HIGH (ref 10.3–14.5)
SODIUM SERPL-SCNC: 132 MMOL/L — LOW (ref 135–145)
WBC # BLD: 4.8 K/UL — SIGNIFICANT CHANGE UP (ref 3.8–10.5)
WBC # BLD: 6.3 K/UL — SIGNIFICANT CHANGE UP (ref 3.8–10.5)
WBC # FLD AUTO: 4.8 K/UL — SIGNIFICANT CHANGE UP (ref 3.8–10.5)
WBC # FLD AUTO: 6.3 K/UL — SIGNIFICANT CHANGE UP (ref 3.8–10.5)

## 2018-12-27 PROCEDURE — 99497 ADVNCD CARE PLAN 30 MIN: CPT | Mod: 25

## 2018-12-27 PROCEDURE — 99223 1ST HOSP IP/OBS HIGH 75: CPT

## 2018-12-27 PROCEDURE — 99222 1ST HOSP IP/OBS MODERATE 55: CPT | Mod: GC

## 2018-12-27 RX ORDER — ACETAMINOPHEN 500 MG
1000 TABLET ORAL ONCE
Qty: 0 | Refills: 0 | Status: DISCONTINUED | OUTPATIENT
Start: 2018-12-27 | End: 2018-12-27

## 2018-12-27 RX ORDER — CEFTRIAXONE 500 MG/1
1 INJECTION, POWDER, FOR SOLUTION INTRAMUSCULAR; INTRAVENOUS EVERY 24 HOURS
Qty: 0 | Refills: 0 | Status: DISCONTINUED | OUTPATIENT
Start: 2018-12-27 | End: 2019-01-08

## 2018-12-27 RX ORDER — MINERAL OIL
133 OIL (ML) MISCELLANEOUS ONCE
Qty: 0 | Refills: 0 | Status: COMPLETED | OUTPATIENT
Start: 2018-12-27 | End: 2018-12-27

## 2018-12-27 RX ORDER — MORPHINE SULFATE 50 MG/1
15 CAPSULE, EXTENDED RELEASE ORAL ONCE
Qty: 0 | Refills: 0 | Status: DISCONTINUED | OUTPATIENT
Start: 2018-12-27 | End: 2018-12-27

## 2018-12-27 RX ORDER — MORPHINE SULFATE 50 MG/1
15 CAPSULE, EXTENDED RELEASE ORAL EVERY 4 HOURS
Qty: 0 | Refills: 0 | Status: DISCONTINUED | OUTPATIENT
Start: 2018-12-27 | End: 2019-01-03

## 2018-12-27 RX ADMIN — Medication 250 MILLIGRAM(S): at 11:59

## 2018-12-27 RX ADMIN — NALOXEGOL OXALATE 12.5 MILLIGRAM(S): 12.5 TABLET, FILM COATED ORAL at 13:26

## 2018-12-27 RX ADMIN — MORPHINE SULFATE 15 MILLIGRAM(S): 50 CAPSULE, EXTENDED RELEASE ORAL at 07:13

## 2018-12-27 RX ADMIN — MORPHINE SULFATE 15 MILLIGRAM(S): 50 CAPSULE, EXTENDED RELEASE ORAL at 23:31

## 2018-12-27 RX ADMIN — MORPHINE SULFATE 15 MILLIGRAM(S): 50 CAPSULE, EXTENDED RELEASE ORAL at 08:00

## 2018-12-27 RX ADMIN — Medication 2: at 07:45

## 2018-12-27 RX ADMIN — Medication 133 MILLILITER(S): at 07:13

## 2018-12-27 RX ADMIN — ERTAPENEM SODIUM 120 MILLIGRAM(S): 1 INJECTION, POWDER, LYOPHILIZED, FOR SOLUTION INTRAMUSCULAR; INTRAVENOUS at 00:12

## 2018-12-27 RX ADMIN — Medication 50 MILLIGRAM(S): at 11:59

## 2018-12-27 RX ADMIN — MORPHINE SULFATE 15 MILLIGRAM(S): 50 CAPSULE, EXTENDED RELEASE ORAL at 13:55

## 2018-12-27 RX ADMIN — MORPHINE SULFATE 15 MILLIGRAM(S): 50 CAPSULE, EXTENDED RELEASE ORAL at 19:50

## 2018-12-27 RX ADMIN — MORPHINE SULFATE 15 MILLIGRAM(S): 50 CAPSULE, EXTENDED RELEASE ORAL at 13:25

## 2018-12-27 RX ADMIN — Medication 4: at 13:29

## 2018-12-27 RX ADMIN — MORPHINE SULFATE 15 MILLIGRAM(S): 50 CAPSULE, EXTENDED RELEASE ORAL at 19:19

## 2018-12-27 RX ADMIN — LISINOPRIL 10 MILLIGRAM(S): 2.5 TABLET ORAL at 11:59

## 2018-12-27 RX ADMIN — Medication 2: at 18:01

## 2018-12-27 NOTE — CONSULT NOTE ADULT - PROBLEM SELECTOR RECOMMENDATION 5
Family meeting with pt and wife Ole at bedside discussed goal to continue abx and possible transfusions if needed. Discussed concern of pt not being able to qualify for Hospice due to abx duration, discussed with ID change of abx from Invanz to Rocephin. As well as potential improvement in CT abdomen in hopes to shorten abx length. Hospice referral made will wait for approval.

## 2018-12-27 NOTE — CONSULT NOTE ADULT - ASSESSMENT
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 He is no longer on chemotherapy and recently the family decided to have home hospice, However the hospice was rejected due to the PICC line and IV antibiotics which he will require for about another 45 days. During ED w/u, pt presented afebrile, SBPs in 90s (baseline 110s-130s prev admsn), HR 70-80s, hgb 6.9 (baseline 7s-8s), WBC wnl, platelets in 100s, lactate 2.3.     Family interested in home hospice, wife has difficult time taking care of him at home. Pt was seen on prior admission by [facility] but was not a candidate due to pt on long term abx. Discussed with wife and pt at bedside options of enrolling once abx course finished vs requesting another hospice agency to see if he qualifies.

## 2018-12-27 NOTE — PROGRESS NOTE ADULT - ASSESSMENT
pt w/ met urothelial ca   hematuria  b/l nt tubes  abcess  cont iv abs  id eval noted  change ertapenem to cefrtiaxone per ID  urology eval noted  anemia from blood loss  omproved s/p prbc  hospice eval  Palliative eval noted  iv fluids  hold asa

## 2018-12-27 NOTE — GOALS OF CARE CONVERSATION - PERSONAL ADVANCE DIRECTIVE - CONVERSATION DETAILS
Patient re-referred by PCT Dr. Keisha Zazueta.  Case reviewed with Dr. Saranya Bravo due to long term nature of IV ABT (IV Zosyn and IV Vanco.)  Keisha consulted with ID and they are able to change Zosyn to Rocephin daily but will recommend maintaining IV Vanco daily.  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  He also has a SACHA drain, suprapubic tube placed, and now has bilateral nephrostomy tubes.  He is due for a CT scan today or tomorrow to evaluate the extent of the osteomylitis.  He did receive a blood transfusion for hGb of less than 7.  His wife currently manages his care at home with the help of home care.  Message left for CM to confirm previous home care services.  Dr. Beaver is hopeful that we can accomodate the IV antibiotics within the hospice plan of care with an understanding that should the patient decline and offer no further benefit to the patient, they would need to be re-ealuated.  She will touch base with Dr. Boucher to inform her of what was discussed and case can be re-reviewed for approval.    Appt arranged for tomorrow, 12/28 at 12 pm (may need to change if  CT not done.)

## 2018-12-27 NOTE — PROGRESS NOTE ADULT - SUBJECTIVE AND OBJECTIVE BOX
Progress Note    Subjective  Patient seen and examined in AM. SPT exchanged yesterday. Draining with clots. Bilateral NT in place and draining yellow. No fevers/chills, nausea/vomiting. Was pending tube check with IR today prior to admission.     Objective  T(F): , Max: 98.5 (12-26-18 @ 13:00), HR: 92, BP: 123/69, SpO2: 96%  L Nephrostomy Tube: 425 mL  R Nephrostomy Tube: 550 mL  SPT: 250 cc     Gen: NAD  Abd: Soft, NT, ND  : SPT draining maroon colored urine.  Back: Bilateral nephrostomy tubes draining clear yellow urine    Diet/Fluids  ADA reg, NS @ 75      Labs 12/27   WBC:  6.3  HCT: 22.7  (from 20)   Cr: 1.18     Cultures:  Urine Cx: pending  NT Cx: pending     Antibiotics:  Ertapenem  Vanco

## 2018-12-27 NOTE — DISCHARGE NOTE ADULT - MEDICATION SUMMARY - MEDICATIONS TO CHANGE
I will SWITCH the dose or number of times a day I take the medications listed below when I get home from the hospital:    vancomycin 1 g intravenous injection  -- 1 gram(s) intravenously every 24 hours MDD:END JANUARY 25, 2019    morphine 15 mg/8 to 12 hr oral tablet, extended release  -- 1 tab(s) by mouth 2 times a day

## 2018-12-27 NOTE — PROGRESS NOTE ADULT - SUBJECTIVE AND OBJECTIVE BOX
CHIEF COMPLAINT:Patient is a 83y old  Male who presents with a chief complaint of   	  Interval history:      Allergies:  oxycodone (Other)      PAST MEDICAL & SURGICAL HISTORY:  Benign prostatic hyperplasia with lower urinary tract symptoms, symptom details unspecified  Prostate cancer  Hypertension  HLD (hyperlipidemia)  Type 2 diabetes mellitus  Paroxysmal A-fib  S/P AVR (aortic valve replacement)  S/P TURP      FAMILY HISTORY:  No pertinent family history in first degree relatives      REVIEW OF SYSTEMS:  CONSTITUTIONAL: No fever, weight loss, + fatigue  EYES: No eye pain, visual disturbances, or discharge  NECK: No pain or stiffness  RESPIRATORY: No cough or wheezing, no shortness of breath  CARDIOVASCULAR: No chest pain, palpitations, dizziness, or leg swelling  GASTROINTESTINAL: No abdominal or epigastric pain. No nausea, vomiting, diarrhea or constipation  GENITOURINARY: No dysuria, urinary frequency or urgency, + hematuria  NEUROLOGICAL: No headaches, memory loss, loss of strength, numbness, or tremors  SKIN: No itching, burning, rashes, or lesions   MUSCULOSKELETAL: No joint pain or swelling; No muscle, back, or extremity pain    Medications:  MEDICATIONS  (STANDING):  cefTRIAXone   IVPB 1 Gram(s) IV Intermittent every 24 hours  dextrose 5%. 1000 milliLiter(s) (50 mL/Hr) IV Continuous <Continuous>  dextrose 50% Injectable 12.5 Gram(s) IV Push once  dextrose 50% Injectable 25 Gram(s) IV Push once  dextrose 50% Injectable 25 Gram(s) IV Push once  insulin lispro (HumaLOG) corrective regimen sliding scale   SubCutaneous three times a day before meals  lisinopril 10 milliGRAM(s) Oral daily  metoprolol succinate ER 50 milliGRAM(s) Oral daily  naloxegol 12.5 milliGRAM(s) Oral daily  sodium chloride 0.9%. 1000 milliLiter(s) (75 mL/Hr) IV Continuous <Continuous>  vancomycin  IVPB 1000 milliGRAM(s) IV Intermittent every 24 hours    MEDICATIONS  (PRN):  dextrose 40% Gel 15 Gram(s) Oral once PRN Blood Glucose LESS THAN 70 milliGRAM(s)/deciliter  glucagon  Injectable 1 milliGRAM(s) IntraMuscular once PRN Glucose LESS THAN 70 milligrams/deciliter  morphine  IR 15 milliGRAM(s) Oral every 4 hours PRN Severe Pain (7 - 10)    	    PHYSICAL EXAM:  T(C): 36.9 (12-27-18 @ 13:38), Max: 36.9 (12-26-18 @ 23:30)  HR: 83 (12-27-18 @ 13:38) (78 - 98)  BP: 129/63 (12-27-18 @ 13:38) (90/50 - 139/76)  RR: 16 (12-27-18 @ 13:38) (16 - 18)  SpO2: 99% (12-27-18 @ 13:38) (94% - 99%)  Wt(kg): --  I&O's Summary    26 Dec 2018 07:01  -  27 Dec 2018 07:00  --------------------------------------------------------  IN: 1095 mL / OUT: 1255 mL / NET: -160 mL    27 Dec 2018 07:01  -  27 Dec 2018 14:09  --------------------------------------------------------  IN: 600 mL / OUT: 1010 mL / NET: -410 mL        Appearance: Normal	  HEENT:   NCAT, PERRL, EOMI	  Lymphatic: No lymphadenopathy  Cardiovascular: Normal S1 S2, RRR  Respiratory: Lungs clear to auscultation BL  Psychiatry: A & O x 3, Mood & affect appropriate  Gastrointestinal:  Soft, Non-tender, + BS  : + suprapubic, + BL nephrostomy  Skin: No rashes, No ecchymoses, No cyanosis	  Neurologic: Non-focal  Extremities: Normal range of motion, No clubbing, cyanosis or edema    	  LABS:	 	    CARDIAC MARKERS:                                7.9    6.3   )-----------( 128      ( 27 Dec 2018 07:05 )             22.7     12-27    132<L>  |  98  |  18  ----------------------------<  151<H>  4.4   |  24  |  1.18    Ca    8.4      27 Dec 2018 07:06    TPro  6.9  /  Alb  2.8<L>  /  TBili  0.4  /  DBili  x   /  AST  13  /  ALT  <5<L>  /  AlkPhos  63  12-26    proBNP:   Lipid Profile:   HgA1c:   TSH:

## 2018-12-27 NOTE — DISCHARGE NOTE ADULT - HOSPITAL COURSE
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 He is no longer on chemotherapy and recently the family decided to have home hospice, However the hospice was rejected due to the PICC line and IV antibiotics which he will require for about another 45 days. During ED w/u, pt presented afebrile, SBPs in 90s (baseline 110s-130s prev admsn), HR 70-80s, hgb 6.9 (baseline 7s-8s), WBC wnl, platelets in 100s, lactate 2.3. During hospital course..... 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 He is no longer on chemotherapy and recently the family decided to have home hospice, However the hospice was rejected due to the PICC line and IV antibiotics which he will require for about another 45 days. During ED w/u, pt presented afebrile, SBPs in 90s (baseline 110s-130s prev admsn), HR 70-80s, hgb 6.9 (baseline 7s-8s), WBC wnl, platelets in 100s, lactate 2.3 and hematuria.  During hospital course pt was transfused.  Family decided to stop ctx.  Plan for dc to home with hospice.

## 2018-12-27 NOTE — DISCHARGE NOTE ADULT - MEDICATION SUMMARY - MEDICATIONS TO STOP TAKING
I will STOP taking the medications listed below when I get home from the hospital:    INVanz 1 g injection  -- 1 gram(s) intravenously every 24 hours MDD:END JANUARY 25, 2019

## 2018-12-27 NOTE — CONSULT NOTE ADULT - SUBJECTIVE AND OBJECTIVE BOX
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 He is no longer on chemotherapy and recently the family decided to have home hospice, However the hospice was rejected due to the PICC line and IV antibiotics which he will require for about another 45 days.      Recent admission  -   - IR guided fluid drainage from recto-vesicular space on  and was left a SACHA drain  - CT showed left pubic ramus mottling that was concerning for osteomyelitis  -  The culture of the patient’s intra-abdominal fluid resulted on  and was significant for growth VSE and multiple strains of E coli.   - PICC placed and pt was discharged on Vanc and Ertapenem with 6 weeks total therapy planned.   - pt desires hospice care however was rejected by hospice network given PICC and abx   - ID outpt follow up planned on  with Dr. Hathaway.     In ED  - afebrile with oral measurement, SBPs in 90s (baseline 110s-130s prev admsn), HR 70-80s.   - hgb 6.9 (baseline 7s-8s), WBC wnl, platelets in 100s, lactate 2.3  - xray with R PICC and ?old catether embolus    PAST MEDICAL & SURGICAL HISTORY:  Benign prostatic hyperplasia with lower urinary tract symptoms, symptom details unspecified  Prostate cancer  Hypertension  HLD (hyperlipidemia)  Type 2 diabetes mellitus  Paroxysmal A-fib  S/P AVR (aortic valve replacement)  S/P TURP      Allergies    oxycodone (Other)    Intolerances        ANTIMICROBIALS:  ertapenem  IVPB 1000 every 24 hours  vancomycin  IVPB 1000 every 24 hours      OTHER MEDS:  dextrose 40% Gel 15 Gram(s) Oral once PRN  dextrose 5%. 1000 milliLiter(s) IV Continuous <Continuous>  dextrose 50% Injectable 12.5 Gram(s) IV Push once  dextrose 50% Injectable 25 Gram(s) IV Push once  dextrose 50% Injectable 25 Gram(s) IV Push once  glucagon  Injectable 1 milliGRAM(s) IntraMuscular once PRN  insulin lispro (HumaLOG) corrective regimen sliding scale   SubCutaneous three times a day before meals  lisinopril 10 milliGRAM(s) Oral daily  metoprolol succinate ER 50 milliGRAM(s) Oral daily  naloxegol 12.5 milliGRAM(s) Oral daily  sodium chloride 0.9%. 1000 milliLiter(s) IV Continuous <Continuous>      SOCIAL HISTORY:    Marital Status:    Occupation:   Lives with:     Substance Use (street drugs):   Tobacco Usage:    Alcohol Usage: Social EtOH    FAMILY HISTORY:  No pertinent family history in first degree relatives      ROS:  Unobtainable because:   All other systems negative     Constitutional: no fever, no chills, no weight loss, no night sweats  Eye: no eye pain, no redness, no vision changes  ENT:  no sore throat, no rhinorrhea  Cardiovascular:  no chest pain, no palpitation  Respiratory:  no SOB, no cough  GI:  no abd pain, no vomiting, no diarrhea  urinary: no dysuria, no hematuria, no flank pain  : no  discharge or bleeding  musculoskeletal:  no joint pain, no joint swelling  skin:  no rash  neurology:  no headache, no seizure, no change in mental status  psych: no anxiety, no depression     Physical Exam:    General:    NAD, non toxic  Head: atraumatic, normocephalic  Eyes: normal sclera and conjunctiva  ENT:   no oropharyngeal lesions, no LAD, neck supple  Cardio:    regular S1,S2, no murmur  Respiratory:   clear b/l, no wheezing  abd:   soft, BS +, not tender, no hepatosplenomegaly  :     no CVAT, no suprapubic tenderness, no barbour  Musculoskeletal : no joint swelling, no edema  Skin:    no rash  vascular: no lines, normal pulses  Neurologic:     no focal deficits  psych: normal affect, no suicidal ideation      Drug Dosing Weight  Height (cm): 175.26 (27 Dec 2018 05:15)  Weight (kg): 77.1 (26 Dec 2018 12:30)  BMI (kg/m2): 25.1 (27 Dec 2018 05:15)  BSA (m2): 1.93 (27 Dec 2018 05:15)    Vital Signs Last 24 Hrs  T(F): 98.4 (18 @ 09:05), Max: 98.5 (18 @ 13:00)    Vital Signs Last 24 Hrs  HR: 92 (18 @ 09:05) (76 - 98)  BP: 123/69 (18 @ 09:05) (90/50 - 139/76)  RR: 18 (18 @ 09:05)  SpO2: 96% (18 @ 09:05) (94% - 99%)  Wt(kg): --                          7.9    6.3   )-----------( 128      ( 27 Dec 2018 07:05 )             22.7           132<L>  |  98  |  18  ----------------------------<  151<H>  4.4   |  24  |  1.18    Ca    8.4      27 Dec 2018 07:06    TPro  6.9  /  Alb  2.8<L>  /  TBili  0.4  /  DBili  x   /  AST  13  /  ALT  <5<L>  /  AlkPhos  63        Urinalysis Basic - ( 26 Dec 2018 13:55 )    Color: Yellow / Appearance: Slightly Turbid / S.013 / pH: x  Gluc: x / Ketone: Negative  / Bili: Negative / Urobili: Negative   Blood: x / Protein: 100 mg/dL / Nitrite: Negative   Leuk Esterase: Large / RBC: 3 /hpf / WBC 81 /hpf   Sq Epi: x / Non Sq Epi: 10 /hpf / Bacteria: Negative        MICROBIOLOGY:  v  .Body Fluid Abdominal Fluid  18   Moderate Escherichia coli Multiple Morphological Strains  Few Enterococcus faecalis  Moderate Alpha hemolytic strep "Susceptibilities not performed"  --  Escherichia coli  Escherichia coli  Enterococcus faecalis      .Blood Blood-Peripheral  18   No growth at 5 days.  --  --      .Urine Nephrostomy - Left  18   Culture grew 3 or more types of organisms which indicate  collection contamination; consider recollection only if clinically  indicated.  --  Escherichia coli  Pseudomonas aeruginosa  Enterococcus faecalis                  RADIOLOGY:    Images below reviewed personally Note author: Marino Posadas, PGY 3  [facility] Pager: [phone]  [facility] Pager: [phone]  Spectra:     HPI:      83M PMH HTN, DM2, afib (off AC), bioprostethic AVR on ASA, BPH s/p TURP c/b seminal vesicle/prostatic abscess (Aug '18 with ecoli and e faecalis) now with urothelial cell cancer with mets to penis and prostate and possible pulm mets, urinary retention s/p suprapubic catheter and b/l nephrostomy tubes, presenting with hematuria from suprapubic tube. He is no longer on chemotherapy and recently the family decided to have home hospice, However the hospice was rejected due to the PICC line and IV antibiotics which he will require for about another 45 days.    Pt denies fevers and chills.  Reports chronic pain in stomach and from his waist down. Needs enemas to make stools, but has been eating well. SACHA continues to drain well.     Recent admission  -   - IR guided fluid drainage from recto-vesicular space on  and was left a SACHA drain  - CT showed left pubic ramus mottling that was concerning for osteomyelitis  -  The culture of the patient’s intra-abdominal fluid resulted on  and was significant for growth VSE and multiple strains of E coli.   - PICC placed and pt was discharged on Vanc and Ertapenem with 6 weeks total therapy planned.   - pt desires hospice care however was rejected by hospice network given PICC and abx   - ID outpt follow up planned on  with Dr. Hathaway.     In ED  - afebrile with oral measurement, SBPs in 90s (baseline 110s-130s prev admsn), HR 70-80s.   - hgb 6.9 (baseline 7s-8s), WBC wnl, platelets in 100s, lactate 2.3  - xray with R PICC and ?old catether embolus    PAST MEDICAL & SURGICAL HISTORY:  Benign prostatic hyperplasia with lower urinary tract symptoms, symptom details unspecified  Prostate cancer  Hypertension  HLD (hyperlipidemia)  Type 2 diabetes mellitus  Paroxysmal A-fib  S/P AVR (aortic valve replacement)  S/P TURP      Allergies  oxycodone (Other)    Intolerances    ANTIMICROBIALS:  ertapenem  IVPB 1000 every 24 hours  vancomycin  IVPB 1000 every 24 hours    OTHER MEDS:  dextrose 40% Gel 15 Gram(s) Oral once PRN  dextrose 5%. 1000 milliLiter(s) IV Continuous <Continuous>  dextrose 50% Injectable 12.5 Gram(s) IV Push once  dextrose 50% Injectable 25 Gram(s) IV Push once  dextrose 50% Injectable 25 Gram(s) IV Push once  glucagon  Injectable 1 milliGRAM(s) IntraMuscular once PRN  insulin lispro (HumaLOG) corrective regimen sliding scale   SubCutaneous three times a day before meals  lisinopril 10 milliGRAM(s) Oral daily  metoprolol succinate ER 50 milliGRAM(s) Oral daily  naloxegol 12.5 milliGRAM(s) Oral daily  sodium chloride 0.9%. 1000 milliLiter(s) IV Continuous <Continuous>      SOCIAL HISTORY:    Marital Status:  .   Occupation: Retired, worked at [employer], had been volunteering at [employer] up until May.   Lives with: Wife     Substance Use (street drugs): None   Tobacco Usage:  Quit 33 years ago, prior to that was 2 packs per day.   Alcohol Usage: Social EtOH    FAMILY HISTORY:  No pertinent family history in first degree relatives    ROS:    Constitutional: no fever, no chills, no weight loss, no night sweats  Eye: no eye pain, no redness, no acute vision changes  ENT:  no sore throat, no rhinorrhea  Cardiovascular:  no chest pain, no palpitations  Respiratory:  no SOB, no cough  GI:  (+) abdom pain related to constipation  urinary: (+) penile discomfort  : (+) hematuria from SP catheter.   musculoskeletal:  no joint pain, no joint swelling  skin: no rash  neurology: no headache, no seizure, no change in mental status  psych: no anxiety, no depression     Physical Exam:    General:    NAD, non toxic  Head: atraumatic, normocephalic  Eyes: normal sclera and conjunctiva  ENT:   no oropharyngeal lesions, no LAD, neck supple  Cardio:    regular S1,S2, no murmur  Respiratory:   clear b/l, no wheezing  abd:   soft, BS +, not tender, no hepatosplenomegaly  :     no CVAT, no suprapubic tenderness, no barbour  Musculoskeletal : no joint swelling, no edema  Skin:    no rash  vascular: no lines, normal pulses  Neurologic:     no focal deficits  psych: normal affect, no suicidal ideation      Drug Dosing Weight  Height (cm): 175.26 (27 Dec 2018 05:15)  Weight (kg): 77.1 (26 Dec 2018 12:30)  BMI (kg/m2): 25.1 (27 Dec 2018 05:15)  BSA (m2): 1.93 (27 Dec 2018 05:15)    Vital Signs Last 24 Hrs  T(F): 98.4 (18 @ 09:05), Max: 98.5 (18 @ 13:00)    Vital Signs Last 24 Hrs  HR: 92 (18 @ 09:05) (76 - 98)  BP: 123/69 (18 @ 09:05) (90/50 - 139/76)  RR: 18 (18 @ 09:05)  SpO2: 96% (18 @ 09:05) (94% - 99%)  Wt(kg): --                          7.9    6.3   )-----------( 128      ( 27 Dec 2018 07:05 )             22.7           132<L>  |  98  |  18  ----------------------------<  151<H>  4.4   |  24  |  1.18    Ca    8.4      27 Dec 2018 07:06    TPro  6.9  /  Alb  2.8<L>  /  TBili  0.4  /  DBili  x   /  AST  13  /  ALT  <5<L>  /  AlkPhos  63        Urinalysis Basic - ( 26 Dec 2018 13:55 )    Color: Yellow / Appearance: Slightly Turbid / S.013 / pH: x  Gluc: x / Ketone: Negative  / Bili: Negative / Urobili: Negative   Blood: x / Protein: 100 mg/dL / Nitrite: Negative   Leuk Esterase: Large / RBC: 3 /hpf / WBC 81 /hpf   Sq Epi: x / Non Sq Epi: 10 /hpf / Bacteria: Negative        MICROBIOLOGY:  v  .Body Fluid Abdominal Fluid  18   Moderate Escherichia coli Multiple Morphological Strains  Few Enterococcus faecalis  Moderate Alpha hemolytic strep "Susceptibilities not performed"  --  Escherichia coli  Escherichia coli  Enterococcus faecalis      .Blood Blood-Peripheral  18   No growth at 5 days.  --  --      .Urine Nephrostomy - Left  18   Culture grew 3 or more types of organisms which indicate  collection contamination; consider recollection only if clinically  indicated.  --  Escherichia coli  Pseudomonas aeruginosa  Enterococcus faecalis                  RADIOLOGY:    Images below reviewed personally Note author: Marino Posadas, PGY 3  [facility] Pager: [phone]  [facility] Pager: [phone]  Spectra:     HPI:      83M PMH HTN, DM2, afib (off AC), bioprostethic AVR on ASA, BPH s/p partial TURP c/b seminal vesicle/prostatic abscess (Aug '18 with ecoli and e faecalis) now with urothelial cell cancer with mets to penis and prostate and possible pulm mets, urinary retention s/p suprapubic catheter and b/l nephrostomy tubes, presenting with hematuria from suprapubic tube. He is no longer on chemotherapy and recently the family decided to have home hospice, However the hospice was rejected due to the PICC line and IV antibiotics which he will require for about another 45 days.    Pt denies fevers and chills, CP, SOB.  Reports chronic pain in stomach and from his waist down. Needs enemas to make stools, but has been eating well. SACHA continues to drain well.     Recent admission  -   - IR guided fluid drainage from recto-vesicular space on  and was left a SACHA drain  - CT showed left pubic ramus mottling that was concerning for osteomyelitis  -  The culture of the patient’s intra-abdominal fluid resulted on  and was significant for growth VSE and multiple strains of E coli.   - PICC placed and pt was discharged on Vanc and Ertapenem with 6 weeks total therapy planned.   - pt desires hospice care however was rejected by hospice network given PICC and abx   - ID outpt follow up planned on  with Dr. Hathaway.     In ED  - afebrile with oral measurement, SBPs in 90s (baseline 110s-130s prev admsn), HR 70-80s.   - hgb 6.9 (baseline 7s-8s), WBC wnl, platelets in 100s, lactate 2.3  - xray with R PICC and ?old catether embolus    PAST MEDICAL & SURGICAL HISTORY:  Benign prostatic hyperplasia with lower urinary tract symptoms, symptom details unspecified  Prostate cancer  Hypertension  HLD (hyperlipidemia)  Type 2 diabetes mellitus  Paroxysmal A-fib  S/P AVR (aortic valve replacement)  S/P TURP      Allergies  oxycodone (Other)    Intolerances    ANTIMICROBIALS:  ertapenem  IVPB 1000 every 24 hours  vancomycin  IVPB 1000 every 24 hours    OTHER MEDS:  dextrose 40% Gel 15 Gram(s) Oral once PRN  dextrose 5%. 1000 milliLiter(s) IV Continuous <Continuous>  dextrose 50% Injectable 12.5 Gram(s) IV Push once  dextrose 50% Injectable 25 Gram(s) IV Push once  dextrose 50% Injectable 25 Gram(s) IV Push once  glucagon  Injectable 1 milliGRAM(s) IntraMuscular once PRN  insulin lispro (HumaLOG) corrective regimen sliding scale   SubCutaneous three times a day before meals  lisinopril 10 milliGRAM(s) Oral daily  metoprolol succinate ER 50 milliGRAM(s) Oral daily  naloxegol 12.5 milliGRAM(s) Oral daily  sodium chloride 0.9%. 1000 milliLiter(s) IV Continuous <Continuous>      SOCIAL HISTORY:    Marital Status:  .   Occupation: Retired, worked at [employer], had been volunteering at [employer] up until May.   Lives with: Wife     Substance Use (street drugs): None   Tobacco Usage:  Quit 33 years ago, prior to that was 2 packs per day.   Alcohol Usage: Social EtOH    FAMILY HISTORY:  No pertinent family history in first degree relatives    ROS:    Constitutional: no fever, no chills, no weight loss, no night sweats  Eye: no eye pain, no redness, no acute vision changes  ENT:  no sore throat, no rhinorrhea  Cardiovascular:  no chest pain, no palpitations  Respiratory:  no SOB, no cough  GI:  (+) abdom pain related to constipation  urinary: (+) penile discomfort  : (+) hematuria from SP catheter.   musculoskeletal:  no joint pain, no joint swelling  skin: no rash  neurology: no headache, no seizure, no change in mental status  psych: no anxiety, no depression     Physical Exam:    General:    NAD, non toxic  Head: atraumatic, normocephalic  Eyes: normal sclera and conjunctiva  ENT:   no oropharyngeal lesions, no LAD, neck supple  Cardio:    regular S1,S2, no murmur  Respiratory:   clear b/l, no wheezing  abd:   soft, BS +, not tender, no hepatosplenomegaly  :     no CVAT, no suprapubic tenderness, no barbour  Musculoskeletal : no joint swelling, no edema  Skin:    no rash  vascular: no lines, normal pulses  Neurologic:     no focal deficits  psych: normal affect, no suicidal ideation      Drug Dosing Weight  Height (cm): 175.26 (27 Dec 2018 05:15)  Weight (kg): 77.1 (26 Dec 2018 12:30)  BMI (kg/m2): 25.1 (27 Dec 2018 05:15)  BSA (m2): 1.93 (27 Dec 2018 05:15)    Vital Signs Last 24 Hrs  T(F): 98.4 (18 @ 09:05), Max: 98.5 (18 @ 13:00)    Vital Signs Last 24 Hrs  HR: 92 (18 @ 09:05) (76 - 98)  BP: 123/69 (18 @ 09:05) (90/50 - 139/76)  RR: 18 (18 @ 09:05)  SpO2: 96% (18 @ 09:05) (94% - 99%)  Wt(kg): --                          7.9    6.3   )-----------( 128      ( 27 Dec 2018 07:05 )             22.7           132<L>  |  98  |  18  ----------------------------<  151<H>  4.4   |  24  |  1.18    Ca    8.4      27 Dec 2018 07:06    TPro  6.9  /  Alb  2.8<L>  /  TBili  0.4  /  DBili  x   /  AST  13  /  ALT  <5<L>  /  AlkPhos  63        Urinalysis Basic - ( 26 Dec 2018 13:55 )    Color: Yellow / Appearance: Slightly Turbid / S.013 / pH: x  Gluc: x / Ketone: Negative  / Bili: Negative / Urobili: Negative   Blood: x / Protein: 100 mg/dL / Nitrite: Negative   Leuk Esterase: Large / RBC: 3 /hpf / WBC 81 /hpf   Sq Epi: x / Non Sq Epi: 10 /hpf / Bacteria: Negative        MICROBIOLOGY:  v  .Body Fluid Abdominal Fluid  18   Moderate Escherichia coli Multiple Morphological Strains  Few Enterococcus faecalis  Moderate Alpha hemolytic strep "Susceptibilities not performed"  --  Escherichia coli  Escherichia coli  Enterococcus faecalis      .Blood Blood-Peripheral  18   No growth at 5 days.  --  --      .Urine Nephrostomy - Left  18   Culture grew 3 or more types of organisms which indicate  collection contamination; consider recollection only if clinically  indicated.  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 He is no longer on chemotherapy and recently the family decided to have home hospice, However the hospice was rejected due to the PICC line and IV antibiotics which he will require for about another 45 days.    Pt denies fevers and chills, CP, SOB.  Reports chronic pain in stomach and from his waist down. Needs enemas to make stools, but has been eating well. SACHA continues to drain well.     Recent admission  -   - IR guided fluid drainage from recto-vesicular space on  and was left a SACHA drain  - CT showed left pubic ramus mottling that was concerning for osteomyelitis  -  The culture of the patient’s intra-abdominal fluid resulted on  and was significant for growth VSE and multiple strains of E coli.   - PICC placed and pt was discharged on Vanc and Ertapenem with 6 weeks total therapy planned.   - pt desires hospice care however was rejected by hospice network given PICC and abx   - ID outpt follow up planned on  with Dr. Hathaway.     In ED  - afebrile with oral measurement, SBPs in 90s (baseline 110s-130s prev admsn), HR 70-80s.   - hgb 6.9 (baseline 7s-8s), WBC wnl, platelets in 100s, lactate 2.3  - xray with R PICC and ?old catether embolus    PAST MEDICAL & SURGICAL HISTORY:  Benign prostatic hyperplasia with lower urinary tract symptoms, symptom details unspecified  Prostate cancer  Hypertension  HLD (hyperlipidemia)  Type 2 diabetes mellitus  Paroxysmal A-fib  S/P AVR (aortic valve replacement)  S/P TURP    Allergies  oxycodone (Other)    Intolerances    ANTIMICROBIALS:  ertapenem  IVPB 1000 every 24 hours  vancomycin  IVPB 1000 every 24 hours    OTHER MEDS:  dextrose 40% Gel 15 Gram(s) Oral once PRN  dextrose 5%. 1000 milliLiter(s) IV Continuous <Continuous>  dextrose 50% Injectable 12.5 Gram(s) IV Push once  dextrose 50% Injectable 25 Gram(s) IV Push once  dextrose 50% Injectable 25 Gram(s) IV Push once  glucagon  Injectable 1 milliGRAM(s) IntraMuscular once PRN  insulin lispro (HumaLOG) corrective regimen sliding scale   SubCutaneous three times a day before meals  lisinopril 10 milliGRAM(s) Oral daily  metoprolol succinate ER 50 milliGRAM(s) Oral daily  naloxegol 12.5 milliGRAM(s) Oral daily  sodium chloride 0.9%. 1000 milliLiter(s) IV Continuous <Continuous>      SOCIAL HISTORY:    Marital Status:  .   Occupation: Retired, worked at [employer], had been volunteering at [employer] up until May.   Lives with: Wife     Substance Use (street drugs): None   Tobacco Usage:  Quit 33 years ago, prior to that was 2 packs per day.   Alcohol Usage: Social EtOH    FAMILY HISTORY:  No pertinent family history in first degree relatives    ROS:    Constitutional: no fever, no chills, no weight loss, no night sweats  Eye: no eye pain, no redness, no acute vision changes  ENT:  no sore throat, no rhinorrhea  Cardiovascular:  no chest pain, no palpitations  Respiratory:  no SOB, no cough  GI:  (+) abdom pain related to constipation  urinary: (+) penile discomfort  : (+) hematuria from SP catheter.   musculoskeletal:  no joint pain, no joint swelling  skin: no rash  neurology: no headache, no seizure, no change in mental status  psych: no anxiety, no depression     Physical Exam:    General:    NAD, non toxic  Head: atraumatic, normocephalic  Eyes: normal sclera and conjunctiva  ENT:   no oropharyngeal lesions, no LAD, neck supple  Cardio:    regular S1,S2, no murmur  Respiratory:   clear b/l, no wheezing  abd:   soft, BS +, not tender, no hepatosplenomegaly.(+) BL SACHA  :   no suprapubic tenderness,  (+) Suprapubic catheter in place surround wound in healing phase with erythematous margins, sanguinas drained.   Musculoskeletal : no joint swelling, no edema  Skin:    no rash  vascular: no lines, normal pulses  Neurologic:     no focal deficits  psych: normal affect, no suicidal ideation      Drug Dosing Weight  Height (cm): 175.26 (27 Dec 2018 05:15)  Weight (kg): 77.1 (26 Dec 2018 12:30)  BMI (kg/m2): 25.1 (27 Dec 2018 05:15)  BSA (m2): 1.93 (27 Dec 2018 05:15)    Vital Signs Last 24 Hrs  T(F): 98.4 (18 @ 09:05), Max: 98.5 (18 @ 13:00)    Vital Signs Last 24 Hrs  HR: 92 (18 @ 09:05) (76 - 98)  BP: 123/69 (18 @ 09:05) (90/50 - 139/76)  RR: 18 (18 @ 09:05)  SpO2: 96% (18 @ 09:05) (94% - 99%)  Wt(kg): --                          7.9    6.3   )-----------( 128      ( 27 Dec 2018 07:05 )             22.7           132<L>  |  98  |  18  ----------------------------<  151<H>  4.4   |  24  |  1.18    Ca    8.4      27 Dec 2018 07:06    TPro  6.9  /  Alb  2.8<L>  /  TBili  0.4  /  DBili  x   /  AST  13  /  ALT  <5<L>  /  AlkPhos  63        Urinalysis Basic - ( 26 Dec 2018 13:55 )    Color: Yellow / Appearance: Slightly Turbid / S.013 / pH: x  Gluc: x / Ketone: Negative  / Bili: Negative / Urobili: Negative   Blood: x / Protein: 100 mg/dL / Nitrite: Negative   Leuk Esterase: Large / RBC: 3 /hpf / WBC 81 /hpf   Sq Epi: x / Non Sq Epi: 10 /hpf / Bacteria: Negative        MICROBIOLOGY:  v  .Body Fluid Abdominal Fluid  18   Moderate Escherichia coli Multiple Morphological Strains  Few Enterococcus faecalis  Moderate Alpha hemolytic strep "Susceptibilities not performed"  --  Escherichia coli  Escherichia coli  Enterococcus faecalis      .Blood Blood-Peripheral  18   No growth at 5 days.  --  --      .Urine Nephrostomy - Left  18   Culture grew 3 or more types of organisms which indicate  collection contamination; consider recollection only if clinically  indicated.  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 He is no longer on chemotherapy and recently the family decided to have home hospice, However the hospice was rejected due to the PICC line and IV antibiotics which he will require for about another 45 days.    Pt denies fevers and chills, CP, SOB.  Reports chronic pain in stomach and from his waist down. Needs enemas to make stools, but has been eating well. SACHA continues to drain well.     Recent admission  -   - IR guided fluid drainage from recto-vesicular space on  and was left a SACHA drain  - CT showed left pubic ramus mottling that was concerning for osteomyelitis  -  The culture of the patient’s intra-abdominal fluid resulted on  and was significant for growth VSE and multiple strains of E coli.   - PICC placed and pt was discharged on Vanc and Ertapenem with 6 weeks total therapy planned.   - pt desires hospice care however was rejected by hospice network given PICC and abx   - ID outpt follow up planned on  with Dr. Hathaway.     In ED  - afebrile with oral measurement, SBPs in 90s (baseline 110s-130s prev admsn), HR 70-80s.   - hgb 6.9 (baseline 7s-8s), WBC wnl, platelets in 100s, lactate 2.3  - xray with R PICC and ?old catether embolus    PAST MEDICAL & SURGICAL HISTORY:  Benign prostatic hyperplasia with lower urinary tract symptoms, symptom details unspecified  Prostate cancer  Hypertension  HLD (hyperlipidemia)  Type 2 diabetes mellitus  Paroxysmal A-fib  S/P AVR (aortic valve replacement)  S/P TURP    Allergies  oxycodone (Other)    Intolerances    ANTIMICROBIALS:  ertapenem  IVPB 1000 every 24 hours  vancomycin  IVPB 1000 every 24 hours    OTHER MEDS:  dextrose 40% Gel 15 Gram(s) Oral once PRN  dextrose 5%. 1000 milliLiter(s) IV Continuous <Continuous>  dextrose 50% Injectable 12.5 Gram(s) IV Push once  dextrose 50% Injectable 25 Gram(s) IV Push once  dextrose 50% Injectable 25 Gram(s) IV Push once  glucagon  Injectable 1 milliGRAM(s) IntraMuscular once PRN  insulin lispro (HumaLOG) corrective regimen sliding scale   SubCutaneous three times a day before meals  lisinopril 10 milliGRAM(s) Oral daily  metoprolol succinate ER 50 milliGRAM(s) Oral daily  naloxegol 12.5 milliGRAM(s) Oral daily  sodium chloride 0.9%. 1000 milliLiter(s) IV Continuous <Continuous>      SOCIAL HISTORY:    Marital Status:  .   Occupation: Retired, worked at [employer], had been volunteering at [facility] up until May.   Lives with: Wife     Substance Use (street drugs): None   Tobacco Usage:  Quit 33 years ago, prior to that was 2 packs per day.   Alcohol Usage: Social EtOH    FAMILY HISTORY:  No pertinent family history in first degree relatives    ROS:    Constitutional: no fever, no chills, no weight loss, no night sweats  Eye: no eye pain, no redness, no acute vision changes  ENT:  no sore throat, no rhinorrhea  Cardiovascular:  no chest pain, no palpitations  Respiratory:  no SOB, no cough  GI:  (+) abdom pain related to constipation  urinary: (+) penile discomfort  : (+) hematuria from SP catheter.   musculoskeletal:  no joint pain, no joint swelling  skin: no rash  neurology: no headache, no seizure, no change in mental status  psych: no anxiety, no depression     Physical Exam:    General:    NAD, non toxic  Head: atraumatic, normocephalic  Eyes: normal sclera and conjunctiva  ENT:   no oropharyngeal lesions, no LAD, neck supple  Cardio:    regular S1,S2, no murmur  Respiratory:   clear b/l, no wheezing  abd:   soft, BS +, not tender, no hepatosplenomegaly.(+) BL SACHA  :   no suprapubic tenderness,  (+) Suprapubic catheter in place surround wound in healing phase with erythematous margins, sanguinas drained.   Musculoskeletal : no joint swelling, no edema  Skin:    no rash  vascular: no lines, normal pulses  Neurologic:     no focal deficits  psych: normal affect, no suicidal ideation      Drug Dosing Weight  Height (cm): 175.26 (27 Dec 2018 05:15)  Weight (kg): 77.1 (26 Dec 2018 12:30)  BMI (kg/m2): 25.1 (27 Dec 2018 05:15)  BSA (m2): 1.93 (27 Dec 2018 05:15)    Vital Signs Last 24 Hrs  T(F): 98.4 (18 @ 09:05), Max: 98.5 (18 @ 13:00)    Vital Signs Last 24 Hrs  HR: 92 (18 @ 09:05) (76 - 98)  BP: 123/69 (18 @ 09:05) (90/50 - 139/76)  RR: 18 (18 @ 09:05)  SpO2: 96% (18 @ 09:05) (94% - 99%)  Wt(kg): --                          7.9    6.3   )-----------( 128      ( 27 Dec 2018 07:05 )             22.7           132<L>  |  98  |  18  ----------------------------<  151<H>  4.4   |  24  |  1.18    Ca    8.4      27 Dec 2018 07:06    TPro  6.9  /  Alb  2.8<L>  /  TBili  0.4  /  DBili  x   /  AST  13  /  ALT  <5<L>  /  AlkPhos  63        Urinalysis Basic - ( 26 Dec 2018 13:55 )    Color: Yellow / Appearance: Slightly Turbid / S.013 / pH: x  Gluc: x / Ketone: Negative  / Bili: Negative / Urobili: Negative   Blood: x / Protein: 100 mg/dL / Nitrite: Negative   Leuk Esterase: Large / RBC: 3 /hpf / WBC 81 /hpf   Sq Epi: x / Non Sq Epi: 10 /hpf / Bacteria: Negative        MICROBIOLOGY:  v  .Body Fluid Abdominal Fluid  18   Moderate Escherichia coli Multiple Morphological Strains  Few Enterococcus faecalis  Moderate Alpha hemolytic strep "Susceptibilities not performed"  --  Escherichia coli  Escherichia coli  Enterococcus faecalis      .Blood Blood-Peripheral  18   No growth at 5 days.  --  --      .Urine Nephrostomy - Left  18   Culture grew 3 or more types of organisms which indicate  collection contamination; consider recollection only if clinically  indicated.  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 He is no longer on chemotherapy and recently the family decided to have home hospice, However the hospice was rejected due to the PICC line and IV antibiotics which he will require for about another 45 days.    Pt denies fevers and chills, CP, SOB.  Reports chronic pain in stomach and from his waist down. Needs enemas to make stools, but has been eating well. SACHA continues to drain well. Feels better, abdominal discomfort resolved after suprapubic catheter exchange.     Recent admission  -   - IR guided fluid drainage from recto-vesicular space on  and was left a SACHA drain  - CT showed left pubic ramus mottling that was concerning for osteomyelitis  -  The culture of the patient’s intra-abdominal fluid resulted on  and was significant for growth VSE and multiple strains of E coli.   - PICC placed and pt was discharged on Vanc and Ertapenem with 6 weeks total therapy planned.   - pt desires hospice care however was rejected by hospice network given PICC and abx   - ID outpt follow up planned on  with Dr. Hathaway.     In ED  - afebrile with oral measurement, SBPs in 90s (baseline 110s-130s prev admsn), HR 70-80s.   - hgb 6.9 (baseline 7s-8s), WBC wnl, platelets in 100s, lactate 2.3  - xray with R PICC and ?old catheter embolus      PAST MEDICAL & SURGICAL HISTORY:  Benign prostatic hyperplasia with lower urinary tract symptoms, symptom details unspecified  Prostate cancer  Hypertension  HLD (hyperlipidemia)  Type 2 diabetes mellitus  Paroxysmal A-fib  S/P AVR (aortic valve replacement)  S/P TURP    Allergies  oxycodone (Other)        ANTIMICROBIALS:  ertapenem  IVPB 1000 every 24 hours  vancomycin  IVPB 1000 every 24 hours    OTHER MEDS:  dextrose 40% Gel 15 Gram(s) Oral once PRN  dextrose 5%. 1000 milliLiter(s) IV Continuous <Continuous>  dextrose 50% Injectable 12.5 Gram(s) IV Push once  dextrose 50% Injectable 25 Gram(s) IV Push once  dextrose 50% Injectable 25 Gram(s) IV Push once  glucagon  Injectable 1 milliGRAM(s) IntraMuscular once PRN  insulin lispro (HumaLOG) corrective regimen sliding scale   SubCutaneous three times a day before meals  lisinopril 10 milliGRAM(s) Oral daily  metoprolol succinate ER 50 milliGRAM(s) Oral daily  naloxegol 12.5 milliGRAM(s) Oral daily  sodium chloride 0.9%. 1000 milliLiter(s) IV Continuous <Continuous>      SOCIAL HISTORY:  Marital Status:  .   Occupation: Retired, worked at [employer], had been volunteering at [facility] up until May.   Lives with: Wife   Tobacco Usage:  Quit 33 years ago, prior to that was 2 packs per day.   Alcohol Usage: Social EtOH      FAMILY HISTORY:  Father dies at 67 with prostate cancer.       ROS:  Constitutional: no fever, no chills,   Eye: no redness, no acute vision changes  ENT:  no sore throat, no rhinorrhea  Cardiovascular:  no chest pain, no palpitations  Respiratory:  no SOB, no cough  GI:  (+) abdominal pain related to constipation  urinary: (+) penile discomfort  : (+) hematuria from SP catheter.   musculoskeletal:  no joint pain, no joint swelling  skin: no rash  neurology: no headache,  psych: no anxiety, no depression       Physical Exam:  General:    NAD, non toxic  Head: atraumatic, normocephalic  Eyes: normal sclera and conjunctiva  ENT:   no oropharyngeal lesions, neck supple  Cardio: regular S1,S2, no murmur  Respiratory: + air entry b/l   abd:   soft, BS +, not tender, (+) BL nephrostomy   :   (+) Suprapubic catheter in place with some bleeding around, draining clots and bloody urine mostly.   Musculoskeletal : no joint swelling, no edema  Skin:    no rash  vascular: + rt arm PICC, normal pulses  Neurologic:     no focal deficits  psych: normal affect      Vital Signs Last 24 Hrs  T(F): 98.4 (18 @ 09:05), Max: 98.5 (18 @ 13:00)    Vital Signs Last 24 Hrs  HR: 92 (18 @ 09:05) (76 - 98)  BP: 123/69 (18 @ 09:05) (90/50 - 139/76)  RR: 18 (18 @ 09:05)  SpO2: 96% (18 @ 09:05) (94% - 99%)                          7.9    6.3   )-----------( 128      ( 27 Dec 2018 07:05 )             22.7           132<L>  |  98  |  18  ----------------------------<  151<H>  4.4   |  24  |  1.18    Ca    8.4      27 Dec 2018 07:06    TPro  6.9  /  Alb  2.8<L>  /  TBili  0.4  /  DBili  x   /  AST  13  /  ALT  <5<L>  /  AlkPhos  63        Urinalysis Basic - ( 26 Dec 2018 13:55 )    Color: Yellow / Appearance: Slightly Turbid / S.013 / pH: x  Gluc: x / Ketone: Negative  / Bili: Negative / Urobili: Negative   Blood: x / Protein: 100 mg/dL / Nitrite: Negative   Leuk Esterase: Large / RBC: 3 /hpf / WBC 81 /hpf   Sq Epi: x / Non Sq Epi: 10 /hpf / Bacteria: Negative        MICROBIOLOGY:  .Body Fluid Abdominal Fluid  18   Moderate Escherichia coli Multiple Morphological Strains  Few Enterococcus faecalis  Moderate Alpha hemolytic strep "Susceptibilities not performed"  --  Escherichia coli  Escherichia coli  Enterococcus faecalis      .Blood Blood-Peripheral  18   No growth at 5 days.  --  --      .Urine Nephrostomy - Left  18   Culture grew 3 or more types of organisms which indicate  collection contamination; consider recollection only if clinically  indicated.  --  Escherichia coli  Pseudomonas aeruginosa  Enterococcus faecalis    RADIOLOGY:    Images below reviewed personally     < from: Xray Chest 1 View- PORTABLE-Urgent (18 @ 14:50) >  IMPRESSION:   Right-sided PICC line with tip in SVC.    There is a 3 mm radiopaque linear object in the right mid to lower lung,   which likely represents embolic object from an old catheter.    Clear lungs.

## 2018-12-27 NOTE — DISCHARGE NOTE ADULT - PLAN OF CARE
Stable Hgb counts s/p blood transfusions IR guided fluid drainage from recto-vesicular space on 12/7 and was left a SACHA drain  s/p drain check with IR during this admission OM of L pubic ramus & intra-abdominal fluid significant for growth VSE and multiple strains of E coli FROM LAST ADMISSION, requiring IV antibiotics Vanc and Ertapenem IR guided fluid drainage from recto-vesicular space on 12/7 and was left a SACHA drain  s/p drain check with IR during this admission; drain dislodged 1/3 - no need for replacement as per Dr Gutierrez in IR OM of L pubic ramus & intra-abdominal fluid significant for growth VSE and multiple strains of E coli FROM LAST ADMISSION, requiring IV antibiotics Vanc and ceftriaxone s/p blood transfusions  Continue iron supplements  Eat iron and protein rich foods including: meat, dark leafy green vegetables, and beans.  Limit caffeine.  Notify your doctor if you experience heartburn, constipation, diarrhea, nausea/vomiting, dizziness or are feeling extremely tired.  Seek immediate care or call 911 if you experience:  shortness of breath even at rest, you have blood in your bowel movement or vomit, if you are too dizzy to stand up Continue current medications Continue Vancomycin and Ceftriaxone via PICC until 1/25 as directed  CBC/ CMP/ Vancomycin trough weekly while on antibiotic YOu were evaluated by gastroenterologist   Continue bowel regimen as directed  Now tolerating diet well You were evaluated by urology-plan to allow bladder to clot off, follow up with Dr. Burroughs at [facility]   s/p blood transfusions  Continue iron supplements  Eat iron and protein rich foods including: meat, dark leafy green vegetables, and beans.  Limit caffeine.  Notify your doctor if you experience heartburn, constipation, diarrhea, nausea/vomiting, dizziness or are feeling extremely tired.  Seek immediate care or call 911 if you experience:  shortness of breath even at rest, you have blood in your bowel movement or vomit, if you are too dizzy to stand up You were evaluated by urology-plan to allow bladder to clot off, nephrostomy draining well, monitor urine output, follow up with Dr. Burroughs at [facility]   s/p blood transfusions  Continue iron supplements  Eat iron and protein rich foods including: meat, dark leafy green vegetables, and beans.  Limit caffeine.  Notify your doctor if you experience heartburn, constipation, diarrhea, nausea/vomiting, dizziness or are feeling extremely tired.  Seek immediate care or call 911 if you experience:  shortness of breath even at rest, you have blood in your bowel movement or vomit, if you are too dizzy to stand up

## 2018-12-27 NOTE — DISCHARGE NOTE ADULT - PATIENT PORTAL LINK FT
You can access the [facility] Patient Portal, offered by [facility], by registering with the following website: [email or web address]

## 2018-12-27 NOTE — CONSULT NOTE ADULT - PROBLEM SELECTOR RECOMMENDATION 3
Osteomyelitis of pubic ramus  - CT 12/9 shows mottling c/w OM fluid cx w/ E coli and E faecalis   - vanco and meropenam started on 12/6, now on vanco and ertapenam with 6 weeks tx planned depending on tx response  - discussed with ID who changed Ertapenem to Rocephin, abx to be continued until 1/25/19

## 2018-12-27 NOTE — CONSULT NOTE ADULT - PROBLEM SELECTOR RECOMMENDATION 4
s/p TURP c/b seminal vesicle/prostatic abscess (Aug '18 with ecoli and vse faecalis)  f/u CT abdomen to eval status of fluid collection that was previously drained (pt was supposed to have follow up appt with IR today)

## 2018-12-27 NOTE — CONSULT NOTE ADULT - ATTENDING COMMENTS
Gabby Holloway  Pager: [phone]. If no response or past 5 pm call [phone].
I was physically present for the key portions of the evaluation and management (E/M) service provided.  I agree with the above history, physical, and plan which I have reviewed and edited where appropriate. Plan discussed with team.

## 2018-12-27 NOTE — CONSULT NOTE ADULT - PROBLEM SELECTOR RECOMMENDATION 6
Advance care planning discussion with pt and wife. Made copy of HCP, where he assigned wife as primary decision maker. Discussed with pt and wife MOLST form as well as different aspects regarding ACP, pt had signed DNR form with [facility] prior to admission, but would like to review MOLST form before making final decision.

## 2018-12-27 NOTE — DISCHARGE NOTE ADULT - CONDITIONS AT DISCHARGE
Pt is a&o x4. Pt denies any pain. Pt. verbalized understanding of discharge instructions. Pt is ambulatory, voiding, tolerating diet, and VSS. Pt. verbalized understanding of medications prescribed and to follow up with MD in time ordered. IV lock removed and site WDL. L PICC WDL. B/l nephrostomy tube & suprapubic cath intact. Safety maintained. Pt being transported via ambullette.

## 2018-12-27 NOTE — DISCHARGE NOTE ADULT - MEDICATION SUMMARY - MEDICATIONS TO TAKE
I will START or STAY ON the medications listed below when I get home from the hospital:    CBC, CHEMISTRY, VANCOMYCIN LEVEL QWEEKLY  -- Indication: For blood work while on antibiotic     morphine 15 mg oral tablet  -- 1 tab(s) by mouth every 4 hours, As needed, Severe Pain (7 - 10) MDD:6 tabs  -- Indication: For Pain managment     morphine 30 mg/8 to 12 hr oral tablet, extended release  -- 1 tab(s) by mouth 2 times a day MDD:2 tabs  -- Indication: For Pain     lisinopril 10 mg oral tablet  -- 1 tab(s) by mouth once a day  -- Indication: For Blood pressure control    aluminum hydroxide-magnesium hydroxide 200 mg-200 mg/5 mL oral suspension  -- 30 milliliter(s) by mouth every 4 hours, As needed, Dyspepsia  -- Indication: For Dyspepsia    metFORMIN 1000 mg oral tablet  -- 1 tab(s) by mouth 2 times a day  -- Indication: For type 2 diabetes    metoprolol succinate 50 mg oral tablet, extended release  -- 1 tab(s) by mouth once a day  -- Indication: For blood pressure and heart rate control    cefTRIAXone 1 g intravenous injection  -- 1 gram(s) intravenous every 24 hours UNTIL 1/25/19  -- Indication: For Antibiotic for onstemyelitis     Movantik 12.5 mg oral tablet  -- 1 tab(s) by mouth once a day  -- Indication: For Opioid induced constipation     vancomycin 750 mg intravenous injection  -- 750 milligram(s) intravenous every 12 hours UNTIL 1/25/19    check trough in 1 week  last trough level on 1/1/19 IS 15.1  -- Indication: For Antibiotic for osteomyelitis     docusate sodium 100 mg oral capsule  -- 1 cap(s) by mouth 3 times a day, As needed, Constipation  -- Indication: For bowel movement     senna oral tablet  -- 2 tab(s) by mouth once a day (at bedtime)  -- Indication: For bowel movement     MiraLax oral powder for reconstitution  -- 17 gram(s) by mouth once a day (in the morning)  -- Indication: For bowel movement     lactobacillus acidophilus oral capsule  -- 1 cap(s) by mouth 4 times a day (with meals and at bedtime)   -- Indication: For supplement

## 2018-12-27 NOTE — CONSULT NOTE ADULT - ASSESSMENT
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     # Osteomyelitis of pubic ramus  - CT 12/9 shows mottling c/w OM fluid cx w/ E coli and E faecalis   - vanco and meropenam started on 12/6, now on vanco and ertapenam with 6 weeks tx planned depending on tx response  - pt currently afebrile, WBC wnl, lactate 2.3  - no evidence to suggest treatment failure   - on discussion with pall care at bedside, hospice rejected her case based on their inability to cover ertapenam   - cultures and sensitivities reviewed, will change ertapenam to ceftriaxone, would should be more affordable  - cont with vanco  - check CT to eval status of fluid collection that was previously drained (pt was supposed to have follow up appt with IR today)  - if CT appears stable or improved, can likely reduced total days of abx as this will also affect hospice decision.     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   Overall prostate abscess, OM of pubic ramus, abnormal U/A, anemia, thrombocytopenia, [place], lactic acidosis.      Plan;   - CT 12/9 shows mottling c/w OM fluid cx w/ E coli and E faecalis   - vanco and meropenam started on 12/6, now on vanco and ertapenam with 6 weeks tx planned depending on tx response  - pt currently afebrile, WBC wnl, lactate 2.3  - no evidence to suggest treatment failure   - on discussion with pall care at bedside, hospice rejected her case based on their inability to cover ertapenam   - cultures and sensitivities reviewed, will change ertapenam to ceftriaxone, would should be more affordable  - cont with vanco at current dose.   - Check trough   - check CT to eval status of fluid collection that was previously drained (pt was supposed to have follow up appt with IR today)  - if CT appears stable or improved, can likely change abx duration based on that.

## 2018-12-27 NOTE — DISCHARGE NOTE ADULT - ADDITIONAL INSTRUCTIONS
Make appointments to follow up with your out patient physicians.  Bring all discharge paperwork including discharge medication list to your follow up appointments. Supportive care with Hospice  Follow up with urology Dr. Burroughs  Follow up with ID. Dr. Kaiser  Make appointments to follow up with your out patient physicians.  Bring all discharge paperwork including discharge medication list to your follow up appointments.

## 2018-12-27 NOTE — DISCHARGE NOTE ADULT - CARE PLAN
Principal Discharge DX:	Gross hematuria  Goal:	Stable Hgb counts  Assessment and plan of treatment:	s/p blood transfusions  Secondary Diagnosis:	Prostate abscess  Assessment and plan of treatment:	IR guided fluid drainage from recto-vesicular space on 12/7 and was left a SACHA drain  s/p drain check with IR during this admission  Secondary Diagnosis:	Prostate cancer  Secondary Diagnosis:	Osteomyelitis hip  Assessment and plan of treatment:	OM of L pubic ramus & intra-abdominal fluid significant for growth VSE and multiple strains of E coli FROM LAST ADMISSION, requiring IV antibiotics Vanc and Ertapenem Principal Discharge DX:	Gross hematuria  Goal:	Stable Hgb counts  Assessment and plan of treatment:	s/p blood transfusions  Secondary Diagnosis:	Prostate abscess  Assessment and plan of treatment:	IR guided fluid drainage from recto-vesicular space on 12/7 and was left a SACHA drain  s/p drain check with IR during this admission; drain dislodged 1/3 - no need for replacement as per Dr Gutierrez in IR  Secondary Diagnosis:	Prostate cancer  Secondary Diagnosis:	Osteomyelitis hip  Assessment and plan of treatment:	OM of L pubic ramus & intra-abdominal fluid significant for growth VSE and multiple strains of E coli FROM LAST ADMISSION, requiring IV antibiotics Vanc and Ertapenem Principal Discharge DX:	Gross hematuria  Goal:	Stable Hgb counts  Assessment and plan of treatment:	s/p blood transfusions  Secondary Diagnosis:	Prostate abscess  Assessment and plan of treatment:	IR guided fluid drainage from recto-vesicular space on 12/7 and was left a SACHA drain  s/p drain check with IR during this admission; drain dislodged 1/3 - no need for replacement as per Dr Gutierrez in IR  Secondary Diagnosis:	Prostate cancer  Secondary Diagnosis:	Osteomyelitis hip  Assessment and plan of treatment:	OM of L pubic ramus & intra-abdominal fluid significant for growth VSE and multiple strains of E coli FROM LAST ADMISSION, requiring IV antibiotics Vanc and ceftriaxone Principal Discharge DX:	Gross hematuria  Goal:	Stable Hgb counts  Assessment and plan of treatment:	s/p blood transfusions  Continue iron supplements  Eat iron and protein rich foods including: meat, dark leafy green vegetables, and beans.  Limit caffeine.  Notify your doctor if you experience heartburn, constipation, diarrhea, nausea/vomiting, dizziness or are feeling extremely tired.  Seek immediate care or call 911 if you experience:  shortness of breath even at rest, you have blood in your bowel movement or vomit, if you are too dizzy to stand up  Secondary Diagnosis:	Prostate abscess  Assessment and plan of treatment:	IR guided fluid drainage from recto-vesicular space on 12/7 and was left a SACHA drain  s/p drain check with IR during this admission; drain dislodged 1/3 - no need for replacement as per Dr Gutierrez in IR  Secondary Diagnosis:	Prostate cancer  Assessment and plan of treatment:	Continue current medications  Secondary Diagnosis:	Osteomyelitis hip  Assessment and plan of treatment:	OM of L pubic ramus & intra-abdominal fluid significant for growth VSE and multiple strains of E coli FROM LAST ADMISSION, requiring IV antibiotics Vanc and ceftriaxone Principal Discharge DX:	Gross hematuria  Goal:	Stable Hgb counts  Assessment and plan of treatment:	s/p blood transfusions  Continue iron supplements  Eat iron and protein rich foods including: meat, dark leafy green vegetables, and beans.  Limit caffeine.  Notify your doctor if you experience heartburn, constipation, diarrhea, nausea/vomiting, dizziness or are feeling extremely tired.  Seek immediate care or call 911 if you experience:  shortness of breath even at rest, you have blood in your bowel movement or vomit, if you are too dizzy to stand up  Secondary Diagnosis:	Prostate abscess  Assessment and plan of treatment:	IR guided fluid drainage from recto-vesicular space on 12/7 and was left a SACHA drain  s/p drain check with IR during this admission; drain dislodged 1/3 - no need for replacement as per Dr Gutierrez in IR  Secondary Diagnosis:	Prostate cancer  Assessment and plan of treatment:	Continue current medications  Secondary Diagnosis:	Osteomyelitis hip  Assessment and plan of treatment:	Continue Vancomycin and Ceftriaxone via PICC until 1/25 as directed  CBC/ CMP/ Vancomycin trough weekly while on antibiotic  Secondary Diagnosis:	Ileus  Assessment and plan of treatment:	YOu were evaluated by gastroenterologist   Continue bowel regimen as directed  Now tolerating diet well Principal Discharge DX:	Gross hematuria  Goal:	Stable Hgb counts  Assessment and plan of treatment:	You were evaluated by urology-plan to allow bladder to clot off, follow up with Dr. Burroughs at [facility]   s/p blood transfusions  Continue iron supplements  Eat iron and protein rich foods including: meat, dark leafy green vegetables, and beans.  Limit caffeine.  Notify your doctor if you experience heartburn, constipation, diarrhea, nausea/vomiting, dizziness or are feeling extremely tired.  Seek immediate care or call 911 if you experience:  shortness of breath even at rest, you have blood in your bowel movement or vomit, if you are too dizzy to stand up  Secondary Diagnosis:	Prostate abscess  Assessment and plan of treatment:	IR guided fluid drainage from recto-vesicular space on 12/7 and was left a SACHA drain  s/p drain check with IR during this admission; drain dislodged 1/3 - no need for replacement as per Dr Gutierrez in IR  Secondary Diagnosis:	Prostate cancer  Assessment and plan of treatment:	Continue current medications  Secondary Diagnosis:	Osteomyelitis hip  Assessment and plan of treatment:	Continue Vancomycin and Ceftriaxone via PICC until 1/25 as directed  CBC/ CMP/ Vancomycin trough weekly while on antibiotic  Secondary Diagnosis:	Ileus  Assessment and plan of treatment:	YOu were evaluated by gastroenterologist   Continue bowel regimen as directed  Now tolerating diet well Principal Discharge DX:	Gross hematuria  Goal:	Stable Hgb counts  Assessment and plan of treatment:	You were evaluated by urology-plan to allow bladder to clot off, nephrostomy draining well, monitor urine output, follow up with Dr. Burroughs at [facility]   s/p blood transfusions  Continue iron supplements  Eat iron and protein rich foods including: meat, dark leafy green vegetables, and beans.  Limit caffeine.  Notify your doctor if you experience heartburn, constipation, diarrhea, nausea/vomiting, dizziness or are feeling extremely tired.  Seek immediate care or call 911 if you experience:  shortness of breath even at rest, you have blood in your bowel movement or vomit, if you are too dizzy to stand up  Secondary Diagnosis:	Prostate abscess  Assessment and plan of treatment:	IR guided fluid drainage from recto-vesicular space on 12/7 and was left a SACHA drain  s/p drain check with IR during this admission; drain dislodged 1/3 - no need for replacement as per Dr Gutierrez in IR  Secondary Diagnosis:	Prostate cancer  Assessment and plan of treatment:	Continue current medications  Secondary Diagnosis:	Osteomyelitis hip  Assessment and plan of treatment:	Continue Vancomycin and Ceftriaxone via PICC until 1/25 as directed  CBC/ CMP/ Vancomycin trough weekly while on antibiotic  Secondary Diagnosis:	Ileus  Assessment and plan of treatment:	YOu were evaluated by gastroenterologist   Continue bowel regimen as directed  Now tolerating diet well

## 2018-12-27 NOTE — DISCHARGE NOTE ADULT - CARE PROVIDERS DIRECT ADDRESSES
,[email or web address]

## 2018-12-27 NOTE — PROGRESS NOTE ADULT - ASSESSMENT
- No active  intervention at this time  - IR tube check   - Follow up ID re antibiotic plan. PO options in order to pursue home hospice?  - Transfuse as needed  - Palliative consult  - f/u Oncology  - Continue NTs to bag drainage   - Continue SPT. Do no flush or manipulate SPT - No active  intervention at this time  - IR SACHA drain study   - Follow up ID re antibiotic plan. PO options in order to pursue home hospice?  - Transfuse as needed  - Palliative consult  - f/u Oncology  - Continue NTs to bag drainage   - Continue SPT. Do no flush or manipulate SPT

## 2018-12-27 NOTE — DISCHARGE NOTE ADULT - CARE PROVIDER_API CALL
Kerwin Hathaway), Infectious Disease; Internal Medicine  [address] [place] [address]  Phone: [phone]  Fax: [phone] Kerwin Hathaway), Infectious Disease; Internal Medicine  [address] [place] [address]  Phone: [phone]  Fax: [phone]    Irvin Burroughs), Urology  [address] [place] [address]  Phone: [phone]  Fax: [phone]

## 2018-12-27 NOTE — CONSULT NOTE ADULT - SUBJECTIVE AND OBJECTIVE BOX
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 He is no longer on chemotherapy and recently the family decided to have home hospice, However the hospice was rejected due to the PICC line and IV antibiotics which he will require for about another 45 days. During ED w/u, pt presented afebrile, SBPs in 90s (baseline 110s-130s prev admsn), HR 70-80s, hgb 6.9 (baseline 7s-8s), WBC wnl, platelets in 100s, lactate 2.3.     Family interested in home hospice, wife has difficult time taking care of him at home. Pt was seen on prior admission by [facility] but was not a candidate due to pt on long term abx. Discussed with wife and pt at bedside options of enrolling once abx course finished vs requesting another hospice agency to see if he qualifies.      PERTINENT PM/SXH:   Benign prostatic hyperplasia with lower urinary tract symptoms, symptom details unspecified  Prostate cancer  Hypertension  HLD (hyperlipidemia)  Type 2 diabetes mellitus  Paroxysmal A-fib    S/P AVR (aortic valve replacement)  S/P TURP    FAMILY HISTORY:  No pertinent family history in first degree relatives    ITEMS NOT CHECKED ARE NOT PRESENT    SOCIAL HISTORY:   Significant other/partner: wife Arlen Nation [x]  Children:  [ ]  Alevism/Spirituality: Jehovah's witness   Substance hx:  [ ]   Tobacco hx:  [ ]   Alcohol hx: [ ]   Home Opioid hx:  [x] I-Stop Reference No: # [id number]  ·	Morphine 15mg IR q/ 8 hrs as needed for pain  Living Situation: [x]Home  [ ]Long term care  [ ]Rehab [ ]Other    ADVANCE DIRECTIVES:    DNR no   MOLST  [ ]  Living Will  [ ]   DECISION MAKER(s): patient is alert and oriented and able to make decisions, if not able in absence of HCP form, decision maker would be his surrogate, his wife Arlen  [ ] Health Care Proxy(s)  [ ] Surrogate(s)  [ ] Guardian           Name(s): Arlen Nation Phone Number(s): [phone]    BASELINE (I)ADL(s) (prior to admission):  [place]: [ ]Total  [x] Moderate [ ]Dependent    Allergies    oxycodone (Other)    Intolerances    MEDICATIONS  (STANDING):  dextrose 5%. 1000 milliLiter(s) (50 mL/Hr) IV Continuous <Continuous>  dextrose 50% Injectable 12.5 Gram(s) IV Push once  dextrose 50% Injectable 25 Gram(s) IV Push once  dextrose 50% Injectable 25 Gram(s) IV Push once  ertapenem  IVPB 1000 milliGRAM(s) IV Intermittent every 24 hours  insulin lispro (HumaLOG) corrective regimen sliding scale   SubCutaneous three times a day before meals  lisinopril 10 milliGRAM(s) Oral daily  metoprolol succinate ER 50 milliGRAM(s) Oral daily  naloxegol 12.5 milliGRAM(s) Oral daily  sodium chloride 0.9%. 1000 milliLiter(s) (75 mL/Hr) IV Continuous <Continuous>  vancomycin  IVPB 1000 milliGRAM(s) IV Intermittent every 24 hours    MEDICATIONS  (PRN):  dextrose 40% Gel 15 Gram(s) Oral once PRN Blood Glucose LESS THAN 70 milliGRAM(s)/deciliter  glucagon  Injectable 1 milliGRAM(s) IntraMuscular once PRN Glucose LESS THAN 70 milligrams/deciliter  morphine  IR 15 milliGRAM(s) Oral every 4 hours PRN Severe Pain (7 - 10)    PRESENT SYMPTOMS: [ ]Unable to obtain due to poor mentation   Source if other than patient:  [ ]Family   [ ]Team     Pain (Impact on QOL): yes  Location - suprapubic area        Minimal acceptable level (0-10 scale):  3-4/10           Aggravating factors - movement  Quality - sharp pain  Radiation - penis, lower back  Severity (0-10 scale) -  8/10  Timing - intermittent     PAIN AD Score:     [email or web address] (press ctrl +  left click to view)    Dyspnea:                           [ ]Mild [ ]Moderate [ ]Severe  Anxiety:                             [ ]Mild [ ]Moderate [ ]Severe  Fatigue:                             [ ]Mild [ ]Moderate [ ]Severe  Nausea:                             [ ]Mild [ ]Moderate [ ]Severe  Loss of appetite:              [ ]Mild [ ]Moderate [ ]Severe  Constipation:                    [ ]Mild [ ]Moderate [x]Severe    Other Symptoms:  [x]All other review of systems negative     Karnofsky Performance Score/Palliative Performance Status Version 2: 40%    http://palliative.info/resource_material/[email or web address].pdf    PHYSICAL EXAM:  Vital Signs Last 24 Hrs  T(C): 36.9 (27 Dec 2018 09:05), Max: 36.9 (26 Dec 2018 13:00)  T(F): 98.4 (27 Dec 2018 09:05), Max: 98.5 (26 Dec 2018 13:00)  HR: 92 (27 Dec 2018 09:05) (76 - 98)  BP: 123/69 (27 Dec 2018 09:05) (90/50 - 139/76)  RR: 18 (27 Dec 2018 09:05) (16 - 20)  SpO2: 96% (27 Dec 2018 09:05) (94% - 99%) I&O's Summary    26 Dec 2018 07:01  -  27 Dec 2018 07:00  --------------------------------------------------------  IN: 1095 mL / OUT: 1255 mL / NET: -160 mL    GENERAL:  [x]Alert  [x]Oriented x 3  [ ]Lethargic  [ ]Cachexia  [ ]Unarousable  [ ]Verbal  [ ]Non-Verbal  Behavioral:   [ ] Anxiety  [ ] Delirium [ ] Agitation [ ] Other  HEENT:  [x]Normal   [ ]Dry mouth   [ ]ET Tube/Trach  [ ]Oral lesions  PULMONARY:   [x]Clear [ ]Tachypnea  [ ]Audible excessive secretions   [ ]Rhonchi        [ ]Right [ ]Left [ ]Bilateral  [ ]Crackles        [ ]Right [ ]Left [ ]Bilateral  [ ]Wheezing     [ ]Right [ ]Left [ ]Bilateral  CARDIOVASCULAR:    [x]Regular [ ]Irregular [ ]Tachy  [ ]Lico [ ]Murmur [ ]Other  GASTROINTESTINAL:  [x]Soft  [ ]Distended   [x]+BS  [ ]Non tender [x]Tender / suprapubic area [ ]PEG [ ]OGT/ NGT  Last BM:   GENITOURINARY:  [ ]Normal [ ] Incontinent   [ ]Oliguria/Anuria   [x] Suprapubic catheter with hematuria, bilateral nephrostomy tubes   MUSCULOSKELETAL:   [ ]Normal   [x]Weakness  [ ]Bed/Wheelchair bound [ ]Edema  NEUROLOGIC:   [x]No focal deficits  [ ] Cognitive impairment  [ ] Dysphagia [ ]Dysarthria [ ] Paresis [ ]Other   SKIN:   [x]Normal   [ ]Pressure ulcer(s)  [ ]Rash    CRITICAL CARE:  [ ] Shock Present  [ ]Septic [ ]Cardiogenic [ ]Neurologic [ ]Hypovolemic  [ ]  Vasopressors [ ]  Inotropes   [ ] Respiratory failure present  [ ] Acute  [ ] Chronic [ ] Hypoxic  [ ] Hypercarbic [ ] Other  [ ] Other organ failure     LABS:                        7.9    6.3   )-----------( 128      ( 27 Dec 2018 07:05 )             22.7       132<L>  |  98  |  18  ----------------------------<  151<H>  4.4   |  24  |  1.18    Ca    8.4      27 Dec 2018 07:06    TPro  6.9  /  Alb  2.8<L>  /  TBili  0.4  /  DBili  x   /  AST  13  /  ALT  <5<L>  /  AlkPhos  63    PT/INR - ( 26 Dec 2018 13:58 )   PT: 13.8 sec;   INR: 1.19 ratio    PTT - ( 26 Dec 2018 13:58 )  PTT:29.6 sec    Urinalysis Basic - ( 26 Dec 2018 13:55 )    Color: Yellow / Appearance: Slightly Turbid / S.013 / pH: x  Gluc: x / Ketone: Negative  / Bili: Negative / Urobili: Negative   Blood: x / Protein: 100 mg/dL / Nitrite: Negative   Leuk Esterase: Large / RBC: 3 /hpf / WBC 81 /hpf   Sq Epi: x / Non Sq Epi: 10 /hpf / Bacteria: Negative    RADIOLOGY & ADDITIONAL STUDIES: reviewed     PROTEIN CALORIE MALNUTRITION PRESENT: [ ] Yes [ ] No  [ ] PPSV2 < or = to 30% [ ] significant weight loss  [ ] poor nutritional intake [ ] catabolic state [ ] anasarca     Albumin, Serum: 2.8 g/dL (18 @ 13:45)  Artificial Nutrition [ ]     REFERRALS:   [ ]Chaplaincy  [ ] Hospice  [ ]Child Life  [ ]Social Work  [ ]Case management [ ]Holistic Therapy   Goals of Care Discussion Document:

## 2018-12-28 ENCOUNTER — APPOINTMENT (OUTPATIENT)
Dept: CT IMAGING | Facility: HOSPITAL | Age: 83
End: 2018-12-28

## 2018-12-28 LAB
ANION GAP SERPL CALC-SCNC: 11 MMOL/L — SIGNIFICANT CHANGE UP (ref 5–17)
BUN SERPL-MCNC: 17 MG/DL — SIGNIFICANT CHANGE UP (ref 7–23)
CALCIUM SERPL-MCNC: 8.5 MG/DL — SIGNIFICANT CHANGE UP (ref 8.4–10.5)
CHLORIDE SERPL-SCNC: 99 MMOL/L — SIGNIFICANT CHANGE UP (ref 96–108)
CO2 SERPL-SCNC: 25 MMOL/L — SIGNIFICANT CHANGE UP (ref 22–31)
CREAT SERPL-MCNC: 1.23 MG/DL — SIGNIFICANT CHANGE UP (ref 0.5–1.3)
CULTURE RESULTS: SIGNIFICANT CHANGE UP
CULTURE RESULTS: SIGNIFICANT CHANGE UP
GLUCOSE SERPL-MCNC: 168 MG/DL — HIGH (ref 70–99)
HCT VFR BLD CALC: 22.6 % — LOW (ref 39–50)
HCT VFR BLD CALC: 24 % — LOW (ref 39–50)
HCT VFR BLD CALC: 25.4 % — LOW (ref 39–50)
HCT VFR BLD CALC: 25.4 % — LOW (ref 39–50)
HGB BLD-MCNC: 7.8 G/DL — LOW (ref 13–17)
HGB BLD-MCNC: 8.2 G/DL — LOW (ref 13–17)
HGB BLD-MCNC: 8.3 G/DL — LOW (ref 13–17)
HGB BLD-MCNC: 8.7 G/DL — LOW (ref 13–17)
MCHC RBC-ENTMCNC: 27.4 PG — SIGNIFICANT CHANGE UP (ref 27–34)
MCHC RBC-ENTMCNC: 28.9 PG — SIGNIFICANT CHANGE UP (ref 27–34)
MCHC RBC-ENTMCNC: 29.3 PG — SIGNIFICANT CHANGE UP (ref 27–34)
MCHC RBC-ENTMCNC: 29.7 PG — SIGNIFICANT CHANGE UP (ref 27–34)
MCHC RBC-ENTMCNC: 32.4 GM/DL — SIGNIFICANT CHANGE UP (ref 32–36)
MCHC RBC-ENTMCNC: 34.1 GM/DL — SIGNIFICANT CHANGE UP (ref 32–36)
MCHC RBC-ENTMCNC: 34.6 GM/DL — SIGNIFICANT CHANGE UP (ref 32–36)
MCHC RBC-ENTMCNC: 34.6 GM/DL — SIGNIFICANT CHANGE UP (ref 32–36)
MCV RBC AUTO: 84.5 FL — SIGNIFICANT CHANGE UP (ref 80–100)
MCV RBC AUTO: 84.7 FL — SIGNIFICANT CHANGE UP (ref 80–100)
MCV RBC AUTO: 84.9 FL — SIGNIFICANT CHANGE UP (ref 80–100)
MCV RBC AUTO: 85.7 FL — SIGNIFICANT CHANGE UP (ref 80–100)
PLATELET # BLD AUTO: 118 K/UL — LOW (ref 150–400)
PLATELET # BLD AUTO: 118 K/UL — LOW (ref 150–400)
PLATELET # BLD AUTO: 120 K/UL — LOW (ref 150–400)
PLATELET # BLD AUTO: 129 K/UL — LOW (ref 150–400)
POTASSIUM SERPL-MCNC: 4.2 MMOL/L — SIGNIFICANT CHANGE UP (ref 3.5–5.3)
POTASSIUM SERPL-SCNC: 4.2 MMOL/L — SIGNIFICANT CHANGE UP (ref 3.5–5.3)
RBC # BLD: 2.64 M/UL — LOW (ref 4.2–5.8)
RBC # BLD: 2.84 M/UL — LOW (ref 4.2–5.8)
RBC # BLD: 2.99 M/UL — LOW (ref 4.2–5.8)
RBC # BLD: 3.01 M/UL — LOW (ref 4.2–5.8)
RBC # FLD: 15.9 % — HIGH (ref 10.3–14.5)
RBC # FLD: 16 % — HIGH (ref 10.3–14.5)
RBC # FLD: 16.1 % — HIGH (ref 10.3–14.5)
RBC # FLD: 16.4 % — HIGH (ref 10.3–14.5)
SODIUM SERPL-SCNC: 135 MMOL/L — SIGNIFICANT CHANGE UP (ref 135–145)
SPECIMEN SOURCE: SIGNIFICANT CHANGE UP
SPECIMEN SOURCE: SIGNIFICANT CHANGE UP
WBC # BLD: 5.6 K/UL — SIGNIFICANT CHANGE UP (ref 3.8–10.5)
WBC # BLD: 5.6 K/UL — SIGNIFICANT CHANGE UP (ref 3.8–10.5)
WBC # BLD: 5.7 K/UL — SIGNIFICANT CHANGE UP (ref 3.8–10.5)
WBC # BLD: 6.5 K/UL — SIGNIFICANT CHANGE UP (ref 3.8–10.5)
WBC # FLD AUTO: 5.6 K/UL — SIGNIFICANT CHANGE UP (ref 3.8–10.5)
WBC # FLD AUTO: 5.6 K/UL — SIGNIFICANT CHANGE UP (ref 3.8–10.5)
WBC # FLD AUTO: 5.7 K/UL — SIGNIFICANT CHANGE UP (ref 3.8–10.5)
WBC # FLD AUTO: 6.5 K/UL — SIGNIFICANT CHANGE UP (ref 3.8–10.5)

## 2018-12-28 PROCEDURE — 99232 SBSQ HOSP IP/OBS MODERATE 35: CPT

## 2018-12-28 PROCEDURE — 72192 CT PELVIS W/O DYE: CPT | Mod: 26

## 2018-12-28 PROCEDURE — 49424 ASSESS CYST CONTRAST INJECT: CPT

## 2018-12-28 PROCEDURE — 76080 X-RAY EXAM OF FISTULA: CPT | Mod: 26

## 2018-12-28 RX ORDER — BACITRACIN ZINC 500 UNIT/G
1 OINTMENT IN PACKET (EA) TOPICAL DAILY
Qty: 0 | Refills: 0 | Status: DISCONTINUED | OUTPATIENT
Start: 2018-12-28 | End: 2019-01-08

## 2018-12-28 RX ORDER — SENNA PLUS 8.6 MG/1
2 TABLET ORAL AT BEDTIME
Qty: 0 | Refills: 0 | Status: DISCONTINUED | OUTPATIENT
Start: 2018-12-28 | End: 2019-01-08

## 2018-12-28 RX ORDER — DOCUSATE SODIUM 100 MG
100 CAPSULE ORAL THREE TIMES A DAY
Qty: 0 | Refills: 0 | Status: DISCONTINUED | OUTPATIENT
Start: 2018-12-28 | End: 2019-01-08

## 2018-12-28 RX ADMIN — MORPHINE SULFATE 15 MILLIGRAM(S): 50 CAPSULE, EXTENDED RELEASE ORAL at 17:46

## 2018-12-28 RX ADMIN — CEFTRIAXONE 100 GRAM(S): 500 INJECTION, POWDER, FOR SOLUTION INTRAMUSCULAR; INTRAVENOUS at 05:18

## 2018-12-28 RX ADMIN — Medication 2: at 10:11

## 2018-12-28 RX ADMIN — Medication 4: at 16:19

## 2018-12-28 RX ADMIN — MORPHINE SULFATE 15 MILLIGRAM(S): 50 CAPSULE, EXTENDED RELEASE ORAL at 05:50

## 2018-12-28 RX ADMIN — Medication 250 MILLIGRAM(S): at 11:26

## 2018-12-28 RX ADMIN — CEFTRIAXONE 100 GRAM(S): 500 INJECTION, POWDER, FOR SOLUTION INTRAMUSCULAR; INTRAVENOUS at 22:00

## 2018-12-28 RX ADMIN — Medication 50 MILLIGRAM(S): at 05:17

## 2018-12-28 RX ADMIN — SENNA PLUS 2 TABLET(S): 8.6 TABLET ORAL at 22:01

## 2018-12-28 RX ADMIN — NALOXEGOL OXALATE 12.5 MILLIGRAM(S): 12.5 TABLET, FILM COATED ORAL at 11:26

## 2018-12-28 RX ADMIN — MORPHINE SULFATE 15 MILLIGRAM(S): 50 CAPSULE, EXTENDED RELEASE ORAL at 17:16

## 2018-12-28 RX ADMIN — SODIUM CHLORIDE 75 MILLILITER(S): 9 INJECTION INTRAMUSCULAR; INTRAVENOUS; SUBCUTANEOUS at 05:17

## 2018-12-28 RX ADMIN — LISINOPRIL 10 MILLIGRAM(S): 2.5 TABLET ORAL at 05:17

## 2018-12-28 RX ADMIN — MORPHINE SULFATE 15 MILLIGRAM(S): 50 CAPSULE, EXTENDED RELEASE ORAL at 11:56

## 2018-12-28 RX ADMIN — MORPHINE SULFATE 15 MILLIGRAM(S): 50 CAPSULE, EXTENDED RELEASE ORAL at 05:17

## 2018-12-28 RX ADMIN — MORPHINE SULFATE 15 MILLIGRAM(S): 50 CAPSULE, EXTENDED RELEASE ORAL at 00:30

## 2018-12-28 RX ADMIN — MORPHINE SULFATE 15 MILLIGRAM(S): 50 CAPSULE, EXTENDED RELEASE ORAL at 11:26

## 2018-12-28 NOTE — GOALS OF CARE CONVERSATION - PERSONAL ADVANCE DIRECTIVE - CONVERSATION DETAILS
Met with pt and family this afternoon after pt returned from CT.  Spoke with Dr Najera earlier today re: discharge plan.  Will follow up on Monday when CT results have been evaluated and medical POC has been established to determine if it is congruent with a hospice POC.  Pt is already known to [facility] and has had a PICC placed for IV therapy.  Discharge home with hospice care pending N Medical Director approval and coordination with [facility].

## 2018-12-28 NOTE — PROGRESS NOTE ADULT - SUBJECTIVE AND OBJECTIVE BOX
Interventional Radiology    83y Male with PMH as below with recurrent periprostatic fluid collection referred to IR for drainage catheter tube check.  Pt is s/p drainage via Right transgluteal approach on 12/7/18 (10.2 F drainage catheter).  Outputs for last 48 hours were 12/28/18 7AM 45 ml, 12/27/18 7AM 30 ml.      PAST MEDICAL & SURGICAL HISTORY:  Benign prostatic hyperplasia with lower urinary tract symptoms, symptom details unspecified  Prostate cancer  Hypertension  HLD (hyperlipidemia)  Type 2 diabetes mellitus  Paroxysmal A-fib  S/P AVR (aortic valve replacement)  S/P TURP    Allergies  oxycodone (Other)    Labs:                        8.2    5.6   )-----------( 118      ( 28 Dec 2018 11:43 )             25.4     12-28    135  |  99  |  17  ----------------------------<  168<H>  4.2   |  25  |  1.23    Ca    8.5      28 Dec 2018 06:23    TPro  6.9  /  Alb  2.8<L>  /  TBili  0.4  /  DBili  x   /  AST  13  /  ALT  <5<L>  /  AlkPhos  63  12-26    PT/INR - ( 26 Dec 2018 13:58 )   PT: 13.8 sec;   INR: 1.19 ratio    PTT - ( 26 Dec 2018 13:58 )  PTT:29.6 sec    After risks, benefits, alternatives discussion with pt and his wife they both verbalized understanding and both signed informed consent.  Will plan for right transgluteal pelvic tube check with possible adjustment or exchange.    Junior Reece, LEONOR-C  [facility] [address]  Ext [phone]

## 2018-12-28 NOTE — PROGRESS NOTE ADULT - ASSESSMENT
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   Overall prostate abscess, OM of pubic ramus, abnormal U/A, anemia, thrombocytopenia, [place], lactic acido      Plan;   - CT with persistent OM, ? maybe slight worsening, but a lot of change is not expected within 2 weeks   - Continue with ceftriaxone,   - cont with vanco at current dose.    - Check trough   - Hospice referral made, awaiting recommendation regarding exact duration of abx.    - For now plan to continue abx until 1/25/19.

## 2018-12-28 NOTE — PROGRESS NOTE ADULT - SUBJECTIVE AND OBJECTIVE BOX
Patient seen and examined in AM. Full note to follow  Progress Note    Subjective  Patient seen and examined in AM. IR abscess drain study today. Pain controlled. Bilateral NT draining well. Hematuria per SPT. No fevers/chills, nausea/vomiting     Objective  T(F): , Max: 98.8 (12-28-18 @ 17:06)  HR: 88  BP: 123/65  SpO2: 97%      Nephrostomy Tube: 1250 mL    Nephrostomy Tube: 1525 mL    Gen NAD  Abd soft, NT/ND, SPT draining punch red, bl NT in place and draining clear    Labs  WBC:  4.5  HCT:  21.9  Cr: 1.71        Cultures:  Urine Cx:   Blood Cx:     Antibiotics:      Imaging

## 2018-12-28 NOTE — PROGRESS NOTE ADULT - SUBJECTIVE AND OBJECTIVE BOX
83y old  Male who presents with a chief complaint of hematuria (28 Dec 2018 15:56)      Interval history:  Afebrile, some cough, no SOB, no N/V/D, no abdominal discomfort. S/p tube check for nephrostomy tubes.       Allergies:  oxycodone (Other)      Antimicrobials:  cefTRIAXone   IVPB 1 Gram(s) IV Intermittent every 24 hours  vancomycin  IVPB 1000 milliGRAM(s) IV Intermittent every 24 hours      REVIEW OF SYSTEMS:  No chest pain  No rash.       Vital Signs Last 24 Hrs  T(C): 37.1 (12-28-18 @ 17:06), Max: 37.1 (12-28-18 @ 17:06)  T(F): 98.8 (12-28-18 @ 17:06), Max: 98.8 (12-28-18 @ 17:06)  HR: 82 (12-28-18 @ 17:06) (68 - 90)  BP: 115/57 (12-28-18 @ 17:06) (115/57 - 146/71)  RR: 18 (12-28-18 @ 17:06) (16 - 18)  SpO2: 97% (12-28-18 @ 17:06) (95% - 97%)      PHYSICAL EXAM:  Patient in no acute distress. Alert, awake.   Cardiovascular: S1S2 normal.  Lungs: + air entry B/L lung fields.  Gastrointestinal: soft, nondistended, + suprapubic catheter, b/l nephrostomy.   Extremities: trace ankle edema.  + Rt PICC                           7.8    5.7   )-----------( 118      ( 28 Dec 2018 18:08 )             22.6   12-28    135  |  99  |  17  ----------------------------<  168<H>  4.2   |  25  |  1.23    Ca    8.5      28 Dec 2018 06:23        Culture - Urine (collected 26 Dec 2018 17:40)  Source: .Urine Nephrostomy - Left  Preliminary Report (27 Dec 2018 16:35):    >100,000 CFU/ml Yeast like cells    Culture - Urine (collected 26 Dec 2018 17:36)  Source: .Urine Suprapubic  Preliminary Report (27 Dec 2018 16:34):    Moderate Yeast like cells      Radiology:  < from: CT Pelvis Bony Only No Cont (12.28.18 @ 14:45) >  IMPRESSION:    Cortical erosions in the region of the left inferior pubic ramus   extending to the ischium, most consistent with osteomyelitis. Findings   appear minimally worsened when compared with prior exam.  Distention of the bladder containing layering debris and air.

## 2018-12-28 NOTE — PROGRESS NOTE ADULT - SUBJECTIVE AND OBJECTIVE BOX
CHIEF COMPLAINT:Patient is a 83y old  Male who presents with a chief complaint of hematuria  	  Interval history: no acute events      Allergies:  oxycodone (Other)      PAST MEDICAL & SURGICAL HISTORY:  Benign prostatic hyperplasia with lower urinary tract symptoms, symptom details unspecified  Prostate cancer  Hypertension  HLD (hyperlipidemia)  Type 2 diabetes mellitus  Paroxysmal A-fib  S/P AVR (aortic valve replacement)  S/P TURP      FAMILY HISTORY:  No pertinent family history in first degree relatives      REVIEW OF SYSTEMS:  CONSTITUTIONAL: No fever, weight loss, + fatigue  EYES: No eye pain, visual disturbances, or discharge  NECK: No pain or stiffness  RESPIRATORY: No cough or wheezing, no shortness of breath  CARDIOVASCULAR: No chest pain, palpitations, dizziness, or leg swelling  GASTROINTESTINAL: No abdominal or epigastric pain. No nausea, vomiting, diarrhea or constipation  GENITOURINARY: No dysuria, urinary frequency or urgency, + hematuria  NEUROLOGICAL: No headaches, memory loss, loss of strength, numbness, or tremors  SKIN: No itching, burning, rashes, or lesions   MUSCULOSKELETAL: No joint pain or swelling; No muscle, back, or extremity pain      Medications:  MEDICATIONS  (STANDING):  BACItracin   Ointment 1 Application(s) Topical daily  cefTRIAXone   IVPB 1 Gram(s) IV Intermittent every 24 hours  dextrose 5%. 1000 milliLiter(s) (50 mL/Hr) IV Continuous <Continuous>  dextrose 50% Injectable 12.5 Gram(s) IV Push once  dextrose 50% Injectable 25 Gram(s) IV Push once  dextrose 50% Injectable 25 Gram(s) IV Push once  insulin lispro (HumaLOG) corrective regimen sliding scale   SubCutaneous three times a day before meals  lisinopril 10 milliGRAM(s) Oral daily  metoprolol succinate ER 50 milliGRAM(s) Oral daily  naloxegol 12.5 milliGRAM(s) Oral daily  senna 2 Tablet(s) Oral at bedtime  sodium chloride 0.9%. 1000 milliLiter(s) (75 mL/Hr) IV Continuous <Continuous>  vancomycin  IVPB 1000 milliGRAM(s) IV Intermittent every 24 hours    MEDICATIONS  (PRN):  dextrose 40% Gel 15 Gram(s) Oral once PRN Blood Glucose LESS THAN 70 milliGRAM(s)/deciliter  docusate sodium 100 milliGRAM(s) Oral three times a day PRN Constipation  glucagon  Injectable 1 milliGRAM(s) IntraMuscular once PRN Glucose LESS THAN 70 milligrams/deciliter  morphine  IR 15 milliGRAM(s) Oral every 4 hours PRN Severe Pain (7 - 10)    	    PHYSICAL EXAM:  T(C): 36.7 (12-28-18 @ 09:40), Max: 36.9 (12-27-18 @ 21:40)  HR: 87 (12-28-18 @ 09:40) (60 - 90)  BP: 132/71 (12-28-18 @ 09:40) (123/61 - 146/74)  RR: 18 (12-28-18 @ 09:40) (16 - 18)  SpO2: 96% (12-28-18 @ 09:40) (95% - 99%)  Wt(kg): --  I&O's Summary    27 Dec 2018 07:01  -  28 Dec 2018 07:00  --------------------------------------------------------  IN: 1625 mL / OUT: 3470 mL / NET: -1845 mL    28 Dec 2018 07:01  -  28 Dec 2018 15:58  --------------------------------------------------------  IN: 280 mL / OUT: 1500 mL / NET: -1220 mL      Appearance: Normal	  HEENT:   NCAT, PERRL, EOMI	  Lymphatic: No lymphadenopathy  Cardiovascular: Normal S1 S2, RRR  Respiratory: Lungs clear to auscultation BL  Psychiatry: A & O x 3, Mood & affect appropriate  Gastrointestinal:  Soft, Non-tender, + BS  : + suprapubic, + BL nephrostomy  Skin: No rashes, No ecchymoses, No cyanosis	  Neurologic: Non-focal  Extremities: Normal range of motion, No clubbing, cyanosis or edema    	  LABS:	 	    CARDIAC MARKERS:                                7.9    6.3   )-----------( 128      ( 27 Dec 2018 07:05 )             22.7     12-27    132<L>  |  98  |  18  ----------------------------<  151<H>  4.4   |  24  |  1.18    Ca    8.4      27 Dec 2018 07:06    TPro  6.9  /  Alb  2.8<L>  /  TBili  0.4  /  DBili  x   /  AST  13  /  ALT  <5<L>  /  AlkPhos  63  12-26    proBNP:   Lipid Profile:   HgA1c:   TSH:

## 2018-12-28 NOTE — PROGRESS NOTE ADULT - ASSESSMENT
- No active  intervention at this time  - Follow up  IR SACHA drain study   - Follow up ID re antibiotic plan. PO options in order to pursue home hospice  - Transfuse as needed  - Palliative follow-up  - f/u Oncology  - Continue NTs to bag drainage   - Continue SPT. Do no flush or manipulate SPT

## 2018-12-28 NOTE — PROGRESS NOTE ADULT - ASSESSMENT
pt w/ met urothelial ca   hematuria  b/l nt tubes  abcess  cont iv abs  id eval noted  change ertapenem to cefrtiaxone per ID  urology eval noted  anemia from blood loss  transfuse PRN   omproved s/p prbc  hospice eval in progress  Palliative eval noted  iv fluids  hold asa  tube check by IR today

## 2018-12-28 NOTE — PROGRESS NOTE ADULT - SUBJECTIVE AND OBJECTIVE BOX
Interventional Radiology     Procedure: Right transgluteal periprostatic fluid drainage catheter check    Indication: drainage catheter re-evaluation     Operators: KANDIS Smith  Supervising Physician: Dr. ySed Gutierrez    Anesthesia (type): None     Contrast: 8 ml    EBL: none    Findings/Follow up Plan of Care: induration was noted around the drainage catheter site, persistent moderate amorphous abscess cavity was seen without evidence of fistulous communication.  Drainage catheter was maintained in position.    Specimens Removed: None    Implants: Previously placed drainage catheter    Complications: none    Condition/Disposition: Stable, Sent back to 9 Mon in stable condition.    Please call Interventional Radiology x [phone] with any questions, concerns, or issues. Interventional Radiology     Procedure: Right transgluteal periprostatic fluid drainage catheter check    Indication: drainage catheter re-evaluation     Operators: KANDIS Smith  Supervising Physician: Dr. Syed Gutierrez    Anesthesia (type): None     Contrast: 8 ml    EBL: none    Findings/Follow up Plan of Care: induration was noted around the drainage catheter site, persistent moderate amorphous abscess cavity was seen without evidence of fistulous communication.  Drainage catheter was maintained in position.  Bacitracin ointment was applied to the site and dressing was changed.    Specimens Removed: None    Implants: Previously placed drainage catheter    Complications: none    Condition/Disposition: Stable, Sent back to 9 Mon in stable condition.    Please call Interventional Radiology x [phone] with any questions, concerns, or issues. Interventional Radiology     Procedure: Right transgluteal periprostatic fluid drainage catheter check    Indication: drainage catheter re-evaluation     Operators: KANDIS Smith  Supervising Physician: Dr. Syed Gutierrez    Anesthesia (type): None     Contrast: 8 ml    EBL: none    Findings/Follow up Plan of Care: induration was noted around the drainage catheter site, persistent moderate amorphous abscess cavity was seen without evidence of fistulous communication.  Drainage catheter was maintained in position.  Bacitracin ointment was applied to the site and dressing was changed.  d/w primary team covering NP Zeta to plan for the next 2 weeks to have daily dressing changes with bacitracin ointment applied around the right transgluteal area once daily.    Specimens Removed: None    Implants: Previously placed drainage catheter    Complications: none    Condition/Disposition: Stable, Sent back to 9 Mon in stable condition.    Please call Interventional Radiology x [phone] with any questions, concerns, or issues.

## 2018-12-29 LAB
ANION GAP SERPL CALC-SCNC: 12 MMOL/L — SIGNIFICANT CHANGE UP (ref 5–17)
BUN SERPL-MCNC: 16 MG/DL — SIGNIFICANT CHANGE UP (ref 7–23)
CALCIUM SERPL-MCNC: 8.4 MG/DL — SIGNIFICANT CHANGE UP (ref 8.4–10.5)
CHLORIDE SERPL-SCNC: 99 MMOL/L — SIGNIFICANT CHANGE UP (ref 96–108)
CO2 SERPL-SCNC: 24 MMOL/L — SIGNIFICANT CHANGE UP (ref 22–31)
CREAT SERPL-MCNC: 1.15 MG/DL — SIGNIFICANT CHANGE UP (ref 0.5–1.3)
GLUCOSE SERPL-MCNC: 153 MG/DL — HIGH (ref 70–99)
HCT VFR BLD CALC: 21.9 % — LOW (ref 39–50)
HCT VFR BLD CALC: 22.4 % — LOW (ref 39–50)
HCT VFR BLD CALC: 23.4 % — LOW (ref 39–50)
HCT VFR BLD CALC: 23.5 % — LOW (ref 39–50)
HGB BLD-MCNC: 7.5 G/DL — LOW (ref 13–17)
HGB BLD-MCNC: 7.7 G/DL — LOW (ref 13–17)
HGB BLD-MCNC: 7.9 G/DL — LOW (ref 13–17)
HGB BLD-MCNC: 8.2 G/DL — LOW (ref 13–17)
MCHC RBC-ENTMCNC: 29.1 PG — SIGNIFICANT CHANGE UP (ref 27–34)
MCHC RBC-ENTMCNC: 29.5 PG — SIGNIFICANT CHANGE UP (ref 27–34)
MCHC RBC-ENTMCNC: 29.7 PG — SIGNIFICANT CHANGE UP (ref 27–34)
MCHC RBC-ENTMCNC: 29.8 PG — SIGNIFICANT CHANGE UP (ref 27–34)
MCHC RBC-ENTMCNC: 33.7 GM/DL — SIGNIFICANT CHANGE UP (ref 32–36)
MCHC RBC-ENTMCNC: 34.3 GM/DL — SIGNIFICANT CHANGE UP (ref 32–36)
MCHC RBC-ENTMCNC: 34.4 GM/DL — SIGNIFICANT CHANGE UP (ref 32–36)
MCHC RBC-ENTMCNC: 34.7 GM/DL — SIGNIFICANT CHANGE UP (ref 32–36)
MCV RBC AUTO: 85.8 FL — SIGNIFICANT CHANGE UP (ref 80–100)
MCV RBC AUTO: 86 FL — SIGNIFICANT CHANGE UP (ref 80–100)
MCV RBC AUTO: 86.2 FL — SIGNIFICANT CHANGE UP (ref 80–100)
MCV RBC AUTO: 86.3 FL — SIGNIFICANT CHANGE UP (ref 80–100)
PLATELET # BLD AUTO: 104 K/UL — LOW (ref 150–400)
PLATELET # BLD AUTO: 106 K/UL — LOW (ref 150–400)
PLATELET # BLD AUTO: 107 K/UL — LOW (ref 150–400)
PLATELET # BLD AUTO: 111 K/UL — LOW (ref 150–400)
POTASSIUM SERPL-MCNC: 4.3 MMOL/L — SIGNIFICANT CHANGE UP (ref 3.5–5.3)
POTASSIUM SERPL-SCNC: 4.3 MMOL/L — SIGNIFICANT CHANGE UP (ref 3.5–5.3)
RBC # BLD: 2.56 M/UL — LOW (ref 4.2–5.8)
RBC # BLD: 2.6 M/UL — LOW (ref 4.2–5.8)
RBC # BLD: 2.71 M/UL — LOW (ref 4.2–5.8)
RBC # BLD: 2.73 M/UL — LOW (ref 4.2–5.8)
RBC # FLD: 16.4 % — HIGH (ref 10.3–14.5)
RBC # FLD: 16.5 % — HIGH (ref 10.3–14.5)
RBC # FLD: 16.6 % — HIGH (ref 10.3–14.5)
RBC # FLD: 16.8 % — HIGH (ref 10.3–14.5)
SODIUM SERPL-SCNC: 135 MMOL/L — SIGNIFICANT CHANGE UP (ref 135–145)
VANCOMYCIN TROUGH SERPL-MCNC: 10.5 UG/ML — SIGNIFICANT CHANGE UP (ref 10–20)
WBC # BLD: 4.2 K/UL — SIGNIFICANT CHANGE UP (ref 3.8–10.5)
WBC # BLD: 4.4 K/UL — SIGNIFICANT CHANGE UP (ref 3.8–10.5)
WBC # BLD: 4.5 K/UL — SIGNIFICANT CHANGE UP (ref 3.8–10.5)
WBC # BLD: 4.9 K/UL — SIGNIFICANT CHANGE UP (ref 3.8–10.5)
WBC # FLD AUTO: 4.2 K/UL — SIGNIFICANT CHANGE UP (ref 3.8–10.5)
WBC # FLD AUTO: 4.4 K/UL — SIGNIFICANT CHANGE UP (ref 3.8–10.5)
WBC # FLD AUTO: 4.5 K/UL — SIGNIFICANT CHANGE UP (ref 3.8–10.5)
WBC # FLD AUTO: 4.9 K/UL — SIGNIFICANT CHANGE UP (ref 3.8–10.5)

## 2018-12-29 PROCEDURE — 99232 SBSQ HOSP IP/OBS MODERATE 35: CPT

## 2018-12-29 RX ADMIN — Medication 100 MILLIGRAM(S): at 08:11

## 2018-12-29 RX ADMIN — SENNA PLUS 2 TABLET(S): 8.6 TABLET ORAL at 22:09

## 2018-12-29 RX ADMIN — MORPHINE SULFATE 15 MILLIGRAM(S): 50 CAPSULE, EXTENDED RELEASE ORAL at 14:26

## 2018-12-29 RX ADMIN — CEFTRIAXONE 100 GRAM(S): 500 INJECTION, POWDER, FOR SOLUTION INTRAMUSCULAR; INTRAVENOUS at 22:09

## 2018-12-29 RX ADMIN — MORPHINE SULFATE 15 MILLIGRAM(S): 50 CAPSULE, EXTENDED RELEASE ORAL at 13:56

## 2018-12-29 RX ADMIN — Medication 4: at 12:12

## 2018-12-29 RX ADMIN — MORPHINE SULFATE 15 MILLIGRAM(S): 50 CAPSULE, EXTENDED RELEASE ORAL at 05:31

## 2018-12-29 RX ADMIN — Medication 50 MILLIGRAM(S): at 05:29

## 2018-12-29 RX ADMIN — Medication 2: at 08:08

## 2018-12-29 RX ADMIN — NALOXEGOL OXALATE 12.5 MILLIGRAM(S): 12.5 TABLET, FILM COATED ORAL at 12:12

## 2018-12-29 RX ADMIN — LISINOPRIL 10 MILLIGRAM(S): 2.5 TABLET ORAL at 05:28

## 2018-12-29 RX ADMIN — MORPHINE SULFATE 15 MILLIGRAM(S): 50 CAPSULE, EXTENDED RELEASE ORAL at 10:31

## 2018-12-29 RX ADMIN — Medication 4: at 18:13

## 2018-12-29 RX ADMIN — Medication 1 APPLICATION(S): at 12:12

## 2018-12-29 RX ADMIN — MORPHINE SULFATE 15 MILLIGRAM(S): 50 CAPSULE, EXTENDED RELEASE ORAL at 17:35

## 2018-12-29 RX ADMIN — MORPHINE SULFATE 15 MILLIGRAM(S): 50 CAPSULE, EXTENDED RELEASE ORAL at 11:01

## 2018-12-29 RX ADMIN — MORPHINE SULFATE 15 MILLIGRAM(S): 50 CAPSULE, EXTENDED RELEASE ORAL at 06:01

## 2018-12-29 RX ADMIN — MORPHINE SULFATE 15 MILLIGRAM(S): 50 CAPSULE, EXTENDED RELEASE ORAL at 18:05

## 2018-12-29 RX ADMIN — Medication 250 MILLIGRAM(S): at 13:33

## 2018-12-29 NOTE — PROGRESS NOTE ADULT - SUBJECTIVE AND OBJECTIVE BOX
Patient is a 83y old  Male who presents with a chief complaint of hematuria (28 Dec 2018 15:56)    Being followed by ID for Prostate abscess    Interval history:  Afebrile  No acute events      ROS:  Cough, No SOB, CP  No N/V/D./abd pain  No other complaints      Antimicrobials:    cefTRIAXone   IVPB 1 Gram(s) IV Intermittent every 24 hours  vancomycin  IVPB 1000 milliGRAM(s) IV Intermittent every 24 hours      Vital Signs Last 24 Hrs  T(C): 36.8 (12-29-18 @ 05:25), Max: 37.1 (12-28-18 @ 17:06)  T(F): 98.3 (12-29-18 @ 05:25), Max: 98.8 (12-28-18 @ 17:06)  HR: 91 (12-29-18 @ 05:25) (82 - 94)  BP: 152/78 (12-29-18 @ 05:25) (115/57 - 152/78)  BP(mean): --  RR: 18 (12-29-18 @ 05:25) (18 - 18)  SpO2: 97% (12-29-18 @ 05:25) (94% - 97%)    Physical Exam:    Constitutional well developed, NAD    HEENT EOMI    Chest Clear to Auscultation    CVS S1 S2 No murmur or rub or gallop    Abd soft BS normal No tenderness no masses, suprapubic tube, B/L nephrostomy tubes    Ext No cyanosis clubbing or edema    IV site no erythema tenderness or discharge, Rt PICC    Joints no swelling or LOM    CNS AAO X 3 no focal    Lab Data:                          8.2    4.4   )-----------( 111      ( 29 Dec 2018 06:08 )             23.5       12-29    135  |  99  |  16  ----------------------------<  153<H>  4.3   |  24  |  1.15    Ca    8.4      29 Dec 2018 06:07          .Urine Nephrostomy - Left  12-26-18   >100,000 CFU/ml Presumptive Candida albicans  --  --      .Urine Suprapubic  12-26-18   Moderate Presumptive Candida albicans  --  --      < from: CT Pelvis Bony Only No Cont (12.28.18 @ 14:45) >  EXAM:  CT PELVIS BONY ONLY                            PROCEDURE DATE:  12/28/2018            INTERPRETATION:  CT of the pelvis     CLINICAL INFORMATION: History of urothelial cancer. Prostatic abscess.   Evaluate left inferior pubic ramus.  TECHNIQUE: Axial CT images were obtained of the pelvis with coronal and   sagittal reconstructions. No contrast was administered.    COMPARISON: CT of the abdomen and pelvis dated 12/9/2018.    FINDINGS:    Redemonstration of erosive changes within the left inferior pubic ramus   extending towards the left ischium, which appears minimally worsened when   compared with prior exam 12/9/2018. There is mild bilateral hip   osteoarthrosis. Mild degenerative changes of the sacroiliac joints and   lower lumbar spine. There are unchanged sclerotic foci within the right   ischium, right humeral head and right acetabulum. There is a suprapubic   catheter. The bladder is distended with debris layering posteriorly as   well as containing air. Bilateral nephrostomy tubes are partially   visualized. Drain is visualized within the region of the prostate with   air. Contrast is visualized within the region of the prostate and along   the penile shaft, consistent with drain placement.     IMPRESSION:    Cortical erosions in the region of the left inferior pubic ramus   extending to the ischium, most consistent with osteomyelitis. Findings   appear minimally worsened when compared with prior exam.  Distention of the bladder containing layering debris and air.      < end of copied text >

## 2018-12-29 NOTE — PROGRESS NOTE ADULT - ASSESSMENT
pt w/ met urothelial ca   hematuria  b/l nt tubes  abcess  cont iv abs  id eval noted  changed ertapenem to cefrtiaxone per ID  urology eval noted  CT reviewed, OM same, plan for abx duration unchanged  anemia from blood loss  transfuse PRN   omproved s/p prbc  hospice eval in progress  Palliative eval noted  iv fluids  hold asa  tube check by IR yesterday, maintain in position, dressing changes  d/c planning to home w/hospice if accepted

## 2018-12-29 NOTE — PROGRESS NOTE ADULT - SUBJECTIVE AND OBJECTIVE BOX
Subjective  Patient seen & examined in AM  No acute events overnight  SPT continues to be hematuric, but per wife is less than yesterday  No n/v/f/c.  Pain is controlled.  Reports constipation.    Objective    Vital signs  T(F): , Max: 98.8 (12-28-18 @ 17:06)  HR: 90 (12-29-18 @ 09:38)  BP: 129/62 (12-29-18 @ 09:38)  SpO2: 95% (12-29-18 @ 09:38)  Wt(kg): --    Output     12-28 @ 07:01  -  12-29 @ 07:00  --------------------------------------------------------  IN: 2730 mL / OUT: 4235 mL / NET: -1505 mL    Physical Exam  Gen NAD  Abd Soft, NT, ND.  SPT draining punch red urine with clots in proximal portion.  b/L NT draining clear yellow urine    Labs  12-29 @ 06:08  WBC 4.4   / Hct 23.5  / SCr --       12-29 @ 06:07  WBC --    / Hct --    / SCr 1.15     Imaging    < from: CT Pelvis Bony Only No Cont (12.28.18 @ 14:45) >  IMPRESSION:    Cortical erosions in the region of the left inferior pubic ramus   extending to the ischium, most consistent with osteomyelitis. Findings   appear minimally worsened when compared with prior exam.  Distention of the bladder containing layering debris and air.    < end of copied text >

## 2018-12-29 NOTE — PROGRESS NOTE ADULT - ASSESSMENT
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   Overall prostate abscess, OM of pubic ramus, abnormal U/A, anemia, thrombocytopenia, [place], lactic acido      Plan;   - CT with persistent OM, ? maybe slight worsening, but a lot of change is not expected within 2 weeks   - Continue with ceftriaxone,   - cont with vanco at current dose.    - Check trough   - Hospice referral made, awaiting recommendation regarding exact duration of abx.    - For now plan to continue abx until 1/25/19.     Michele Hopkins MD  pager [phone]  office [phone]

## 2018-12-29 NOTE — PROGRESS NOTE ADULT - ASSESSMENT
83y Male with metastatic urothelial ca, s/p bilateral nephrostomy tubes and suprapubic tube here with recurrent hematuria from the SP tube.     - No active  intervention at this time  - Follow up IR SACHA drain study   - Follow up ID re antibiotic plan. PO options in order to pursue home hospice  - Transfuse as needed  - Palliative follow-up  - f/u Oncology  - Continue NTs to bag drainage   - Continue SPT. Do no flush or manipulate SPT

## 2018-12-29 NOTE — PROGRESS NOTE ADULT - SUBJECTIVE AND OBJECTIVE BOX
CHIEF COMPLAINT:Patient is a 83y old  Male who presents with a chief complaint of hematuria  	  Interval history: no acute events      Allergies:  oxycodone (Other)      PAST MEDICAL & SURGICAL HISTORY:  Benign prostatic hyperplasia with lower urinary tract symptoms, symptom details unspecified  Prostate cancer  Hypertension  HLD (hyperlipidemia)  Type 2 diabetes mellitus  Paroxysmal A-fib  S/P AVR (aortic valve replacement)  S/P TURP      FAMILY HISTORY:  No pertinent family history in first degree relatives      REVIEW OF SYSTEMS:  CONSTITUTIONAL: No fever, weight loss, + fatigue  EYES: No eye pain, visual disturbances, or discharge  NECK: No pain or stiffness  RESPIRATORY: No cough or wheezing, no shortness of breath  CARDIOVASCULAR: No chest pain, palpitations, dizziness, or leg swelling  GASTROINTESTINAL: No abdominal or epigastric pain. No nausea, vomiting, diarrhea or constipation  GENITOURINARY: No dysuria, urinary frequency or urgency, + hematuria  NEUROLOGICAL: No headaches, memory loss, loss of strength, numbness, or tremors  SKIN: No itching, burning, rashes, or lesions   MUSCULOSKELETAL: No joint pain or swelling; No muscle, back, or extremity pain      Medications:  MEDICATIONS  (STANDING):  BACItracin   Ointment 1 Application(s) Topical daily  cefTRIAXone   IVPB 1 Gram(s) IV Intermittent every 24 hours  dextrose 5%. 1000 milliLiter(s) (50 mL/Hr) IV Continuous <Continuous>  dextrose 50% Injectable 12.5 Gram(s) IV Push once  dextrose 50% Injectable 25 Gram(s) IV Push once  dextrose 50% Injectable 25 Gram(s) IV Push once  insulin lispro (HumaLOG) corrective regimen sliding scale   SubCutaneous three times a day before meals  lisinopril 10 milliGRAM(s) Oral daily  metoprolol succinate ER 50 milliGRAM(s) Oral daily  naloxegol 12.5 milliGRAM(s) Oral daily  senna 2 Tablet(s) Oral at bedtime  sodium chloride 0.9%. 1000 milliLiter(s) (75 mL/Hr) IV Continuous <Continuous>  vancomycin  IVPB 1000 milliGRAM(s) IV Intermittent every 24 hours    MEDICATIONS  (PRN):  dextrose 40% Gel 15 Gram(s) Oral once PRN Blood Glucose LESS THAN 70 milliGRAM(s)/deciliter  docusate sodium 100 milliGRAM(s) Oral three times a day PRN Constipation  glucagon  Injectable 1 milliGRAM(s) IntraMuscular once PRN Glucose LESS THAN 70 milligrams/deciliter  morphine  IR 15 milliGRAM(s) Oral every 4 hours PRN Severe Pain (7 - 10)    	    PHYSICAL EXAM:  T(C): 36.8 (12-29-18 @ 09:38), Max: 37.1 (12-28-18 @ 17:06)  HR: 90 (12-29-18 @ 09:38) (82 - 94)  BP: 129/62 (12-29-18 @ 09:38) (115/57 - 152/78)  RR: 16 (12-29-18 @ 09:38) (16 - 18)  SpO2: 95% (12-29-18 @ 09:38) (94% - 97%)  Wt(kg): --  I&O's Summary    28 Dec 2018 07:01  -  29 Dec 2018 07:00  --------------------------------------------------------  IN: 2730 mL / OUT: 4235 mL / NET: -1505 mL    29 Dec 2018 07:01  -  29 Dec 2018 11:12  --------------------------------------------------------  IN: 360 mL / OUT: 825 mL / NET: -465 mL      Appearance: Normal	  HEENT:   NCAT, PERRL, EOMI	  Lymphatic: No lymphadenopathy  Cardiovascular: Normal S1 S2, RRR  Respiratory: Lungs clear to auscultation BL  Psychiatry: A & O x 3, Mood & affect appropriate  Gastrointestinal:  Soft, Non-tender, + BS  : + suprapubic, + BL nephrostomy  Skin: No rashes, No ecchymoses, No cyanosis	  Neurologic: Non-focal  Extremities: Normal range of motion, No clubbing, cyanosis or edema    LABS:	 	    CARDIAC MARKERS:                                8.2    4.4   )-----------( 111      ( 29 Dec 2018 06:08 )             23.5     12-29    135  |  99  |  16  ----------------------------<  153<H>  4.3   |  24  |  1.15    Ca    8.4      29 Dec 2018 06:07      proBNP:   Lipid Profile:   HgA1c:   TSH:

## 2018-12-30 LAB
HCT VFR BLD CALC: 22.4 % — LOW (ref 39–50)
HCT VFR BLD CALC: 23.6 % — LOW (ref 39–50)
HGB BLD-MCNC: 7.6 G/DL — LOW (ref 13–17)
HGB BLD-MCNC: 8.1 G/DL — LOW (ref 13–17)
MCHC RBC-ENTMCNC: 29.4 PG — SIGNIFICANT CHANGE UP (ref 27–34)
MCHC RBC-ENTMCNC: 29.5 PG — SIGNIFICANT CHANGE UP (ref 27–34)
MCHC RBC-ENTMCNC: 34.1 GM/DL — SIGNIFICANT CHANGE UP (ref 32–36)
MCHC RBC-ENTMCNC: 34.1 GM/DL — SIGNIFICANT CHANGE UP (ref 32–36)
MCV RBC AUTO: 86.2 FL — SIGNIFICANT CHANGE UP (ref 80–100)
MCV RBC AUTO: 86.7 FL — SIGNIFICANT CHANGE UP (ref 80–100)
PLATELET # BLD AUTO: 115 K/UL — LOW (ref 150–400)
PLATELET # BLD AUTO: 126 K/UL — LOW (ref 150–400)
RBC # BLD: 2.59 M/UL — LOW (ref 4.2–5.8)
RBC # BLD: 2.74 M/UL — LOW (ref 4.2–5.8)
RBC # FLD: 16.3 % — HIGH (ref 10.3–14.5)
RBC # FLD: 16.6 % — HIGH (ref 10.3–14.5)
WBC # BLD: 4.7 K/UL — SIGNIFICANT CHANGE UP (ref 3.8–10.5)
WBC # BLD: 5.7 K/UL — SIGNIFICANT CHANGE UP (ref 3.8–10.5)
WBC # FLD AUTO: 4.7 K/UL — SIGNIFICANT CHANGE UP (ref 3.8–10.5)
WBC # FLD AUTO: 5.7 K/UL — SIGNIFICANT CHANGE UP (ref 3.8–10.5)

## 2018-12-30 RX ADMIN — SODIUM CHLORIDE 75 MILLILITER(S): 9 INJECTION INTRAMUSCULAR; INTRAVENOUS; SUBCUTANEOUS at 21:56

## 2018-12-30 RX ADMIN — SENNA PLUS 2 TABLET(S): 8.6 TABLET ORAL at 21:56

## 2018-12-30 RX ADMIN — Medication 6: at 14:26

## 2018-12-30 RX ADMIN — Medication 50 MILLIGRAM(S): at 05:02

## 2018-12-30 RX ADMIN — MORPHINE SULFATE 15 MILLIGRAM(S): 50 CAPSULE, EXTENDED RELEASE ORAL at 21:56

## 2018-12-30 RX ADMIN — CEFTRIAXONE 100 GRAM(S): 500 INJECTION, POWDER, FOR SOLUTION INTRAMUSCULAR; INTRAVENOUS at 21:56

## 2018-12-30 RX ADMIN — NALOXEGOL OXALATE 12.5 MILLIGRAM(S): 12.5 TABLET, FILM COATED ORAL at 12:23

## 2018-12-30 RX ADMIN — MORPHINE SULFATE 15 MILLIGRAM(S): 50 CAPSULE, EXTENDED RELEASE ORAL at 18:26

## 2018-12-30 RX ADMIN — Medication 2: at 08:45

## 2018-12-30 RX ADMIN — MORPHINE SULFATE 15 MILLIGRAM(S): 50 CAPSULE, EXTENDED RELEASE ORAL at 17:56

## 2018-12-30 RX ADMIN — MORPHINE SULFATE 15 MILLIGRAM(S): 50 CAPSULE, EXTENDED RELEASE ORAL at 22:45

## 2018-12-30 RX ADMIN — MORPHINE SULFATE 15 MILLIGRAM(S): 50 CAPSULE, EXTENDED RELEASE ORAL at 10:20

## 2018-12-30 RX ADMIN — Medication 1 APPLICATION(S): at 11:30

## 2018-12-30 RX ADMIN — Medication 250 MILLIGRAM(S): at 11:30

## 2018-12-30 RX ADMIN — Medication 4: at 17:57

## 2018-12-30 RX ADMIN — MORPHINE SULFATE 15 MILLIGRAM(S): 50 CAPSULE, EXTENDED RELEASE ORAL at 10:50

## 2018-12-30 RX ADMIN — LISINOPRIL 10 MILLIGRAM(S): 2.5 TABLET ORAL at 05:02

## 2018-12-30 NOTE — PROGRESS NOTE ADULT - ASSESSMENT
pt w/ met urothelial ca   hematuria  b/l nt tubes  abcess  cont iv abs  id eval noted  changed ertapenem to cefrtiaxone per ID  urology eval noted  CT reviewed, OM same, plan for abx duration unchanged  anemia from blood loss  Hb slightly lower today, monitor, transfuse for Hb <7.5  omproved s/p prbc  hospice eval in progress  Palliative eval noted  iv fluids  hold asa  tube check by IR yesterday, maintain in position, dressing changes  d/c planning to home w/hospice if accepted

## 2018-12-30 NOTE — PROGRESS NOTE ADULT - SUBJECTIVE AND OBJECTIVE BOX
CHIEF COMPLAINT:Patient is a 83y old  Male who presents with a chief complaint of hematuria  	  Interval history: no acute events      Allergies:  oxycodone (Other)      PAST MEDICAL & SURGICAL HISTORY:  Benign prostatic hyperplasia with lower urinary tract symptoms, symptom details unspecified  Prostate cancer  Hypertension  HLD (hyperlipidemia)  Type 2 diabetes mellitus  Paroxysmal A-fib  S/P AVR (aortic valve replacement)  S/P TURP      FAMILY HISTORY:  No pertinent family history in first degree relatives      REVIEW OF SYSTEMS:  CONSTITUTIONAL: No fever, weight loss, + fatigue  EYES: No eye pain, visual disturbances, or discharge  NECK: No pain or stiffness  RESPIRATORY: No cough or wheezing, no shortness of breath  CARDIOVASCULAR: No chest pain, palpitations, dizziness, or leg swelling  GASTROINTESTINAL: No abdominal or epigastric pain. No nausea, vomiting, diarrhea or constipation  GENITOURINARY: No dysuria, urinary frequency or urgency, + hematuria  NEUROLOGICAL: No headaches, memory loss, loss of strength, numbness, or tremors  SKIN: No itching, burning, rashes, or lesions   MUSCULOSKELETAL: No joint pain or swelling; No muscle, back, or extremity pain      Medications:  MEDICATIONS  (STANDING):  BACItracin   Ointment 1 Application(s) Topical daily  cefTRIAXone   IVPB 1 Gram(s) IV Intermittent every 24 hours  dextrose 5%. 1000 milliLiter(s) (50 mL/Hr) IV Continuous <Continuous>  dextrose 50% Injectable 12.5 Gram(s) IV Push once  dextrose 50% Injectable 25 Gram(s) IV Push once  dextrose 50% Injectable 25 Gram(s) IV Push once  insulin lispro (HumaLOG) corrective regimen sliding scale   SubCutaneous three times a day before meals  lisinopril 10 milliGRAM(s) Oral daily  metoprolol succinate ER 50 milliGRAM(s) Oral daily  naloxegol 12.5 milliGRAM(s) Oral daily  senna 2 Tablet(s) Oral at bedtime  sodium chloride 0.9%. 1000 milliLiter(s) (75 mL/Hr) IV Continuous <Continuous>  vancomycin  IVPB 1000 milliGRAM(s) IV Intermittent every 24 hours    MEDICATIONS  (PRN):  dextrose 40% Gel 15 Gram(s) Oral once PRN Blood Glucose LESS THAN 70 milliGRAM(s)/deciliter  docusate sodium 100 milliGRAM(s) Oral three times a day PRN Constipation  glucagon  Injectable 1 milliGRAM(s) IntraMuscular once PRN Glucose LESS THAN 70 milligrams/deciliter  morphine  IR 15 milliGRAM(s) Oral every 4 hours PRN Severe Pain (7 - 10)    	    PHYSICAL EXAM:  T(C): 36.4 (12-30-18 @ 09:19), Max: 37.2 (12-30-18 @ 00:48)  HR: 92 (12-30-18 @ 09:19) (84 - 100)  BP: 138/67 (12-30-18 @ 09:19) (117/52 - 138/67)  RR: 16 (12-30-18 @ 09:19) (16 - 18)  SpO2: 97% (12-30-18 @ 09:19) (94% - 97%)  Wt(kg): --  I&O's Summary    29 Dec 2018 07:01  -  30 Dec 2018 07:00  --------------------------------------------------------  IN: 2960 mL / OUT: 2730 mL / NET: 230 mL      Appearance: Normal	  HEENT:   NCAT, PERRL, EOMI	  Lymphatic: No lymphadenopathy  Cardiovascular: Normal S1 S2, RRR  Respiratory: Lungs clear to auscultation BL  Psychiatry: A & O x 3, Mood & affect appropriate  Gastrointestinal:  Soft, Non-tender, + BS  : + suprapubic, + BL nephrostomy  Skin: No rashes, No ecchymoses, No cyanosis	  Neurologic: Non-focal  Extremities: Normal range of motion, No clubbing, cyanosis or edema    LABS:	 	    CARDIAC MARKERS:                                7.6    4.7   )-----------( 115      ( 30 Dec 2018 07:02 )             22.4     12-29    135  |  99  |  16  ----------------------------<  153<H>  4.3   |  24  |  1.15    Ca    8.4      29 Dec 2018 06:07      proBNP:   Lipid Profile:   HgA1c:   TSH:

## 2018-12-30 NOTE — PROGRESS NOTE ADULT - SUBJECTIVE AND OBJECTIVE BOX
no complaints, some drainage still from SPT. left NT slightly pink today, still with good output    T(F): 98.5, Max: 99 (12-30-18 @ 00:48)  HR: 92  BP: 125/77  SpO2: 96%    OUT:    Drain: 5 mL    Nephrostomy Tube: 1350 mL    Nephrostomy Tube: 1350 mL  Total OUT: 2705 mL        Gen NAD   Abd soft, SPT thick cranberry color, SP tenderness. SACHA in place   b/l NT in place, left pink, very clear no clots       12-30 @ 07:02    WBC 4.7   / Hct 22.4  / SCr --       12-30 @ 00:45    WBC 5.7   / Hct 23.6  / SCr --       .Urine Nephrostomy - Left  12-26  >100,000 CFU/ml Presumptive Candida albicans      .Urine Suprapubic  12-26  Moderate Presumptive Candida albicans

## 2018-12-30 NOTE — PROGRESS NOTE ADULT - ASSESSMENT
83y Male with metastatic urothelial ca, s/p bilateral nephrostomy tubes and suprapubic tube here with recurrent hematuria from the SP tube.     - No active  intervention at this time  - Follow up IR SACHA drain study   - Follow up ID re antibiotic plan. PO options in order to pursue home hospice?  - Transfuse as needed  - Palliative follow-up  - f/u Oncology  - Continue NTs to bag drainage   - Continue SPT. Do not flush or manipulate SPT

## 2018-12-31 ENCOUNTER — APPOINTMENT (OUTPATIENT)
Dept: INFUSION THERAPY | Facility: HOSPITAL | Age: 83
End: 2018-12-31

## 2018-12-31 LAB
ANION GAP SERPL CALC-SCNC: 7 MMOL/L — SIGNIFICANT CHANGE UP (ref 5–17)
BLD GP AB SCN SERPL QL: POSITIVE — SIGNIFICANT CHANGE UP
BUN SERPL-MCNC: 16 MG/DL — SIGNIFICANT CHANGE UP (ref 7–23)
CALCIUM SERPL-MCNC: 8.1 MG/DL — LOW (ref 8.4–10.5)
CHLORIDE SERPL-SCNC: 104 MMOL/L — SIGNIFICANT CHANGE UP (ref 96–108)
CO2 SERPL-SCNC: 27 MMOL/L — SIGNIFICANT CHANGE UP (ref 22–31)
CREAT SERPL-MCNC: 1.1 MG/DL — SIGNIFICANT CHANGE UP (ref 0.5–1.3)
GLUCOSE SERPL-MCNC: 135 MG/DL — HIGH (ref 70–99)
HCT VFR BLD CALC: 21.6 % — LOW (ref 39–50)
HGB BLD-MCNC: 7.3 G/DL — LOW (ref 13–17)
MCHC RBC-ENTMCNC: 29.5 PG — SIGNIFICANT CHANGE UP (ref 27–34)
MCHC RBC-ENTMCNC: 33.8 GM/DL — SIGNIFICANT CHANGE UP (ref 32–36)
MCV RBC AUTO: 87.1 FL — SIGNIFICANT CHANGE UP (ref 80–100)
PLATELET # BLD AUTO: 107 K/UL — LOW (ref 150–400)
POTASSIUM SERPL-MCNC: 4.1 MMOL/L — SIGNIFICANT CHANGE UP (ref 3.5–5.3)
POTASSIUM SERPL-SCNC: 4.1 MMOL/L — SIGNIFICANT CHANGE UP (ref 3.5–5.3)
RBC # BLD: 2.48 M/UL — LOW (ref 4.2–5.8)
RBC # FLD: 16.2 % — HIGH (ref 10.3–14.5)
RH IG SCN BLD-IMP: POSITIVE — SIGNIFICANT CHANGE UP
SODIUM SERPL-SCNC: 138 MMOL/L — SIGNIFICANT CHANGE UP (ref 135–145)
VANCOMYCIN TROUGH SERPL-MCNC: 10.8 UG/ML — SIGNIFICANT CHANGE UP (ref 10–20)
WBC # BLD: 3.5 K/UL — LOW (ref 3.8–10.5)
WBC # FLD AUTO: 3.5 K/UL — LOW (ref 3.8–10.5)

## 2018-12-31 PROCEDURE — 86077 PHYS BLOOD BANK SERV XMATCH: CPT

## 2018-12-31 PROCEDURE — 99232 SBSQ HOSP IP/OBS MODERATE 35: CPT

## 2018-12-31 PROCEDURE — 99233 SBSQ HOSP IP/OBS HIGH 50: CPT

## 2018-12-31 RX ORDER — MORPHINE SULFATE 50 MG/1
15 CAPSULE, EXTENDED RELEASE ORAL
Qty: 0 | Refills: 0 | Status: DISCONTINUED | OUTPATIENT
Start: 2018-12-31 | End: 2019-01-04

## 2018-12-31 RX ORDER — VANCOMYCIN HCL 1 G
750 VIAL (EA) INTRAVENOUS EVERY 12 HOURS
Qty: 0 | Refills: 0 | Status: DISCONTINUED | OUTPATIENT
Start: 2018-12-31 | End: 2019-01-08

## 2018-12-31 RX ADMIN — MORPHINE SULFATE 15 MILLIGRAM(S): 50 CAPSULE, EXTENDED RELEASE ORAL at 17:38

## 2018-12-31 RX ADMIN — Medication 250 MILLIGRAM(S): at 12:19

## 2018-12-31 RX ADMIN — MORPHINE SULFATE 15 MILLIGRAM(S): 50 CAPSULE, EXTENDED RELEASE ORAL at 11:01

## 2018-12-31 RX ADMIN — Medication 250 MILLIGRAM(S): at 23:56

## 2018-12-31 RX ADMIN — LISINOPRIL 10 MILLIGRAM(S): 2.5 TABLET ORAL at 06:25

## 2018-12-31 RX ADMIN — MORPHINE SULFATE 15 MILLIGRAM(S): 50 CAPSULE, EXTENDED RELEASE ORAL at 07:05

## 2018-12-31 RX ADMIN — Medication 50 MILLIGRAM(S): at 06:25

## 2018-12-31 RX ADMIN — NALOXEGOL OXALATE 12.5 MILLIGRAM(S): 12.5 TABLET, FILM COATED ORAL at 11:01

## 2018-12-31 RX ADMIN — SODIUM CHLORIDE 75 MILLILITER(S): 9 INJECTION INTRAMUSCULAR; INTRAVENOUS; SUBCUTANEOUS at 06:25

## 2018-12-31 RX ADMIN — MORPHINE SULFATE 15 MILLIGRAM(S): 50 CAPSULE, EXTENDED RELEASE ORAL at 06:25

## 2018-12-31 RX ADMIN — MORPHINE SULFATE 15 MILLIGRAM(S): 50 CAPSULE, EXTENDED RELEASE ORAL at 16:50

## 2018-12-31 RX ADMIN — Medication 6: at 14:22

## 2018-12-31 RX ADMIN — Medication 1 APPLICATION(S): at 11:02

## 2018-12-31 RX ADMIN — Medication 30 MILLILITER(S): at 17:05

## 2018-12-31 RX ADMIN — MORPHINE SULFATE 15 MILLIGRAM(S): 50 CAPSULE, EXTENDED RELEASE ORAL at 11:31

## 2018-12-31 RX ADMIN — Medication 2: at 17:38

## 2018-12-31 RX ADMIN — MORPHINE SULFATE 15 MILLIGRAM(S): 50 CAPSULE, EXTENDED RELEASE ORAL at 22:20

## 2018-12-31 RX ADMIN — MORPHINE SULFATE 15 MILLIGRAM(S): 50 CAPSULE, EXTENDED RELEASE ORAL at 21:50

## 2018-12-31 RX ADMIN — CEFTRIAXONE 100 GRAM(S): 500 INJECTION, POWDER, FOR SOLUTION INTRAMUSCULAR; INTRAVENOUS at 21:46

## 2018-12-31 RX ADMIN — MORPHINE SULFATE 15 MILLIGRAM(S): 50 CAPSULE, EXTENDED RELEASE ORAL at 18:08

## 2018-12-31 RX ADMIN — MORPHINE SULFATE 15 MILLIGRAM(S): 50 CAPSULE, EXTENDED RELEASE ORAL at 16:20

## 2018-12-31 NOTE — PROGRESS NOTE ADULT - PROBLEM SELECTOR PLAN 2
- no further DDT - no further DDT  - has been taking 45-60mg PO IR morphine daily over last few days. Will start 15mg ER morphine BID for more optimized pain control

## 2018-12-31 NOTE — PROGRESS NOTE ADULT - ASSESSMENT
83y Male with metastatic urothelial ca, s/p bilateral nephrostomy tubes and suprapubic tube here with recurrent hematuria from the SP tube.   - IR tube check w/persistent moderate amorphous abscess cavity, no evidence of fistulous communication  - vanc and CTX per ID, final abx pending hospice recs  - Transfuse as needed  - Palliative follow-up  - f/u Oncology  - Continue NTs to bag drainage   - Continue SPT. Do not flush or manipulate SPT  - No active  intervention at this time  - f/u w/Dr. Burroughs at [facility]   - please call if questions

## 2018-12-31 NOTE — PROGRESS NOTE ADULT - ASSESSMENT
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   Overall prostate abscess, OM of pubic ramus, abnormal U/A, anemia, thrombocytopenia, positive culture finding.   Candida in urine cx ? significance as pt likely colonized given he has been on broad spectrum abx.       Plan;   - CT with persistent OM,   - Continue with ceftriaxone,   - cont with vanco, increase dose to 750 mg iv q12h    - Trough around 10.   - Hospice referral made, awaiting recommendation regarding abx.    - For now plan to continue abx until 1/25/19.

## 2018-12-31 NOTE — PROGRESS NOTE ADULT - SUBJECTIVE AND OBJECTIVE BOX
CHIEF COMPLAINT:Patient is a 83y old  Male who presents with a chief complaint of hematuria  	  Interval history: no acute events      Allergies:  oxycodone (Other)      PAST MEDICAL & SURGICAL HISTORY:  Benign prostatic hyperplasia with lower urinary tract symptoms, symptom details unspecified  Prostate cancer  Hypertension  HLD (hyperlipidemia)  Type 2 diabetes mellitus  Paroxysmal A-fib  S/P AVR (aortic valve replacement)  S/P TURP      FAMILY HISTORY:  No pertinent family history in first degree relatives      REVIEW OF SYSTEMS:  CONSTITUTIONAL: No fever, weight loss, + fatigue  EYES: No eye pain, visual disturbances, or discharge  NECK: No pain or stiffness  RESPIRATORY: No cough or wheezing, no shortness of breath  CARDIOVASCULAR: No chest pain, palpitations, dizziness, or leg swelling  GASTROINTESTINAL: No abdominal or epigastric pain. No nausea, vomiting, diarrhea or constipation  GENITOURINARY: No dysuria, urinary frequency or urgency, no hematuria today  NEUROLOGICAL: No headaches, memory loss, loss of strength, numbness, or tremors  SKIN: No itching, burning, rashes, or lesions   MUSCULOSKELETAL: No joint pain or swelling; No muscle, back, or extremity pain      Medications:  MEDICATIONS  (STANDING):  BACItracin   Ointment 1 Application(s) Topical daily  cefTRIAXone   IVPB 1 Gram(s) IV Intermittent every 24 hours  dextrose 5%. 1000 milliLiter(s) (50 mL/Hr) IV Continuous <Continuous>  dextrose 50% Injectable 12.5 Gram(s) IV Push once  dextrose 50% Injectable 25 Gram(s) IV Push once  dextrose 50% Injectable 25 Gram(s) IV Push once  insulin lispro (HumaLOG) corrective regimen sliding scale   SubCutaneous three times a day before meals  lisinopril 10 milliGRAM(s) Oral daily  metoprolol succinate ER 50 milliGRAM(s) Oral daily  naloxegol 12.5 milliGRAM(s) Oral daily  senna 2 Tablet(s) Oral at bedtime  sodium chloride 0.9%. 1000 milliLiter(s) (75 mL/Hr) IV Continuous <Continuous>  vancomycin  IVPB 1000 milliGRAM(s) IV Intermittent every 24 hours    MEDICATIONS  (PRN):  dextrose 40% Gel 15 Gram(s) Oral once PRN Blood Glucose LESS THAN 70 milliGRAM(s)/deciliter  docusate sodium 100 milliGRAM(s) Oral three times a day PRN Constipation  glucagon  Injectable 1 milliGRAM(s) IntraMuscular once PRN Glucose LESS THAN 70 milligrams/deciliter  morphine  IR 15 milliGRAM(s) Oral every 4 hours PRN Severe Pain (7 - 10)    	    PHYSICAL EXAM:  T(C): 36.8 (12-31-18 @ 13:58), Max: 37.2 (12-31-18 @ 01:25)  HR: 83 (12-31-18 @ 13:58) (65 - 88)  BP: 132/69 (12-31-18 @ 13:58) (123/69 - 132/69)  RR: 18 (12-31-18 @ 13:58) (18 - 18)  SpO2: 96% (12-31-18 @ 13:58) (96% - 98%)  Wt(kg): --  I&O's Summary    30 Dec 2018 07:01  -  31 Dec 2018 07:00  --------------------------------------------------------  IN: 3070 mL / OUT: 2820 mL / NET: 250 mL    31 Dec 2018 07:01  -  31 Dec 2018 14:32  --------------------------------------------------------  IN: 240 mL / OUT: 900 mL / NET: -660 mL      Appearance: Normal	  HEENT:   NCAT, PERRL, EOMI	  Lymphatic: No lymphadenopathy  Cardiovascular: Normal S1 S2, RRR  Respiratory: Lungs clear to auscultation BL  Psychiatry: A & O x 3, Mood & affect appropriate  LABS:	 	    CARDIAC MARKERS:                                7.3    3.5   )-----------( 107      ( 31 Dec 2018 06:37 )             21.6     12-31    138  |  104  |  16  ----------------------------<  135<H>  4.1   |  27  |  1.10    Ca    8.1<L>      31 Dec 2018 06:37      proBNP:   Lipid Profile:   HgA1c:   TSH:     	        Gastrointestinal:  Soft, Non-tender, + BS  : + suprapubic, + BL nephrostomy  Skin: No rashes, No ecchymoses, No cyanosis	  Neurologic: Non-focal  Extremities: Normal range of motion, No clubbing, cyanosis or edema

## 2018-12-31 NOTE — PROGRESS NOTE ADULT - ASSESSMENT
pt w/ met urothelial ca   hematuria  b/l nt tubes  abcess  cont iv abs  id eval noted  changed ertapenem to cefrtiaxone per ID  urology eval noted  CT reviewed, OM same, plan for abx duration unchanged  anemia from blood loss  Hb lower, transfuse 1U PRBC today  omproved s/p prbc  hospice eval in progress  Palliative eval noted  iv fluids  hold asa  tube check by IR yesterday, maintain in position, dressing changes  d/c planning to home w/hospice if accepted

## 2018-12-31 NOTE — PROGRESS NOTE ADULT - SUBJECTIVE AND OBJECTIVE BOX
83y old  Male who presents with a chief complaint of hematuria (31 Dec 2018 15:27)      Interval history:  Afebrile, no cough, no SOB, no N/V, pain in the suprapubic and penile area.    Allergies:   oxycodone (Other)      Antimicrobials:  cefTRIAXone   IVPB 1 Gram(s) IV Intermittent every 24 hours  vancomycin  IVPB 1000 milliGRAM(s) IV Intermittent every 24 hours      REVIEW OF SYSTEMS:  No chest pain   No rash.       Vital Signs Last 24 Hrs  T(C): 36.9 (12-31-18 @ 17:35), Max: 37.2 (12-31-18 @ 01:25)  T(F): 98.4 (12-31-18 @ 17:35), Max: 99 (12-31-18 @ 17:01)  HR: 84 (12-31-18 @ 17:35) (65 - 88)  BP: 145/70 (12-31-18 @ 17:35) (123/69 - 153/64)  RR: 18 (12-31-18 @ 17:35) (18 - 18)  SpO2: 96% (12-31-18 @ 17:35) (96% - 98%)      PHYSICAL EXAM:  Patient in no acute distress. Alert, awake. Laying in bed.   Cardiovascular: S1S2 normal.  Lungs: + air entry B/L lung fields.  Gastrointestinal: soft, nondistended, + suprapubic catheter with some bleeding around, b/l nephrostomy.   Extremities: trace ankle edema.  + Rt PICC                           7.3    3.5   )-----------( 107      ( 31 Dec 2018 06:37 )             21.6   12-31    138  |  104  |  16  ----------------------------<  135<H>  4.1   |  27  |  1.10    Ca    8.1<L>      31 Dec 2018 06:37      Culture - Urine (12.26.18 @ 17:40)    Specimen Source: .Urine Nephrostomy - Left    Culture Results:   >100,000 CFU/ml Presumptive Candida albicans

## 2018-12-31 NOTE — PROGRESS NOTE ADULT - SUBJECTIVE AND OBJECTIVE BOX
Subjective  No overnight events. Pt w/o complaints, no penile pain, no flank pain, fevers.     Objective    Vital signs  T(F): , Max: 98.9 (12-31-18 @ 01:25)  HR: 65 (12-31-18 @ 05:56)  BP: 123/69 (12-31-18 @ 05:56)  SpO2: 98% (12-31-18 @ 05:56)  Wt(kg): --    Output     12-30 @ 07:01  -  12-31 @ 07:00  --------------------------------------------------------  IN: 3070 mL / OUT: 2820 mL / NET: 250 mL        Gen: NAD  Abd: soft, NT, ND, SPT draining clear red, c/d/i, penis w/unchanged induration, b/l NT draining clear yellow urine, drain SS    Labs      12-31 @ 06:37    WBC 3.5   / Hct 21.6  / SCr 1.10     12-30 @ 07:02    WBC 4.7   / Hct 22.4  / SCr --       Imaging

## 2018-12-31 NOTE — PROGRESS NOTE ADULT - ATTENDING COMMENTS
Gabby Holloway  Pager: [phone]. If no response or past 5 pm call [phone].     I am off tomorrow, please call ID service for questions at [phone].

## 2018-12-31 NOTE — PROGRESS NOTE ADULT - SUBJECTIVE AND OBJECTIVE BOX
CC: goals of care in the setting of end-stage malignancy    INTERVAL EVENTS: doing well overall, but used morphine IR 15mg x 4 in last 24 hours     PERTINENT PM/SXH:   Benign prostatic hyperplasia with lower urinary tract symptoms, symptom details unspecified  Prostate cancer  Hypertension  HLD (hyperlipidemia)  Type 2 diabetes mellitus  Paroxysmal A-fib    S/P AVR (aortic valve replacement)  S/P TURP    FAMILY HISTORY:  No pertinent family history in first degree relatives    ITEMS NOT CHECKED ARE NOT PRESENT    SOCIAL HISTORY:   Significant other/partner: wife Arlen Nation [x]  Children:  [ ]  Yazdanism/Spirituality: Episcopalian   Substance hx:  [ ]   Tobacco hx:  [ ]   Alcohol hx: [ ]   Home Opioid hx:  [x] I-Stop Reference No: # [id number]  ·	Morphine 15mg IR q/ 8 hrs as needed for pain  Living Situation: [x]Home  [ ]Long term care  [ ]Rehab [ ]Other    ADVANCE DIRECTIVES:    DNR no   MOLST  [ ]  Living Will  [ ]   DECISION MAKER(s): patient is alert and oriented and able to make decisions, if not able in absence of HCP form, decision maker would be his surrogate, his wife Arlen  [ ] Health Care Proxy(s)  [ ] Surrogate(s)  [ ] Guardian           Name(s): Arlen Nation Phone Number(s): [phone]    BASELINE (I)ADL(s) (prior to admission):  [place]: [ ]Total  [x] Moderate [ ]Dependent    Allergies    oxycodone (Other)    Intolerances    MEDICATIONS  (STANDING):  BACItracin   Ointment 1 Application(s) Topical daily  cefTRIAXone   IVPB 1 Gram(s) IV Intermittent every 24 hours  dextrose 5%. 1000 milliLiter(s) (50 mL/Hr) IV Continuous <Continuous>  dextrose 50% Injectable 12.5 Gram(s) IV Push once  dextrose 50% Injectable 25 Gram(s) IV Push once  dextrose 50% Injectable 25 Gram(s) IV Push once  insulin lispro (HumaLOG) corrective regimen sliding scale   SubCutaneous three times a day before meals  lisinopril 10 milliGRAM(s) Oral daily  metoprolol succinate ER 50 milliGRAM(s) Oral daily  naloxegol 12.5 milliGRAM(s) Oral daily  senna 2 Tablet(s) Oral at bedtime  sodium chloride 0.9%. 1000 milliLiter(s) (75 mL/Hr) IV Continuous <Continuous>  vancomycin  IVPB 1000 milliGRAM(s) IV Intermittent every 24 hours    MEDICATIONS  (PRN):  dextrose 40% Gel 15 Gram(s) Oral once PRN Blood Glucose LESS THAN 70 milliGRAM(s)/deciliter  docusate sodium 100 milliGRAM(s) Oral three times a day PRN Constipation  glucagon  Injectable 1 milliGRAM(s) IntraMuscular once PRN Glucose LESS THAN 70 milligrams/deciliter  morphine  IR 15 milliGRAM(s) Oral every 4 hours PRN Severe Pain (7 - 10)      PRESENT SYMPTOMS: [ ]Unable to obtain due to poor mentation   Source if other than patient:  [ ]Family   [ ]Team     Pain (Impact on QOL): yes  Location - suprapubic area        Minimal acceptable level (0-10 scale):  3-4/10           Aggravating factors - movement  Quality - sharp pain  Radiation - penis, lower back  Severity (0-10 scale) -  8/10  Timing - intermittent     PAIN AD Score:     [email or web address] (press ctrl +  left click to view)    Dyspnea:                           [ ]Mild [ ]Moderate [ ]Severe  Anxiety:                             [ ]Mild [ ]Moderate [ ]Severe  Fatigue:                             [ ]Mild [ ]Moderate [ ]Severe  Nausea:                             [ ]Mild [ ]Moderate [ ]Severe  Loss of appetite:              [ ]Mild [ ]Moderate [ ]Severe  Constipation:                    [ ]Mild [ ]Moderate [x]Severe    Other Symptoms:  [x]All other review of systems negative     Karnofsky Performance Score/Palliative Performance Status Version 2: 40%    http://palliative.info/resource_material/[email or web address].pdf    PHYSICAL EXAM:  Vital Signs Last 24 Hrs  T(C): 36.8 (31 Dec 2018 13:58), Max: 37.2 (31 Dec 2018 01:25)  T(F): 98.3 (31 Dec 2018 13:58), Max: 98.9 (31 Dec 2018 01:25)  HR: 83 (31 Dec 2018 13:58) (65 - 88)  BP: 132/69 (31 Dec 2018 13:58) (123/69 - 132/69)  BP(mean): --  RR: 18 (31 Dec 2018 13:58) (18 - 18)  SpO2: 96% (31 Dec 2018 13:58) (96% - 98%)    Limited exam for patient comfort  GENERAL:  [x]Alert  []Oriented x 3  [ ]Lethargic  [ ]Cachexia  [ ]Unarousable  [ ]Verbal  [ ]Non-Verbal  Behavioral:   [ ] Anxiety  [ ] Delirium [ ] Agitation [ ] Other  HEENT:  [x]Normal   [ ]Dry mouth   [ ]ET Tube/Trach  [ ]Oral lesions  PULMONARY:   []Clear [ ]Tachypnea  [ ]Audible excessive secretions   [ ]Rhonchi        [ ]Right [ ]Left [ ]Bilateral  [ ]Crackles        [ ]Right [ ]Left [ ]Bilateral  [ ]Wheezing     [ ]Right [ ]Left [ ]Bilateral  CARDIOVASCULAR:    []Regular [ ]Irregular [ ]Tachy  [ ]Lico [ ]Murmur [ ]Other  GASTROINTESTINAL:  []Soft  [ ]Distended   []+BS  [ ]Non tender []Tender / suprapubic area [ ]PEG [ ]OGT/ NGT  Last BM: 12/27  GENITOURINARY:  [ ]Normal [ ] Incontinent   [ ]Oliguria/Anuria   [x] Suprapubic catheter with hematuria, bilateral nephrostomy tubes   MUSCULOSKELETAL:   [ ]Normal   []Weakness  [ ]Bed/Wheelchair bound [ ]Edema  NEUROLOGIC:   [x]No focal deficits  [ ] Cognitive impairment  [ ] Dysphagia [ ]Dysarthria [ ] Paresis [ ]Other   SKIN:   [x]Normal   [ ]Pressure ulcer(s)  [ ]Rash    CRITICAL CARE:  [ ] Shock Present  [ ]Septic [ ]Cardiogenic [ ]Neurologic [ ]Hypovolemic  [ ]  Vasopressors [ ]  Inotropes   [ ] Respiratory failure present  [ ] Acute  [ ] Chronic [ ] Hypoxic  [ ] Hypercarbic [ ] Other  [ ] Other organ failure     LABS:                           7.3    3.5   )-----------( 107      ( 31 Dec 2018 06:37 )             21.6   12-31    138  |  104  |  16  ----------------------------<  135<H>  4.1   |  27  |  1.10    Ca    8.1<L>      31 Dec 2018 06:37        RADIOLOGY & ADDITIONAL STUDIES: reviewed     PROTEIN CALORIE MALNUTRITION PRESENT: [ ] Yes [ ] No  [ ] PPSV2 < or = to 30% [ ] significant weight loss  [ ] poor nutritional intake [ ] catabolic state [ ] anasarca     Albumin, Serum: 2.8 g/dL (12-26-18 @ 13:45)  Artificial Nutrition [ ]     REFERRALS:   [ ]Chaplaincy  [ ] Hospice  [ ]Child Life  [ ]Social Work  [ ]Case management [ ]Holistic Therapy   Goals of Care Discussion Document:

## 2019-01-01 LAB
ANION GAP SERPL CALC-SCNC: 9 MMOL/L — SIGNIFICANT CHANGE UP (ref 5–17)
BUN SERPL-MCNC: 15 MG/DL — SIGNIFICANT CHANGE UP (ref 7–23)
CALCIUM SERPL-MCNC: 8.3 MG/DL — LOW (ref 8.4–10.5)
CHLORIDE SERPL-SCNC: 99 MMOL/L — SIGNIFICANT CHANGE UP (ref 96–108)
CO2 SERPL-SCNC: 26 MMOL/L — SIGNIFICANT CHANGE UP (ref 22–31)
CREAT SERPL-MCNC: 1.13 MG/DL — SIGNIFICANT CHANGE UP (ref 0.5–1.3)
GLUCOSE SERPL-MCNC: 146 MG/DL — HIGH (ref 70–99)
HCT VFR BLD CALC: 25.6 % — LOW (ref 39–50)
HCT VFR BLD CALC: 26.1 % — LOW (ref 39–50)
HGB BLD-MCNC: 8.6 G/DL — LOW (ref 13–17)
HGB BLD-MCNC: 8.8 G/DL — LOW (ref 13–17)
MCHC RBC-ENTMCNC: 29.4 PG — SIGNIFICANT CHANGE UP (ref 27–34)
MCHC RBC-ENTMCNC: 29.5 PG — SIGNIFICANT CHANGE UP (ref 27–34)
MCHC RBC-ENTMCNC: 33.5 GM/DL — SIGNIFICANT CHANGE UP (ref 32–36)
MCHC RBC-ENTMCNC: 33.6 GM/DL — SIGNIFICANT CHANGE UP (ref 32–36)
MCV RBC AUTO: 87.8 FL — SIGNIFICANT CHANGE UP (ref 80–100)
MCV RBC AUTO: 87.8 FL — SIGNIFICANT CHANGE UP (ref 80–100)
PLATELET # BLD AUTO: 130 K/UL — LOW (ref 150–400)
PLATELET # BLD AUTO: 133 K/UL — LOW (ref 150–400)
POTASSIUM SERPL-MCNC: 4.1 MMOL/L — SIGNIFICANT CHANGE UP (ref 3.5–5.3)
POTASSIUM SERPL-SCNC: 4.1 MMOL/L — SIGNIFICANT CHANGE UP (ref 3.5–5.3)
RBC # BLD: 2.92 M/UL — LOW (ref 4.2–5.8)
RBC # BLD: 2.98 M/UL — LOW (ref 4.2–5.8)
RBC # FLD: 15.6 % — HIGH (ref 10.3–14.5)
RBC # FLD: 15.8 % — HIGH (ref 10.3–14.5)
SODIUM SERPL-SCNC: 134 MMOL/L — LOW (ref 135–145)
WBC # BLD: 4.7 K/UL — SIGNIFICANT CHANGE UP (ref 3.8–10.5)
WBC # BLD: 5.6 K/UL — SIGNIFICANT CHANGE UP (ref 3.8–10.5)
WBC # FLD AUTO: 4.7 K/UL — SIGNIFICANT CHANGE UP (ref 3.8–10.5)
WBC # FLD AUTO: 5.6 K/UL — SIGNIFICANT CHANGE UP (ref 3.8–10.5)

## 2019-01-01 RX ADMIN — Medication 2: at 08:27

## 2019-01-01 RX ADMIN — Medication 4: at 13:46

## 2019-01-01 RX ADMIN — Medication 4: at 18:40

## 2019-01-01 RX ADMIN — Medication 50 MILLIGRAM(S): at 06:01

## 2019-01-01 RX ADMIN — CEFTRIAXONE 100 GRAM(S): 500 INJECTION, POWDER, FOR SOLUTION INTRAMUSCULAR; INTRAVENOUS at 21:14

## 2019-01-01 RX ADMIN — MORPHINE SULFATE 15 MILLIGRAM(S): 50 CAPSULE, EXTENDED RELEASE ORAL at 08:50

## 2019-01-01 RX ADMIN — MORPHINE SULFATE 15 MILLIGRAM(S): 50 CAPSULE, EXTENDED RELEASE ORAL at 08:18

## 2019-01-01 RX ADMIN — MORPHINE SULFATE 15 MILLIGRAM(S): 50 CAPSULE, EXTENDED RELEASE ORAL at 06:02

## 2019-01-01 RX ADMIN — MORPHINE SULFATE 15 MILLIGRAM(S): 50 CAPSULE, EXTENDED RELEASE ORAL at 18:29

## 2019-01-01 RX ADMIN — Medication 250 MILLIGRAM(S): at 12:19

## 2019-01-01 RX ADMIN — Medication 1 APPLICATION(S): at 12:18

## 2019-01-01 RX ADMIN — MORPHINE SULFATE 15 MILLIGRAM(S): 50 CAPSULE, EXTENDED RELEASE ORAL at 21:44

## 2019-01-01 RX ADMIN — NALOXEGOL OXALATE 12.5 MILLIGRAM(S): 12.5 TABLET, FILM COATED ORAL at 12:18

## 2019-01-01 RX ADMIN — SENNA PLUS 2 TABLET(S): 8.6 TABLET ORAL at 21:14

## 2019-01-01 RX ADMIN — LISINOPRIL 10 MILLIGRAM(S): 2.5 TABLET ORAL at 06:01

## 2019-01-01 RX ADMIN — MORPHINE SULFATE 15 MILLIGRAM(S): 50 CAPSULE, EXTENDED RELEASE ORAL at 21:14

## 2019-01-01 RX ADMIN — MORPHINE SULFATE 15 MILLIGRAM(S): 50 CAPSULE, EXTENDED RELEASE ORAL at 07:29

## 2019-01-01 NOTE — PROGRESS NOTE ADULT - SUBJECTIVE AND OBJECTIVE BOX
CHIEF COMPLAINT:Patient is a 83y old  Male who presents with a chief complaint of hematuria  	  Interval history: no acute events      Allergies:  oxycodone (Other)      PAST MEDICAL & SURGICAL HISTORY:  Benign prostatic hyperplasia with lower urinary tract symptoms, symptom details unspecified  Prostate cancer  Hypertension  HLD (hyperlipidemia)  Type 2 diabetes mellitus  Paroxysmal A-fib  S/P AVR (aortic valve replacement)  S/P TURP      FAMILY HISTORY:  No pertinent family history in first degree relatives      REVIEW OF SYSTEMS:  CONSTITUTIONAL: No fever, weight loss, less fatigue  EYES: No eye pain, visual disturbances, or discharge  NECK: No pain or stiffness  RESPIRATORY: No cough or wheezing, no shortness of breath  CARDIOVASCULAR: No chest pain, palpitations, dizziness, or leg swelling  GASTROINTESTINAL: No abdominal or epigastric pain. No nausea, vomiting, diarrhea or constipation  GENITOURINARY: No dysuria, urinary frequency or urgency, no hematuria today  NEUROLOGICAL: No headaches, memory loss, loss of strength, numbness, or tremors  SKIN: No itching, burning, rashes, or lesions   MUSCULOSKELETAL: No joint pain or swelling; No muscle, back, or extremity pain      Medications:  MEDICATIONS  (STANDING):  BACItracin   Ointment 1 Application(s) Topical daily  cefTRIAXone   IVPB 1 Gram(s) IV Intermittent every 24 hours  dextrose 5%. 1000 milliLiter(s) (50 mL/Hr) IV Continuous <Continuous>  dextrose 50% Injectable 12.5 Gram(s) IV Push once  dextrose 50% Injectable 25 Gram(s) IV Push once  dextrose 50% Injectable 25 Gram(s) IV Push once  insulin lispro (HumaLOG) corrective regimen sliding scale   SubCutaneous three times a day before meals  lisinopril 10 milliGRAM(s) Oral daily  metoprolol succinate ER 50 milliGRAM(s) Oral daily  morphine ER Tablet 15 milliGRAM(s) Oral two times a day  naloxegol 12.5 milliGRAM(s) Oral daily  senna 2 Tablet(s) Oral at bedtime  sodium chloride 0.9%. 1000 milliLiter(s) (75 mL/Hr) IV Continuous <Continuous>  vancomycin  IVPB 750 milliGRAM(s) IV Intermittent every 12 hours    MEDICATIONS  (PRN):  aluminum hydroxide/magnesium hydroxide/simethicone Suspension 30 milliLiter(s) Oral every 4 hours PRN Dyspepsia  dextrose 40% Gel 15 Gram(s) Oral once PRN Blood Glucose LESS THAN 70 milliGRAM(s)/deciliter  docusate sodium 100 milliGRAM(s) Oral three times a day PRN Constipation  glucagon  Injectable 1 milliGRAM(s) IntraMuscular once PRN Glucose LESS THAN 70 milligrams/deciliter  morphine  IR 15 milliGRAM(s) Oral every 4 hours PRN Severe Pain (7 - 10)    	    PHYSICAL EXAM:  T(C): 37.2 (01-01-19 @ 09:24), Max: 37.3 (12-31-18 @ 20:50)  HR: 91 (01-01-19 @ 09:24) (82 - 98)  BP: 112/62 (01-01-19 @ 09:24) (112/62 - 164/76)  RR: 17 (01-01-19 @ 09:24) (16 - 18)  SpO2: 96% (01-01-19 @ 09:24) (92% - 97%)  Wt(kg): --  I&O's Summary    31 Dec 2018 07:01  -  01 Jan 2019 07:00  --------------------------------------------------------  IN: 2360 mL / OUT: 2630 mL / NET: -270 mL    01 Jan 2019 07:01  -  01 Jan 2019 09:59  --------------------------------------------------------  IN: 240 mL / OUT: 0 mL / NET: 240 mL      Appearance: Normal	  HEENT:   NCAT, PERRL, EOMI	  Lymphatic: No lymphadenopathy  Cardiovascular: Normal S1 S2, RRR  Respiratory: Lungs clear to auscultation BL  Psychiatry: A & O x 3, Mood & affect appropriate  Gastrointestinal:  Soft, Non-tender, + BS  : + suprapubic, + BL nephrostomy  Skin: No rashes, No ecchymoses, No cyanosis	  Neurologic: Non-focal  Extremities: Normal range of motion, No clubbing, cyanosis or edema    LABS:	 	    CARDIAC MARKERS:                                8.6    4.7   )-----------( 130      ( 01 Jan 2019 06:49 )             25.6     01-01    134<L>  |  99  |  15  ----------------------------<  146<H>  4.1   |  26  |  1.13    Ca    8.3<L>      01 Jan 2019 06:45      proBNP:   Lipid Profile:   HgA1c:   TSH:

## 2019-01-01 NOTE — PROGRESS NOTE ADULT - ASSESSMENT
pt w/ met urothelial ca   hematuria  b/l nt tubes  abcess  cont iv abs  id eval noted  changed ertapenem to cefrtiaxone per ID  urology eval noted  CT reviewed, OM same, plan for abx duration unchanged  anemia from blood loss  Hb improved s/p PRBC  hospice eval in progress  Palliative eval noted  iv fluids  hold asa  tube check by IR yesterday, maintain in position, dressing changes  d/c planning to home w/hospice if accepted

## 2019-01-02 ENCOUNTER — OUTPATIENT (OUTPATIENT)
Dept: OUTPATIENT SERVICES | Facility: HOSPITAL | Age: 84
LOS: 1 days | Discharge: HOME | End: 2019-01-02

## 2019-01-02 DIAGNOSIS — Z90.79 ACQUIRED ABSENCE OF OTHER GENITAL ORGAN(S): Chronic | ICD-10-CM

## 2019-01-02 DIAGNOSIS — Z95.2 PRESENCE OF PROSTHETIC HEART VALVE: Chronic | ICD-10-CM

## 2019-01-02 DIAGNOSIS — C61 MALIGNANT NEOPLASM OF PROSTATE: ICD-10-CM

## 2019-01-02 DIAGNOSIS — Z71.89 OTHER SPECIFIED COUNSELING: ICD-10-CM

## 2019-01-02 LAB
ANION GAP SERPL CALC-SCNC: 13 MMOL/L — SIGNIFICANT CHANGE UP (ref 5–17)
BUN SERPL-MCNC: 15 MG/DL — SIGNIFICANT CHANGE UP (ref 7–23)
CALCIUM SERPL-MCNC: 8.4 MG/DL — SIGNIFICANT CHANGE UP (ref 8.4–10.5)
CHLORIDE SERPL-SCNC: 96 MMOL/L — SIGNIFICANT CHANGE UP (ref 96–108)
CO2 SERPL-SCNC: 24 MMOL/L — SIGNIFICANT CHANGE UP (ref 22–31)
CREAT SERPL-MCNC: 1.11 MG/DL — SIGNIFICANT CHANGE UP (ref 0.5–1.3)
GLUCOSE SERPL-MCNC: 156 MG/DL — HIGH (ref 70–99)
HCT VFR BLD CALC: 26.2 % — LOW (ref 39–50)
HGB BLD-MCNC: 8.8 G/DL — LOW (ref 13–17)
MCHC RBC-ENTMCNC: 29.2 PG — SIGNIFICANT CHANGE UP (ref 27–34)
MCHC RBC-ENTMCNC: 33.8 GM/DL — SIGNIFICANT CHANGE UP (ref 32–36)
MCV RBC AUTO: 86.5 FL — SIGNIFICANT CHANGE UP (ref 80–100)
PLATELET # BLD AUTO: 132 K/UL — LOW (ref 150–400)
POTASSIUM SERPL-MCNC: 4.2 MMOL/L — SIGNIFICANT CHANGE UP (ref 3.5–5.3)
POTASSIUM SERPL-SCNC: 4.2 MMOL/L — SIGNIFICANT CHANGE UP (ref 3.5–5.3)
RBC # BLD: 3.02 M/UL — LOW (ref 4.2–5.8)
RBC # FLD: 15.4 % — HIGH (ref 10.3–14.5)
SODIUM SERPL-SCNC: 133 MMOL/L — LOW (ref 135–145)
VANCOMYCIN TROUGH SERPL-MCNC: 15.4 UG/ML — SIGNIFICANT CHANGE UP (ref 10–20)
WBC # BLD: 4.8 K/UL — SIGNIFICANT CHANGE UP (ref 3.8–10.5)
WBC # FLD AUTO: 4.8 K/UL — SIGNIFICANT CHANGE UP (ref 3.8–10.5)

## 2019-01-02 PROCEDURE — 99233 SBSQ HOSP IP/OBS HIGH 50: CPT

## 2019-01-02 PROCEDURE — 99232 SBSQ HOSP IP/OBS MODERATE 35: CPT

## 2019-01-02 PROCEDURE — 99497 ADVNCD CARE PLAN 30 MIN: CPT

## 2019-01-02 RX ORDER — DOCUSATE SODIUM 100 MG
1 CAPSULE ORAL
Qty: 0 | Refills: 0 | COMMUNITY
Start: 2019-01-02

## 2019-01-02 RX ORDER — VANCOMYCIN HCL 1 G
750 VIAL (EA) INTRAVENOUS
Qty: 46 | Refills: 0 | OUTPATIENT
Start: 2019-01-02 | End: 2019-01-24

## 2019-01-02 RX ORDER — VANCOMYCIN HCL 1 G
750 VIAL (EA) INTRAVENOUS
Qty: 0 | Refills: 0 | COMMUNITY
Start: 2019-01-02

## 2019-01-02 RX ORDER — CEFTRIAXONE 500 MG/1
1 INJECTION, POWDER, FOR SOLUTION INTRAMUSCULAR; INTRAVENOUS
Qty: 23 | Refills: 0 | OUTPATIENT
Start: 2019-01-02 | End: 2019-01-24

## 2019-01-02 RX ORDER — CEFTRIAXONE 500 MG/1
1 INJECTION, POWDER, FOR SOLUTION INTRAMUSCULAR; INTRAVENOUS
Qty: 0 | Refills: 0 | COMMUNITY
Start: 2019-01-02

## 2019-01-02 RX ORDER — MORPHINE SULFATE 50 MG/1
1 CAPSULE, EXTENDED RELEASE ORAL
Qty: 0 | Refills: 0 | COMMUNITY
Start: 2019-01-02

## 2019-01-02 RX ORDER — BACITRACIN ZINC 500 UNIT/G
1 OINTMENT IN PACKET (EA) TOPICAL
Qty: 0 | Refills: 0 | COMMUNITY
Start: 2019-01-02

## 2019-01-02 RX ORDER — MORPHINE SULFATE 50 MG/1
1 CAPSULE, EXTENDED RELEASE ORAL
Qty: 10 | Refills: 0 | OUTPATIENT
Start: 2019-01-02 | End: 2019-01-06

## 2019-01-02 RX ORDER — SIMVASTATIN 20 MG/1
10 TABLET, FILM COATED ORAL
Qty: 0 | Refills: 0 | COMMUNITY

## 2019-01-02 RX ORDER — SENNA PLUS 8.6 MG/1
2 TABLET ORAL
Qty: 0 | Refills: 0 | COMMUNITY
Start: 2019-01-02

## 2019-01-02 RX ADMIN — MORPHINE SULFATE 15 MILLIGRAM(S): 50 CAPSULE, EXTENDED RELEASE ORAL at 05:08

## 2019-01-02 RX ADMIN — MORPHINE SULFATE 15 MILLIGRAM(S): 50 CAPSULE, EXTENDED RELEASE ORAL at 20:39

## 2019-01-02 RX ADMIN — LISINOPRIL 10 MILLIGRAM(S): 2.5 TABLET ORAL at 05:08

## 2019-01-02 RX ADMIN — MORPHINE SULFATE 15 MILLIGRAM(S): 50 CAPSULE, EXTENDED RELEASE ORAL at 18:19

## 2019-01-02 RX ADMIN — Medication 250 MILLIGRAM(S): at 01:30

## 2019-01-02 RX ADMIN — Medication 1 APPLICATION(S): at 12:00

## 2019-01-02 RX ADMIN — MORPHINE SULFATE 15 MILLIGRAM(S): 50 CAPSULE, EXTENDED RELEASE ORAL at 05:38

## 2019-01-02 RX ADMIN — MORPHINE SULFATE 15 MILLIGRAM(S): 50 CAPSULE, EXTENDED RELEASE ORAL at 15:57

## 2019-01-02 RX ADMIN — CEFTRIAXONE 100 GRAM(S): 500 INJECTION, POWDER, FOR SOLUTION INTRAMUSCULAR; INTRAVENOUS at 21:35

## 2019-01-02 RX ADMIN — Medication 2: at 08:15

## 2019-01-02 RX ADMIN — MORPHINE SULFATE 15 MILLIGRAM(S): 50 CAPSULE, EXTENDED RELEASE ORAL at 12:31

## 2019-01-02 RX ADMIN — MORPHINE SULFATE 15 MILLIGRAM(S): 50 CAPSULE, EXTENDED RELEASE ORAL at 12:01

## 2019-01-02 RX ADMIN — Medication 250 MILLIGRAM(S): at 12:00

## 2019-01-02 RX ADMIN — Medication 4: at 18:17

## 2019-01-02 RX ADMIN — MORPHINE SULFATE 15 MILLIGRAM(S): 50 CAPSULE, EXTENDED RELEASE ORAL at 16:27

## 2019-01-02 RX ADMIN — NALOXEGOL OXALATE 12.5 MILLIGRAM(S): 12.5 TABLET, FILM COATED ORAL at 12:00

## 2019-01-02 RX ADMIN — Medication 30 MILLILITER(S): at 12:00

## 2019-01-02 RX ADMIN — Medication 50 MILLIGRAM(S): at 05:08

## 2019-01-02 RX ADMIN — MORPHINE SULFATE 15 MILLIGRAM(S): 50 CAPSULE, EXTENDED RELEASE ORAL at 20:09

## 2019-01-02 RX ADMIN — Medication 8: at 13:09

## 2019-01-02 NOTE — PROGRESS NOTE ADULT - SUBJECTIVE AND OBJECTIVE BOX
83y old  Male who presents with a chief complaint of hematuria (02 Jan 2019 12:10)      Interval history:  Afebrile, pain controlled, awaiting authorization for abx.       Allergies:   oxycodone (Other)      Antimicrobials:  cefTRIAXone   IVPB 1 Gram(s) IV Intermittent every 24 hours  vancomycin  IVPB 750 milliGRAM(s) IV Intermittent every 12 hours      REVIEW OF SYSTEMS:  No chest pain  No cough, no SOB  No N/V/D,  No rash.     Vital Signs Last 24 Hrs  T(C): 36.3 (01-02-19 @ 10:50), Max: 37.1 (01-02-19 @ 00:38)  T(F): 97.4 (01-02-19 @ 10:50), Max: 98.7 (01-02-19 @ 00:38)  HR: 83 (01-02-19 @ 10:50) (78 - 90)  BP: 127/72 (01-02-19 @ 10:50) (127/72 - 139/73)  RR: 18 (01-02-19 @ 10:50) (18 - 18)  SpO2: 95% (01-02-19 @ 10:50) (95% - 98%)      PHYSICAL EXAM:  Patient in no acute distress. Alert, awake. Laying in bed.   Cardiovascular: S1S2 normal, slightly irregular.   Lungs: + air entry B/L lung fields.  Gastrointestinal: soft, nondistended, + suprapubic catheter, b/l nephrostomy, + SACHA   Extremities: trace ankle edema.  + Rt PICC                             8.8    4.8   )-----------( 132      ( 02 Jan 2019 06:47 )             26.2   01-02    133<L>  |  96  |  15  ----------------------------<  156<H>  4.2   |  24  |  1.11    Ca    8.4      02 Jan 2019 06:46

## 2019-01-02 NOTE — GOALS OF CARE CONVERSATION - PERSONAL ADVANCE DIRECTIVE - CONVERSATION DETAILS
Met with pt, spouse and family.  Consents for hospice care re-signed.  Pt has been approved for admission to home hospice care with current antibiotic tx.  Referral sent to [facility] for resumption of services under [facility].  Spoke with RAFAEL Stephens re: beginning to make antibiotic administration times earlier for pt's spouse's convenience.  Pt and family expect pt's discharge home in early afternoon 1/3.  Reviewed discharge medications and instructions with Angelo Tran NP.

## 2019-01-02 NOTE — PROGRESS NOTE ADULT - ATTENDING COMMENTS
Gabby Holloway  Pager: [phone]. If no response or past 5 pm call [phone]. Gabby Holloway  Pager: [phone]. If no response or past 5 pm call [phone].    Will sign off, please call with questions.

## 2019-01-02 NOTE — PROGRESS NOTE ADULT - SUBJECTIVE AND OBJECTIVE BOX
CC: goals of care in the setting of end-stage malignancy    INTERVAL EVENTS: doing well overall, used morphine IR 15mg x 2in last 24 hours, is on standing ER morphine as well.    ADVANCE DIRECTIVES:    DNR no   MOLST  [ ]  Living Will  [ ]   DECISION MAKER(s): patient is alert and oriented and able to make decisions, if not able in absence of HCP form, decision maker would be his surrogate, his wife Arlen  [ ] Health Care Proxy(s)  [ ] Surrogate(s)  [ ] Guardian           Name(s): Arlen Nation Phone Number(s): [phone]    BASELINE (I)ADL(s) (prior to admission):  [place]: [ ]Total  [x] Moderate [ ]Dependent    Allergies    oxycodone (Other)    Intolerances    MEDICATIONS  (STANDING):  BACItracin   Ointment 1 Application(s) Topical daily  cefTRIAXone   IVPB 1 Gram(s) IV Intermittent every 24 hours  dextrose 5%. 1000 milliLiter(s) (50 mL/Hr) IV Continuous <Continuous>  dextrose 50% Injectable 12.5 Gram(s) IV Push once  dextrose 50% Injectable 25 Gram(s) IV Push once  dextrose 50% Injectable 25 Gram(s) IV Push once  insulin lispro (HumaLOG) corrective regimen sliding scale   SubCutaneous three times a day before meals  lisinopril 10 milliGRAM(s) Oral daily  metoprolol succinate ER 50 milliGRAM(s) Oral daily  morphine ER Tablet 15 milliGRAM(s) Oral two times a day  naloxegol 12.5 milliGRAM(s) Oral daily  senna 2 Tablet(s) Oral at bedtime  sodium chloride 0.9%. 1000 milliLiter(s) (75 mL/Hr) IV Continuous <Continuous>  vancomycin  IVPB 750 milliGRAM(s) IV Intermittent every 12 hours    MEDICATIONS  (PRN):  aluminum hydroxide/magnesium hydroxide/simethicone Suspension 30 milliLiter(s) Oral every 4 hours PRN Dyspepsia  dextrose 40% Gel 15 Gram(s) Oral once PRN Blood Glucose LESS THAN 70 milliGRAM(s)/deciliter  docusate sodium 100 milliGRAM(s) Oral three times a day PRN Constipation  glucagon  Injectable 1 milliGRAM(s) IntraMuscular once PRN Glucose LESS THAN 70 milligrams/deciliter  morphine  IR 15 milliGRAM(s) Oral every 4 hours PRN Severe Pain (7 - 10)    PRESENT SYMPTOMS: [ ]Unable to obtain due to poor mentation   Source if other than patient:  [ ]Family   [ ]Team     Pain (Impact on QOL): yes  Location - suprapubic area        Minimal acceptable level (0-10 scale):  3-4/10           Aggravating factors - movement  Quality - sharp pain  Radiation - penis, lower back  Severity (0-10 scale) -  8/10  Timing - intermittent     PAIN AD Score:     http://geriatrictoolkit.[email or web address]/cog/painad.pdf (press ctrl +  left click to view)    Dyspnea:                           [ ]Mild [ ]Moderate [ ]Severe  Anxiety:                             [ ]Mild [ ]Moderate [ ]Severe  Fatigue:                             [ ]Mild [ ]Moderate [ ]Severe  Nausea:                             [ ]Mild [ ]Moderate [ ]Severe  Loss of appetite:              [ ]Mild [ ]Moderate [ ]Severe  Constipation:                    [ ]Mild [ ]Moderate [x]Severe    Other Symptoms:  [x]All other review of systems negative     Karnofsky Performance Score/Palliative Performance Status Version 2: 40%    http://palliative.info/resource_material/[email or web address].pdf    PHYSICAL EXAM:  Vital Signs Last 24 Hrs  T(C): 36.7 (02 Jan 2019 14:30), Max: 37.1 (02 Jan 2019 00:38)  T(F): 98.1 (02 Jan 2019 14:30), Max: 98.7 (02 Jan 2019 00:38)  HR: 80 (02 Jan 2019 14:30) (78 - 90)  BP: 127/69 (02 Jan 2019 14:30) (127/69 - 139/73)  BP(mean): --  RR: 18 (02 Jan 2019 14:30) (18 - 18)  SpO2: 96% (02 Jan 2019 14:30) (95% - 98%)    Limited exam for patient comfort  GENERAL:  [x]Alert  []Oriented x 3  [ ]Lethargic  [ ]Cachexia  [ ]Unarousable  [ ]Verbal  [ ]Non-Verbal  Behavioral:   [ ] Anxiety  [ ] Delirium [ ] Agitation [ ] Other  HEENT:  [x]Normal   [ ]Dry mouth   [ ]ET Tube/Trach  [ ]Oral lesions  PULMONARY:   []Clear [ ]Tachypnea  [ ]Audible excessive secretions   [ ]Rhonchi        [ ]Right [ ]Left [ ]Bilateral  [ ]Crackles        [ ]Right [ ]Left [ ]Bilateral  [ ]Wheezing     [ ]Right [ ]Left [ ]Bilateral  CARDIOVASCULAR:    []Regular [ ]Irregular [ ]Tachy  [ ]Lico [ ]Murmur [ ]Other  GASTROINTESTINAL:  []Soft  [ ]Distended   []+BS  [ ]Non tender []Tender / suprapubic area [ ]PEG [ ]OGT/ NGT  Last BM: 12/27  GENITOURINARY:  [ ]Normal [ ] Incontinent   [ ]Oliguria/Anuria   [x] Suprapubic catheter with hematuria, bilateral nephrostomy tubes   MUSCULOSKELETAL:   [ ]Normal   []Weakness  [ ]Bed/Wheelchair bound [ ]Edema  NEUROLOGIC:   [x]No focal deficits  [ ] Cognitive impairment  [ ] Dysphagia [ ]Dysarthria [ ] Paresis [ ]Other   SKIN:   [x]Normal   [ ]Pressure ulcer(s)  [ ]Rash    CRITICAL CARE:  [ ] Shock Present  [ ]Septic [ ]Cardiogenic [ ]Neurologic [ ]Hypovolemic  [ ]  Vasopressors [ ]  Inotropes   [ ] Respiratory failure present  [ ] Acute  [ ] Chronic [ ] Hypoxic  [ ] Hypercarbic [ ] Other  [ ] Other organ failure     LABS:                           8.8    4.8   )-----------( 132      ( 02 Jan 2019 06:47 )             26.2     01-02    133<L>  |  96  |  15  ----------------------------<  156<H>  4.2   |  24  |  1.11    Ca    8.4      02 Jan 2019 06:46      RADIOLOGY & ADDITIONAL STUDIES: reviewed     PROTEIN CALORIE MALNUTRITION PRESENT: [ ] Yes [ ] No  [ ] PPSV2 < or = to 30% [ ] significant weight loss  [ ] poor nutritional intake [ ] catabolic state [ ] anasarca     Albumin, Serum: 2.8 g/dL (12-26-18 @ 13:45)  Artificial Nutrition [ ]     REFERRALS:   [ ]Chaplaincy  [ ] Hospice  [ ]Child Life  [ ]Social Work  [ ]Case management [ ]Holistic Therapy   Goals of Care Discussion Document:

## 2019-01-02 NOTE — PROGRESS NOTE ADULT - ASSESSMENT
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   Overall prostate abscess, OM of pubic ramus, abnormal U/A, anemia, thrombocytopenia, positive culture finding.   Candida in urine cx ? significance as pt likely colonized given he has been on broad spectrum abx.       Plan;   - CT with persistent OM,   - Continue with ceftriaxone,   - cont with vanco at current dose of 750 mg iv q12h    - Trough therapeutic, above 15   - Hospice referral made, awaiting recommendation regarding abx.    - For now plan to continue abx until 1/25/19.   - CBC/CMP/Vanco trough once a week while on OPAT.   - Removal of SACHA per IR

## 2019-01-02 NOTE — PROGRESS NOTE ADULT - SUBJECTIVE AND OBJECTIVE BOX
CHIEF COMPLAINT:Patient is a 83y old  Male who presents with a chief complaint of hematuria  	  Interval history: no acute events      Allergies:  oxycodone (Other)      PAST MEDICAL & SURGICAL HISTORY:  Benign prostatic hyperplasia with lower urinary tract symptoms, symptom details unspecified  Prostate cancer  Hypertension  HLD (hyperlipidemia)  Type 2 diabetes mellitus  Paroxysmal A-fib  S/P AVR (aortic valve replacement)  S/P TURP      FAMILY HISTORY:  No pertinent family history in first degree relatives      REVIEW OF SYSTEMS:  CONSTITUTIONAL: No fever, weight loss, less fatigue  EYES: No eye pain, visual disturbances, or discharge  NECK: No pain or stiffness  RESPIRATORY: No cough or wheezing, no shortness of breath  CARDIOVASCULAR: No chest pain, palpitations, dizziness, or leg swelling  GASTROINTESTINAL: No abdominal or epigastric pain. No nausea, vomiting, diarrhea or constipation  GENITOURINARY: No dysuria, urinary frequency or urgency, no hematuria today  NEUROLOGICAL: No headaches, memory loss, loss of strength, numbness, or tremors  SKIN: No itching, burning, rashes, or lesions   MUSCULOSKELETAL: No joint pain or swelling; No muscle, back, or extremity pain      Medications:  MEDICATIONS  (STANDING):  BACItracin   Ointment 1 Application(s) Topical daily  cefTRIAXone   IVPB 1 Gram(s) IV Intermittent every 24 hours  dextrose 5%. 1000 milliLiter(s) (50 mL/Hr) IV Continuous <Continuous>  dextrose 50% Injectable 12.5 Gram(s) IV Push once  dextrose 50% Injectable 25 Gram(s) IV Push once  dextrose 50% Injectable 25 Gram(s) IV Push once  insulin lispro (HumaLOG) corrective regimen sliding scale   SubCutaneous three times a day before meals  lisinopril 10 milliGRAM(s) Oral daily  metoprolol succinate ER 50 milliGRAM(s) Oral daily  morphine ER Tablet 15 milliGRAM(s) Oral two times a day  naloxegol 12.5 milliGRAM(s) Oral daily  senna 2 Tablet(s) Oral at bedtime  sodium chloride 0.9%. 1000 milliLiter(s) (75 mL/Hr) IV Continuous <Continuous>  vancomycin  IVPB 750 milliGRAM(s) IV Intermittent every 12 hours    MEDICATIONS  (PRN):  aluminum hydroxide/magnesium hydroxide/simethicone Suspension 30 milliLiter(s) Oral every 4 hours PRN Dyspepsia  dextrose 40% Gel 15 Gram(s) Oral once PRN Blood Glucose LESS THAN 70 milliGRAM(s)/deciliter  docusate sodium 100 milliGRAM(s) Oral three times a day PRN Constipation  glucagon  Injectable 1 milliGRAM(s) IntraMuscular once PRN Glucose LESS THAN 70 milligrams/deciliter  morphine  IR 15 milliGRAM(s) Oral every 4 hours PRN Severe Pain (7 - 10)    	    PHYSICAL EXAM:  T(C): 36.3 (01-02-19 @ 10:50), Max: 37.1 (01-02-19 @ 00:38)  HR: 83 (01-02-19 @ 10:50) (78 - 90)  BP: 127/72 (01-02-19 @ 10:50) (107/61 - 139/73)  RR: 18 (01-02-19 @ 10:50) (18 - 18)  SpO2: 95% (01-02-19 @ 10:50) (94% - 98%)  Wt(kg): --  I&O's Summary    01 Jan 2019 07:01  -  02 Jan 2019 07:00  --------------------------------------------------------  IN: 2055 mL / OUT: 2960 mL / NET: -905 mL    02 Jan 2019 07:01  -  02 Jan 2019 12:11  --------------------------------------------------------  IN: 240 mL / OUT: 1250 mL / NET: -1010 mL      Appearance: Normal	  HEENT:   NCAT, PERRL, EOMI	  Lymphatic: No lymphadenopathy  Cardiovascular: Normal S1 S2, RRR  Respiratory: Lungs clear to auscultation BL  Psychiatry: A & O x 3, Mood & affect appropriate  Gastrointestinal:  Soft, Non-tender, + BS  : + suprapubic, + BL nephrostomy  Skin: No rashes, No ecchymoses, No cyanosis	  Neurologic: Non-focal  Extremities: Normal range of motion, No clubbing, cyanosis or edema    LABS:	 	    CARDIAC MARKERS:                                8.8    4.8   )-----------( 132      ( 02 Jan 2019 06:47 )             26.2     01-02    133<L>  |  96  |  15  ----------------------------<  156<H>  4.2   |  24  |  1.11    Ca    8.4      02 Jan 2019 06:46      proBNP:   Lipid Profile:   HgA1c:   TSH:

## 2019-01-03 DIAGNOSIS — K56.7 ILEUS, UNSPECIFIED: ICD-10-CM

## 2019-01-03 LAB — VANCOMYCIN TROUGH SERPL-MCNC: 15.6 UG/ML — SIGNIFICANT CHANGE UP (ref 10–20)

## 2019-01-03 PROCEDURE — 99232 SBSQ HOSP IP/OBS MODERATE 35: CPT

## 2019-01-03 PROCEDURE — 74018 RADEX ABDOMEN 1 VIEW: CPT | Mod: 26

## 2019-01-03 RX ORDER — LISINOPRIL 2.5 MG/1
1 TABLET ORAL
Qty: 0 | Refills: 0 | COMMUNITY

## 2019-01-03 RX ORDER — METOPROLOL TARTRATE 50 MG
1 TABLET ORAL
Qty: 0 | Refills: 0 | COMMUNITY

## 2019-01-03 RX ORDER — NALOXEGOL OXALATE 12.5 MG/1
1 TABLET, FILM COATED ORAL
Qty: 0 | Refills: 0 | COMMUNITY

## 2019-01-03 RX ORDER — POLYETHYLENE GLYCOL 3350 17 G/17G
17 POWDER, FOR SOLUTION ORAL
Qty: 0 | Refills: 0 | COMMUNITY

## 2019-01-03 RX ORDER — SENNA PLUS 8.6 MG/1
2 TABLET ORAL
Qty: 60 | Refills: 0 | OUTPATIENT
Start: 2019-01-03 | End: 2019-02-01

## 2019-01-03 RX ORDER — DOCUSATE SODIUM 100 MG
1 CAPSULE ORAL
Qty: 90 | Refills: 0 | OUTPATIENT
Start: 2019-01-03 | End: 2019-02-01

## 2019-01-03 RX ORDER — METFORMIN HYDROCHLORIDE 850 MG/1
1 TABLET ORAL
Qty: 0 | Refills: 0 | COMMUNITY

## 2019-01-03 RX ORDER — POLYETHYLENE GLYCOL 3350 17 G/17G
17 POWDER, FOR SOLUTION ORAL
Qty: 510 | Refills: 0 | OUTPATIENT
Start: 2019-01-03 | End: 2019-02-01

## 2019-01-03 RX ORDER — INSULIN LISPRO 100/ML
VIAL (ML) SUBCUTANEOUS EVERY 6 HOURS
Qty: 0 | Refills: 0 | Status: DISCONTINUED | OUTPATIENT
Start: 2019-01-03 | End: 2019-01-04

## 2019-01-03 RX ADMIN — SENNA PLUS 2 TABLET(S): 8.6 TABLET ORAL at 22:12

## 2019-01-03 RX ADMIN — NALOXEGOL OXALATE 12.5 MILLIGRAM(S): 12.5 TABLET, FILM COATED ORAL at 13:46

## 2019-01-03 RX ADMIN — MORPHINE SULFATE 15 MILLIGRAM(S): 50 CAPSULE, EXTENDED RELEASE ORAL at 19:57

## 2019-01-03 RX ADMIN — Medication 4: at 08:40

## 2019-01-03 RX ADMIN — MORPHINE SULFATE 15 MILLIGRAM(S): 50 CAPSULE, EXTENDED RELEASE ORAL at 18:34

## 2019-01-03 RX ADMIN — MORPHINE SULFATE 15 MILLIGRAM(S): 50 CAPSULE, EXTENDED RELEASE ORAL at 05:23

## 2019-01-03 RX ADMIN — Medication 250 MILLIGRAM(S): at 00:02

## 2019-01-03 RX ADMIN — MORPHINE SULFATE 15 MILLIGRAM(S): 50 CAPSULE, EXTENDED RELEASE ORAL at 12:13

## 2019-01-03 RX ADMIN — MORPHINE SULFATE 15 MILLIGRAM(S): 50 CAPSULE, EXTENDED RELEASE ORAL at 20:50

## 2019-01-03 RX ADMIN — MORPHINE SULFATE 15 MILLIGRAM(S): 50 CAPSULE, EXTENDED RELEASE ORAL at 18:04

## 2019-01-03 RX ADMIN — Medication 1: at 18:05

## 2019-01-03 RX ADMIN — CEFTRIAXONE 100 GRAM(S): 500 INJECTION, POWDER, FOR SOLUTION INTRAMUSCULAR; INTRAVENOUS at 22:12

## 2019-01-03 RX ADMIN — SODIUM CHLORIDE 75 MILLILITER(S): 9 INJECTION INTRAMUSCULAR; INTRAVENOUS; SUBCUTANEOUS at 22:12

## 2019-01-03 RX ADMIN — MORPHINE SULFATE 15 MILLIGRAM(S): 50 CAPSULE, EXTENDED RELEASE ORAL at 08:37

## 2019-01-03 RX ADMIN — Medication 50 MILLIGRAM(S): at 04:56

## 2019-01-03 RX ADMIN — MORPHINE SULFATE 15 MILLIGRAM(S): 50 CAPSULE, EXTENDED RELEASE ORAL at 04:53

## 2019-01-03 RX ADMIN — MORPHINE SULFATE 15 MILLIGRAM(S): 50 CAPSULE, EXTENDED RELEASE ORAL at 08:07

## 2019-01-03 RX ADMIN — MORPHINE SULFATE 15 MILLIGRAM(S): 50 CAPSULE, EXTENDED RELEASE ORAL at 12:43

## 2019-01-03 RX ADMIN — Medication 30 MILLILITER(S): at 08:06

## 2019-01-03 RX ADMIN — Medication 250 MILLIGRAM(S): at 13:51

## 2019-01-03 RX ADMIN — LISINOPRIL 10 MILLIGRAM(S): 2.5 TABLET ORAL at 04:56

## 2019-01-03 RX ADMIN — Medication 4: at 13:46

## 2019-01-03 NOTE — PROGRESS NOTE ADULT - SUBJECTIVE AND OBJECTIVE BOX
CHIEF COMPLAINT:Patient is a 83y old  Male who presents with a chief complaint of hematuria  	  Interval history: SACHA drain fell out      Allergies:  oxycodone (Other)      PAST MEDICAL & SURGICAL HISTORY:  Benign prostatic hyperplasia with lower urinary tract symptoms, symptom details unspecified  Prostate cancer  Hypertension  HLD (hyperlipidemia)  Type 2 diabetes mellitus  Paroxysmal A-fib  S/P AVR (aortic valve replacement)  S/P TURP      FAMILY HISTORY:  No pertinent family history in first degree relatives      REVIEW OF SYSTEMS:  CONSTITUTIONAL: No fever, weight loss, less fatigue  EYES: No eye pain, visual disturbances, or discharge  NECK: No pain or stiffness  RESPIRATORY: No cough or wheezing, no shortness of breath  CARDIOVASCULAR: No chest pain, palpitations, dizziness, or leg swelling  GASTROINTESTINAL: No abdominal or epigastric pain. No nausea, vomiting, diarrhea or constipation, + abd distention  GENITOURINARY: No dysuria, urinary frequency or urgency, no hematuria today  NEUROLOGICAL: No headaches, memory loss, loss of strength, numbness, or tremors  SKIN: No itching, burning, rashes, or lesions   MUSCULOSKELETAL: No joint pain or swelling; No muscle, back, or extremity pain        Medications:  MEDICATIONS  (STANDING):  BACItracin   Ointment 1 Application(s) Topical daily  cefTRIAXone   IVPB 1 Gram(s) IV Intermittent every 24 hours  dextrose 5%. 1000 milliLiter(s) (50 mL/Hr) IV Continuous <Continuous>  dextrose 50% Injectable 12.5 Gram(s) IV Push once  dextrose 50% Injectable 25 Gram(s) IV Push once  dextrose 50% Injectable 25 Gram(s) IV Push once  insulin lispro (HumaLOG) corrective regimen sliding scale   SubCutaneous three times a day before meals  lisinopril 10 milliGRAM(s) Oral daily  metoprolol succinate ER 50 milliGRAM(s) Oral daily  morphine ER Tablet 15 milliGRAM(s) Oral two times a day  naloxegol 12.5 milliGRAM(s) Oral daily  senna 2 Tablet(s) Oral at bedtime  sodium chloride 0.9%. 1000 milliLiter(s) (75 mL/Hr) IV Continuous <Continuous>  vancomycin  IVPB 750 milliGRAM(s) IV Intermittent every 12 hours    MEDICATIONS  (PRN):  aluminum hydroxide/magnesium hydroxide/simethicone Suspension 30 milliLiter(s) Oral every 4 hours PRN Dyspepsia  dextrose 40% Gel 15 Gram(s) Oral once PRN Blood Glucose LESS THAN 70 milliGRAM(s)/deciliter  docusate sodium 100 milliGRAM(s) Oral three times a day PRN Constipation  glucagon  Injectable 1 milliGRAM(s) IntraMuscular once PRN Glucose LESS THAN 70 milligrams/deciliter  morphine  IR 15 milliGRAM(s) Oral every 4 hours PRN Severe Pain (7 - 10)    	    PHYSICAL EXAM:  T(C): 36.7 (01-03-19 @ 14:33), Max: 36.9 (01-02-19 @ 21:00)  HR: 84 (01-03-19 @ 14:33) (81 - 89)  BP: 129/64 (01-03-19 @ 14:33) (122/69 - 139/64)  RR: 18 (01-03-19 @ 14:33) (17 - 18)  SpO2: 95% (01-03-19 @ 14:33) (95% - 96%)  Wt(kg): --  I&O's Summary    02 Jan 2019 07:01  -  03 Jan 2019 07:00  --------------------------------------------------------  IN: 1430 mL / OUT: 3687 mL / NET: -2257 mL    03 Jan 2019 07:01  -  03 Jan 2019 14:52  --------------------------------------------------------  IN: 200 mL / OUT: 850 mL / NET: -650 mL      Appearance: Normal	  HEENT:   NCAT, PERRL, EOMI	  Lymphatic: No lymphadenopathy  Cardiovascular: Normal S1 S2, RRR  Respiratory: Lungs clear to auscultation BL  Psychiatry: A & O x 3, Mood & affect appropriate  Gastrointestinal:  Soft, Non-tender, + BS, + distended  : + suprapubic, + BL nephrostomy  Skin: No rashes, No ecchymoses, No cyanosis	  Neurologic: Non-focal  Extremities: Normal range of motion, No clubbing, cyanosis or edema    LABS:	 	    CARDIAC MARKERS:                                8.8    4.8   )-----------( 132      ( 02 Jan 2019 06:47 )             26.2     01-02    133<L>  |  96  |  15  ----------------------------<  156<H>  4.2   |  24  |  1.11    Ca    8.4      02 Jan 2019 06:46      proBNP:   Lipid Profile:   HgA1c:   TSH:

## 2019-01-03 NOTE — PROGRESS NOTE ADULT - SUBJECTIVE AND OBJECTIVE BOX
Subjective  Pt w/drainage around SPT requiring multiple dressing changes. Reports increased abd distention, no BM. No fevers, flank pain.    Objective    Vital signs  T(F): , Max: 98.4 (01-02-19 @ 21:00)  HR: 86 (01-03-19 @ 05:00)  BP: 134/64 (01-03-19 @ 05:00)  SpO2: 95% (01-03-19 @ 05:00)  Wt(kg): --    Output     01-02 @ 07:01  -  01-03 @ 07:00  --------------------------------------------------------  IN: 1430 mL / OUT: 3687 mL / NET: -2257 mL        Gen: NAD  Abd: distended, diffuse discomfort, SPT in place, draining scant pink urine  : penis w/baseline induration, no overlying skin changes, no crepitus  Back: NT in place draining clear yellow urine, SACHA dislodged    Labs      01-02 @ 06:47    WBC 4.8   / Hct 26.2  / SCr --       01-02 @ 06:46    WBC --    / Hct --    / SCr 1.11     Imaging

## 2019-01-03 NOTE — CONSULT NOTE ADULT - SUBJECTIVE AND OBJECTIVE BOX
Chief Complaint:  Patient is a 83y old  Male who presents with a chief complaint of hematuria (03 Jan 2019 15:45)      HPI: Patient is a 83y Male with metastatic urothelial ca, s/p bilateral nephrostomy tubes and suprapubic tube here with recurrent hematuria from the SP tube. The patient has had NT placed in order to divert urine and in an attempt to allow the bladder to clot off. He also recently had a seminal vesical abscess drained and has an indwelling drain. He is no longer on chemotherapy and recently the family decided to have home hospice. GI consulted for abdominal distension. AXR reviewed with dilated loops of bowel suggestive of ileus. At time of examination pt is on the bed pan. He has 3 bouts of large volume diarrhea as per pt and family. Distension has drastically improved as per RN and family since he has been having bms. Pt denies abdominal pain, n/v.    Allergies:  oxycodone (Other)      Medications:  aluminum hydroxide/magnesium hydroxide/simethicone Suspension 30 milliLiter(s) Oral every 4 hours PRN  BACItracin   Ointment 1 Application(s) Topical daily  cefTRIAXone   IVPB 1 Gram(s) IV Intermittent every 24 hours  dextrose 40% Gel 15 Gram(s) Oral once PRN  dextrose 5%. 1000 milliLiter(s) IV Continuous <Continuous>  dextrose 50% Injectable 12.5 Gram(s) IV Push once  dextrose 50% Injectable 25 Gram(s) IV Push once  dextrose 50% Injectable 25 Gram(s) IV Push once  docusate sodium 100 milliGRAM(s) Oral three times a day PRN  glucagon  Injectable 1 milliGRAM(s) IntraMuscular once PRN  insulin lispro (HumaLOG) corrective regimen sliding scale   SubCutaneous three times a day before meals  lisinopril 10 milliGRAM(s) Oral daily  metoprolol succinate ER 50 milliGRAM(s) Oral daily  morphine  IR 15 milliGRAM(s) Oral every 4 hours PRN  morphine ER Tablet 15 milliGRAM(s) Oral two times a day  naloxegol 12.5 milliGRAM(s) Oral daily  senna 2 Tablet(s) Oral at bedtime  sodium chloride 0.9%. 1000 milliLiter(s) IV Continuous <Continuous>  vancomycin  IVPB 750 milliGRAM(s) IV Intermittent every 12 hours      PMHX/PSHX:  Benign prostatic hyperplasia with lower urinary tract symptoms, symptom details unspecified  Prostate cancer  Hypertension  HLD (hyperlipidemia)  Type 2 diabetes mellitus  Paroxysmal A-fib  S/P AVR (aortic valve replacement)  S/P TURP      Family history:  No pertinent family history in first degree relatives      Social History:     ROS:     General:  No wt loss, fevers, chills, night sweats, fatigue,   Eyes:  Good vision, no reported pain  ENT:  No sore throat, pain, runny nose, dysphagia  CV:  No pain, palpitations, hypo/hypertension  Resp:  No dyspnea, cough, tachypnea, wheezing  GI:  No pain, No nausea, No vomiting, No diarrhea, No constipation, No weight loss, No fever, No pruritis, No rectal bleeding, No tarry stools, No dysphagia,  :  No pain, bleeding, incontinence, nocturia  Muscle:  No pain, weakness  Neuro:  No weakness, tingling, memory problems  Psych:  No fatigue, insomnia, mood problems, depression  Endocrine:  No polyuria, polydipsia, cold/heat intolerance  Heme:  No petechiae, ecchymosis, easy bruisability  Skin:  No rash, tattoos, scars, edema      PHYSICAL EXAM:   Vital Signs:  Vital Signs Last 24 Hrs  T(C): 36.7 (03 Jan 2019 14:33), Max: 36.9 (02 Jan 2019 21:00)  T(F): 98.1 (03 Jan 2019 14:33), Max: 98.4 (02 Jan 2019 21:00)  HR: 77 (03 Jan 2019 14:55) (77 - 89)  BP: 133/73 (03 Jan 2019 14:55) (122/69 - 139/64)  BP(mean): --  RR: 18 (03 Jan 2019 14:55) (17 - 18)  SpO2: 95% (03 Jan 2019 14:55) (95% - 96%)  Daily     Daily     GENERAL:  Appears stated age, well-groomed, well-nourished, no distress  HEENT:  NC/AT,  conjunctivae clear and pink, no thyromegaly, nodules, adenopathy, no JVD, sclera -anicteric  CHEST:  Full & symmetric excursion, no increased effort, breath sounds clear  HEART:  Regular rhythm, S1, S2, no murmur/rub/S3/S4, no abdominal bruit, no edema  ABDOMEN:  firm,  non-tender, non- distended normoactive bowel sounds,  no masses ,no hepato-splenomegaly, no signs of chronic liver disease  EXTEREMITIES:  no cyanosis,clubbing or edema  SKIN:  No rash/erythema/ecchymoses/petechiae/wounds/abscess/warm/dry  NEURO:  Alert, oriented, no asterixis, no tremor, no encephalopathy    LABS:                        8.8    4.8   )-----------( 132      ( 02 Jan 2019 06:47 )             26.2     01-02    133<L>  |  96  |  15  ----------------------------<  156<H>  4.2   |  24  |  1.11    Ca    8.4      02 Jan 2019 06:46                Imaging:

## 2019-01-03 NOTE — PHYSICAL THERAPY INITIAL EVALUATION ADULT - IMPAIRMENTS FOUND, PT EVAL
muscle strength/aerobic capacity/endurance/gait, locomotion, and balance/joint integrity and mobility

## 2019-01-03 NOTE — CONSULT NOTE ADULT - PROBLEM SELECTOR RECOMMENDATION 2
- metastatic disease  - palliative care following  - plans for discharge with hospice
Pt was metastatic urothelial ca, s/p bilateral nephrostomy tubes and suprapubic tube here with recurrent hematuria from the SP tube, patient had NT placed in order to divert urine and in an attempt to allow the bladder to clot off.   He also recently had a seminal vesical abscess drained and has an indwelling drain.   Pt is no longer on chemotherapy

## 2019-01-03 NOTE — PROGRESS NOTE ADULT - SUBJECTIVE AND OBJECTIVE BOX
CC: goals of care in the setting of end-stage malignancy    INTERVAL EVENTS: doing well overall, used morphine IR 15mg x 4 in last 24 hours, is on standing ER morphine as well. Having worsening abdominal pain, ? ileus seen on AXR    ADVANCE DIRECTIVES:    DNR no   MOLST  [ ]  Living Will  [ ]   DECISION MAKER(s): patient is alert and oriented and able to make decisions, if not able in absence of HCP form, decision maker would be his surrogate, his wife Arlen  [ ] Health Care Proxy(s)  [ ] Surrogate(s)  [ ] Guardian           Name(s): Arlen Nation Phone Number(s): [phone]    BASELINE (I)ADL(s) (prior to admission):  [place]: [ ]Total  [x] Moderate [ ]Dependent    Allergies    oxycodone (Other)    Intolerances    MEDICATIONS  (STANDING):  BACItracin   Ointment 1 Application(s) Topical daily  cefTRIAXone   IVPB 1 Gram(s) IV Intermittent every 24 hours  dextrose 5%. 1000 milliLiter(s) (50 mL/Hr) IV Continuous <Continuous>  dextrose 50% Injectable 12.5 Gram(s) IV Push once  dextrose 50% Injectable 25 Gram(s) IV Push once  dextrose 50% Injectable 25 Gram(s) IV Push once  insulin lispro (HumaLOG) corrective regimen sliding scale   SubCutaneous three times a day before meals  lisinopril 10 milliGRAM(s) Oral daily  metoprolol succinate ER 50 milliGRAM(s) Oral daily  morphine ER Tablet 15 milliGRAM(s) Oral two times a day  naloxegol 12.5 milliGRAM(s) Oral daily  senna 2 Tablet(s) Oral at bedtime  sodium chloride 0.9%. 1000 milliLiter(s) (75 mL/Hr) IV Continuous <Continuous>  vancomycin  IVPB 750 milliGRAM(s) IV Intermittent every 12 hours    MEDICATIONS  (PRN):  aluminum hydroxide/magnesium hydroxide/simethicone Suspension 30 milliLiter(s) Oral every 4 hours PRN Dyspepsia  dextrose 40% Gel 15 Gram(s) Oral once PRN Blood Glucose LESS THAN 70 milliGRAM(s)/deciliter  docusate sodium 100 milliGRAM(s) Oral three times a day PRN Constipation  glucagon  Injectable 1 milliGRAM(s) IntraMuscular once PRN Glucose LESS THAN 70 milligrams/deciliter  morphine  IR 15 milliGRAM(s) Oral every 4 hours PRN Severe Pain (7 - 10)    PRESENT SYMPTOMS: [ ]Unable to obtain due to poor mentation   Source if other than patient:  [ ]Family   [ ]Team     Pain (Impact on QOL): yes  Location - suprapubic area        Minimal acceptable level (0-10 scale):  3-4/10           Aggravating factors - movement  Quality - sharp pain  Radiation - penis, lower back  Severity (0-10 scale) -  8/10  Timing - intermittent     PAIN AD Score:     [email or web address] (press ctrl +  left click to view)    Dyspnea:                           [ ]Mild [ ]Moderate [ ]Severe  Anxiety:                             [ ]Mild [ ]Moderate [ ]Severe  Fatigue:                             [ ]Mild [ ]Moderate [ ]Severe  Nausea:                             [ ]Mild [ ]Moderate [ ]Severe  Loss of appetite:              [ ]Mild [ ]Moderate [ ]Severe  Constipation:                    [ ]Mild [ ]Moderate [x]Severe    Other Symptoms:  [x]All other review of systems negative     Karnofsky Performance Score/Palliative Performance Status Version 2: 40%    http://palliative.info/resource_material/[email or web address]    PHYSICAL EXAM:  Vital Signs Last 24 Hrs  T(C): 36.7 (03 Jan 2019 14:33), Max: 36.9 (02 Jan 2019 21:00)  T(F): 98.1 (03 Jan 2019 14:33), Max: 98.4 (02 Jan 2019 21:00)  HR: 77 (03 Jan 2019 14:55) (77 - 89)  BP: 133/73 (03 Jan 2019 14:55) (122/69 - 139/64)  BP(mean): --  RR: 18 (03 Jan 2019 14:55) (17 - 18)  SpO2: 95% (03 Jan 2019 14:55) (95% - 96%)    Limited exam for patient comfort  GENERAL:  [x]Alert  []Oriented x 3  [ ]Lethargic  [ ]Cachexia  [ ]Unarousable  [ ]Verbal  [ ]Non-Verbal  Behavioral:   [ ] Anxiety  [ ] Delirium [ ] Agitation [ ] Other  HEENT:  [x]Normal   [ ]Dry mouth   [ ]ET Tube/Trach  [ ]Oral lesions  PULMONARY:   []Clear [ ]Tachypnea  [ ]Audible excessive secretions   [ ]Rhonchi        [ ]Right [ ]Left [ ]Bilateral  [ ]Crackles        [ ]Right [ ]Left [ ]Bilateral  [ ]Wheezing     [ ]Right [ ]Left [ ]Bilateral  CARDIOVASCULAR:    []Regular [ ]Irregular [ ]Tachy  [ ]Lico [ ]Murmur [ ]Other  GASTROINTESTINAL:  []Soft  [ ]Distended   []+BS  [ ]Non tender []Tender / suprapubic area [ ]PEG [ ]OGT/ NGT  Last BM: 12/27  GENITOURINARY:  [ ]Normal [ ] Incontinent   [ ]Oliguria/Anuria   [x] Suprapubic catheter with hematuria, bilateral nephrostomy tubes   MUSCULOSKELETAL:   [ ]Normal   []Weakness  [ ]Bed/Wheelchair bound [ ]Edema  NEUROLOGIC:   [x]No focal deficits  [ ] Cognitive impairment  [ ] Dysphagia [ ]Dysarthria [ ] Paresis [ ]Other   SKIN:   [x]Normal   [ ]Pressure ulcer(s)  [ ]Rash    CRITICAL CARE:  [ ] Shock Present  [ ]Septic [ ]Cardiogenic [ ]Neurologic [ ]Hypovolemic  [ ]  Vasopressors [ ]  Inotropes   [ ] Respiratory failure present  [ ] Acute  [ ] Chronic [ ] Hypoxic  [ ] Hypercarbic [ ] Other  [ ] Other organ failure     LABS:                          8.8    4.8   )-----------( 132      ( 02 Jan 2019 06:47 )             26.2     01-02    133<L>  |  96  |  15  ----------------------------<  156<H>  4.2   |  24  |  1.11    Ca    8.4      02 Jan 2019 06:46        RADIOLOGY & ADDITIONAL STUDIES: reviewed     PROTEIN CALORIE MALNUTRITION PRESENT: [ ] Yes [ ] No  [ ] PPSV2 < or = to 30% [ ] significant weight loss  [ ] poor nutritional intake [ ] catabolic state [ ] anasarca     Albumin, Serum: 2.8 g/dL (12-26-18 @ 13:45)  Artificial Nutrition [ ]     REFERRALS:   [ ]Chaplaincy  [ ] Hospice  [ ]Child Life  [ ]Social Work  [ ]Case management [ ]Holistic Therapy   Goals of Care Discussion Document:

## 2019-01-03 NOTE — PHYSICAL THERAPY INITIAL EVALUATION ADULT - PERTINENT HX OF CURRENT PROBLEM, REHAB EVAL
83M, pmh of HTN, DM, metastatic bladder cancer presenting with hematuria. Patient has bilateral nephrostomy tubes and a suprapubic catheter,

## 2019-01-03 NOTE — CONSULT NOTE ADULT - PROBLEM SELECTOR RECOMMENDATION 9
- likely 2/2 narcotic use   - abdominal X-ray reviewed with dilated loops of bowel  - ileus is resolving as pt is having large volume bms and distension is improved  - recommend NPO with IVF  - can advance diet to clears tomorrow if patient continues to pass gas and have bms  - if pt should start vomiting, is unable to pass gas and stool would place NGT and order CT a/p with PO contrast.  - continue bowel regimen with senna and colace
Pt presented with gross hematuria with no active  intervention at this time, likely 2/2 urothelial cancer   s/p 2U PRBC transfusion with Hb level 6.9   Pt was scheduled for outpatient IR tube check, pt will go for Ct abdomen

## 2019-01-03 NOTE — PHYSICAL THERAPY INITIAL EVALUATION ADULT - PRECAUTIONS/LIMITATIONS, REHAB EVAL
12/28: Urothelial Ca - mets to penis., prostate, lungs. S/p itzel nephrostomy tubes. Recent Lt pubicramus OM - On IVabx.Acute Blood loss/fall precautions

## 2019-01-03 NOTE — PROGRESS NOTE ADULT - ASSESSMENT
pt w/ met urothelial ca   hematuria  b/l nt tubes  abcess  cont iv abs  id eval noted  changed ertapenem to cefrtiaxone per ID  urology eval noted  CT reviewed, OM same, plan for abx duration unchanged  anemia from blood loss  Hb improved s/p PRBC  hospice eval in progress  Palliative eval noted  iv fluids  hold asa  SACHA drain out, no need to replace per IR  abd distention today, AXR reviewed, ? ileus, GI eval

## 2019-01-03 NOTE — PROGRESS NOTE ADULT - SUBJECTIVE AND OBJECTIVE BOX
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 Called to see patient for problems with the SACHA bulb X 2 days.  Reports 7/10 abdominal pain.  Decreased out put from the drain noted.     Vitals: T(F): 98.4 (01-03-19 @ 05:00), Max: 98.4 (01-02-19 @ 21:00)  HR: 86 (01-03-19 @ 05:00) (80 - 89)  BP: 134/64 (01-03-19 @ 05:00) (127/69 - 139/64)  RR: 18 (01-03-19 @ 05:00) (17 - 18)  SpO2: 95% (01-03-19 @ 05:00) (95% - 96%)  Wt(kg): --    LABS:                        8.8    4.8   )-----------( 132      ( 02 Jan 2019 06:47 )             26.2     01-02    133<L>  |  96  |  15  ----------------------------<  156<H>  4.2   |  24  |  1.11    Ca    8.4      02 Jan 2019 06:46      PHYSICAL EXAM:  General: Nontoxic, in NAD  right buttock: the drain is completely dislodged. Suture was cut and the drain was removed.  Dressing was applied.     Impression: 83y Male with recurrent periprostatic collection s/p drainage. The drain is dislodged.    - CT scan from 12/28 reviewed with Dr. Gutierrez, there is no drainable pelvic collection. There for, no plans to replace the drain.  - If patient develops symptoms such as fever, worsening abdominal pain, leukocytosis or AMS repeat pelvic CT scan to r/o collection.  - d/w family at bedside and Medicine NP.      Please call IR at extension [phone] or [phone] with any questions, concerns, or issues regarding above.

## 2019-01-03 NOTE — CONSULT NOTE ADULT - ASSESSMENT
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 He is no longer on chemotherapy and recently the family decided to have home hospice. GI consulted for ileus.

## 2019-01-03 NOTE — PROGRESS NOTE ADULT - ASSESSMENT
83y Male with metastatic urothelial ca, s/p bilateral nephrostomy tubes and suprapubic tube here with recurrent hematuria from the SP tube.   - NT draining well, majority of UOP from NT, may have some leakage around SPT given bladder spasms  - trial of belladonna and opium suppositories for bladder spasms  - increased abd distention, pt has difficulty w/BM, recommend aggressive bowel regimen  - consider reimaging given increased distention  - SACHA dislodged, as per IR, no drainable collection, SACHA w/scant output anyway, will not be replaced  - vanc and CTX per ID, final abx pending hospice recs  - Palliative follow-up  - f/u Oncology  - Continue NTs to bag drainage   - Continue SPT  - f/u w/Dr. Burroughs at [place] [facility] [place] for Urology

## 2019-01-04 ENCOUNTER — APPOINTMENT (OUTPATIENT)
Dept: UROLOGY | Facility: CLINIC | Age: 84
End: 2019-01-04

## 2019-01-04 DIAGNOSIS — Z71.89 OTHER SPECIFIED COUNSELING: ICD-10-CM

## 2019-01-04 DIAGNOSIS — R10.32 LEFT LOWER QUADRANT PAIN: ICD-10-CM

## 2019-01-04 LAB
ALBUMIN SERPL ELPH-MCNC: 2.2 G/DL — LOW (ref 3.3–5)
ALP SERPL-CCNC: 46 U/L — SIGNIFICANT CHANGE UP (ref 40–120)
ALT FLD-CCNC: <5 U/L — LOW (ref 10–45)
ANION GAP SERPL CALC-SCNC: 7 MMOL/L — SIGNIFICANT CHANGE UP (ref 5–17)
AST SERPL-CCNC: 7 U/L — LOW (ref 10–40)
BILIRUB SERPL-MCNC: 0.4 MG/DL — SIGNIFICANT CHANGE UP (ref 0.2–1.2)
BUN SERPL-MCNC: 14 MG/DL — SIGNIFICANT CHANGE UP (ref 7–23)
CALCIUM SERPL-MCNC: 8.3 MG/DL — LOW (ref 8.4–10.5)
CHLORIDE SERPL-SCNC: 100 MMOL/L — SIGNIFICANT CHANGE UP (ref 96–108)
CO2 SERPL-SCNC: 27 MMOL/L — SIGNIFICANT CHANGE UP (ref 22–31)
CREAT SERPL-MCNC: 1.09 MG/DL — SIGNIFICANT CHANGE UP (ref 0.5–1.3)
GLUCOSE SERPL-MCNC: 158 MG/DL — HIGH (ref 70–99)
HCT VFR BLD CALC: 25.2 % — LOW (ref 39–50)
HGB BLD-MCNC: 8.3 G/DL — LOW (ref 13–17)
MCHC RBC-ENTMCNC: 28.8 PG — SIGNIFICANT CHANGE UP (ref 27–34)
MCHC RBC-ENTMCNC: 32.9 GM/DL — SIGNIFICANT CHANGE UP (ref 32–36)
MCV RBC AUTO: 87.5 FL — SIGNIFICANT CHANGE UP (ref 80–100)
PLATELET # BLD AUTO: 135 K/UL — LOW (ref 150–400)
POTASSIUM SERPL-MCNC: 4.4 MMOL/L — SIGNIFICANT CHANGE UP (ref 3.5–5.3)
POTASSIUM SERPL-SCNC: 4.4 MMOL/L — SIGNIFICANT CHANGE UP (ref 3.5–5.3)
PROT SERPL-MCNC: 5.7 G/DL — LOW (ref 6–8.3)
RBC # BLD: 2.88 M/UL — LOW (ref 4.2–5.8)
RBC # FLD: 15.2 % — HIGH (ref 10.3–14.5)
SODIUM SERPL-SCNC: 134 MMOL/L — LOW (ref 135–145)
WBC # BLD: 4.9 K/UL — SIGNIFICANT CHANGE UP (ref 3.8–10.5)
WBC # FLD AUTO: 4.9 K/UL — SIGNIFICANT CHANGE UP (ref 3.8–10.5)

## 2019-01-04 PROCEDURE — 74177 CT ABD & PELVIS W/CONTRAST: CPT | Mod: 26

## 2019-01-04 PROCEDURE — 99233 SBSQ HOSP IP/OBS HIGH 50: CPT

## 2019-01-04 RX ORDER — INSULIN LISPRO 100/ML
VIAL (ML) SUBCUTANEOUS
Qty: 0 | Refills: 0 | Status: DISCONTINUED | OUTPATIENT
Start: 2019-01-04 | End: 2019-01-08

## 2019-01-04 RX ORDER — MORPHINE SULFATE 50 MG/1
15 CAPSULE, EXTENDED RELEASE ORAL EVERY 4 HOURS
Qty: 0 | Refills: 0 | Status: DISCONTINUED | OUTPATIENT
Start: 2019-01-04 | End: 2019-01-08

## 2019-01-04 RX ORDER — INSULIN LISPRO 100/ML
VIAL (ML) SUBCUTANEOUS AT BEDTIME
Qty: 0 | Refills: 0 | Status: DISCONTINUED | OUTPATIENT
Start: 2019-01-04 | End: 2019-01-08

## 2019-01-04 RX ORDER — MORPHINE SULFATE 50 MG/1
30 CAPSULE, EXTENDED RELEASE ORAL
Qty: 0 | Refills: 0 | Status: DISCONTINUED | OUTPATIENT
Start: 2019-01-04 | End: 2019-01-08

## 2019-01-04 RX ADMIN — MORPHINE SULFATE 30 MILLIGRAM(S): 50 CAPSULE, EXTENDED RELEASE ORAL at 18:47

## 2019-01-04 RX ADMIN — Medication 1 APPLICATION(S): at 11:53

## 2019-01-04 RX ADMIN — MORPHINE SULFATE 15 MILLIGRAM(S): 50 CAPSULE, EXTENDED RELEASE ORAL at 07:55

## 2019-01-04 RX ADMIN — MORPHINE SULFATE 15 MILLIGRAM(S): 50 CAPSULE, EXTENDED RELEASE ORAL at 11:53

## 2019-01-04 RX ADMIN — MORPHINE SULFATE 30 MILLIGRAM(S): 50 CAPSULE, EXTENDED RELEASE ORAL at 18:17

## 2019-01-04 RX ADMIN — MORPHINE SULFATE 15 MILLIGRAM(S): 50 CAPSULE, EXTENDED RELEASE ORAL at 16:35

## 2019-01-04 RX ADMIN — CEFTRIAXONE 100 GRAM(S): 500 INJECTION, POWDER, FOR SOLUTION INTRAMUSCULAR; INTRAVENOUS at 21:46

## 2019-01-04 RX ADMIN — MORPHINE SULFATE 15 MILLIGRAM(S): 50 CAPSULE, EXTENDED RELEASE ORAL at 12:23

## 2019-01-04 RX ADMIN — MORPHINE SULFATE 15 MILLIGRAM(S): 50 CAPSULE, EXTENDED RELEASE ORAL at 06:13

## 2019-01-04 RX ADMIN — MORPHINE SULFATE 15 MILLIGRAM(S): 50 CAPSULE, EXTENDED RELEASE ORAL at 16:05

## 2019-01-04 RX ADMIN — MORPHINE SULFATE 15 MILLIGRAM(S): 50 CAPSULE, EXTENDED RELEASE ORAL at 08:21

## 2019-01-04 RX ADMIN — Medication 250 MILLIGRAM(S): at 02:10

## 2019-01-04 RX ADMIN — Medication 250 MILLIGRAM(S): at 11:52

## 2019-01-04 RX ADMIN — Medication 1: at 07:55

## 2019-01-04 RX ADMIN — Medication 50 MILLIGRAM(S): at 06:13

## 2019-01-04 RX ADMIN — SENNA PLUS 2 TABLET(S): 8.6 TABLET ORAL at 21:44

## 2019-01-04 RX ADMIN — Medication 250 MILLIGRAM(S): at 22:29

## 2019-01-04 RX ADMIN — LISINOPRIL 10 MILLIGRAM(S): 2.5 TABLET ORAL at 06:13

## 2019-01-04 NOTE — PROGRESS NOTE ADULT - ASSESSMENT
pt w/ met urothelial ca   hematuria  b/l nt tubes  abcess  cont iv abs  id eval noted  changed ertapenem to cefrtiaxone per ID  urology eval noted  CT reviewed, OM same, plan for abx duration unchanged  anemia from blood loss  Hb improved s/p PRBC  hospice eval in progress  Palliative eval noted  iv fluids  hold asa  SACHA drain out, no need to replace per IR  AXR w/ileus, however pt had large BM after abd distention improved  LLQ pain today, CT A/P pending  GI following

## 2019-01-04 NOTE — PROGRESS NOTE ADULT - SUBJECTIVE AND OBJECTIVE BOX
CHIEF COMPLAINT:Patient is a 83y old  Male who presents with a chief complaint of hematuria  	  Interval history: LLQ pain today      Allergies:  oxycodone (Other)      PAST MEDICAL & SURGICAL HISTORY:  Benign prostatic hyperplasia with lower urinary tract symptoms, symptom details unspecified  Prostate cancer  Hypertension  HLD (hyperlipidemia)  Type 2 diabetes mellitus  Paroxysmal A-fib  S/P AVR (aortic valve replacement)  S/P TURP      FAMILY HISTORY:  No pertinent family history in first degree relatives      REVIEW OF SYSTEMS:  CONSTITUTIONAL: No fever, weight loss, less fatigue  EYES: No eye pain, visual disturbances, or discharge  NECK: No pain or stiffness  RESPIRATORY: No cough or wheezing, no shortness of breath  CARDIOVASCULAR: No chest pain, palpitations, dizziness, or leg swelling  GASTROINTESTINAL: LLQ pain. No nausea, vomiting, diarrhea or constipation, + abd distention  GENITOURINARY: No dysuria, urinary frequency or urgency, no hematuria today  NEUROLOGICAL: No headaches, memory loss, loss of strength, numbness, or tremors  SKIN: No itching, burning, rashes, or lesions   MUSCULOSKELETAL: No joint pain or swelling; No muscle, back, or extremity pain        Medications:  MEDICATIONS  (STANDING):  BACItracin   Ointment 1 Application(s) Topical daily  cefTRIAXone   IVPB 1 Gram(s) IV Intermittent every 24 hours  dextrose 5%. 1000 milliLiter(s) (50 mL/Hr) IV Continuous <Continuous>  dextrose 50% Injectable 12.5 Gram(s) IV Push once  dextrose 50% Injectable 25 Gram(s) IV Push once  dextrose 50% Injectable 25 Gram(s) IV Push once  insulin lispro (HumaLOG) corrective regimen sliding scale   SubCutaneous every 6 hours  lisinopril 10 milliGRAM(s) Oral daily  metoprolol succinate ER 50 milliGRAM(s) Oral daily  morphine ER Tablet 30 milliGRAM(s) Oral two times a day  naloxegol 12.5 milliGRAM(s) Oral daily  senna 2 Tablet(s) Oral at bedtime  sodium chloride 0.9%. 1000 milliLiter(s) (75 mL/Hr) IV Continuous <Continuous>  vancomycin  IVPB 750 milliGRAM(s) IV Intermittent every 12 hours    MEDICATIONS  (PRN):  aluminum hydroxide/magnesium hydroxide/simethicone Suspension 30 milliLiter(s) Oral every 4 hours PRN Dyspepsia  dextrose 40% Gel 15 Gram(s) Oral once PRN Blood Glucose LESS THAN 70 milliGRAM(s)/deciliter  docusate sodium 100 milliGRAM(s) Oral three times a day PRN Constipation  glucagon  Injectable 1 milliGRAM(s) IntraMuscular once PRN Glucose LESS THAN 70 milligrams/deciliter  morphine  IR 15 milliGRAM(s) Oral every 4 hours PRN Severe Pain (7 - 10)    	    PHYSICAL EXAM:  T(C): 36.8 (01-04-19 @ 14:32), Max: 37.2 (01-03-19 @ 17:05)  HR: 89 (01-04-19 @ 14:32) (73 - 91)  BP: 106/62 (01-04-19 @ 14:32) (106/62 - 123/57)  RR: 18 (01-04-19 @ 14:32) (16 - 18)  SpO2: 95% (01-04-19 @ 14:32) (93% - 97%)  Wt(kg): --  I&O's Summary    03 Jan 2019 07:01  -  04 Jan 2019 07:00  --------------------------------------------------------  IN: 1480 mL / OUT: 2600 mL / NET: -1120 mL    04 Jan 2019 07:01  -  04 Jan 2019 15:45  --------------------------------------------------------  IN: 0 mL / OUT: 600 mL / NET: -600 mL      Appearance: Normal	  HEENT:   NCAT, PERRL, EOMI	  Lymphatic: No lymphadenopathy  Cardiovascular: Normal S1 S2, RRR  Respiratory: Lungs clear to auscultation BL  Psychiatry: A & O x 3, Mood & affect appropriate  Gastrointestinal:  Soft, mildly-tender LLQ, + BS, less distended  : + suprapubic, + BL nephrostomy  Skin: No rashes, No ecchymoses, No cyanosis	  Neurologic: Non-focal  Extremities: Normal range of motion, No clubbing, cyanosis or edema    LABS:	 	    CARDIAC MARKERS:                                8.8    4.8   )-----------( 132      ( 02 Jan 2019 06:47 )             26.2     01-02    133<L>  |  96  |  15  ----------------------------<  156<H>  4.2   |  24  |  1.11    Ca    8.4      02 Jan 2019 06:46      proBNP:   Lipid Profile:   HgA1c:   TSH:

## 2019-01-04 NOTE — PROGRESS NOTE ADULT - PROBLEM SELECTOR PLAN 2
- no further DDT  - c/w morphine ER and IR, uptitrate ER to 30mg BID based on usage (60mg IR used/24 hours)

## 2019-01-04 NOTE — PROGRESS NOTE ADULT - ASSESSMENT
83y Male with metastatic urothelial ca, s/p bilateral nephrostomy tubes and suprapubic tube here with recurrent hematuria from the SP tube, now resolved  - NT draining well, majority of UOP from NT, may have some leakage around SPT given bladder spasms  - trial of belladonna and opium suppositories for bladder spasms  - abd distention improved, c/w bowel regimen  - SACHA dislodged, as per IR, no drainable collection, SACHA w/scant output anyway, will not be replaced  - vanc and CTX per ID, final abx pending hospice recs  - Palliative follow-up  - f/u Oncology  - Continue NTs to bag drainage   - Continue SPT  - f/u w/Dr. Burroughs at [place] for Urology   - please call if questions

## 2019-01-04 NOTE — PROGRESS NOTE ADULT - PROBLEM SELECTOR PLAN 5
- d/w wife re: FRANK in detail, she plans to complete before discharge  - d/w hospice, will be going home with hospice once acute issues resolve

## 2019-01-04 NOTE — PROGRESS NOTE ADULT - SUBJECTIVE AND OBJECTIVE BOX
Subjective  No overnight events. Pt w/multiple BMs yesterday. Decreased leakage around SPT site    Objective    Vital signs  T(F): , Max: 99 (01-03-19 @ 17:05)  HR: 73 (01-04-19 @ 06:09)  BP: 123/57 (01-04-19 @ 06:09)  SpO2: 97% (01-04-19 @ 06:09)  Wt(kg): --    Output     01-03 @ 07:01  -  01-04 @ 07:00  --------------------------------------------------------  IN: 1480 mL / OUT: 2600 mL / NET: -1120 mL        Gen: NAD  Abd: softly distended, markedly decreased from yesterday, NT, SPT draining minimal clear yellow fluid  Back: NT draining clear yellow b/l    Labs

## 2019-01-04 NOTE — PROGRESS NOTE ADULT - SUBJECTIVE AND OBJECTIVE BOX
Interval Events: Pt with LLQ abdominal pain today. He denies N/V.   + small bm this morning and passing gas     MEDICATIONS  (STANDING):  BACItracin   Ointment 1 Application(s) Topical daily  cefTRIAXone   IVPB 1 Gram(s) IV Intermittent every 24 hours  dextrose 5%. 1000 milliLiter(s) (50 mL/Hr) IV Continuous <Continuous>  dextrose 50% Injectable 12.5 Gram(s) IV Push once  dextrose 50% Injectable 25 Gram(s) IV Push once  dextrose 50% Injectable 25 Gram(s) IV Push once  insulin lispro (HumaLOG) corrective regimen sliding scale   SubCutaneous every 6 hours  lisinopril 10 milliGRAM(s) Oral daily  metoprolol succinate ER 50 milliGRAM(s) Oral daily  morphine ER Tablet 30 milliGRAM(s) Oral two times a day  naloxegol 12.5 milliGRAM(s) Oral daily  senna 2 Tablet(s) Oral at bedtime  sodium chloride 0.9%. 1000 milliLiter(s) (75 mL/Hr) IV Continuous <Continuous>  vancomycin  IVPB 750 milliGRAM(s) IV Intermittent every 12 hours    MEDICATIONS  (PRN):  aluminum hydroxide/magnesium hydroxide/simethicone Suspension 30 milliLiter(s) Oral every 4 hours PRN Dyspepsia  dextrose 40% Gel 15 Gram(s) Oral once PRN Blood Glucose LESS THAN 70 milliGRAM(s)/deciliter  docusate sodium 100 milliGRAM(s) Oral three times a day PRN Constipation  glucagon  Injectable 1 milliGRAM(s) IntraMuscular once PRN Glucose LESS THAN 70 milligrams/deciliter  morphine  IR 15 milliGRAM(s) Oral every 4 hours PRN Severe Pain (7 - 10)      Allergies    oxycodone (Other)    Intolerances        Review of Systems:    General:  No wt loss, fevers, chills, night sweats,fatigue,   Eyes:  Good vision, no reported pain  ENT:  No sore throat, pain, runny nose, dysphagia  CV:  No pain, palpitations, hypo/hypertension  Resp:  No dyspnea, cough, tachypnea, wheezing  GI:  + LLQ pain, No nausea, No vomiting, No diarrhea, No constipation, No weight loss, No fever, No pruritis, No rectal bleeding, No melena, No dysphagia  :  No pain, bleeding, incontinence, nocturia  Muscle:  No pain, weakness  Neuro:  No weakness, tingling, memory problems  Psych:  No fatigue, insomnia, mood problems, depression  Endocrine:  No polyuria, polydypsia, cold/heat intolerance  Heme:  No petechiae, ecchymosis, easy bruisability  Skin:  No rash, tattoos, scars, edema      Vital Signs Last 24 Hrs  T(C): 36.8 (04 Jan 2019 10:10), Max: 37.2 (03 Jan 2019 17:05)  T(F): 98.2 (04 Jan 2019 10:10), Max: 99 (03 Jan 2019 17:05)  HR: 84 (04 Jan 2019 10:10) (73 - 91)  BP: 116/53 (04 Jan 2019 10:10) (116/53 - 133/73)  BP(mean): --  RR: 18 (04 Jan 2019 10:10) (16 - 18)  SpO2: 95% (04 Jan 2019 10:10) (93% - 97%)    PHYSICAL EXAM:    Constitutional: NAD, well-developed  HEENT: EOMI, throat clear  Neck: No LAD, supple  Respiratory: CTA and P  Cardiovascular: S1 and S2, RRR, no M  Gastrointestinal: firm,  LLQ tenderness, non- distended normoactive bowel sounds,  no masses ,no hepato-splenomegaly, no signs of chronic liver disease  Extremities: No peripheral edema, neg clubbing, cyanosis  Vascular: 2+ peripheral pulses  Neurological: A/O x 3, no focal deficits  Psychiatric: Normal mood, normal affect  Skin: No rashes      LABS:                        8.3    4.9   )-----------( 135      ( 04 Jan 2019 10:20 )             25.2     01-04    134<L>  |  100  |  14  ----------------------------<  158<H>  4.4   |  27  |  1.09    Ca    8.3<L>      04 Jan 2019 10:20    TPro  5.7<L>  /  Alb  2.2<L>  /  TBili  0.4  /  DBili  x   /  AST  7<L>  /  ALT  <5<L>  /  AlkPhos  46  01-04          RADIOLOGY & ADDITIONAL TESTS:

## 2019-01-04 NOTE — PROGRESS NOTE ADULT - SUBJECTIVE AND OBJECTIVE BOX
CC: goals of care in the setting of end-stage malignancy    INTERVAL EVENTS: persistent abdominal pain, now more LLQ per wife, used 4 doses of 15mg dilaudid in addition to long-acting regimen. CT pending. + BMs. Confused per wife.     ADVANCE DIRECTIVES:    DNR no   MOLST  [ ]  Living Will  [ ]   DECISION MAKER(s): patient is alert and oriented and able to make decisions, if not able in absence of HCP form, decision maker would be his surrogate, his wife Arlen  [ ] Health Care Proxy(s)  [ ] Surrogate(s)  [ ] Guardian           Name(s): Arlen Nation Phone Number(s): [phone]    BASELINE (I)ADL(s) (prior to admission):  [place]: [ ]Total  [x] Moderate [ ]Dependent    Allergies    oxycodone (Other)    Intolerances    MEDICATIONS  (STANDING):  BACItracin   Ointment 1 Application(s) Topical daily  cefTRIAXone   IVPB 1 Gram(s) IV Intermittent every 24 hours  dextrose 5%. 1000 milliLiter(s) (50 mL/Hr) IV Continuous <Continuous>  dextrose 50% Injectable 12.5 Gram(s) IV Push once  dextrose 50% Injectable 25 Gram(s) IV Push once  dextrose 50% Injectable 25 Gram(s) IV Push once  insulin lispro (HumaLOG) corrective regimen sliding scale   SubCutaneous every 6 hours  lisinopril 10 milliGRAM(s) Oral daily  metoprolol succinate ER 50 milliGRAM(s) Oral daily  morphine ER Tablet 30 milliGRAM(s) Oral two times a day  naloxegol 12.5 milliGRAM(s) Oral daily  senna 2 Tablet(s) Oral at bedtime  sodium chloride 0.9%. 1000 milliLiter(s) (75 mL/Hr) IV Continuous <Continuous>  vancomycin  IVPB 750 milliGRAM(s) IV Intermittent every 12 hours    MEDICATIONS  (PRN):  aluminum hydroxide/magnesium hydroxide/simethicone Suspension 30 milliLiter(s) Oral every 4 hours PRN Dyspepsia  dextrose 40% Gel 15 Gram(s) Oral once PRN Blood Glucose LESS THAN 70 milliGRAM(s)/deciliter  docusate sodium 100 milliGRAM(s) Oral three times a day PRN Constipation  glucagon  Injectable 1 milliGRAM(s) IntraMuscular once PRN Glucose LESS THAN 70 milligrams/deciliter  morphine  IR 15 milliGRAM(s) Oral every 4 hours PRN Severe Pain (7 - 10)    PRESENT SYMPTOMS: [X ]Unable to obtain due to poor mentation   Source if other than patient:  [ ]Family   [ ]Team     Pain (Impact on QOL): yes  Location - suprapubic area        Minimal acceptable level (0-10 scale):  3-4/10           Aggravating factors - movement  Quality - sharp pain  Radiation - penis, lower back  Severity (0-10 scale) -  8/10  Timing - intermittent     PAIN AD Score:     [email or web address] (press ctrl +  left click to view)    Dyspnea:                           [ ]Mild [ ]Moderate [ ]Severe  Anxiety:                             [ ]Mild [ ]Moderate [ ]Severe  Fatigue:                             [ ]Mild [ ]Moderate [ ]Severe  Nausea:                             [ ]Mild [ ]Moderate [ ]Severe  Loss of appetite:              [ ]Mild [ ]Moderate [ ]Severe  Constipation:                    [ ]Mild [ ]Moderate [x]Severe    Other Symptoms:  [ ]All other review of systems negative     Karnofsky Performance Score/Palliative Performance Status Version 2: 40%    http://palliative.info/resource_material/[email or web address].pdf    PHYSICAL EXAM:  Vital Signs Last 24 Hrs  T(C): 36.8 (04 Jan 2019 10:10), Max: 37.2 (03 Jan 2019 17:05)  T(F): 98.2 (04 Jan 2019 10:10), Max: 99 (03 Jan 2019 17:05)  HR: 84 (04 Jan 2019 10:10) (73 - 91)  BP: 116/53 (04 Jan 2019 10:10) (116/53 - 133/73)  BP(mean): --  RR: 18 (04 Jan 2019 10:10) (16 - 18)  SpO2: 95% (04 Jan 2019 10:10) (93% - 97%)    Limited exam for patient comfort as patient sleeping  GENERAL:  [x]Alert  []Oriented x 3  [ ]Lethargic  [ ]Cachexia  [ ]Unarousable  [ ]Verbal  [ ]Non-Verbal  Behavioral:   [ ] Anxiety  [ ] Delirium [ ] Agitation [ ] Other  HEENT:  [x]Normal   [ ]Dry mouth   [ ]ET Tube/Trach  [ ]Oral lesions  PULMONARY:   []Clear [ ]Tachypnea  [ ]Audible excessive secretions   [ ]Rhonchi        [ ]Right [ ]Left [ ]Bilateral  [ ]Crackles        [ ]Right [ ]Left [ ]Bilateral  [ ]Wheezing     [ ]Right [ ]Left [ ]Bilateral  CARDIOVASCULAR:    []Regular [ ]Irregular [ ]Tachy  [ ]Lico [ ]Murmur [ ]Other  GASTROINTESTINAL:  []Soft  [ ]Distended   []+BS  [ ]Non tender []Tender / suprapubic area [ ]PEG [ ]OGT/ NGT  Last BM: 1/3/19  GENITOURINARY:  [ ]Normal [ ] Incontinent   [ ]Oliguria/Anuria   [x] Suprapubic catheter with hematuria, bilateral nephrostomy tubes   MUSCULOSKELETAL:   [ ]Normal   []Weakness  [ ]Bed/Wheelchair bound [ ]Edema  NEUROLOGIC:   [x]No focal deficits  [ ] Cognitive impairment  [ ] Dysphagia [ ]Dysarthria [ ] Paresis [ ]Other   SKIN:   [x]Normal   [ ]Pressure ulcer(s)  [ ]Rash    CRITICAL CARE:  [ ] Shock Present  [ ]Septic [ ]Cardiogenic [ ]Neurologic [ ]Hypovolemic  [ ]  Vasopressors [ ]  Inotropes   [ ] Respiratory failure present  [ ] Acute  [ ] Chronic [ ] Hypoxic  [ ] Hypercarbic [ ] Other  [ ] Other organ failure     LABS:                          8.3    4.9   )-----------( 135      ( 04 Jan 2019 10:20 )             25.2     01-04    134<L>  |  100  |  14  ----------------------------<  158<H>  4.4   |  27  |  1.09    Ca    8.3<L>      04 Jan 2019 10:20      RADIOLOGY & ADDITIONAL STUDIES: reviewed     PROTEIN CALORIE MALNUTRITION PRESENT: [ ] Yes [ ] No  [ ] PPSV2 < or = to 30% [ ] significant weight loss  [ ] poor nutritional intake [ ] catabolic state [ ] anasarca     Albumin, Serum: 2.8 g/dL (12-26-18 @ 13:45)  Artificial Nutrition [ ]     REFERRALS:   [ ]Chaplaincy  [ ] Hospice  [ ]Child Life  [ ]Social Work  [ ]Case management [ ]Holistic Therapy   Goals of Care Discussion Document:

## 2019-01-05 LAB
ANION GAP SERPL CALC-SCNC: 9 MMOL/L — SIGNIFICANT CHANGE UP (ref 5–17)
BLD GP AB SCN SERPL QL: POSITIVE — SIGNIFICANT CHANGE UP
BUN SERPL-MCNC: 15 MG/DL — SIGNIFICANT CHANGE UP (ref 7–23)
CALCIUM SERPL-MCNC: 7.9 MG/DL — LOW (ref 8.4–10.5)
CHLORIDE SERPL-SCNC: 95 MMOL/L — LOW (ref 96–108)
CO2 SERPL-SCNC: 24 MMOL/L — SIGNIFICANT CHANGE UP (ref 22–31)
CREAT SERPL-MCNC: 1.18 MG/DL — SIGNIFICANT CHANGE UP (ref 0.5–1.3)
CULTURE RESULTS: SIGNIFICANT CHANGE UP
GLUCOSE SERPL-MCNC: 254 MG/DL — HIGH (ref 70–99)
HCT VFR BLD CALC: 21.6 % — LOW (ref 39–50)
HCT VFR BLD CALC: 23.4 % — LOW (ref 39–50)
HGB BLD-MCNC: 7.4 G/DL — LOW (ref 13–17)
HGB BLD-MCNC: 7.9 G/DL — LOW (ref 13–17)
MCHC RBC-ENTMCNC: 29.5 PG — SIGNIFICANT CHANGE UP (ref 27–34)
MCHC RBC-ENTMCNC: 29.6 PG — SIGNIFICANT CHANGE UP (ref 27–34)
MCHC RBC-ENTMCNC: 33.9 GM/DL — SIGNIFICANT CHANGE UP (ref 32–36)
MCHC RBC-ENTMCNC: 34.1 GM/DL — SIGNIFICANT CHANGE UP (ref 32–36)
MCV RBC AUTO: 86.7 FL — SIGNIFICANT CHANGE UP (ref 80–100)
MCV RBC AUTO: 87.1 FL — SIGNIFICANT CHANGE UP (ref 80–100)
PLATELET # BLD AUTO: 145 K/UL — LOW (ref 150–400)
PLATELET # BLD AUTO: 148 K/UL — LOW (ref 150–400)
POTASSIUM SERPL-MCNC: 4.2 MMOL/L — SIGNIFICANT CHANGE UP (ref 3.5–5.3)
POTASSIUM SERPL-SCNC: 4.2 MMOL/L — SIGNIFICANT CHANGE UP (ref 3.5–5.3)
RBC # BLD: 2.49 M/UL — LOW (ref 4.2–5.8)
RBC # BLD: 2.68 M/UL — LOW (ref 4.2–5.8)
RBC # FLD: 14.8 % — HIGH (ref 10.3–14.5)
RBC # FLD: 14.9 % — HIGH (ref 10.3–14.5)
RH IG SCN BLD-IMP: POSITIVE — SIGNIFICANT CHANGE UP
SODIUM SERPL-SCNC: 128 MMOL/L — LOW (ref 135–145)
SPECIMEN SOURCE: SIGNIFICANT CHANGE UP
VANCOMYCIN TROUGH SERPL-MCNC: 16.7 UG/ML — SIGNIFICANT CHANGE UP (ref 10–20)
WBC # BLD: 4.2 K/UL — SIGNIFICANT CHANGE UP (ref 3.8–10.5)
WBC # BLD: 5.4 K/UL — SIGNIFICANT CHANGE UP (ref 3.8–10.5)
WBC # FLD AUTO: 4.2 K/UL — SIGNIFICANT CHANGE UP (ref 3.8–10.5)
WBC # FLD AUTO: 5.4 K/UL — SIGNIFICANT CHANGE UP (ref 3.8–10.5)

## 2019-01-05 PROCEDURE — 86077 PHYS BLOOD BANK SERV XMATCH: CPT

## 2019-01-05 PROCEDURE — 51700 IRRIGATION OF BLADDER: CPT

## 2019-01-05 RX ORDER — LACTOBACILLUS ACIDOPHILUS 100MM CELL
1 CAPSULE ORAL
Qty: 0 | Refills: 0 | Status: DISCONTINUED | OUTPATIENT
Start: 2019-01-05 | End: 2019-01-08

## 2019-01-05 RX ORDER — GLYCERIN ADULT
1 SUPPOSITORY, RECTAL RECTAL ONCE
Qty: 0 | Refills: 0 | Status: COMPLETED | OUTPATIENT
Start: 2019-01-05 | End: 2019-01-05

## 2019-01-05 RX ORDER — ACETAMINOPHEN 500 MG
1000 TABLET ORAL ONCE
Qty: 0 | Refills: 0 | Status: COMPLETED | OUTPATIENT
Start: 2019-01-05 | End: 2019-01-05

## 2019-01-05 RX ORDER — LACTULOSE 10 G/15ML
30 SOLUTION ORAL ONCE
Qty: 0 | Refills: 0 | Status: COMPLETED | OUTPATIENT
Start: 2019-01-05 | End: 2019-01-05

## 2019-01-05 RX ORDER — SODIUM CHLORIDE 9 MG/ML
1000 INJECTION INTRAMUSCULAR; INTRAVENOUS; SUBCUTANEOUS
Qty: 0 | Refills: 0 | Status: DISCONTINUED | OUTPATIENT
Start: 2019-01-05 | End: 2019-01-08

## 2019-01-05 RX ADMIN — Medication 1 SUPPOSITORY(S): at 11:05

## 2019-01-05 RX ADMIN — CEFTRIAXONE 100 GRAM(S): 500 INJECTION, POWDER, FOR SOLUTION INTRAMUSCULAR; INTRAVENOUS at 21:09

## 2019-01-05 RX ADMIN — MORPHINE SULFATE 30 MILLIGRAM(S): 50 CAPSULE, EXTENDED RELEASE ORAL at 05:02

## 2019-01-05 RX ADMIN — NALOXEGOL OXALATE 12.5 MILLIGRAM(S): 12.5 TABLET, FILM COATED ORAL at 11:12

## 2019-01-05 RX ADMIN — SODIUM CHLORIDE 75 MILLILITER(S): 9 INJECTION INTRAMUSCULAR; INTRAVENOUS; SUBCUTANEOUS at 12:23

## 2019-01-05 RX ADMIN — MORPHINE SULFATE 15 MILLIGRAM(S): 50 CAPSULE, EXTENDED RELEASE ORAL at 12:17

## 2019-01-05 RX ADMIN — MORPHINE SULFATE 15 MILLIGRAM(S): 50 CAPSULE, EXTENDED RELEASE ORAL at 09:00

## 2019-01-05 RX ADMIN — MORPHINE SULFATE 30 MILLIGRAM(S): 50 CAPSULE, EXTENDED RELEASE ORAL at 05:40

## 2019-01-05 RX ADMIN — MORPHINE SULFATE 15 MILLIGRAM(S): 50 CAPSULE, EXTENDED RELEASE ORAL at 21:15

## 2019-01-05 RX ADMIN — Medication 1 TABLET(S): at 18:54

## 2019-01-05 RX ADMIN — Medication 1 APPLICATION(S): at 11:11

## 2019-01-05 RX ADMIN — MORPHINE SULFATE 15 MILLIGRAM(S): 50 CAPSULE, EXTENDED RELEASE ORAL at 04:19

## 2019-01-05 RX ADMIN — LISINOPRIL 10 MILLIGRAM(S): 2.5 TABLET ORAL at 05:02

## 2019-01-05 RX ADMIN — Medication 2: at 08:14

## 2019-01-05 RX ADMIN — Medication 50 MILLIGRAM(S): at 05:02

## 2019-01-05 RX ADMIN — Medication 1 TABLET(S): at 21:15

## 2019-01-05 RX ADMIN — Medication 250 MILLIGRAM(S): at 12:24

## 2019-01-05 RX ADMIN — Medication 400 MILLIGRAM(S): at 13:48

## 2019-01-05 RX ADMIN — MORPHINE SULFATE 15 MILLIGRAM(S): 50 CAPSULE, EXTENDED RELEASE ORAL at 05:40

## 2019-01-05 RX ADMIN — Medication 30 MILLILITER(S): at 04:20

## 2019-01-05 RX ADMIN — LACTULOSE 30 GRAM(S): 10 SOLUTION ORAL at 11:05

## 2019-01-05 RX ADMIN — MORPHINE SULFATE 15 MILLIGRAM(S): 50 CAPSULE, EXTENDED RELEASE ORAL at 22:00

## 2019-01-05 RX ADMIN — SENNA PLUS 2 TABLET(S): 8.6 TABLET ORAL at 21:11

## 2019-01-05 RX ADMIN — Medication 1 TABLET(S): at 12:18

## 2019-01-05 RX ADMIN — MORPHINE SULFATE 30 MILLIGRAM(S): 50 CAPSULE, EXTENDED RELEASE ORAL at 18:58

## 2019-01-05 RX ADMIN — MORPHINE SULFATE 15 MILLIGRAM(S): 50 CAPSULE, EXTENDED RELEASE ORAL at 13:00

## 2019-01-05 RX ADMIN — Medication 250 MILLIGRAM(S): at 23:24

## 2019-01-05 RX ADMIN — Medication 2: at 12:20

## 2019-01-05 RX ADMIN — MORPHINE SULFATE 15 MILLIGRAM(S): 50 CAPSULE, EXTENDED RELEASE ORAL at 08:15

## 2019-01-05 RX ADMIN — Medication 3: at 18:53

## 2019-01-05 RX ADMIN — Medication 1000 MILLIGRAM(S): at 14:15

## 2019-01-05 NOTE — PROGRESS NOTE ADULT - SUBJECTIVE AND OBJECTIVE BOX
CHIEF COMPLAINT:Patient is a 83y old  Male who presents with a chief complaint of hematuria (04 Jan 2019 15:45)    	        PAST MEDICAL & SURGICAL HISTORY:  Benign prostatic hyperplasia with lower urinary tract symptoms, symptom details unspecified  Prostate cancer  Hypertension  HLD (hyperlipidemia)  Type 2 diabetes mellitus  Paroxysmal A-fib  S/P AVR (aortic valve replacement)  S/P TURP          REVIEW OF SYSTEMS:  CONSTITUTIONAL:weak  EYES: No eye pain, visual disturbances, or discharge  NECK: No pain or stiffness  RESPIRATORY: No cough, wheezing, chills or hemoptysis; No Shortness of Breath  CARDIOVASCULAR: No chest pain, palpitations, passing out, dizziness, or leg swelling  GASTROINTESTINAL: No abdominal or epigastric pain. No nausea, vomiting, or hematemesis;   GENITOURINARY: hematuria   NEUROLOGICAL: No headaches, memory loss, loss of strength, numbness, or tremors    Medications:  MEDICATIONS  (STANDING):  BACItracin   Ointment 1 Application(s) Topical daily  cefTRIAXone   IVPB 1 Gram(s) IV Intermittent every 24 hours  dextrose 5%. 1000 milliLiter(s) (50 mL/Hr) IV Continuous <Continuous>  dextrose 50% Injectable 12.5 Gram(s) IV Push once  dextrose 50% Injectable 25 Gram(s) IV Push once  dextrose 50% Injectable 25 Gram(s) IV Push once  insulin lispro (HumaLOG) corrective regimen sliding scale   SubCutaneous three times a day before meals  insulin lispro (HumaLOG) corrective regimen sliding scale   SubCutaneous at bedtime  lisinopril 10 milliGRAM(s) Oral daily  metoprolol succinate ER 50 milliGRAM(s) Oral daily  morphine ER Tablet 30 milliGRAM(s) Oral two times a day  naloxegol 12.5 milliGRAM(s) Oral daily  senna 2 Tablet(s) Oral at bedtime  sodium chloride 0.9%. 1000 milliLiter(s) (75 mL/Hr) IV Continuous <Continuous>  vancomycin  IVPB 750 milliGRAM(s) IV Intermittent every 12 hours    MEDICATIONS  (PRN):  aluminum hydroxide/magnesium hydroxide/simethicone Suspension 30 milliLiter(s) Oral every 4 hours PRN Dyspepsia  dextrose 40% Gel 15 Gram(s) Oral once PRN Blood Glucose LESS THAN 70 milliGRAM(s)/deciliter  docusate sodium 100 milliGRAM(s) Oral three times a day PRN Constipation  glucagon  Injectable 1 milliGRAM(s) IntraMuscular once PRN Glucose LESS THAN 70 milligrams/deciliter  morphine  IR 15 milliGRAM(s) Oral every 4 hours PRN Severe Pain (7 - 10)    	    PHYSICAL EXAM:  T(C): 36.8 (01-05-19 @ 04:28), Max: 37.2 (01-05-19 @ 01:07)  HR: 93 (01-05-19 @ 04:28) (80 - 93)  BP: 113/68 (01-05-19 @ 04:28) (106/62 - 125/67)  RR: 18 (01-05-19 @ 04:28) (18 - 18)  SpO2: 94% (01-05-19 @ 04:28) (94% - 95%)  Wt(kg): --  I&O's Summary    04 Jan 2019 07:01  -  05 Jan 2019 07:00  --------------------------------------------------------  IN: 2160 mL / OUT: 1575 mL / NET: 585 mL        Appearance: Normal	  HEENT:   Normal oral mucosa, PERRL, EOMI	  Lymphatic: No lymphadenopathy  Cardiovascular: Normal S1 S2, No JVD, No murmurs, No edema  Respiratory: Lungs clear to auscultation	  Psychiatry: A & O x 3, Mood & affect appropriate  Gastrointestinal:  Soft, Non-tender, + BS	/tube in place  Skin: No rashes, No ecchymoses, No cyanosis	  Neurologic: Non-focal  Extremities: dec  range of motion, No clubbing, cyanosis or edema  Vascular: Peripheral pulses palpable     TELEMETRY: 	    ECG:  	  RADIOLOGY:  OTHER: 	  	  LABS:	 	    CARDIAC MARKERS:                                7.9    5.4   )-----------( 148      ( 05 Jan 2019 05:58 )             23.4     01-05    128<L>  |  95<L>  |  15  ----------------------------<  254<H>  4.2   |  24  |  1.18    Ca    7.9<L>      05 Jan 2019 05:58    TPro  5.7<L>  /  Alb  2.2<L>  /  TBili  0.4  /  DBili  x   /  AST  7<L>  /  ALT  <5<L>  /  AlkPhos  46  01-04    proBNP:   Lipid Profile:   HgA1c:   TSH:

## 2019-01-05 NOTE — PROGRESS NOTE ADULT - SUBJECTIVE AND OBJECTIVE BOX
INTERVAL HPI/OVERNIGHT EVENTS:  No new overnight event.  No N/V/D.  Tolerating diet.  no bm yet ct neg    Allergies    oxycodone (Other)    Intolerances  General:  No wt loss, fevers, chills, night sweats, fatigue,   Eyes:  Good vision, no reported pain  ENT:  No sore throat, pain, runny nose, dysphagia  CV:  No pain, palpitations, hypo/hypertension  Resp:  No dyspnea, cough, tachypnea, wheezing  GI:  No pain, No nausea, No vomiting, No diarrhea, No constipation, No weight loss, No fever, No pruritis, No rectal bleeding, No tarry stools, No dysphagia,  :  No pain, bleeding, incontinence, nocturia  Muscle:  No pain, weakness  Neuro:  No weakness, tingling, memory problems  Psych:  No fatigue, insomnia, mood problems, depression  Endocrine:  No polyuria, polydipsia, cold/heat intolerance  Heme:  No petechiae, ecchymosis, easy bruisability  Skin:  No rash, tattoos, scars, edema      PHYSICAL EXAM:   Vital Signs:  Vital Signs Last 24 Hrs  T(C): 36.6 (05 Jan 2019 16:42), Max: 37.2 (05 Jan 2019 01:07)  T(F): 97.8 (05 Jan 2019 16:42), Max: 98.9 (05 Jan 2019 01:07)  HR: 86 (05 Jan 2019 16:42) (86 - 93)  BP: 102/60 (05 Jan 2019 16:42) (102/60 - 122/62)  BP(mean): --  RR: 17 (05 Jan 2019 16:42) (17 - 18)  SpO2: 96% (05 Jan 2019 16:42) (94% - 96%)  Daily     Daily I&O's Summary    04 Jan 2019 07:01  -  05 Jan 2019 07:00  --------------------------------------------------------  IN: 2160 mL / OUT: 1575 mL / NET: 585 mL    05 Jan 2019 07:01  -  05 Jan 2019 18:42  --------------------------------------------------------  IN: 420 mL / OUT: 925 mL / NET: -505 mL        GENERAL:  Appears stated age, well-groomed, well-nourished, no distress  HEENT:  NC/AT,  conjunctivae clear and pink, no thyromegaly, nodules, adenopathy, no JVD, sclera -anicteric  CHEST:  Full & symmetric excursion, no increased effort, breath sounds clear  HEART:  Regular rhythm, S1, S2, no murmur/rub/S3/S4, no abdominal bruit, no edema  ABDOMEN:  Soft, non-tender, non-distended, normoactive bowel sounds,  no masses ,no hepato-splenomegaly, no signs of chronic liver disease  EXTEREMITIES:  no cyanosis,clubbing or edema  SKIN:  No rash/erythema/ecchymoses/petechiae/wounds/abscess/warm/dry  NEURO:  Alert, oriented, no asterixis, no tremor, no encephalopathy      LABS:                        7.9    5.4   )-----------( 148      ( 05 Jan 2019 05:58 )             23.4     01-05    128<L>  |  95<L>  |  15  ----------------------------<  254<H>  4.2   |  24  |  1.18    Ca    7.9<L>      05 Jan 2019 05:58    TPro  5.7<L>  /  Alb  2.2<L>  /  TBili  0.4  /  DBili  x   /  AST  7<L>  /  ALT  <5<L>  /  AlkPhos  46  01-04        amylase   lipase  RADIOLOGY & ADDITIONAL TESTS:

## 2019-01-05 NOTE — PROGRESS NOTE ADULT - ASSESSMENT
pt w/ met urothelial ca   hematuria  b/l nt tubes  abcess  cont iv abs asper id   id f/u     urology f.u     anemia from blood loss  transfuse prn  hospice eval in progress  Palliative eval noted  iv fluids  hold asa  SACHA drain out, no need to replace per IR  AXR w/ileus, however pt had large BM after abd distention improved  LLQ painimproved    CT A/P/ done check results  GI f/u  discussed w/ wife at bedside

## 2019-01-06 LAB
ANION GAP SERPL CALC-SCNC: 8 MMOL/L — SIGNIFICANT CHANGE UP (ref 5–17)
BUN SERPL-MCNC: 13 MG/DL — SIGNIFICANT CHANGE UP (ref 7–23)
CALCIUM SERPL-MCNC: 8.2 MG/DL — LOW (ref 8.4–10.5)
CHLORIDE SERPL-SCNC: 98 MMOL/L — SIGNIFICANT CHANGE UP (ref 96–108)
CO2 SERPL-SCNC: 25 MMOL/L — SIGNIFICANT CHANGE UP (ref 22–31)
CREAT SERPL-MCNC: 1.26 MG/DL — SIGNIFICANT CHANGE UP (ref 0.5–1.3)
GLUCOSE SERPL-MCNC: 153 MG/DL — HIGH (ref 70–99)
HCT VFR BLD CALC: 19.5 % — CRITICAL LOW (ref 39–50)
HCT VFR BLD CALC: 21.4 % — LOW (ref 39–50)
HGB BLD-MCNC: 6.7 G/DL — CRITICAL LOW (ref 13–17)
HGB BLD-MCNC: 7.3 G/DL — LOW (ref 13–17)
MCHC RBC-ENTMCNC: 29.9 PG — SIGNIFICANT CHANGE UP (ref 27–34)
MCHC RBC-ENTMCNC: 30.2 PG — SIGNIFICANT CHANGE UP (ref 27–34)
MCHC RBC-ENTMCNC: 34.1 GM/DL — SIGNIFICANT CHANGE UP (ref 32–36)
MCHC RBC-ENTMCNC: 34.6 GM/DL — SIGNIFICANT CHANGE UP (ref 32–36)
MCV RBC AUTO: 87.1 FL — SIGNIFICANT CHANGE UP (ref 80–100)
MCV RBC AUTO: 87.6 FL — SIGNIFICANT CHANGE UP (ref 80–100)
PLATELET # BLD AUTO: 128 K/UL — LOW (ref 150–400)
PLATELET # BLD AUTO: 136 K/UL — LOW (ref 150–400)
POTASSIUM SERPL-MCNC: 3.9 MMOL/L — SIGNIFICANT CHANGE UP (ref 3.5–5.3)
POTASSIUM SERPL-SCNC: 3.9 MMOL/L — SIGNIFICANT CHANGE UP (ref 3.5–5.3)
RBC # BLD: 2.24 M/UL — LOW (ref 4.2–5.8)
RBC # BLD: 2.45 M/UL — LOW (ref 4.2–5.8)
RBC # FLD: 14.7 % — HIGH (ref 10.3–14.5)
RBC # FLD: 14.8 % — HIGH (ref 10.3–14.5)
SODIUM SERPL-SCNC: 131 MMOL/L — LOW (ref 135–145)
VANCOMYCIN TROUGH SERPL-MCNC: 21.1 UG/ML — HIGH (ref 10–20)
WBC # BLD: 4.2 K/UL — SIGNIFICANT CHANGE UP (ref 3.8–10.5)
WBC # BLD: 4.5 K/UL — SIGNIFICANT CHANGE UP (ref 3.8–10.5)
WBC # FLD AUTO: 4.2 K/UL — SIGNIFICANT CHANGE UP (ref 3.8–10.5)
WBC # FLD AUTO: 4.5 K/UL — SIGNIFICANT CHANGE UP (ref 3.8–10.5)

## 2019-01-06 RX ADMIN — Medication 1 APPLICATION(S): at 12:13

## 2019-01-06 RX ADMIN — Medication 2: at 08:23

## 2019-01-06 RX ADMIN — Medication 1 TABLET(S): at 08:23

## 2019-01-06 RX ADMIN — MORPHINE SULFATE 30 MILLIGRAM(S): 50 CAPSULE, EXTENDED RELEASE ORAL at 18:02

## 2019-01-06 RX ADMIN — SENNA PLUS 2 TABLET(S): 8.6 TABLET ORAL at 22:09

## 2019-01-06 RX ADMIN — Medication 50 MILLIGRAM(S): at 05:57

## 2019-01-06 RX ADMIN — NALOXEGOL OXALATE 12.5 MILLIGRAM(S): 12.5 TABLET, FILM COATED ORAL at 12:12

## 2019-01-06 RX ADMIN — MORPHINE SULFATE 30 MILLIGRAM(S): 50 CAPSULE, EXTENDED RELEASE ORAL at 05:57

## 2019-01-06 RX ADMIN — Medication 1 TABLET(S): at 22:09

## 2019-01-06 RX ADMIN — CEFTRIAXONE 100 GRAM(S): 500 INJECTION, POWDER, FOR SOLUTION INTRAMUSCULAR; INTRAVENOUS at 22:09

## 2019-01-06 RX ADMIN — Medication 250 MILLIGRAM(S): at 12:12

## 2019-01-06 RX ADMIN — Medication 3: at 12:13

## 2019-01-06 RX ADMIN — MORPHINE SULFATE 15 MILLIGRAM(S): 50 CAPSULE, EXTENDED RELEASE ORAL at 10:24

## 2019-01-06 RX ADMIN — MORPHINE SULFATE 15 MILLIGRAM(S): 50 CAPSULE, EXTENDED RELEASE ORAL at 09:54

## 2019-01-06 RX ADMIN — Medication 1 TABLET(S): at 18:02

## 2019-01-06 RX ADMIN — Medication 1 TABLET(S): at 12:12

## 2019-01-06 RX ADMIN — Medication 1: at 18:04

## 2019-01-06 NOTE — PROGRESS NOTE ADULT - SUBJECTIVE AND OBJECTIVE BOX
CHIEF COMPLAINT:Patient is a 83y old  Male who presents with a chief complaint of ileus (05 Jan 2019 18:41)    	        PAST MEDICAL & SURGICAL HISTORY:  Benign prostatic hyperplasia with lower urinary tract symptoms, symptom details unspecified  Prostate cancer  Hypertension  HLD (hyperlipidemia)  Type 2 diabetes mellitus  Paroxysmal A-fib  S/P AVR (aortic valve replacement)  S/P TURP          REVIEW OF SYSTEMS:  CONSTITUTIONAL: weak  EYES: No eye pain, visual disturbances, or discharge  NECK: No pain or stiffness  RESPIRATORY: No cough, wheezing, chills or hemoptysis; No Shortness of Breath  CARDIOVASCULAR: No chest pain, palpitations, passing out, dizziness, or leg swelling  GASTROINTESTINAL: abd pain better  GENITOURINARY: tubes in place / hematuria  NEUROLOGICAL: No headaches, memory loss, loss of strength, numbness, or tremors  MUSCULOSKELETAL: No joint pain or swelling; No muscle, back, or extremity pain    Medications:  MEDICATIONS  (STANDING):  BACItracin   Ointment 1 Application(s) Topical daily  cefTRIAXone   IVPB 1 Gram(s) IV Intermittent every 24 hours  dextrose 5%. 1000 milliLiter(s) (50 mL/Hr) IV Continuous <Continuous>  dextrose 50% Injectable 12.5 Gram(s) IV Push once  dextrose 50% Injectable 25 Gram(s) IV Push once  dextrose 50% Injectable 25 Gram(s) IV Push once  insulin lispro (HumaLOG) corrective regimen sliding scale   SubCutaneous three times a day before meals  insulin lispro (HumaLOG) corrective regimen sliding scale   SubCutaneous at bedtime  lactobacillus acidophilus 1 Tablet(s) Oral four times a day with meals  metoprolol succinate ER 50 milliGRAM(s) Oral daily  morphine ER Tablet 30 milliGRAM(s) Oral two times a day  naloxegol 12.5 milliGRAM(s) Oral daily  senna 2 Tablet(s) Oral at bedtime  sodium chloride 0.9%. 1000 milliLiter(s) (75 mL/Hr) IV Continuous <Continuous>  vancomycin  IVPB 750 milliGRAM(s) IV Intermittent every 12 hours    MEDICATIONS  (PRN):  aluminum hydroxide/magnesium hydroxide/simethicone Suspension 30 milliLiter(s) Oral every 4 hours PRN Dyspepsia  dextrose 40% Gel 15 Gram(s) Oral once PRN Blood Glucose LESS THAN 70 milliGRAM(s)/deciliter  docusate sodium 100 milliGRAM(s) Oral three times a day PRN Constipation  glucagon  Injectable 1 milliGRAM(s) IntraMuscular once PRN Glucose LESS THAN 70 milligrams/deciliter  morphine  IR 15 milliGRAM(s) Oral every 4 hours PRN Severe Pain (7 - 10)    	    PHYSICAL EXAM:  T(C): 37.3 (01-06-19 @ 08:18), Max: 37.3 (01-06-19 @ 08:18)  HR: 87 (01-06-19 @ 08:18) (86 - 90)  BP: 97/55 (01-06-19 @ 08:18) (97/55 - 123/52)  RR: 18 (01-06-19 @ 08:18) (17 - 18)  SpO2: 96% (01-06-19 @ 08:18) (94% - 96%)  Wt(kg): --  I&O's Summary    05 Jan 2019 07:01  -  06 Jan 2019 07:00  --------------------------------------------------------  IN: 1620 mL / OUT: 2425 mL / NET: -805 mL        Appearance: Normal	  HEENT:   Normal oral mucosa, PERRL, EOMI	  Lymphatic: No lymphadenopathy  Cardiovascular: Normal S1 S2, No JVD, No murmurs, No edema  Respiratory: Lungs clear to auscultation	  Psychiatry: A & O x 3, Mood & affect appropriate  Gastrointestinal:  Soft, dec tenderness +bs tubes in place  Skin: No rashes, No ecchymoses, No cyanosis	  Neurologic: Non-focal  Extremities: Normal range of motion, No clubbing, cyanosis or edema  Vascular: Peripheral pulses palpable 2+ bilaterally    TELEMETRY: 	    ECG:  	  RADIOLOGY:  OTHER: 	  	  LABS:	 	    CARDIAC MARKERS:                                6.7    4.2   )-----------( 128      ( 06 Jan 2019 06:59 )             19.5     01-06    131<L>  |  98  |  13  ----------------------------<  153<H>  3.9   |  25  |  1.26    Ca    8.2<L>      06 Jan 2019 06:59    TPro  5.7<L>  /  Alb  2.2<L>  /  TBili  0.4  /  DBili  x   /  AST  7<L>  /  ALT  <5<L>  /  AlkPhos  46  01-04    proBNP:   Lipid Profile:   HgA1c:   TSH:

## 2019-01-06 NOTE — PROGRESS NOTE ADULT - SUBJECTIVE AND OBJECTIVE BOX
INTERVAL HPI/OVERNIGHT EVENTS:  No new overnight event.  No N/V/D.  Tolerating diet.  large bm not bleeding    Allergies    oxycodone (Other)    Intolerances    General:  No wt loss, fevers, chills, night sweats, fatigue,   Eyes:  Good vision, no reported pain  ENT:  No sore throat, pain, runny nose, dysphagia  CV:  No pain, palpitations, hypo/hypertension  Resp:  No dyspnea, cough, tachypnea, wheezing  GI:  No pain, No nausea, No vomiting, No diarrhea, No constipation, No weight loss, No fever, No pruritis, No rectal bleeding, No tarry stools, No dysphagia,  :  No pain, bleeding, incontinence, nocturia  Muscle:  No pain, weakness  Neuro:  No weakness, tingling, memory problems  Psych:  No fatigue, insomnia, mood problems, depression  Endocrine:  No polyuria, polydipsia, cold/heat intolerance  Heme:  No petechiae, ecchymosis, easy bruisability  Skin:  No rash, tattoos, scars, edema      PHYSICAL EXAM:   Vital Signs:  Vital Signs Last 24 Hrs  T(C): 36.9 (06 Jan 2019 09:55), Max: 37.3 (06 Jan 2019 08:18)  T(F): 98.5 (06 Jan 2019 09:55), Max: 99.1 (06 Jan 2019 08:18)  HR: 91 (06 Jan 2019 09:55) (86 - 91)  BP: 96/65 (06 Jan 2019 09:55) (96/65 - 123/52)  BP(mean): --  RR: 18 (06 Jan 2019 09:55) (17 - 18)  SpO2: 96% (06 Jan 2019 09:55) (94% - 96%)  Daily     Daily I&O's Summary    05 Jan 2019 07:01  -  06 Jan 2019 07:00  --------------------------------------------------------  IN: 1620 mL / OUT: 2425 mL / NET: -805 mL        GENERAL:  Appears stated age, well-groomed, well-nourished, no distress  HEENT:  NC/AT,  conjunctivae clear and pink, no thyromegaly, nodules, adenopathy, no JVD, sclera -anicteric  CHEST:  Full & symmetric excursion, no increased effort, breath sounds clear  HEART:  Regular rhythm, S1, S2, no murmur/rub/S3/S4, no abdominal bruit, no edema  ABDOMEN:  Soft, non-tender, non-distended, normoactive bowel sounds,  no masses ,no hepato-splenomegaly, no signs of chronic liver disease  EXTEREMITIES:  no cyanosis,clubbing or edema  SKIN:  No rash/erythema/ecchymoses/petechiae/wounds/abscess/warm/dry  NEURO:  Alert, oriented, no asterixis, no tremor, no encephalopathy      LABS:                        6.7    4.2   )-----------( 128      ( 06 Jan 2019 06:59 )             19.5     01-06    131<L>  |  98  |  13  ----------------------------<  153<H>  3.9   |  25  |  1.26    Ca    8.2<L>      06 Jan 2019 06:59          amylase   lipase  RADIOLOGY & ADDITIONAL TESTS:

## 2019-01-06 NOTE — PROGRESS NOTE ADULT - ASSESSMENT
pt w/ met urothelial ca   hematuria  b/l nt tubes  abcess  cont iv abs asper id   id f/u     urology f.u     anemia from blood loss  transfuse today   hospice eval in progress  Palliative eval noted  iv fluids  f/u lytes  hold asa  stop lisinopril for dec bp   SACHA drain out, no need to replace per IR  ileus improved w/ +bm  LLQ painimproved    CT A/P/results noted  discussed w/ wife at bedside

## 2019-01-07 ENCOUNTER — APPOINTMENT (OUTPATIENT)
Dept: INFUSION THERAPY | Facility: HOSPITAL | Age: 84
End: 2019-01-07

## 2019-01-07 LAB
ANION GAP SERPL CALC-SCNC: 8 MMOL/L — SIGNIFICANT CHANGE UP (ref 5–17)
BUN SERPL-MCNC: 11 MG/DL — SIGNIFICANT CHANGE UP (ref 7–23)
CALCIUM SERPL-MCNC: 8.2 MG/DL — LOW (ref 8.4–10.5)
CHLORIDE SERPL-SCNC: 101 MMOL/L — SIGNIFICANT CHANGE UP (ref 96–108)
CO2 SERPL-SCNC: 27 MMOL/L — SIGNIFICANT CHANGE UP (ref 22–31)
CREAT SERPL-MCNC: 1.15 MG/DL — SIGNIFICANT CHANGE UP (ref 0.5–1.3)
GLUCOSE SERPL-MCNC: 131 MG/DL — HIGH (ref 70–99)
HCT VFR BLD CALC: 20.8 % — CRITICAL LOW (ref 39–50)
HGB BLD-MCNC: 7 G/DL — CRITICAL LOW (ref 13–17)
MCHC RBC-ENTMCNC: 29.4 PG — SIGNIFICANT CHANGE UP (ref 27–34)
MCHC RBC-ENTMCNC: 33.6 GM/DL — SIGNIFICANT CHANGE UP (ref 32–36)
MCV RBC AUTO: 87.5 FL — SIGNIFICANT CHANGE UP (ref 80–100)
PLATELET # BLD AUTO: 123 K/UL — LOW (ref 150–400)
POTASSIUM SERPL-MCNC: 3.9 MMOL/L — SIGNIFICANT CHANGE UP (ref 3.5–5.3)
POTASSIUM SERPL-SCNC: 3.9 MMOL/L — SIGNIFICANT CHANGE UP (ref 3.5–5.3)
RBC # BLD: 2.38 M/UL — LOW (ref 4.2–5.8)
RBC # FLD: 14.5 % — SIGNIFICANT CHANGE UP (ref 10.3–14.5)
SODIUM SERPL-SCNC: 136 MMOL/L — SIGNIFICANT CHANGE UP (ref 135–145)
WBC # BLD: 2.8 K/UL — LOW (ref 3.8–10.5)
WBC # FLD AUTO: 2.8 K/UL — LOW (ref 3.8–10.5)

## 2019-01-07 RX ADMIN — MORPHINE SULFATE 15 MILLIGRAM(S): 50 CAPSULE, EXTENDED RELEASE ORAL at 23:08

## 2019-01-07 RX ADMIN — NALOXEGOL OXALATE 12.5 MILLIGRAM(S): 12.5 TABLET, FILM COATED ORAL at 11:29

## 2019-01-07 RX ADMIN — Medication 50 MILLIGRAM(S): at 05:48

## 2019-01-07 RX ADMIN — Medication 4: at 17:44

## 2019-01-07 RX ADMIN — SENNA PLUS 2 TABLET(S): 8.6 TABLET ORAL at 22:38

## 2019-01-07 RX ADMIN — Medication 1 TABLET(S): at 22:39

## 2019-01-07 RX ADMIN — MORPHINE SULFATE 15 MILLIGRAM(S): 50 CAPSULE, EXTENDED RELEASE ORAL at 01:30

## 2019-01-07 RX ADMIN — Medication 250 MILLIGRAM(S): at 15:06

## 2019-01-07 RX ADMIN — MORPHINE SULFATE 30 MILLIGRAM(S): 50 CAPSULE, EXTENDED RELEASE ORAL at 06:18

## 2019-01-07 RX ADMIN — Medication 2: at 12:39

## 2019-01-07 RX ADMIN — MORPHINE SULFATE 30 MILLIGRAM(S): 50 CAPSULE, EXTENDED RELEASE ORAL at 05:48

## 2019-01-07 RX ADMIN — MORPHINE SULFATE 15 MILLIGRAM(S): 50 CAPSULE, EXTENDED RELEASE ORAL at 01:16

## 2019-01-07 RX ADMIN — Medication 1 TABLET(S): at 09:03

## 2019-01-07 RX ADMIN — MORPHINE SULFATE 15 MILLIGRAM(S): 50 CAPSULE, EXTENDED RELEASE ORAL at 22:38

## 2019-01-07 RX ADMIN — Medication 100 MILLIGRAM(S): at 22:38

## 2019-01-07 RX ADMIN — Medication 1 APPLICATION(S): at 11:29

## 2019-01-07 RX ADMIN — CEFTRIAXONE 100 GRAM(S): 500 INJECTION, POWDER, FOR SOLUTION INTRAMUSCULAR; INTRAVENOUS at 22:38

## 2019-01-07 RX ADMIN — Medication 250 MILLIGRAM(S): at 22:38

## 2019-01-07 RX ADMIN — MORPHINE SULFATE 30 MILLIGRAM(S): 50 CAPSULE, EXTENDED RELEASE ORAL at 17:43

## 2019-01-07 RX ADMIN — Medication 1 TABLET(S): at 11:29

## 2019-01-07 RX ADMIN — MORPHINE SULFATE 15 MILLIGRAM(S): 50 CAPSULE, EXTENDED RELEASE ORAL at 13:15

## 2019-01-07 RX ADMIN — Medication 1 TABLET(S): at 17:44

## 2019-01-07 RX ADMIN — Medication 2: at 09:03

## 2019-01-07 NOTE — DIETITIAN INITIAL EVALUATION ADULT. - ENERGY NEEDS
HT 69 inches,  pounds.  weight last month 173 pounds,  pounds, BMI 25.1,  pounds.  NFPE deferred as patient receiving blood transfusion.  Dxd with Urothelial cancer with mets to penis, prostate, and possible pulmonary mets, urinary retention s/p suprapubic catheter b/l nephrostomy tubes.   Ileus, resolving as per GI.   Skin with lesions and MAD.  Generalized muscle loss in debilitated, chronically ill man for hospice.   Hx diabetes with A1c 7.3- not a concern at this time.

## 2019-01-07 NOTE — DIETITIAN INITIAL EVALUATION ADULT. - OTHER INFO
Patient seen for routine length of stay assessment.  Patient found resting in bed with family at bedside.  As per RN, patient is for home hospice.  Has been eating very small amounts, negligible.  Dxd with ileus related to narcotic use related to pain issues and was on Full Fluid and now advnanced to soft this A.M.  Patient had a yogurt for breakfast. Hx of large weight loss likely due to decreased po intake and mets cancer.  Down about 40 pounds as per wife.  Patient asked why he needs to eat soft foods and rational provided due to resolving ileus.  Positive large BMs and on bowel regimen.

## 2019-01-07 NOTE — DIETITIAN INITIAL EVALUATION ADULT. - NS AS NUTRI INTERV ED CONTENT
Discussed soft diet, reinforced protein and calorie rich foods and suggested foods best tolerated.  Soft foods as desired, no CHO restriction/Recommended modifications/Purpose of the nutrition education

## 2019-01-07 NOTE — PROGRESS NOTE ADULT - SUBJECTIVE AND OBJECTIVE BOX
Subjective  Now w/hematuria. Reports some leakage around SPT. Some bladder spasms    Objective    Vital signs  T(F): , Max: 98.5 (01-06-19 @ 21:02)  HR: 62 (01-07-19 @ 13:25)  BP: 116/62 (01-07-19 @ 13:25)  SpO2: 95% (01-07-19 @ 13:25)  Wt(kg): --    Output     01-06 @ 07:01  -  01-07 @ 07:00  --------------------------------------------------------  IN: 1545 mL / OUT: 3170 mL / NET: -1625 mL    01-07 @ 07:01  -  01-07 @ 14:19  --------------------------------------------------------  IN: 560 mL / OUT: 700 mL / NET: -140 mL        Gen: NAD  Abd: softly distended, NT, SPT in place, clear red  Back: NTs in place, draining clear yellow urine    Labs      01-07 @ 07:20    WBC 2.8   / Hct 20.8  / SCr 1.15     01-06 @ 14:12    WBC 4.5   / Hct 21.4  / SCr --       Imaging

## 2019-01-07 NOTE — DIETITIAN INITIAL EVALUATION ADULT. - PERTINENT MEDS FT
MEDICATIONS  (STANDING):  BACItracin   Ointment 1 Application(s) Topical daily  cefTRIAXone   IVPB 1 Gram(s) IV Intermittent every 24 hours  dextrose 5%. 1000 milliLiter(s) (50 mL/Hr) IV Continuous <Continuous>  dextrose 50% Injectable 12.5 Gram(s) IV Push once  dextrose 50% Injectable 25 Gram(s) IV Push once  dextrose 50% Injectable 25 Gram(s) IV Push once  insulin lispro (HumaLOG) corrective regimen sliding scale   SubCutaneous three times a day before meals  insulin lispro (HumaLOG) corrective regimen sliding scale   SubCutaneous at bedtime  lactobacillus acidophilus 1 Tablet(s) Oral four times a day with meals  metoprolol succinate ER 50 milliGRAM(s) Oral daily  morphine ER Tablet 30 milliGRAM(s) Oral two times a day  naloxegol 12.5 milliGRAM(s) Oral daily  senna 2 Tablet(s) Oral at bedtime  sodium chloride 0.9%. 1000 milliLiter(s) (75 mL/Hr) IV Continuous <Continuous>  vancomycin  IVPB 750 milliGRAM(s) IV Intermittent every 12 hours    MEDICATIONS  (PRN):  aluminum hydroxide/magnesium hydroxide/simethicone Suspension 30 milliLiter(s) Oral every 4 hours PRN Dyspepsia  dextrose 40% Gel 15 Gram(s) Oral once PRN Blood Glucose LESS THAN 70 milliGRAM(s)/deciliter  docusate sodium 100 milliGRAM(s) Oral three times a day PRN Constipation  glucagon  Injectable 1 milliGRAM(s) IntraMuscular once PRN Glucose LESS THAN 70 milligrams/deciliter  morphine  IR 15 milliGRAM(s) Oral every 4 hours PRN Severe Pain (7 - 10)

## 2019-01-07 NOTE — PROGRESS NOTE ADULT - SUBJECTIVE AND OBJECTIVE BOX
CHIEF COMPLAINT:Patient is a 83y old  Male who presents with a chief complaint of ileus (05 Jan 2019 18:41)  no acute events      PAST MEDICAL & SURGICAL HISTORY:  Benign prostatic hyperplasia with lower urinary tract symptoms, symptom details unspecified  Prostate cancer  Hypertension  HLD (hyperlipidemia)  Type 2 diabetes mellitus  Paroxysmal A-fib  S/P AVR (aortic valve replacement)  S/P TURP          REVIEW OF SYSTEMS:  CONSTITUTIONAL: weak  EYES: No eye pain, visual disturbances, or discharge  NECK: No pain or stiffness  RESPIRATORY: No cough, wheezing, chills or hemoptysis; No Shortness of Breath  CARDIOVASCULAR: No chest pain, palpitations, passing out, dizziness, or leg swelling  GASTROINTESTINAL: abd pain better  GENITOURINARY: tubes in place + hematuria  NEUROLOGICAL: No headaches, memory loss, loss of strength, numbness, or tremors  MUSCULOSKELETAL: No joint pain or swelling; No muscle, back, or extremity pain      Medications:  MEDICATIONS  (STANDING):  BACItracin   Ointment 1 Application(s) Topical daily  cefTRIAXone   IVPB 1 Gram(s) IV Intermittent every 24 hours  dextrose 5%. 1000 milliLiter(s) (50 mL/Hr) IV Continuous <Continuous>  dextrose 50% Injectable 12.5 Gram(s) IV Push once  dextrose 50% Injectable 25 Gram(s) IV Push once  dextrose 50% Injectable 25 Gram(s) IV Push once  insulin lispro (HumaLOG) corrective regimen sliding scale   SubCutaneous three times a day before meals  insulin lispro (HumaLOG) corrective regimen sliding scale   SubCutaneous at bedtime  lactobacillus acidophilus 1 Tablet(s) Oral four times a day with meals  metoprolol succinate ER 50 milliGRAM(s) Oral daily  morphine ER Tablet 30 milliGRAM(s) Oral two times a day  naloxegol 12.5 milliGRAM(s) Oral daily  senna 2 Tablet(s) Oral at bedtime  sodium chloride 0.9%. 1000 milliLiter(s) (75 mL/Hr) IV Continuous <Continuous>  vancomycin  IVPB 750 milliGRAM(s) IV Intermittent every 12 hours    MEDICATIONS  (PRN):  aluminum hydroxide/magnesium hydroxide/simethicone Suspension 30 milliLiter(s) Oral every 4 hours PRN Dyspepsia  dextrose 40% Gel 15 Gram(s) Oral once PRN Blood Glucose LESS THAN 70 milliGRAM(s)/deciliter  docusate sodium 100 milliGRAM(s) Oral three times a day PRN Constipation  glucagon  Injectable 1 milliGRAM(s) IntraMuscular once PRN Glucose LESS THAN 70 milligrams/deciliter  morphine  IR 15 milliGRAM(s) Oral every 4 hours PRN Severe Pain (7 - 10)    	    PHYSICAL EXAM:  T(C): 36.5 (01-07-19 @ 13:25), Max: 36.9 (01-06-19 @ 21:02)  HR: 62 (01-07-19 @ 13:25) (62 - 94)  BP: 116/62 (01-07-19 @ 13:25) (113/60 - 135/65)  RR: 18 (01-07-19 @ 13:25) (17 - 18)  SpO2: 95% (01-07-19 @ 13:25) (94% - 98%)  Wt(kg): --  I&O's Summary    06 Jan 2019 07:01  -  07 Jan 2019 07:00  --------------------------------------------------------  IN: 1545 mL / OUT: 3170 mL / NET: -1625 mL    07 Jan 2019 07:01  -  07 Jan 2019 14:46  --------------------------------------------------------  IN: 810 mL / OUT: 700 mL / NET: 110 mL      Appearance: Normal	  HEENT:   Normal oral mucosa, PERRL, EOMI	  Lymphatic: No lymphadenopathy  Cardiovascular: Normal S1 S2, No JVD, No murmurs, No edema  Respiratory: Lungs clear to auscultation	  Psychiatry: A & O x 3, Mood & affect appropriate  Gastrointestinal:  Soft, dec tenderness +bs tubes in place  Skin: No rashes, No ecchymoses, No cyanosis	  Neurologic: Non-focal  Extremities: Normal range of motion, No clubbing, cyanosis or edema  Vascular: Peripheral pulses palpable 2+ bilaterally    LABS:	 	    CARDIAC MARKERS:                                7.0    2.8   )-----------( 123      ( 07 Jan 2019 07:20 )             20.8     01-07    136  |  101  |  11  ----------------------------<  131<H>  3.9   |  27  |  1.15    Ca    8.2<L>      07 Jan 2019 07:20      proBNP:   Lipid Profile:   HgA1c:   TSH:

## 2019-01-07 NOTE — PROGRESS NOTE ADULT - ASSESSMENT
83y Male with metastatic urothelial ca, s/p bilateral nephrostomy tubes and suprapubic tube here with recurrent hematuria from the SP tube, now resolved  - family still desires hospice care  - NT draining well, majority of UOP from NT, may have some leakage around SPT given bladder spasms, plan to allow bladder to clot off  - trial of belladonna and opium suppositories for bladder spasms  - vanc and CTX per ID, final abx pending hospice recs  - Palliative follow-up  - f/u Oncology  - Continue NTs to bag drainage   - Continue SPT  - f/u w/Dr. Burroughs at [facility] for Urology   - please call if questions

## 2019-01-07 NOTE — PROGRESS NOTE ADULT - SUBJECTIVE AND OBJECTIVE BOX
Interval Events: Pt seen and examined with family at bedside. Pt is tolerating full liquids, he denies abdominal pain, n/v.   + passing gas and having bms. Diet advanced to soft.    MEDICATIONS  (STANDING):  BACItracin   Ointment 1 Application(s) Topical daily  cefTRIAXone   IVPB 1 Gram(s) IV Intermittent every 24 hours  dextrose 5%. 1000 milliLiter(s) (50 mL/Hr) IV Continuous <Continuous>  dextrose 50% Injectable 12.5 Gram(s) IV Push once  dextrose 50% Injectable 25 Gram(s) IV Push once  dextrose 50% Injectable 25 Gram(s) IV Push once  insulin lispro (HumaLOG) corrective regimen sliding scale   SubCutaneous three times a day before meals  insulin lispro (HumaLOG) corrective regimen sliding scale   SubCutaneous at bedtime  lactobacillus acidophilus 1 Tablet(s) Oral four times a day with meals  metoprolol succinate ER 50 milliGRAM(s) Oral daily  morphine ER Tablet 30 milliGRAM(s) Oral two times a day  naloxegol 12.5 milliGRAM(s) Oral daily  senna 2 Tablet(s) Oral at bedtime  sodium chloride 0.9%. 1000 milliLiter(s) (75 mL/Hr) IV Continuous <Continuous>  vancomycin  IVPB 750 milliGRAM(s) IV Intermittent every 12 hours    MEDICATIONS  (PRN):  aluminum hydroxide/magnesium hydroxide/simethicone Suspension 30 milliLiter(s) Oral every 4 hours PRN Dyspepsia  dextrose 40% Gel 15 Gram(s) Oral once PRN Blood Glucose LESS THAN 70 milliGRAM(s)/deciliter  docusate sodium 100 milliGRAM(s) Oral three times a day PRN Constipation  glucagon  Injectable 1 milliGRAM(s) IntraMuscular once PRN Glucose LESS THAN 70 milligrams/deciliter  morphine  IR 15 milliGRAM(s) Oral every 4 hours PRN Severe Pain (7 - 10)      Allergies    oxycodone (Other)    Intolerances        Review of Systems:    General:  No wt loss, fevers, chills, night sweats,fatigue,   Eyes:  Good vision, no reported pain  ENT:  No sore throat, pain, runny nose, dysphagia  CV:  No pain, palpitations, hypo/hypertension  Resp:  No dyspnea, cough, tachypnea, wheezing  GI:  No pain, No nausea, No vomiting, No diarrhea, No constipation, No weight loss, No fever, No pruritis, No rectal bleeding, No melena, No dysphagia  :  No pain, bleeding, incontinence, nocturia  Muscle:  No pain, weakness  Neuro:  No weakness, tingling, memory problems  Psych:  No fatigue, insomnia, mood problems, depression  Endocrine:  No polyuria, polydypsia, cold/heat intolerance  Heme:  No petechiae, ecchymosis, easy bruisability  Skin:  No rash, tattoos, scars, edema      Vital Signs Last 24 Hrs  T(C): 36.8 (07 Jan 2019 08:53), Max: 36.9 (06 Jan 2019 21:02)  T(F): 98.2 (07 Jan 2019 08:53), Max: 98.5 (06 Jan 2019 21:02)  HR: 85 (07 Jan 2019 08:53) (85 - 94)  BP: 121/69 (07 Jan 2019 08:53) (113/60 - 135/65)  BP(mean): --  RR: 18 (07 Jan 2019 08:53) (17 - 18)  SpO2: 96% (07 Jan 2019 08:53) (94% - 96%)    PHYSICAL EXAM:    Constitutional: NAD, well-developed  HEENT: EOMI, throat clear  Neck: No LAD, supple  Respiratory: CTA and P  Cardiovascular: S1 and S2, RRR, no M  Gastrointestinal: BS+, soft, NT/ND, neg HSM,  Extremities: No peripheral edema, neg clubing, cyanosis  Vascular: 2+ peripheral pulses  Neurological: A/O x 3, no focal deficits  Psychiatric: Normal mood, normal affect  Skin: No rashes      LABS:                        7.0    2.8   )-----------( 123      ( 07 Jan 2019 07:20 )             20.8     01-07    136  |  101  |  11  ----------------------------<  131<H>  3.9   |  27  |  1.15    Ca    8.2<L>      07 Jan 2019 07:20            RADIOLOGY & ADDITIONAL TESTS:

## 2019-01-07 NOTE — PROGRESS NOTE ADULT - ASSESSMENT
pt w/ met urothelial ca   hematuria  b/l nt tubes  abcess  cont iv abs asper id   urology f.u noted  anemia from blood loss  s/p PRBC yesterday, but Hb still low, another PRBC today  hospice eval in progress  Palliative eval noted  iv fluids  f/u lytes  hold asa  stop lisinopril for dec bp   SACHA drain out, no need to replace per IR  ileus improved w/ +bm  LLQ painimproved    CT A/P/results noted  discussed w/ wife at bedside  d/c planning w/home hospice tomorrow if Hb betetr

## 2019-01-07 NOTE — PROVIDER CONTACT NOTE (CRITICAL VALUE NOTIFICATION) - SITUATION
Low H&H; pt bleeding form suprapubic cath
pt h&h 7.0 20.8, pt with bloody output via suprapubic cath. pt s/p 1 unit PRBC yesterday

## 2019-01-08 VITALS
OXYGEN SATURATION: 95 % | RESPIRATION RATE: 18 BRPM | DIASTOLIC BLOOD PRESSURE: 68 MMHG | SYSTOLIC BLOOD PRESSURE: 128 MMHG | HEART RATE: 88 BPM | TEMPERATURE: 98 F

## 2019-01-08 LAB
ANION GAP SERPL CALC-SCNC: 11 MMOL/L — SIGNIFICANT CHANGE UP (ref 5–17)
BUN SERPL-MCNC: 11 MG/DL — SIGNIFICANT CHANGE UP (ref 7–23)
CALCIUM SERPL-MCNC: 8.1 MG/DL — LOW (ref 8.4–10.5)
CHLORIDE SERPL-SCNC: 97 MMOL/L — SIGNIFICANT CHANGE UP (ref 96–108)
CO2 SERPL-SCNC: 26 MMOL/L — SIGNIFICANT CHANGE UP (ref 22–31)
CREAT SERPL-MCNC: 1.14 MG/DL — SIGNIFICANT CHANGE UP (ref 0.5–1.3)
GLUCOSE SERPL-MCNC: 169 MG/DL — HIGH (ref 70–99)
HCT VFR BLD CALC: 24.5 % — LOW (ref 39–50)
HGB BLD-MCNC: 8.5 G/DL — LOW (ref 13–17)
MCHC RBC-ENTMCNC: 29.9 PG — SIGNIFICANT CHANGE UP (ref 27–34)
MCHC RBC-ENTMCNC: 34.6 GM/DL — SIGNIFICANT CHANGE UP (ref 32–36)
MCV RBC AUTO: 86.2 FL — SIGNIFICANT CHANGE UP (ref 80–100)
PLATELET # BLD AUTO: 141 K/UL — LOW (ref 150–400)
POTASSIUM SERPL-MCNC: 4 MMOL/L — SIGNIFICANT CHANGE UP (ref 3.5–5.3)
POTASSIUM SERPL-SCNC: 4 MMOL/L — SIGNIFICANT CHANGE UP (ref 3.5–5.3)
RBC # BLD: 2.84 M/UL — LOW (ref 4.2–5.8)
RBC # FLD: 15 % — HIGH (ref 10.3–14.5)
SODIUM SERPL-SCNC: 134 MMOL/L — LOW (ref 135–145)
VANCOMYCIN TROUGH SERPL-MCNC: 16.5 UG/ML — SIGNIFICANT CHANGE UP (ref 10–20)
WBC # BLD: 3.5 K/UL — LOW (ref 3.8–10.5)
WBC # FLD AUTO: 3.5 K/UL — LOW (ref 3.8–10.5)

## 2019-01-08 PROCEDURE — 96360 HYDRATION IV INFUSION INIT: CPT

## 2019-01-08 PROCEDURE — 86880 COOMBS TEST DIRECT: CPT

## 2019-01-08 PROCEDURE — 82803 BLOOD GASES ANY COMBINATION: CPT

## 2019-01-08 PROCEDURE — 74018 RADEX ABDOMEN 1 VIEW: CPT

## 2019-01-08 PROCEDURE — 84132 ASSAY OF SERUM POTASSIUM: CPT

## 2019-01-08 PROCEDURE — 72192 CT PELVIS W/O DYE: CPT

## 2019-01-08 PROCEDURE — 80048 BASIC METABOLIC PNL TOTAL CA: CPT

## 2019-01-08 PROCEDURE — 83605 ASSAY OF LACTIC ACID: CPT

## 2019-01-08 PROCEDURE — 80053 COMPREHEN METABOLIC PANEL: CPT

## 2019-01-08 PROCEDURE — P9016: CPT

## 2019-01-08 PROCEDURE — 86850 RBC ANTIBODY SCREEN: CPT

## 2019-01-08 PROCEDURE — P9040: CPT

## 2019-01-08 PROCEDURE — 82947 ASSAY GLUCOSE BLOOD QUANT: CPT

## 2019-01-08 PROCEDURE — 36430 TRANSFUSION BLD/BLD COMPNT: CPT

## 2019-01-08 PROCEDURE — 71045 X-RAY EXAM CHEST 1 VIEW: CPT

## 2019-01-08 PROCEDURE — 81001 URINALYSIS AUTO W/SCOPE: CPT

## 2019-01-08 PROCEDURE — 49424 ASSESS CYST CONTRAST INJECT: CPT

## 2019-01-08 PROCEDURE — 86922 COMPATIBILITY TEST ANTIGLOB: CPT

## 2019-01-08 PROCEDURE — 85027 COMPLETE CBC AUTOMATED: CPT

## 2019-01-08 PROCEDURE — 87086 URINE CULTURE/COLONY COUNT: CPT

## 2019-01-08 PROCEDURE — 85610 PROTHROMBIN TIME: CPT

## 2019-01-08 PROCEDURE — 74177 CT ABD & PELVIS W/CONTRAST: CPT

## 2019-01-08 PROCEDURE — 84295 ASSAY OF SERUM SODIUM: CPT

## 2019-01-08 PROCEDURE — 82330 ASSAY OF CALCIUM: CPT

## 2019-01-08 PROCEDURE — 85730 THROMBOPLASTIN TIME PARTIAL: CPT

## 2019-01-08 PROCEDURE — 86901 BLOOD TYPING SEROLOGIC RH(D): CPT

## 2019-01-08 PROCEDURE — 82962 GLUCOSE BLOOD TEST: CPT

## 2019-01-08 PROCEDURE — 86870 RBC ANTIBODY IDENTIFICATION: CPT

## 2019-01-08 PROCEDURE — 99497 ADVNCD CARE PLAN 30 MIN: CPT

## 2019-01-08 PROCEDURE — 97162 PT EVAL MOD COMPLEX 30 MIN: CPT

## 2019-01-08 PROCEDURE — 86900 BLOOD TYPING SEROLOGIC ABO: CPT

## 2019-01-08 PROCEDURE — 99285 EMERGENCY DEPT VISIT HI MDM: CPT | Mod: 25

## 2019-01-08 PROCEDURE — 76080 X-RAY EXAM OF FISTULA: CPT

## 2019-01-08 PROCEDURE — 80202 ASSAY OF VANCOMYCIN: CPT

## 2019-01-08 PROCEDURE — 82435 ASSAY OF BLOOD CHLORIDE: CPT

## 2019-01-08 PROCEDURE — 86902 BLOOD TYPE ANTIGEN DONOR EA: CPT

## 2019-01-08 PROCEDURE — 85014 HEMATOCRIT: CPT

## 2019-01-08 PROCEDURE — 99233 SBSQ HOSP IP/OBS HIGH 50: CPT

## 2019-01-08 RX ORDER — MORPHINE SULFATE 50 MG/1
1 CAPSULE, EXTENDED RELEASE ORAL
Qty: 10 | Refills: 0 | OUTPATIENT
Start: 2019-01-08 | End: 2019-01-12

## 2019-01-08 RX ORDER — ACETAMINOPHEN 500 MG
1000 TABLET ORAL ONCE
Qty: 0 | Refills: 0 | Status: COMPLETED | OUTPATIENT
Start: 2019-01-08 | End: 2019-01-08

## 2019-01-08 RX ORDER — BACITRACIN ZINC 500 UNIT/G
1 OINTMENT IN PACKET (EA) TOPICAL
Qty: 1 | Refills: 0 | OUTPATIENT
Start: 2019-01-08 | End: 2019-01-14

## 2019-01-08 RX ORDER — MORPHINE SULFATE 50 MG/1
1 CAPSULE, EXTENDED RELEASE ORAL
Qty: 30 | Refills: 0 | OUTPATIENT
Start: 2019-01-08 | End: 2019-01-12

## 2019-01-08 RX ORDER — DOCUSATE SODIUM 100 MG
1 CAPSULE ORAL
Qty: 90 | Refills: 0 | OUTPATIENT
Start: 2019-01-08 | End: 2019-02-06

## 2019-01-08 RX ORDER — SENNA PLUS 8.6 MG/1
2 TABLET ORAL
Qty: 60 | Refills: 0 | OUTPATIENT
Start: 2019-01-08 | End: 2019-02-06

## 2019-01-08 RX ORDER — MORPHINE SULFATE 50 MG/1
1 CAPSULE, EXTENDED RELEASE ORAL
Qty: 0 | Refills: 0 | COMMUNITY

## 2019-01-08 RX ORDER — POLYETHYLENE GLYCOL 3350 17 G/17G
17 POWDER, FOR SOLUTION ORAL
Qty: 510 | Refills: 0 | OUTPATIENT
Start: 2019-01-08 | End: 2019-02-06

## 2019-01-08 RX ORDER — LACTOBACILLUS ACIDOPHILUS 100MM CELL
1 CAPSULE ORAL
Qty: 120 | Refills: 0 | OUTPATIENT
Start: 2019-01-08 | End: 2019-02-06

## 2019-01-08 RX ADMIN — Medication 250 MILLIGRAM(S): at 12:18

## 2019-01-08 RX ADMIN — NALOXEGOL OXALATE 12.5 MILLIGRAM(S): 12.5 TABLET, FILM COATED ORAL at 12:20

## 2019-01-08 RX ADMIN — Medication 1 TABLET(S): at 09:39

## 2019-01-08 RX ADMIN — Medication 1 TABLET(S): at 13:55

## 2019-01-08 RX ADMIN — Medication 400 MILLIGRAM(S): at 10:16

## 2019-01-08 RX ADMIN — MORPHINE SULFATE 15 MILLIGRAM(S): 50 CAPSULE, EXTENDED RELEASE ORAL at 13:30

## 2019-01-08 RX ADMIN — Medication 1: at 12:43

## 2019-01-08 RX ADMIN — Medication 1000 MILLIGRAM(S): at 10:40

## 2019-01-08 RX ADMIN — MORPHINE SULFATE 15 MILLIGRAM(S): 50 CAPSULE, EXTENDED RELEASE ORAL at 08:29

## 2019-01-08 RX ADMIN — MORPHINE SULFATE 15 MILLIGRAM(S): 50 CAPSULE, EXTENDED RELEASE ORAL at 08:01

## 2019-01-08 RX ADMIN — MORPHINE SULFATE 15 MILLIGRAM(S): 50 CAPSULE, EXTENDED RELEASE ORAL at 12:27

## 2019-01-08 RX ADMIN — MORPHINE SULFATE 30 MILLIGRAM(S): 50 CAPSULE, EXTENDED RELEASE ORAL at 07:00

## 2019-01-08 RX ADMIN — Medication 50 MILLIGRAM(S): at 07:00

## 2019-01-08 RX ADMIN — Medication 2: at 09:39

## 2019-01-08 RX ADMIN — Medication 1 APPLICATION(S): at 12:20

## 2019-01-08 NOTE — PROGRESS NOTE ADULT - PROBLEM SELECTOR PROBLEM 2
Prostate cancer

## 2019-01-08 NOTE — PROGRESS NOTE ADULT - PROBLEM SELECTOR PLAN 5
- discussed MOLST with wife and with patient  - MOLST completed, original given to family  - DNR/DNI/no feeding tube, + IVF trial, + abx trial - discussed MOLST with wife and with patient  - MOLST completed, original given to family  - DNR/DNI/no feeding tube, + IVF trial, + abx trial  - signing off, please reconsult PRN

## 2019-01-08 NOTE — PROGRESS NOTE ADULT - PROBLEM SELECTOR PLAN 4
- d/w wife re: FRANK in detail, she plans to complete before discharge  - d/w hospice, will be going home with hospice
- d/w family and hospice, planning for hospice at home with continued abx, awaiting final hospice approval  - plan to complete MOLST before discharge
- d/w wife re: FRANK in detail, she plans to complete before discharge  - d/w hospice, will be going home with hospice once acute issues resolve
- no significant change on 12/28 imaging  - c/w abx to 1/25/19
- no significant change on 12/28 imaging  - c/w abx to 1/25/19

## 2019-01-08 NOTE — PROGRESS NOTE ADULT - PROBLEM SELECTOR PROBLEM 4
Encounter for palliative care
Osteomyelitis hip
Osteomyelitis hip

## 2019-01-08 NOTE — PROGRESS NOTE ADULT - REASON FOR ADMISSION
Hematuria
ileus
hematuria

## 2019-01-08 NOTE — PROGRESS NOTE ADULT - PROBLEM SELECTOR PLAN 1
- continue to monitor
- continue to monitor
- continue to monitor at home
- continue to monitor, had possible leakage around site from bladder spasms per 
- continue to monitor, had possible leakage around site from bladder spasms per 
- likely 2/2 narcotic use   - abdominal X-ray reviewed with dilated loops of bowel  - ileus is resolving as pt is having large volume bms and distension is improved  - recommend NPO with IVF  - CT with PO and IV contrast ordered by primary team due to LLQ abdominal pain, await official results   - if pt should start vomiting, is unable to pass gas and stool would place NGT and order CT a/p with PO contrast.  - continue bowel regimen with senna and colace
- likely 2/2 narcotic use   - abdominal X-ray reviewed with dilated loops of bowel  - ileus is resolving as pt is having large volume bms and distension is improved  - recommend NPO with IVF  - CT with PO and IV contrast ordered by primary team due to LLQ abdominal pain, await official results   - if pt should start vomiting, is unable to pass gas and stool would place NGT and order CT a/p with PO contrast.  - continue bowel regimen with senna and colace add lactulose
- likely 2/2 narcotic use   - abdominal X-ray reviewed with dilated loops of bowel  - ileus is resolving as pt is having large volume bms and distension is improved  - recommend NPO with IVF  - CT with PO and IV contrast ordered by primary team due to LLQ abdominal pain, await official results   - if pt should start vomiting, is unable to pass gas and stool would place NGT and order CT a/p with PO contrast.  - continue bowel regimen with senna and colace add lactulose
- likely 2/2 narcotic use, now improved   - diet advanced to soft   - abdominal X-ray reviewed with dilated loops of bowel  - ileus is resolving as pt is having large volume bms and distension is improved  - CT with PO and IV contrast reviewed   - continue bowel regimen with senna and colace add lactulose
- likely 2/2 narcotic use, now improved   - diet advanced to soft   - abdominal X-ray reviewed with dilated loops of bowel  - ileus resolved   - CT with PO and IV contrast reviewed   - continue bowel regimen with senna and colace, lactulose

## 2019-01-08 NOTE — PROGRESS NOTE ADULT - SUBJECTIVE AND OBJECTIVE BOX
Interval Event: Pt seen and examined with family at bedside. He denies abdominal pain, n/v. Tolerating soft diet well. No bm this morning yet.     MEDICATIONS  (STANDING):  acetaminophen  IVPB .. 1000 milliGRAM(s) IV Intermittent once  BACItracin   Ointment 1 Application(s) Topical daily  cefTRIAXone   IVPB 1 Gram(s) IV Intermittent every 24 hours  dextrose 5%. 1000 milliLiter(s) (50 mL/Hr) IV Continuous <Continuous>  dextrose 50% Injectable 12.5 Gram(s) IV Push once  dextrose 50% Injectable 25 Gram(s) IV Push once  dextrose 50% Injectable 25 Gram(s) IV Push once  insulin lispro (HumaLOG) corrective regimen sliding scale   SubCutaneous three times a day before meals  insulin lispro (HumaLOG) corrective regimen sliding scale   SubCutaneous at bedtime  lactobacillus acidophilus 1 Tablet(s) Oral four times a day with meals  metoprolol succinate ER 50 milliGRAM(s) Oral daily  morphine ER Tablet 30 milliGRAM(s) Oral two times a day  naloxegol 12.5 milliGRAM(s) Oral daily  senna 2 Tablet(s) Oral at bedtime  sodium chloride 0.9%. 1000 milliLiter(s) (75 mL/Hr) IV Continuous <Continuous>  vancomycin  IVPB 750 milliGRAM(s) IV Intermittent every 12 hours    MEDICATIONS  (PRN):  aluminum hydroxide/magnesium hydroxide/simethicone Suspension 30 milliLiter(s) Oral every 4 hours PRN Dyspepsia  dextrose 40% Gel 15 Gram(s) Oral once PRN Blood Glucose LESS THAN 70 milliGRAM(s)/deciliter  docusate sodium 100 milliGRAM(s) Oral three times a day PRN Constipation  glucagon  Injectable 1 milliGRAM(s) IntraMuscular once PRN Glucose LESS THAN 70 milligrams/deciliter  morphine  IR 15 milliGRAM(s) Oral every 4 hours PRN Severe Pain (7 - 10)      Allergies    oxycodone (Other)    Intolerances        Review of Systems:    General:  No wt loss, fevers, chills, night sweats,fatigue,   Eyes:  Good vision, no reported pain  ENT:  No sore throat, pain, runny nose, dysphagia  CV:  No pain, palpitations, hypo/hypertension  Resp:  No dyspnea, cough, tachypnea, wheezing  GI:  No pain, No nausea, No vomiting, No diarrhea, No constipation, No weight loss, No fever, No pruritis, No rectal bleeding, No melena, No dysphagia  :  No pain, bleeding, incontinence, nocturia  Muscle:  No pain, weakness  Neuro:  No weakness, tingling, memory problems  Psych:  No fatigue, insomnia, mood problems, depression  Endocrine:  No polyuria, polydypsia, cold/heat intolerance  Heme:  No petechiae, ecchymosis, easy bruisability  Skin:  No rash, tattoos, scars, edema      Vital Signs Last 24 Hrs  T(C): 36.7 (08 Jan 2019 05:06), Max: 37 (07 Jan 2019 19:30)  T(F): 98.1 (08 Jan 2019 05:06), Max: 98.6 (07 Jan 2019 19:30)  HR: 88 (08 Jan 2019 05:06) (62 - 90)  BP: 127/59 (08 Jan 2019 05:06) (116/62 - 136/72)  BP(mean): --  RR: 20 (08 Jan 2019 05:06) (17 - 20)  SpO2: 95% (08 Jan 2019 05:06) (95% - 98%)    PHYSICAL EXAM:    Constitutional: NAD, well-developed  HEENT: EOMI, throat clear  Neck: No LAD, supple  Respiratory: CTA and P  Cardiovascular: S1 and S2, RRR, no M  Gastrointestinal: BS+, soft, NT/ND, neg HSM,  Extremities: No peripheral edema, neg clubing, cyanosis  Vascular: 2+ peripheral pulses  Neurological: A/O x 3, no focal deficits  Psychiatric: Normal mood, normal affect  Skin: No rashes      LABS:                        8.5    3.5   )-----------( 141      ( 08 Jan 2019 08:02 )             24.5     01-08    134<L>  |  97  |  11  ----------------------------<  169<H>  4.0   |  26  |  1.14    Ca    8.1<L>      08 Jan 2019 08:02            RADIOLOGY & ADDITIONAL TESTS:

## 2019-01-08 NOTE — PROGRESS NOTE ADULT - PROBLEM SELECTOR PLAN 3
- no significant change on 12/28 imaging  - c/w abx to 1/25/19
- improved  - monitor BMs
- largely unremarkable abdominal x-ray 1/3  - awaiting CT  - + BMs  - pain management as above
- no significant change on 12/28 imaging  - c/w abx to 1/25/19
- no significant change on 12/28 imaging  - c/w abx to 1/25/19
Advanced care planning was discussed with patient and family.  Advanced care planning forms were reviewed and discussed.  Risks, benefits and alternatives of gastroenterologic procedures were discussed in detail and all questions were answered.    30 minutes spent.

## 2019-01-08 NOTE — PROGRESS NOTE ADULT - PROBLEM SELECTOR PROBLEM 3
Osteomyelitis hip
ACP (advance care planning)
Left lower quadrant pain
Left lower quadrant pain
Osteomyelitis hip
Osteomyelitis hip

## 2019-01-08 NOTE — PROGRESS NOTE ADULT - ASSESSMENT
pt w/ met urothelial ca   hematuria  b/l nt tubes  abcess  cont iv abs asper id   urology f.u noted  anemia from blood loss  s/p PRBC, Hb now improved  Palliative eval noted  iv fluids  f/u lytes  hold asa  stop lisinopril for dec bp   SACHA drain out, no need to replace per IR  ileus improved w/ +bm  LLQ painimproved    CT A/P/results noted  discussed w/ wife at bedside  d/c planning w/home hospice

## 2019-01-08 NOTE — PROGRESS NOTE ADULT - PROBLEM SELECTOR PROBLEM 1
Gross hematuria
Ileus

## 2019-01-08 NOTE — PROGRESS NOTE ADULT - PROBLEM SELECTOR PLAN 2
- no further DDT  - c/w morphine ER and IR, uptitrated ER previously to 30mg BID   - used 3 doses/24 hours of IR

## 2019-01-08 NOTE — PROGRESS NOTE ADULT - SUBJECTIVE AND OBJECTIVE BOX
CHIEF COMPLAINT:Patient is a 83y old  Male who presents with a chief complaint of ileus (05 Jan 2019 18:41)  no acute events      PAST MEDICAL & SURGICAL HISTORY:  Benign prostatic hyperplasia with lower urinary tract symptoms, symptom details unspecified  Prostate cancer  Hypertension  HLD (hyperlipidemia)  Type 2 diabetes mellitus  Paroxysmal A-fib  S/P AVR (aortic valve replacement)  S/P TURP          REVIEW OF SYSTEMS:  CONSTITUTIONAL: weak  EYES: No eye pain, visual disturbances, or discharge  NECK: No pain or stiffness  RESPIRATORY: No cough, wheezing, chills or hemoptysis; No Shortness of Breath  CARDIOVASCULAR: No chest pain, palpitations, passing out, dizziness, or leg swelling  GASTROINTESTINAL: abd pain better  GENITOURINARY: tubes in place + hematuria  NEUROLOGICAL: No headaches, memory loss, loss of strength, numbness, or tremors  MUSCULOSKELETAL: No joint pain or swelling; No muscle, back, or extremity pain      Medications:  MEDICATIONS  (STANDING):  BACItracin   Ointment 1 Application(s) Topical daily  cefTRIAXone   IVPB 1 Gram(s) IV Intermittent every 24 hours  dextrose 5%. 1000 milliLiter(s) (50 mL/Hr) IV Continuous <Continuous>  dextrose 50% Injectable 12.5 Gram(s) IV Push once  dextrose 50% Injectable 25 Gram(s) IV Push once  dextrose 50% Injectable 25 Gram(s) IV Push once  insulin lispro (HumaLOG) corrective regimen sliding scale   SubCutaneous three times a day before meals  insulin lispro (HumaLOG) corrective regimen sliding scale   SubCutaneous at bedtime  lactobacillus acidophilus 1 Tablet(s) Oral four times a day with meals  metoprolol succinate ER 50 milliGRAM(s) Oral daily  morphine ER Tablet 30 milliGRAM(s) Oral two times a day  naloxegol 12.5 milliGRAM(s) Oral daily  senna 2 Tablet(s) Oral at bedtime  sodium chloride 0.9%. 1000 milliLiter(s) (75 mL/Hr) IV Continuous <Continuous>  vancomycin  IVPB 750 milliGRAM(s) IV Intermittent every 12 hours    MEDICATIONS  (PRN):  aluminum hydroxide/magnesium hydroxide/simethicone Suspension 30 milliLiter(s) Oral every 4 hours PRN Dyspepsia  dextrose 40% Gel 15 Gram(s) Oral once PRN Blood Glucose LESS THAN 70 milliGRAM(s)/deciliter  docusate sodium 100 milliGRAM(s) Oral three times a day PRN Constipation  glucagon  Injectable 1 milliGRAM(s) IntraMuscular once PRN Glucose LESS THAN 70 milligrams/deciliter  morphine  IR 15 milliGRAM(s) Oral every 4 hours PRN Severe Pain (7 - 10)    	    PHYSICAL EXAM:  T(C): 36.5 (01-08-19 @ 13:23), Max: 37 (01-07-19 @ 19:30)  HR: 78 (01-08-19 @ 13:23) (78 - 90)  BP: 110/0 (01-08-19 @ 13:23) (110/0 - 136/72)  RR: 18 (01-08-19 @ 13:23) (17 - 20)  SpO2: 100% (01-08-19 @ 13:23) (95% - 100%)  Wt(kg): --  I&O's Summary    07 Jan 2019 07:01  -  08 Jan 2019 07:00  --------------------------------------------------------  IN: 1320 mL / OUT: 2750 mL / NET: -1430 mL    08 Jan 2019 07:01  -  08 Jan 2019 14:45  --------------------------------------------------------  IN: 280 mL / OUT: 1075 mL / NET: -795 mL      Appearance: Normal	  HEENT:   Normal oral mucosa, PERRL, EOMI	  Lymphatic: No lymphadenopathy  Cardiovascular: Normal S1 S2, No JVD, No murmurs, No edema  Respiratory: Lungs clear to auscultation	  Psychiatry: A & O x 3, Mood & affect appropriate  Gastrointestinal:  Soft, dec tenderness +bs tubes in place  Skin: No rashes, No ecchymoses, No cyanosis	  Neurologic: Non-focal  Extremities: Normal range of motion, No clubbing, cyanosis or edema  Vascular: Peripheral pulses palpable 2+ bilaterally    LABS:	 	    CARDIAC MARKERS:                                8.5    3.5   )-----------( 141      ( 08 Jan 2019 08:02 )             24.5     01-08    134<L>  |  97  |  11  ----------------------------<  169<H>  4.0   |  26  |  1.14    Ca    8.1<L>      08 Jan 2019 08:02      proBNP:   Lipid Profile:   HgA1c:   TSH:

## 2019-01-08 NOTE — PROGRESS NOTE ADULT - PROVIDER SPECIALTY LIST ADULT
Gastroenterology
Infectious Disease
Internal Medicine
Intervent Radiology
Palliative Care
Urology
Infectious Disease
Palliative Care

## 2019-01-08 NOTE — PROGRESS NOTE ADULT - SUBJECTIVE AND OBJECTIVE BOX
CC: goals of care in the setting of end-stage malignancy    INTERVAL EVENTS: used 3 doses of morphine IR in 24 hours, currently comfortable    ADVANCE DIRECTIVES:    DNR no   MOLST  [ ]  Living Will  [ ]   DECISION MAKER(s): patient is alert and oriented and able to make decisions, if not able in absence of HCP form, decision maker would be his surrogate, his wife Arlen  [ ] Health Care Proxy(s)  [ ] Surrogate(s)  [ ] Guardian           Name(s): Arlen Nation Phone Number(s): [phone]    BASELINE (I)ADL(s) (prior to admission):  [place]: [ ]Total  [x] Moderate [ ]Dependent    Allergies    oxycodone (Other)    Intolerances    MEDICATIONS  (STANDING):  BACItracin   Ointment 1 Application(s) Topical daily  cefTRIAXone   IVPB 1 Gram(s) IV Intermittent every 24 hours  dextrose 5%. 1000 milliLiter(s) (50 mL/Hr) IV Continuous <Continuous>  dextrose 50% Injectable 12.5 Gram(s) IV Push once  dextrose 50% Injectable 25 Gram(s) IV Push once  dextrose 50% Injectable 25 Gram(s) IV Push once  insulin lispro (HumaLOG) corrective regimen sliding scale   SubCutaneous three times a day before meals  insulin lispro (HumaLOG) corrective regimen sliding scale   SubCutaneous at bedtime  lactobacillus acidophilus 1 Tablet(s) Oral four times a day with meals  metoprolol succinate ER 50 milliGRAM(s) Oral daily  morphine ER Tablet 30 milliGRAM(s) Oral two times a day  naloxegol 12.5 milliGRAM(s) Oral daily  senna 2 Tablet(s) Oral at bedtime  sodium chloride 0.9%. 1000 milliLiter(s) (75 mL/Hr) IV Continuous <Continuous>  vancomycin  IVPB 750 milliGRAM(s) IV Intermittent every 12 hours      MEDICATIONS  (PRN):  aluminum hydroxide/magnesium hydroxide/simethicone Suspension 30 milliLiter(s) Oral every 4 hours PRN Dyspepsia  dextrose 40% Gel 15 Gram(s) Oral once PRN Blood Glucose LESS THAN 70 milliGRAM(s)/deciliter  docusate sodium 100 milliGRAM(s) Oral three times a day PRN Constipation  glucagon  Injectable 1 milliGRAM(s) IntraMuscular once PRN Glucose LESS THAN 70 milligrams/deciliter  morphine  IR 15 milliGRAM(s) Oral every 4 hours PRN Severe Pain (7 - 10)    PRESENT SYMPTOMS: [X ]Unable to obtain due to poor mentation   Source if other than patient:  [ ]Family   [ ]Team     Pain (Impact on QOL): yes  Location - suprapubic area        Minimal acceptable level (0-10 scale):  3-4/10           Aggravating factors - movement  Quality - sharp pain  Radiation - penis, lower back  Severity (0-10 scale) -  8/10  Timing - intermittent     PAIN AD Score:     [email or web address] (press ctrl +  left click to view)    Dyspnea:                           [ ]Mild [ ]Moderate [ ]Severe  Anxiety:                             [ ]Mild [ ]Moderate [ ]Severe  Fatigue:                             [ ]Mild [ ]Moderate [ ]Severe  Nausea:                             [ ]Mild [ ]Moderate [ ]Severe  Loss of appetite:              [ ]Mild [ ]Moderate [ ]Severe  Constipation:                    [ ]Mild [ ]Moderate [x]Severe    Other Symptoms:  [ ]All other review of systems negative     Karnofsky Performance Score/Palliative Performance Status Version 2: 40%    http://palliative.info/resource_material/[email or web address]    PHYSICAL EXAM:  Vital Signs Last 24 Hrs  T(C): 36.8 (04 Jan 2019 10:10), Max: 37.2 (03 Jan 2019 17:05)  T(F): 98.2 (04 Jan 2019 10:10), Max: 99 (03 Jan 2019 17:05)  HR: 84 (04 Jan 2019 10:10) (73 - 91)  BP: 116/53 (04 Jan 2019 10:10) (116/53 - 133/73)  BP(mean): --  RR: 18 (04 Jan 2019 10:10) (16 - 18)  SpO2: 95% (04 Jan 2019 10:10) (93% - 97%)      GENERAL:  [x]Alert  []Oriented x 3  [ ]Lethargic  [ ]Cachexia  [ ]Unarousable  [ ]Verbal  [ ]Non-Verbal  Behavioral:   [ ] Anxiety  [ ] Delirium [ ] Agitation [ ] Other  HEENT:  [x]Normal   [ ]Dry mouth   [ ]ET Tube/Trach  [ ]Oral lesions  PULMONARY:   [X]Clear [ ]Tachypnea  [ ]Audible excessive secretions   [ ]Rhonchi        [ ]Right [ ]Left [ ]Bilateral  [ ]Crackles        [ ]Right [ ]Left [ ]Bilateral  [ ]Wheezing     [ ]Right [ ]Left [ ]Bilateral  CARDIOVASCULAR:    [X]Regular [ ]Irregular [ ]Tachy  [ ]Lico [ ]Murmur [ ]Other  GASTROINTESTINAL:  [X]Soft  [ ]Distended   [X]+BS  [X ]Non tender []Tender / suprapubic area [ ]PEG [ ]OGT/ NGT  Last BM:   GENITOURINARY:  [ ]Normal [ ] Incontinent   [ ]Oliguria/Anuria   [x] Suprapubic catheter with hematuria, bilateral nephrostomy tubes   MUSCULOSKELETAL:   [ ]Normal   []Weakness  [ ]Bed/Wheelchair bound [ ]Edema  NEUROLOGIC:   [x]No focal deficits  [ ] Cognitive impairment  [ ] Dysphagia [ ]Dysarthria [ ] Paresis [ ]Other   SKIN:   [x]Normal   [ ]Pressure ulcer(s)  [ ]Rash    CRITICAL CARE:  [ ] Shock Present  [ ]Septic [ ]Cardiogenic [ ]Neurologic [ ]Hypovolemic  [ ]  Vasopressors [ ]  Inotropes   [ ] Respiratory failure present  [ ] Acute  [ ] Chronic [ ] Hypoxic  [ ] Hypercarbic [ ] Other  [ ] Other organ failure     LABS:                          8.5    3.5   )-----------( 141      ( 08 Jan 2019 08:02 )             24.5     01-08    134<L>  |  97  |  11  ----------------------------<  169<H>  4.0   |  26  |  1.14    Ca    8.1<L>      08 Jan 2019 08:02      RADIOLOGY & ADDITIONAL STUDIES: reviewed     PROTEIN CALORIE MALNUTRITION PRESENT: [ ] Yes [ ] No  [ ] PPSV2 < or = to 30% [ ] significant weight loss  [ ] poor nutritional intake [ ] catabolic state [ ] anasarca     Albumin, Serum: 2.8 g/dL (12-26-18 @ 13:45)  Artificial Nutrition [ ]     REFERRALS:   [ ]Chaplaincy  [ ] Hospice  [ ]Child Life  [ ]Social Work  [ ]Case management [ ]Holistic Therapy   Goals of Care Discussion Document:

## 2019-01-08 NOTE — PROGRESS NOTE ADULT - ASSESSMENT
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 He is no longer on chemotherapy and recently the family decided to have home hospice, However the hospice was rejected due to the PICC line and IV antibiotics which he will require for about another 45 days. During ED w/u, pt presented afebrile, SBPs in 90s (baseline 110s-130s prev admsn), HR 70-80s, hgb 6.9 (baseline 7s-8s), WBC wnl, platelets in 100s, lactate 2.3.     Being discharged home with [facility] today

## 2019-01-14 ENCOUNTER — APPOINTMENT (OUTPATIENT)
Dept: HEMATOLOGY ONCOLOGY | Facility: CLINIC | Age: 84
End: 2019-01-14

## 2019-01-17 ENCOUNTER — APPOINTMENT (OUTPATIENT)
Dept: INFECTIOUS DISEASE | Facility: CLINIC | Age: 84
End: 2019-01-17

## 2019-01-22 ENCOUNTER — APPOINTMENT (OUTPATIENT)
Dept: INFUSION THERAPY | Facility: HOSPITAL | Age: 84
End: 2019-01-22

## 2019-01-26 LAB
CULTURE RESULTS: SIGNIFICANT CHANGE UP
SPECIMEN SOURCE: SIGNIFICANT CHANGE UP

## 2019-01-28 ENCOUNTER — OUTPATIENT (OUTPATIENT)
Dept: OUTPATIENT SERVICES | Facility: HOSPITAL | Age: 84
LOS: 1 days | Discharge: HOME | End: 2019-01-28

## 2019-01-28 ENCOUNTER — APPOINTMENT (OUTPATIENT)
Dept: INFUSION THERAPY | Facility: HOSPITAL | Age: 84
End: 2019-01-28

## 2019-01-28 DIAGNOSIS — Z90.79 ACQUIRED ABSENCE OF OTHER GENITAL ORGAN(S): Chronic | ICD-10-CM

## 2019-01-28 DIAGNOSIS — C61 MALIGNANT NEOPLASM OF PROSTATE: ICD-10-CM

## 2019-01-28 DIAGNOSIS — Z95.2 PRESENCE OF PROSTHETIC HEART VALVE: Chronic | ICD-10-CM

## 2019-02-04 ENCOUNTER — APPOINTMENT (OUTPATIENT)
Dept: HEMATOLOGY ONCOLOGY | Facility: CLINIC | Age: 84
End: 2019-02-04

## 2019-03-29 NOTE — ED PROVIDER NOTE - TIMING
gradual onset Will check labs, CXR, EKG, and reassess. Patient was placed on a nasal cannula oxygen 3 L due mild hypoxia.

## 2019-07-23 NOTE — ED ADULT TRIAGE NOTE - STATUS:
Mohsen for pt. Dr. Andrade wants to see pt for a follow up after Neurosurgery appt on 08/12.  
Applied

## 2019-07-31 NOTE — CHART NOTE - NSCHARTNOTEFT_GEN_A_CORE
Upon Nutritional Assessment by the Registered Dietitian your patient was determined to meet criteria / has evidence of the following diagnosis/diagnoses:          [ ]  Mild Protein Calorie Malnutrition        [ ]  Moderate Protein Calorie Malnutrition        [x ] Severe Protein Calorie Malnutrition        [ ] Unspecified Protein Calorie Malnutrition        [ ] Underweight / BMI <19        [ ] Morbid Obesity / BMI > 40      Findings as based on:  [x ] Comprehensive nutrition assessment   [ ] Nutrition Focused Physical Exam  [x ] Other: 40 pound weight loss x 1 year, poor po intake, generalized muscle loss, catabolic state.      Nutrition Plan/Recommendations:    Soft diet with Glucerna Shakes 8 ounces x2 daily.      PROVIDER Section:     By signing this assessment you are acknowledging and agree with the diagnosis/diagnoses assigned by the Registered Dietitian    Comments:
impaired balance/impaired coordination/decreased strength/pain

## 2019-08-27 NOTE — ED PROVIDER NOTE - CADM POA URETHRAL CATHETER
CLINICAL NUTRITION SERVICES - REASSESSMENT NOTE    Recommendations Ordered by Registered Dietitian (RD):   Add Gelatein supplement w/ meals    Consider use of alternate means of nutrition in the next 2-3 days if condition does not improve.    Malnutrition:  % Weight Loss:  Weight loss does not meet criteria for malnutrition   % Intake:  </= 50% for >/= 5 days (severe malnutrition)  Subcutaneous Fat Loss:  Orbital region at least mild depletion  Muscle Loss:  Temporal region at least mild depletion and Clavicle bone region at least mild depletion  Fluid Retention:  Mild 2+    Malnutrition Diagnosis: Non-Severe malnutrition  In Context of:  Acute illness or injury  Chronic illness or disease     EVALUATION OF PROGRESS TOWARD GOALS   Diet: FLD (8/26) + Boost Plus TID w/ meals   CLD 8/25  NPO 8/23    Intake/Tolerance: 50% intakes recorded, occasional 75%, 100% recorded for liquid meals, though per CNA at bedside (sitter) he does not drink much Boost. (Boost provides 250 kcal, 14 g pro TID). Negligible Kcal/protein coming from other sources given restrictive nature of liquid diet. Confusion, abdominal pain ongoing - both likely large barriers to adequate PO intakes.     ASSESSED NUTRITION NEEDS:  Dosing Weight 81.9 kg (admit weight)  Estimated Energy Needs: 9140-2693 kcals (25-30 Kcal/Kg)  Justification: maintenance  Estimated Protein Needs: 82-98 grams protein (1-1.2 g pro/Kg)  Justification: Repletion and hypercatabolism with acute illness  Estimated Fluid Needs: 1 mL/Kcal  Justification: per provider pending fluid status    NEW FINDINGS:   - Labs: Lipase trending up, 1339 (H) today.   - Meds: MSSI, bowel program  D5 + NaCl IVF @ 75 mL/hr --> 90 g, 306 kcal daily from dextrose.    Previous Goals:   Pt's diet will advance in the next 24 hrs.  Evaluation: Not met    Previous Nutrition Diagnosis:   Predicted inadequate nutrient (protein-energy) intake related to altered mental status and suspected poor nutrient intake PTA    Evaluation: Completed - updated below     MALNUTRITION  % Weight Loss:  Weight loss does not meet criteria for malnutrition   % Intake:  </= 50% for >/= 5 days (severe malnutrition)  Subcutaneous Fat Loss:  Orbital region at least mild depletion  Muscle Loss:  Temporal region at least mild depletion and Clavicle bone region at least mild depletion  Fluid Retention:  Mild 2+    Malnutrition Diagnosis: Non-Severe malnutrition  In Context of:  Acute illness or injury  Chronic illness or disease    CURRENT NUTRITION DIAGNOSIS  Inadequate oral intake related to abdominal pain, altered mental status, diet restriction as evidenced by intakes meeting <50% x5 days since admission, FLD x2 days.     INTERVENTIONS  Recommendations / Nutrition Prescription  Continue diet per MD --> Low fat, FLD    Add Gelatein (20 g pro, low fat supplement)     Implementation  Collaboration and Referral of Nutrition care: discussed intakes w/ CNA at bedside.     Goals  Tolerance of diet >FLD vs conversations regarding alternate means of nutrition in the next 2-3 days.      MONITORING AND EVALUATION:  Progress towards goals will be monitored and evaluated per protocol and Practice Guidelines    Desirae Longo RD, LD  [facility], 66, 55, MH   Pager: [phone]  Weekend Pager: [phone]       No

## 2020-01-22 NOTE — PROGRESS NOTE ADULT - PROBLEM/PLAN-8
RML (Right Medio-Lateral)
DISPLAY PLAN FREE TEXT

## 2020-03-11 NOTE — DIETITIAN INITIAL EVALUATION ADULT. - WEIGHT CHANGE
CONSULTATION NOTE - GENERAL NEUROLOGY    CHIEF COMPLAINT    Neurology consulted for possible seizures.    HISTORY OF PRESENT ILLNES  Mr. 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  Pt's has a very large neck/throat mass that extends into the supraglottis and hypopharynx with upper airway occlusion.  Neurology was consulted as this am, pt was noted to have a clenched jaw, ever arm stiffening that was sustained for over 20 minutes.  He was given ativan 2 mg twice and loaded with fosphenytoin. Of note, pt is receiving 6 mg of ativan q 6 hours through the PEG as well as phenobarbital 32.4 mg q 8 hours.  Per records, phenobarb appears to have been started in last month.  Pt has no documentation that supports h/o seizures. Labs are significant for leukocytosis currently 17.1 (had been elevated as high as 41.3. Sodium is 150, hg 8.4, alk phos 163. Vital signs reveal temp of 97.2 to 99, HR , RR 10-29, BP 83/47 to 140/88, oxygen satuation of 100%.  Pt is currently receiving 80 mcg of propofol,  175 mcg of phenylephrine. Pt was loaded with fosphenytoin this am.      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 No pertinent surgical history.    Family History   Family history unknown: Yes       Social History     Tobacco Use   • Smoking status: Not on file   Substance Use Topics   • Alcohol use: Not Currently   • Drug use: Not Currently       INPATIENT MEDICATIONS:  Current Facility-Administered Medications   Medication   • LORazepam (ATIVAN) injection 2 mg   • potassium CHLORIDE 40 mEq in sodium chloride 0.9 % 250 mL IVPB   • potassium CHLORIDE (KLOR-CON) packet 40 mEq   • fosphenytoin (CEREBYX) 1,086 mg PE in sodium chloride 0.9 % 100 mL IVPB   • PHENYLephrine (LUCILA-SYNEPHRINE) 50 mg/250 mL in sodium chloride 0.9 % infusion   • lactated ringers bolus 500 mL   • thiamine (VITAMIN B1) tablet 100 mg   • folic acid (FOLATE) tablet 1 mg   • LORazepam (ATIVAN) tablet 6 mg   • fentaNYL (SUBLIMAZE) injection 25 mcg   • ipratropium-albuterol (DUONEB) 0.5-2.5 (3) MG/3ML nebulizer solution 3 mL   • sodium chloride 0.9% infusion   • potassium CHLORIDE (KLOR-CON) packet 40 mEq   • sodium chloride 0.9 % flush bag 25 mL   • sodium chloride (PF) 0.9 % injection 2 mL   • vancomycin (VANCOCIN) 1,000 mg in sodium chloride 0.9 % 250 mL IVPB   • insulin regular (human) (HumuLIN R) 100 units in sodium chloride 0.9% 100 mL infusion   • dextrose 50 % injection 25 g   • dextrose 50 % injection 12.5 g   • dextrose 5 % infusion   • glucagon (GLUCAGEN) injection 1 mg   • dextrose (GLUTOSE) 40 % gel 15 g   • dextrose (GLUTOSE) 40 % gel 30 g   • chlorhexidine gluconate (PERIDEX) 0.12 % solution 15 mL    And   • chlorhexidine gluconate (PERIDEX) 0.12 % solution 15 mL   • petrolatum (white)-mineral oil ophthalmic ointment 1 application   • VANCOMYCIN - PHARMACIST MONITORED   • famotidine (PEPCID) tablet 20 mg   • ampicillin-sulbactam (UNASYN) 1.5 g in sodium chloride 0.9 % 100 mL IVPB   • potassium CHLORIDE (KLOR-CON) packet 40 mEq   • budesonide (PULMICORT) nebulizer suspension 0.5 mg   • formoterol (PERFOROMIST) inhalation  solution 20 mcg   • scopolamine (TRANSDERM_SCOP) 1 MG/3DAYS patch 1 patch   • sodium chloride (PF) 0.9 % injection 10 mL   • sodium chloride (PF) 0.9 % injection 10 mL   • PHENobarbital (LUMINAL) tablet 32.4 mg   • fentaNYL (SUBLIMAZE) 2,500 mcg/250 mL in sodium chloride 0.9 % infusion       HOME MEDICATIONS:  Medications Prior to Admission   Medication Sig Dispense Refill   • Acetaminophen Childrens 160 MG/5ML Solution Take 650 mg by mouth every 6 hours.     • bisacodyl (DULCOLAX) 10 MG suppository Place 10 mg rectally daily.     • budesonide (PULMICORT) 0.5 MG/2ML nebulizer suspension Inhale 0.5 mg into the lungs 2 times daily.     • fentaNYL, PF, 100 MCG/2ML injectable solution Inject 25 mcg into the vein every 2 hours as needed for Agitation.     • formoterol (PERFOROMIST) 20 MCG/2ML inhalation solution Inhale 20 mcg into the lungs every 12 hours.     • Heparin Sodium, Porcine, (HEPARIN, PORCINE,) 5000 UNIT/ML injectable solution Inject 5,000 Units into the skin every 8 hours.     • LORazepam (ATIVAN) 2 MG/ML injectable solution Inject 1 mg into the vein every hour as needed.     • Morphine Sulfate (MORPHINE, PF,) 2 MG/ML injection Inject 1 mg into the vein every 4 hours as needed.     • ondansetron (ZOFRAN) 4 MG/2ML injectable solution Inject 4 mg into the vein every 6 hours as needed.     • Pancrelipase, Lip-Prot-Amyl, 4200 units per capsule 1 capsule by Per NG tube route as needed for Other. Feeding tube declogging     • pantoprazole (PROTONIX) 40 MG injection Inject 40 mg into the vein daily.     • PHENobarbital (LUMINAL) 32.4 MG tablet Take 32.4 mg by mouth every 8 hours.     • scopolamine (TRANSDERM_SCOP) 1 MG/3DAYS patch Place 1 patch onto the skin every 72 hours.     • docusate sodium-sennosides (SENOKOT S) 50-8.6 MG per tablet Take 2 tablets by mouth at bedtime as needed.     • sodium bicarbonate 325 MG tablet Take 325 mg by mouth as needed for Other. Feeding tube declogging          ALLERGIES:  Allergies as of 03/09/2020   • (No Known Allergies)        REVIEW OF SYSTEMS :   10 point review of systems was unremarkable other than as documented in the HPI.    PHYSICAL EXAM  Vital Last Value 24 Hour Range   Temperature 97.9 °F (36.6 °C) (03/10/20 2000) Temp  Min: 97.2 °F (36.2 °C)  Max: 99 °F (37.2 °C)   Pulse (!) 115 (03/11/20 0600) Pulse  Min: 70  Max: 129   Respiratory (!) 22 (03/11/20 0600) Resp  Min: 10  Max: 29   Non-Invasive  Blood Pressure 132/81 (03/11/20 0600) BP  Min: 83/49  Max: 140/82   Pulse Oximetry 100 % (03/11/20 0702) SpO2  Min: 100 %  Max: 100 %   Arterial   Blood Pressure   No data recorded     NEUROLOGIC:    (unconscious exam)  Mental status:  Eyes closed, sedated on propofol.  Not arousable by voice or soft touch.   Painful stimulation of nail bed pressure results in flicker of arms.    No speech made and no attempts to communicate.  Not following commands.   Cranial nerves:    CN I:  Not tested today  CN II and III:  .  Pupils equal with symmetric size and normal shape and react sluggishly ever.    CN III, IV, VI, VII, VIII:    Eyes midline  CN V and VII:  Corneal reflex present bilaterally and symmetric.   Eye closure was symmetric without any obvious facial asymmetry.  CN  IX, X:    Patient was noted to be coughing when suctioned by nursing./ Gag deferred due to oral bleeding.   CN XI:  Not able to be tested on this patient as pt not able to cooperate with examination  CN XII:  Not able to be tested on this patient due to ET tube  Lower medulla function:  Patient was breathing above the set rate on the ventilator.   Motor/sensory exam:  No spontaneous movements seen.  Tone is decreased in all 4 extremities and was symmetric.   nail bed pressure in bilateral upper resulted in purposeful withdrawal.   Lower extremities with no response. DTR's in the upper and lower extremity were 1/4. abscent achilles.  Plantar response was flexor bilaterally.  No decorticate or  decerebrate posturing noted.  Cerebellar exam:  No myoclonus, tremor or other abnormal movements noted.         LABORATORY  I have reviewed the pertinent laboratory tests:    Recent Labs   Lab 03/11/20  0600   WBC 17.1*   RBC 3.65*   HGB 10.6*   HCT 33.4*      NEUT 81   LYMP 8   MON 3   EOS 2     Recent Labs   Lab 03/11/20  0600   SODIUM 147*   POTASSIUM 2.5*   CHLORIDE 111*   CO2 32   BUN 10   CREATININE 0.52*   CALCIUM 10.3*   ALBUMIN 1.6*   BILIRUBIN 0.4   ALKPT 163*   GPT 36   AST 24   GLUCOSE 138*     Recent Labs   Lab 03/09/20  0625 03/09/20  0030   PTT  --  25   PT 12.3* 12.2*   INR 1.2 1.1     No results found  No results found for: HGBA1C]    IMAGING  I have reviewed the pertinent imaging/study reports.      CT NECK SOFT TISSUE W CONTRAST   Final Result      Very large heterogeneously enhancing masslike lesion extending from the level of the nasopharynx to the thoracic inlet.  Mass has increased in size significantly from the prior exam performed 01/30/2020.  The collection contains multiple fluid    attenuation areas which could represent necrosis or infected fluid.      Multiple necrotic lymph nodes or abscesses, greater on the right.      Diffuse opacification of the paranasal sinuses.      Thickened appearance of the upper thoracic esophagus.  This could be due to esophagitis.      Opacification of the right mastoid air cells.      Electronically Signed by: RASHAUN OJEDA M.D.    Signed on: 3/10/2020 1:00 AM          XR CHEST PA OR AP 1 VIEW   Final Result      No acute cardiopulmonary findings.      Electronically Signed by: TOM SORIANO M.D.    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  Pt's has a very large neck/throat mass that extends into the supraglottis and hypopharynx with upper airway occlusion.  Pt was noted to have a clenched jaw, ever arm stiffening that was sustained for over 20 minutes.     1) Possible seizure activity this am   Pt is already receiving 6 mg of ativan q 6 hours and phenobarb 32.4 q 8 which are seizure medications.   -no documentation to support h/o seizures.  -loaded with fosphenytoin    2) Left carotid stenosis (70%)  -pt is hypotensive         PLAN:  Continuous eeg   Phenobarbital level  CT scan brain   Dilantin 100 mg IV q 8   Dilantin level in am 3/12  Continue phenobarbital 32.4 q 8 for now  Avoid hypotension given know significant left carotid stenosis  Palliative care following     Discussed with ICU team.         .NOA Lezama   (available via [email or web address])  [facility] - Neurology Nurse Practitioner  Date: 3/11/2020   yes

## 2020-03-16 NOTE — PROGRESS NOTE ADULT - I WAS PHYSICALLY PRESENT FOR THE KEY PORTIONS OF THE EVALUATION AND MANAGEMENT (E/M) SERVICE PROVIDED.  I AGREE WITH THE ABOVE HISTORY, PHYSICAL, AND PLAN WHICH I HAVE REVIEWED AND EDITED WHERE APPROPRIATE
Statement Selected
Contact
Statement Selected

## 2020-08-20 PROBLEM — N18.3 CHRONIC RENAL INSUFFICIENCY, STAGE III (MODERATE): Status: ACTIVE | Noted: 2018-11-13

## 2020-09-08 ENCOUNTER — TRANSCRIPTION ENCOUNTER (OUTPATIENT)
Age: 85
End: 2020-09-08

## 2020-10-09 NOTE — PROGRESS NOTE ADULT - PROBLEM SELECTOR PLAN 9
C4D1 Ixazomib/Zometa  IV and Oral Medication Nursing Assessment Note      Date:  17    Name:  Mucha, Andrzej   :  1948    Diagnosis: Primary C90.02 - Multiple myeloma in relapse,  Diagnosed 2014  (Active)    Treatment Regimen:     Ixazomib/rev/dex    Toxicities:  The following toxicities were assessed as a 1:  Fatigue - Fatigue relieved by restPain - Mild pain    Vital Signs:  Performed on 2017 10:35  Height - 167.64 cms   Weight - 68 kg (HIGH)   BSA - 1.77 sq.m   BMI - 24.2000   Temperature - 98.7 F   Pulse - 87 /min   Respiration - 14 /min   BP - 107/67 mm(hg)   Pain - 0    Allergies:  No Known Allergies.  Allergies were reviewed.  No new allergies.    Medications:   The medication list was reviewed. No changes noted.    Oral Therapy Start Date:   3/23/17      Contraceptive Review:   NA: Patients spouse is post menopausal       Adherence Assessment:  Do you ever forget to take your medication    _____Yes  ___x__No If yes, please explain:  Did you skip your medication for any reason _____Yes  ___x__No If yes, please explain:  Sometimes if you feel worse when you take the medication, do you stop taking it        _____Yes  ___x__No If yes, please explain:  When you feel better do you sometimes stop taking your medication        _____Yes  __x___No If yes, please explain:  Adherence Evaluation: (1 point for each yes; High adherence =0, Medium adherence  =1-2, Low adherence =3-4)      Score:________0_________       x   yes    Note:      S:O:  Pt seen by Dr. Glover today.  Pt reports feeling well.  Reports back pain related to arthritis.  he will start PT as ordered by PCP.  Received orders to administer zometa today.  PIV started with clean stick, excellent blood return obtained.  Pt will start cycle 4 of  Ixazomib tomorrow.  Picked up rx from pharmacy today.    A.  Zometa administered today, pt tolerated treatment well.  Ixazomib will be started tomorrow.  Pt able to explain the oral  medication schedule and verbalizes taking medicaiton as prescribed.  Compliant with schedule   P: Calendar provided      Questions were answered and understanding was verbalized.  Electronically signed byMarjorie        No ac 2/2 to bleeding from suprapubic catheter  - c/w metop 50 mg ER

## 2021-03-04 NOTE — DIETITIAN INITIAL EVALUATION ADULT. - NS FNS CHANGE IN WEIGHT
Piero Fisher is here for oab  luts     PMD No Pcp    No results found for: PSA    Medications verified/updated.  known Latex allergy or symptoms of Latex sensitivity.  Social History     Tobacco Use   Smoking Status Current Some Day Smoker   • Packs/day: 0.00   Smokeless Tobacco Never Used       See International Prostate Symptom Score (I-PSS) Questionnaire results below for further urological assessment.    BPH SX's IN PAST MONTH 12/16/2019 3/4/2021   Incomplete Emptying (1-5) 5 1   Frequency (1-5) 1 0   Intermittency (1-5) 0 0   Urgency (1-5) 0 0   Stream (1-5) 0 0   Straining (1-5) 1 0   Comments AT NOC SOMETIMES  takes  a while to go   Nocturia (1-5) 3 2   Comments - up to  3 times   Total Score 10 3   Quality of Life 2=MOSTLY SATISFIED 1=PLEASED   Comments I COULD LIVE WITH IT -   Some recent data might be hidden         Hematuria: No  Burning: No  Incontinence: No  Pain: denies   Loss

## 2021-05-01 NOTE — PROCEDURE NOTE - NSURITECHNIQUE_GU_A_CORE
plan discussed with pt and daughter at bedside Discussed with patient in detail, daughter by phone, Hospitalist team and today's RN team. Discussed with patient in detail, family by phone, Wound Care ACP, Surgery PA and tonight's RN team. Case d/w RN. Case d/w RN. The collection bag is below the level of the patient and urinary bladder/The catheter was appropriately lubricated/The site was cleaned with soap/water and sterile solution (betadine)/All applicable medical record documentation is completed/Proper hand hygiene was performed/Sterile gloves were worn for the duration of the procedure/A sterile drape was used to cover all adjacent areas/The catheter was secured with a securement device (e.g. StatLock)/The urinary drainage system is closed at the end of the procedure Case d/w RN. plan discussed with pt and daughter.

## 2022-02-02 NOTE — H&P PST ADULT - BLOOD AVOIDANCE/RESTRICTIONS, PROFILE
[FreeTextEntry1] : The diabetic control has deteriorated slightly\par Advised to follow the diet and walk regularly\par The Trulicity dose will remain the same\par She has diabetic retinopathy\par The hyperlipidemia was fine except for the low HDL-C\par Will repeat the blood tests in 3 months\par The medications were renewed none

## 2022-09-12 NOTE — PATIENT PROFILE ADULT - HAS THE PATIENT RECEIVED THE INFLUENZA VACCINE THIS SEASON?
yes... Advancement-Rotation Flap Text: The defect edges were debeveled with a #15 scalpel blade.  Given the location of the defect, shape of the defect and the proximity to free margins an advancement-rotation flap was deemed most appropriate.  Using a sterile surgical marker, an appropriate flap was drawn incorporating the defect and placing the expected incisions within the relaxed skin tension lines where possible. The area thus outlined was incised deep to adipose tissue with a #15 scalpel blade.  The skin margins were undermined to an appropriate distance in all directions utilizing iris scissors.

## 2022-11-04 NOTE — PROCEDURE NOTE - NSINDICATIONS_GEN_A_CORE
called pt to let her know she would need to bring a disk for her appt or would need to reschedule. Pt became aggitated and said her insurance wouldn't cover another one done here at [facility]. Pt then hung up   dislodged suprapubic tube/other

## 2022-11-16 NOTE — ED ADULT NURSE NOTE - PRIMARY CARE PROVIDER
SSC met with patient/family at bedside. Patient experience rounding completed and reviewed the following.     Do you know your discharge plan? Yes       If yes, what is the plan? Home  If you are discharging home, do you have help at home? No  Do you think you will need help at home at discharge?  No     Have you discussed your needs and preferences with your SW/CM? Yes    Assigned SW/CM notified of any patient/family needs or concerns.    unknown

## 2022-11-30 NOTE — H&P PST ADULT - TEMPERATURE IN FAHRENHEIT (DEGREES F)
97.7
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
## 2023-04-18 NOTE — DISCHARGE NOTE ADULT - NURSING SECTION COMPLETE
Patient/Caregiver provided printed discharge information. Winlevi Pregnancy And Lactation Text: This medication is considered safe during pregnancy and breastfeeding.

## 2023-10-09 NOTE — H&P ADULT - NSHPPOAPULMEMBOLUS_GEN_A_CORE
"[facility] - 2nd Floor Mother/Baby Unit  Vaginal Delivery   Operative Note    SUMMARY     Normal spontaneous vaginal delivery of live infant, was placed on mothers abdomen for skin to skin and bulb suctioning performed.  Infant delivered position OA with episiotomy done for o .  Nuchal cord: No.    Spontaneous delivery of placenta and IV pitocin given noting good uterine tone.  No lacerations noted.  Patient tolerated delivery well. Sponge needle and lap counted correctly x2.    Indications: Pregnancy  Pregnancy complicated by:   Patient Active Problem List   Diagnosis    2. Hypothyroidism affecting pregnancy in third trimester    1. Primigravida of advanced maternal age in third trimester    Depression    Wears glasses    Anxiety    Disorder of thyroid    Low lying placenta nos or without hemorrhage, third trimester    Pregnancy     Admitting GA: 40w4d    Delivery Information for Anthony Miranda    Birth information:  YOB: 2023   Time of birth: 9:10 AM   Sex: male   Head Delivery Date/Time: 10/4/2023  9:10 AM   Delivery type: Vaginal, Spontaneous   Gestational Age: 40w4d        Delivery Providers    Delivering clinician: Jass Bloom MD   Provider Role    Alice Marrero RN Registered Nurse    Carli Stiles RN Registered Nurse    Audrey Mahan RN Registered Nurse    Socorro Tamayo RN Registered Nurse              Measurements    Weight: 3856 g  Weight (lbs): 8 lb 8 oz  Length: 54 cm  Length (in): 21.25"  Head circumference: 34 cm         Apgars    Living status: Living  Apgar Component Scores:  1 min.:  5 min.:  10 min.:  15 min.:  20 min.:    Skin color:  0  1  1      Heart rate:  2  2  2      Reflex irritability:  0  1  2      Muscle tone:  0  1  2      Respiratory effort:  1  1  2      Total:  3  6  9      Apgars assigned by: SHYLA MAHAN RN         Operative Delivery    Forceps attempted?: No  Vacuum extractor attempted?: Yes  Vacuum indications: Fetal Heart " Rate or Rhythm Abnormality, Maternal Fatigue  Vacuum type: MityVac  Vacuum application location: Outlet  First attempt time vacuum applied: 10/4/2023 09:06:40  Number of pop offs: 1  Number of pulls with vacuum: 1  High end pressure range (mmHg): 25  Total vacuum application time: 6 seconds  Vacuum applied by: DR. DEAN  Failed vacuum delivery?: No         Shoulder Dystocia    Shoulder dystocia present?: No           Presentation    Presentation: Compound  Position: Left Occiput Anterior           Interventions/Resuscitation    Method: Bulb Suctioning, Tactile Stimulation, Deep Suctioning, CPAP       Cord    Vessels: 3 vessels  Complications: Nuchal  Nuchal Intervention: reduced  Nuchal Cord Description: tight nuchal cord  Number of Loops: 2  Delayed Cord Clamping?: Yes  Cord Clamped Date/Time: 10/4/2023  9:12 AM  Cord Blood Disposition: Sent with Baby  Gases Sent?: No  Stem Cell Collection (by MD): No       Placenta    Placenta delivery date/time: 10/4/2023 0915  Placenta removal: Spontaneous  Placenta appearance: Intact  Placenta disposition: Discarded           Labor Events:       labor: No     Labor Onset Date/Time: 10/03/2023 15:57     Dilation Complete Date/Time: 10/04/2023 06:26     Start Pushing Date/Time: 10/04/2023 06:26     Rupture Date/Time: 10/03/23  1957         Rupture type: ARM (Artificial Rupture)         Fluid Amount:       Fluid Color: Clear       steroids: None     Antibiotics given for GBS: Yes     Induction: misoprostol     Indications for induction:  Post-term Gestation     Augmentation: amniotomy;oxytocin     Indications for augmentation: Ineffective Contraction Pattern     Labor complications: None     Additional complications:          Cervical ripening: 10/3/2023 10:13 AM      Misoprostol          Delivery:      Episiotomy: Median     Indication for Episiotomy: Instrumented Delivery;Fetal Macrosomia     Perineal Lacerations:   Repaired:      Periurethral Laceration:    Repaired:     Labial Laceration:   Repaired:     Sulcus Laceration:   Repaired:     Vaginal Laceration:   Repaired:     Cervical Laceration:   Repaired:     Repair suture:       Repair # of packets:       Last Value - EBL - Nursing (mL):       Sum - EBL - Nursing (mL): 0     Last Value - EBL - Anesthesia (mL):      Calculated QBL (mL): 175      Vaginal Sweep Performed:       Surgicount Correct:         Other providers:       Anesthesia    Method: Epidural          Details (if applicable):  Trial of Labor      Categorization:      Priority:     Indications for :     Incision Type:       Additional  information:  Forceps:    Vacuum:    Breech:    Observed anomalies    Other (Comments): sxn 10fr , cpap 9 minutes         no

## 2023-10-13 NOTE — ED PROVIDER NOTE - PROGRESS NOTE ADDITIONAL1
Spray Paint Technique: No Show Topical Anesthesia Variable?: Yes Medical Necessity Clause: This procedure was medically necessary because the lesions that were treated were: Detail Level: Detailed Medical Necessity Information: It is in your best interest to select a reason for this procedure from the list below. All of these items fulfill various CMS LCD requirements except the new and changing color options. Spray Paint Text: The liquid nitrogen was applied to the skin utilizing a spray paint frosting technique. Post-Care Instructions: I reviewed with the patient in detail post-care instructions. Patient is to wear sunprotection, and avoid picking at any of the treated lesions. Pt may apply Vaseline to crusted or scabbing areas. Consent: The patient's consent was obtained including but not limited to risks of crusting, scabbing, blistering, scarring, darker or lighter pigmentary change, recurrence, incomplete removal and infection. Additional Progress Note...

## 2023-12-12 NOTE — BRIEF OPERATIVE NOTE - PRIMARY SURGEON
[place] Quality 226: Preventive Care And Screening: Tobacco Use: Screening And Cessation Intervention: Patient screened for tobacco use, is a smoker AND received Cessation Counseling within measurement period or in the six months prior to the measurement period Quality 402: Tobacco Use And Help With Quitting Among Adolescents: Patient screened for tobacco and is a smoker AND received Cessation Counseling Detail Level: Detailed

## 2024-03-14 NOTE — DIETITIAN INITIAL EVALUATION ADULT. - ENERGY NEEDS
Ht: 69 Wt:198  pounds BMI: 29.2 kg/m2 IBW: 160 pounds(+/-10%)   No edema. No pressure ulcers documented. Alert and oriented to person, place and time

## 2024-04-23 NOTE — CONSULT NOTE ADULT - PROBLEM/RECOMMENDATION-1
DISPLAY PLAN FREE TEXT
DISPLAY PLAN FREE TEXT
You can access the FollowMyHealth Patient Portal offered by [facility] by registering at the following website: [email or web address]. By joining [facility]’s FollowMyHealth portal, you will also be able to view your health information using other applications (apps) compatible with our system.

## 2024-07-15 NOTE — DIETITIAN INITIAL EVALUATION ADULT. - WEIGHT CHANGE
call pt, her daughter has contacted me re pain rx, she  had gall bladder surgery and has only 5 pills of her narcotic pain reliever, she is also asking about baclofen.  I don't refill narcotic pain medication from surgery, baclofen would usually be fine to take.  Check with her.    yes

## 2024-10-14 NOTE — PHYSICAL THERAPY INITIAL EVALUATION ADULT - CRITERIA FOR SKILLED THERAPEUTIC INTERVENTIONS
vs confusion with test, inconsistently performs/mild impairment
impairments found/functional limitations in following categories/therapy frequency/rehab potential/anticipated discharge recommendation/predicted duration of therapy intervention/anticipated equipment needs at discharge/risk reduction/prevention

## 2024-12-17 NOTE — PATIENT PROFILE ADULT - NSPROCHRONICPAIN_GEN_A_NUR
[FreeTextEntry1] :  Assessment and Plan:  Viral Upper Respiratory Infection: Patient presents with symptoms consistent with a viral upper respiratory infection, including high fever, cough, and nasal congestion. Clear lung sounds and absence of ear infection support this diagnosis.     - Continue alternating Tylenol and Motrin every 3 hours for fever management      - Increase dosage of fever reducers based on weight (approximately 5.5 mL)      - Encourage fluid intake to prevent dehydration and thin mucus      - Monitor for worsening symptoms or development of new concerns      - Return for follow-up or seek immediate care if condition deteriorates      - Consider chest X-ray if symptoms persist or worsen to rule out pneumonia      - Educate parents on expected course of illness and signs of complications      - Follow up in 24-48 hours if fever persists or symptoms worsen    yes

## 2025-01-10 NOTE — DISCHARGE NOTE ADULT - HOSPITAL COURSE
January 10, 2025       Jyoti Lopez MD  [address] [place] [address]  Via Fax: [phone]      Patient: Zafar Frausto   YOB: 1958   Date of Visit: 1/10/2025       Dear Dr. Lopez:    I saw your patient, Zafar Frausto, for an evaluation. Below are my notes for this visit with him.    If you have questions, please do not hesitate to call me.      Sincerely,        Carmenza Murphy MD        CC: No Recipients    Carmenza Murphy MD  1/10/2025  3:42 PM  Signed  Surgical Post-operative Visit    Date of procedure:  12/11/2024  Procedure:  subtotal gastrectomy    Subjective: The patient comes to see me in follow-up.  he has been doing well.  he has no problems or issues.   he is eating well but has early satiety. He is having bowel movements.  he has no other complaints.   hehas no wound issues or drainage.   he overall feels well. he has no nausea or vomiting. he is no longer taking pain medication.    Objective: On exam, he is a well-appearing adult male in no acute distress.  his abdomen is soft and nondistended.  his incision is clean, dry, and intact.  There are no seromas or hematomas.  There are no palpable hernias or masses.      PATHOLOGY:  Collected 12/11/2024 09:34       Status: Final result       Visible to patient: Yes (not seen)       Dx: Malignant neoplasm of pyloric antrum ...    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  See comment and staging summary below.  -Lymphovascular invasion present.  -Proximal margin positive for adenocarcinoma.  -Distal and radial margins negative for malignancy; invasive carcinoma is 3 mm from closest distal margin.  -Tumor involves muscularis propria.  -Remaining gastric mucosa with chronic gastritis and intestinal metaplasia.  -Three (3) out of twenty seven (27)  lymph nodes positive for metastatic adenocarcinoma.      Electronically signed by Rosalva Flores MD on 12/13/2024 at 0913   Comment    Tumor involves cardia (estimated 2.8 in greatest dimension) and antrum (estimated 3.4 cm in greatest dimension).   Synoptic Checklist   STOMACH   8th Edition - Protocol posted: 3/22/2023STOMACH: LOCAL RESECTION, GASTRECTOMY (NOTE A) - All Specimens  SPECIMEN   Procedure  Subtotal gastrectomy   TUMOR   Tumor Site  Cardia     Antrum   Histologic Type  Adenocarcinoma   Adenocarcinoma Classification (based on WHO)  Poorly cohesive carcinoma (includes signet-ring cell carcinoma and other variants)   Charis Classification of Adenocarcinoma  Diffuse type (includes signet-ring carcinoma, classified as greater than 50% signet-ring cells)   Histologic Grade  G3, poorly differentiated, undifferentiated   Tumor Size  Greatest Dimension (Centimeters): 3.4 cm   Tumor Extent  Invades muscularis propria   Treatment Effect  Absent, with extensive residual cancer and no evident tumor regression (poor or no response, score 3)   Lymphatic and / or Vascular Invasion  Present   Perineural Invasion  Not identified   MARGINS   Margin Status for Invasive Carcinoma  Invasive carcinoma present at margin   Margin(s) Involved by Invasive Carcinoma  Proximal   Margin Status for Dysplasia  All margins negative for dysplasia   REGIONAL LYMPH NODES   Regional Lymph Node Status  Tumor present in regional lymph node(s)   Number of Lymph Nodes with Tumor  3   Number of Lymph Nodes Examined  27               Assessment: Post-operative Visit    Plan:   The patient is doing well postoperatively.  I anticipate he will continue to do so.   he can return to normal activities.  We reviewed his pathology, which showed node-positive disease. I also discussed the proximal margin being microscopically positive. This was unexpected given his antral tumor, but did show extension that proximally. He will start chemo next  week. It may be prudent to consider radiation as well. I will see him back in 6mo, when I will likely recommend EGD surveillance as well.  I answered all questions by the end of our visit.    Thank you for involving me in his care.    -Carmenza Murphy MD  General Surgery and Surgical Oncology     pt is a 84 yo male with a pmh of bph, prostate cancers s/p seeds, recent prostate abscess s/p drainage, urinary rentention requiring spt placement one month ago, htn, afib on eliquis and avr on asa who arrived at [facility] with gross hematuria He was admitted for observation. He was not a candidate for cbi. He required 2 u prbc for acute blood loss anemia due to gross hematuria. He was hyponatremic upon arrival and it normalized with normal saline. He was being worked up as abn outpt for possible spread of prostate cancer vs spread of prostate abscess due to worsening penile pain. He underwent prostate and penile biopsy under anesthesia while admitted.   post op he did well. his vitals were stable, he tolerated diet, his pain was controlled, he ambulated  and his  labs were stable.  He is following up with his cardiologist on Wednesday. If his urine should remain clear and the cardiologist wishes, he may restart his Eliquis then.

## 2025-03-12 NOTE — ED ADULT NURSE NOTE - ABDOMEN
Patient came to clinic for anticoagulation monitoring.  Last INR on 2/26/25 was 2.1.  One time dose increase. Otherwise, dose maintained.   Today's INR is 3.5 and is within goal range.  Reports going to [employer] party this past Saturday and had beer. Current warfarin total weekly dose of 30 mg verified.  Informed the INR result is within therapeutic range and instructed to maintain current dose per protocol. Discussed dose and return date of 4/2/25 for next INR. See Anticoagulation flowsheet.    Yasmeen Tinsley NP is in the office today supervising the treatment.    Instructed to contact the clinic with any unusual bruising, any changes in medications, diet, health status, lifestyle, or any other changes, questions or concerns. Verbalized understanding of all discussed.     
wife says he "hasn't had a bowel movement in weeks"/firm
